# Patient Record
Sex: MALE | Race: WHITE | NOT HISPANIC OR LATINO | Employment: OTHER | URBAN - METROPOLITAN AREA
[De-identification: names, ages, dates, MRNs, and addresses within clinical notes are randomized per-mention and may not be internally consistent; named-entity substitution may affect disease eponyms.]

---

## 2017-05-15 ENCOUNTER — ALLSCRIPTS OFFICE VISIT (OUTPATIENT)
Dept: OTHER | Facility: OTHER | Age: 57
End: 2017-05-15

## 2017-05-15 PROCEDURE — 88305 TISSUE EXAM BY PATHOLOGIST: CPT | Performed by: SURGERY

## 2017-05-17 ENCOUNTER — LAB REQUISITION (OUTPATIENT)
Dept: LAB | Facility: HOSPITAL | Age: 57
End: 2017-05-17
Payer: COMMERCIAL

## 2017-05-17 DIAGNOSIS — L98.9 DISORDER OF SKIN OR SUBCUTANEOUS TISSUE: ICD-10-CM

## 2017-11-22 RX ORDER — RANITIDINE HCL 75 MG
150 TABLET ORAL 2 TIMES DAILY
COMMUNITY
End: 2019-10-12 | Stop reason: HOSPADM

## 2017-11-22 RX ORDER — SIMVASTATIN 40 MG
40 TABLET ORAL EVERY OTHER DAY
COMMUNITY

## 2017-11-24 ENCOUNTER — ANESTHESIA EVENT (OUTPATIENT)
Dept: GASTROENTEROLOGY | Facility: HOSPITAL | Age: 57
End: 2017-11-24
Payer: COMMERCIAL

## 2017-11-27 ENCOUNTER — HOSPITAL ENCOUNTER (OUTPATIENT)
Facility: HOSPITAL | Age: 57
Setting detail: OUTPATIENT SURGERY
Discharge: HOME | End: 2017-11-27
Attending: INTERNAL MEDICINE | Admitting: INTERNAL MEDICINE
Payer: COMMERCIAL

## 2017-11-27 ENCOUNTER — ANESTHESIA (OUTPATIENT)
Dept: GASTROENTEROLOGY | Facility: HOSPITAL | Age: 57
End: 2017-11-27
Payer: COMMERCIAL

## 2017-11-27 VITALS
RESPIRATION RATE: 20 BRPM | SYSTOLIC BLOOD PRESSURE: 133 MMHG | HEIGHT: 69 IN | BODY MASS INDEX: 46.65 KG/M2 | WEIGHT: 315 LBS | DIASTOLIC BLOOD PRESSURE: 62 MMHG | TEMPERATURE: 97.8 F | HEART RATE: 73 BPM | OXYGEN SATURATION: 95 %

## 2017-11-27 RX ORDER — ONDANSETRON 2 MG/ML
INJECTION INTRAMUSCULAR; INTRAVENOUS AS NEEDED
Status: DISCONTINUED | OUTPATIENT
Start: 2017-11-27 | End: 2017-11-27 | Stop reason: SURG

## 2017-11-27 RX ORDER — SODIUM CHLORIDE 9 MG/ML
125 INJECTION, SOLUTION INTRAVENOUS CONTINUOUS
Status: DISCONTINUED | OUTPATIENT
Start: 2017-11-27 | End: 2017-11-27 | Stop reason: HOSPADM

## 2017-11-27 RX ORDER — DEXAMETHASONE SODIUM PHOSPHATE 4 MG/ML
INJECTION, SOLUTION INTRA-ARTICULAR; INTRALESIONAL; INTRAMUSCULAR; INTRAVENOUS; SOFT TISSUE AS NEEDED
Status: DISCONTINUED | OUTPATIENT
Start: 2017-11-27 | End: 2017-11-27 | Stop reason: SURG

## 2017-11-27 RX ORDER — SUCCINYLCHOLINE CHLORIDE 20 MG/ML
INJECTION INTRAMUSCULAR; INTRAVENOUS AS NEEDED
Status: DISCONTINUED | OUTPATIENT
Start: 2017-11-27 | End: 2017-11-27 | Stop reason: SURG

## 2017-11-27 RX ORDER — EPHEDRINE SULFATE 50 MG/ML
INJECTION, SOLUTION INTRAVENOUS AS NEEDED
Status: DISCONTINUED | OUTPATIENT
Start: 2017-11-27 | End: 2017-11-27 | Stop reason: SURG

## 2017-11-27 RX ORDER — LIDOCAINE HYDROCHLORIDE 10 MG/ML
INJECTION, SOLUTION INFILTRATION; PERINEURAL AS NEEDED
Status: DISCONTINUED | OUTPATIENT
Start: 2017-11-27 | End: 2017-11-27 | Stop reason: SURG

## 2017-11-27 RX ORDER — PROPOFOL 10 MG/ML
INJECTION, EMULSION INTRAVENOUS AS NEEDED
Status: DISCONTINUED | OUTPATIENT
Start: 2017-11-27 | End: 2017-11-27 | Stop reason: SURG

## 2017-11-27 RX ORDER — ROCURONIUM BROMIDE 10 MG/ML
INJECTION, SOLUTION INTRAVENOUS AS NEEDED
Status: DISCONTINUED | OUTPATIENT
Start: 2017-11-27 | End: 2017-11-27 | Stop reason: SURG

## 2017-11-27 RX ADMIN — DEXAMETHASONE SODIUM PHOSPHATE 4 MG: 4 INJECTION, SOLUTION INTRAMUSCULAR; INTRAVENOUS at 08:34

## 2017-11-27 RX ADMIN — EPHEDRINE SULFATE 10 MG: 50 INJECTION, SOLUTION INTRAMUSCULAR; INTRAVENOUS; SUBCUTANEOUS at 08:45

## 2017-11-27 RX ADMIN — ROCURONIUM BROMIDE 5 MG: 10 INJECTION INTRAVENOUS at 08:30

## 2017-11-27 RX ADMIN — ONDANSETRON HYDROCHLORIDE 4 MG: 2 INJECTION, SOLUTION INTRAVENOUS at 08:36

## 2017-11-27 RX ADMIN — SUCCINYLCHOLINE CHLORIDE 100 MG: 20 INJECTION, SOLUTION INTRAMUSCULAR; INTRAVENOUS at 08:30

## 2017-11-27 RX ADMIN — PROPOFOL 300 MG: 10 INJECTION, EMULSION INTRAVENOUS at 08:30

## 2017-11-27 RX ADMIN — PROPOFOL 100 MG: 10 INJECTION, EMULSION INTRAVENOUS at 08:35

## 2017-11-27 RX ADMIN — SODIUM CHLORIDE: 0.9 INJECTION, SOLUTION INTRAVENOUS at 08:13

## 2017-11-27 RX ADMIN — LIDOCAINE HYDROCHLORIDE 50 MG: 10 INJECTION, SOLUTION INFILTRATION; PERINEURAL at 08:30

## 2017-11-27 RX ADMIN — SODIUM CHLORIDE 125 ML/HR: 0.9 INJECTION, SOLUTION INTRAVENOUS at 07:32

## 2017-11-27 NOTE — DISCHARGE INSTRUCTIONS
Discharge Summary - Rogelio Muhmamad 65 y o  male MRN: [id number]     [id number]    Date: 11/27/2017     Surgeon:  Fabiola Steven MD    Procedures Performed:  Colonoscopy    Discharge Diagnosis:  Sigmoid diverticulosis    Condition at Discharge: stable    Results and outcomes were reviewed with patient/family and questions answered  Discharge instructions were provided to patient on mobility limitations, signs and symptoms of potential complications, medication plan, treatment plan, and home safety  Patient discharged to: Home    Follow up instructions: See discharge instructions  Colonoscopy in 10 years as part of routine colon cancer screening    Diverticulosis   WHAT YOU NEED TO KNOW:   Diverticulosis is a condition that causes small pockets called diverticula to form in your intestine  These pockets make it difficult for bowel movements to pass through your digestive system  DISCHARGE INSTRUCTIONS:   Seek care immediately if:   · You have severe pain on the left side of your lower abdomen  · Your bowel movements are bright or dark red  Contact your healthcare provider if:   · You have a fever and chills  · You feel dizzy or lightheaded  · You have nausea, or you are vomiting  · You have a change in your bowel movements  · You have questions or concerns about your condition or care  Medicines:   · Medicines  to soften your bowel movements may be given  You may also need medicines to treat symptoms such as bloating and pain  · Take your medicine as directed  Contact your healthcare provider if you think your medicine is not helping or if you have side effects  Tell him or her if you are allergic to any medicine  Keep a list of the medicines, vitamins, and herbs you take  Include the amounts, and when and why you take them  Bring the list or the pill bottles to follow-up visits  Carry your medicine list with you in case of an emergency  Self-care:   The goal of treatment is to manage any symptoms you have and prevent other problems such as diverticulitis  Diverticulitis is swelling or infection of the diverticula  Your healthcare provider may recommend any of the following:  · Eat a variety of high-fiber foods  High-fiber foods help you have regular bowel movements  High-fiber foods include cooked beans, fruits, vegetables, and some cereals  Most adults need 25 to 35 grams of fiber each day  Your healthcare provider may recommend that you have more  Ask your healthcare provider how much fiber you need  Increase fiber slowly  You may have abdominal discomfort, bloating, and gas if you add fiber to your diet too quickly  You may need to take a fiber supplement if you are not getting enough fiber from food  · Drink liquids as directed  You may need to drink 2 to 3 liters (8 to 12 cups) of liquids every day  Ask your healthcare provider how much liquid to drink each day and which liquids are best for you  · Apply heat  on your abdomen for 20 to 30 minutes every 2 hours for as many days as directed  Heat helps decrease pain and muscle spasms  Help prevent diverticulitis or other symptoms: The following may help decrease your risk for diverticulitis or symptoms, such as bleeding  Talk to your provider about these or other things you can do to prevent problems that may occur with diverticulosis  · Exercise regularly  Ask your healthcare provider about the best exercise plan for you  Exercise can help you have regular bowel movements  Get 30 minutes of exercise on most days of the week  · Maintain a healthy weight  Ask your healthcare provider how much you should weigh  Ask him or her to help you create a weight loss plan if you are overweight  · Do not smoke  Nicotine and other chemicals in cigarettes increase your risk for diverticulitis  Ask your healthcare provider for information if you currently smoke and need help to quit   E-cigarettes or smokeless tobacco still contain nicotine  Talk to your healthcare provider before you use these products  · Ask your healthcare provider if it is safe to take NSAIDs  NSAIDs may increase your risk of diverticulitis  Follow up with your healthcare provider as directed:  Write down your questions so you remember to ask them during your visits  © 2017 [facility] Information is for End User's use only and may not be sold, redistributed or otherwise used for commercial purposes  All illustrations and images included in CareNotes® are the copyrighted property of [employer]  or Anibal Hunt  The above information is an  only  It is not intended as medical advice for individual conditions or treatments  Talk to your doctor, nurse or pharmacist before following any medical regimen to see if it is safe and effective for you

## 2017-11-27 NOTE — PRE-PROCEDURE INSTRUCTIONS
Patient very anxious because he was told that he would receive general anesthesia  Spoke with Dr Kel Woody and anxiety relieved  Instructions understood and call light within reach

## 2017-11-27 NOTE — OP NOTE
OPERATIVE REPORT  PATIENT NAME: Dustin Cazares    :  1960  MRN: [id number]  Pt Location: [facility] ROOM 01    SURGERY DATE: 2017    Surgeon(s) and Role:     * Kuldip Cobos MD - Primary    Preop Diagnosis:  Encounter for screening for malignant neoplasm of colon [Z12 11]    Post-Op Diagnosis Codes:     * Encounter for screening for malignant neoplasm of colon [Z12 11]     * Diverticulosis [K57 90]    Procedure(s) (LRB):  COLONOSCOPY (N/A)    Specimen(s):  * No specimens in log *    Estimated Blood Loss:   0 cc    Anesthesia Type:   IV Sedation with Anesthesia    Operative Indications:  Encounter for screening for malignant neoplasm of colon [Z12 11]    Operative Findings:  Diverticulosis    Complications:   None    Procedure and Technique: With the patient in the left lateral decubitus position, and under general endotracheal intubation, the colonoscope was introduced into the rectum following the rectal digital exam was normal  It is easily passed the entire colon to the cecum where the ileocecal valve and surface were identified  The preparation was excellent  The only finding on today's exam is that of moderate sigmoid diverticulosis  The instrument was withdrawn  Patient tolerated the procedure well  Recommendation:  Colonoscopy in 10 years as part of routine colon cancer screening     I was present for the entire procedure    Patient Disposition:  PACU     SIGNATURE: Kuldip Cobos MD  DATE: 2017  TIME: 8:50 AM

## 2017-11-27 NOTE — PROGRESS NOTES
D/C instructions given and Pt verbalizes understanding  Dr Austin Rush was here to talk with pt  OOB to ambulate, gait steady denies dizziness

## 2017-11-27 NOTE — ANESTHESIA PREPROCEDURE EVALUATION
Review of Systems/Medical History  Patient summary reviewed  Chart reviewed  History of anesthetic complications (sleep apnea requiring intubation)     Cardiovascular  Exercise tolerance: poor,  Hyperlipidemia,    Pulmonary  Negative pulmonary ROS Asthma: , Sleep apnea CPAP, ,        GI/Hepatic    GERD poorly controlled,        Negative  ROS        Endo/Other  Negative endo/other ROS      GYN  Negative gynecology ROS          Hematology  Negative hematology ROS      Musculoskeletal  Negative musculoskeletal ROS Obesity  morbid obesity,        Neurology  Negative neurology ROS      Psychology   Negative psychology ROS            Physical Exam    Airway    Mallampati score: II  TM Distance: <3 FB  Neck ROM: full     Dental   No notable dental hx     Cardiovascular  Rhythm: regular, Rate: normal,     Pulmonary  Breath sounds clear to auscultation,     Other Findings        Anesthesia Plan  ASA Score- 3       Anesthesia Type- IV sedation with anesthesia and general with ASA Monitors  Additional Monitors:   Airway Plan: ETT  Induction- intravenous  Informed Consent- Anesthetic plan and risks discussed with patient

## 2018-01-12 VITALS — WEIGHT: 315 LBS | BODY MASS INDEX: 46.65 KG/M2 | HEIGHT: 69 IN

## 2018-04-20 ENCOUNTER — ANESTHESIA EVENT (OUTPATIENT)
Dept: PERIOP | Facility: HOSPITAL | Age: 58
End: 2018-04-20
Payer: COMMERCIAL

## 2018-04-23 ENCOUNTER — ANESTHESIA (OUTPATIENT)
Dept: PERIOP | Facility: HOSPITAL | Age: 58
End: 2018-04-23
Payer: COMMERCIAL

## 2018-04-23 ENCOUNTER — HOSPITAL ENCOUNTER (OUTPATIENT)
Dept: RADIOLOGY | Facility: HOSPITAL | Age: 58
Setting detail: OUTPATIENT SURGERY
Discharge: HOME | End: 2018-04-23
Payer: COMMERCIAL

## 2018-04-23 ENCOUNTER — HOSPITAL ENCOUNTER (OUTPATIENT)
Facility: HOSPITAL | Age: 58
Setting detail: OUTPATIENT SURGERY
Discharge: HOME | End: 2018-04-23
Attending: PODIATRIST | Admitting: PODIATRIST
Payer: COMMERCIAL

## 2018-04-23 ENCOUNTER — APPOINTMENT (OUTPATIENT)
Dept: RADIOLOGY | Facility: HOSPITAL | Age: 58
End: 2018-04-23
Payer: COMMERCIAL

## 2018-04-23 VITALS
TEMPERATURE: 98.8 F | HEIGHT: 69 IN | BODY MASS INDEX: 46.65 KG/M2 | SYSTOLIC BLOOD PRESSURE: 138 MMHG | WEIGHT: 315 LBS | RESPIRATION RATE: 19 BRPM | OXYGEN SATURATION: 97 % | DIASTOLIC BLOOD PRESSURE: 76 MMHG | HEART RATE: 72 BPM

## 2018-04-23 DIAGNOSIS — M66.861 SPONTANEOUS RUPTURE OF OTHER TENDONS, RIGHT LOWER LEG: ICD-10-CM

## 2018-04-23 DIAGNOSIS — M79.671 RIGHT FOOT PAIN: Primary | ICD-10-CM

## 2018-04-23 PROCEDURE — C1713 ANCHOR/SCREW BN/BN,TIS/BN: HCPCS | Performed by: PODIATRIST

## 2018-04-23 PROCEDURE — 73650 X-RAY EXAM OF HEEL: CPT

## 2018-04-23 DEVICE — TENO SCRW,BIO-COMP
Type: IMPLANTABLE DEVICE | Site: ACHILLES TENDON | Status: FUNCTIONAL
Brand: ARTHREX®

## 2018-04-23 DEVICE — IMPL SYS,BIO-COMP ACHILLES MID-SUBSTANCE
Type: IMPLANTABLE DEVICE | Site: ACHILLES TENDON | Status: FUNCTIONAL
Brand: ARTHREX®

## 2018-04-23 RX ORDER — EPHEDRINE SULFATE 50 MG/ML
INJECTION, SOLUTION INTRAVENOUS AS NEEDED
Status: DISCONTINUED | OUTPATIENT
Start: 2018-04-23 | End: 2018-04-23 | Stop reason: SURG

## 2018-04-23 RX ORDER — OXYCODONE HYDROCHLORIDE 5 MG/1
5 TABLET ORAL EVERY 4 HOURS PRN
Status: DISCONTINUED | OUTPATIENT
Start: 2018-04-23 | End: 2018-04-23 | Stop reason: HOSPADM

## 2018-04-23 RX ORDER — ROPIVACAINE HYDROCHLORIDE 5 MG/ML
INJECTION, SOLUTION EPIDURAL; INFILTRATION; PERINEURAL AS NEEDED
Status: DISCONTINUED | OUTPATIENT
Start: 2018-04-23 | End: 2018-04-23 | Stop reason: SURG

## 2018-04-23 RX ORDER — OXYCODONE HYDROCHLORIDE AND ACETAMINOPHEN 5; 325 MG/1; MG/1
1 TABLET ORAL EVERY 4 HOURS PRN
Qty: 35 TABLET | Refills: 0 | Status: SHIPPED | OUTPATIENT
Start: 2018-04-23 | End: 2018-05-03

## 2018-04-23 RX ORDER — ACETAMINOPHEN 325 MG/1
650 TABLET ORAL EVERY 4 HOURS PRN
Status: DISCONTINUED | OUTPATIENT
Start: 2018-04-23 | End: 2018-04-23 | Stop reason: HOSPADM

## 2018-04-23 RX ORDER — OXYCODONE HYDROCHLORIDE 5 MG/1
10 TABLET ORAL EVERY 6 HOURS PRN
Status: DISCONTINUED | OUTPATIENT
Start: 2018-04-23 | End: 2018-04-23 | Stop reason: HOSPADM

## 2018-04-23 RX ORDER — SODIUM CHLORIDE 9 MG/ML
125 INJECTION, SOLUTION INTRAVENOUS CONTINUOUS
Status: DISCONTINUED | OUTPATIENT
Start: 2018-04-23 | End: 2018-04-23 | Stop reason: HOSPADM

## 2018-04-23 RX ORDER — LIDOCAINE HYDROCHLORIDE 20 MG/ML
INJECTION, SOLUTION EPIDURAL; INFILTRATION; INTRACAUDAL; PERINEURAL AS NEEDED
Status: DISCONTINUED | OUTPATIENT
Start: 2018-04-23 | End: 2018-04-23 | Stop reason: SURG

## 2018-04-23 RX ORDER — MAGNESIUM HYDROXIDE 1200 MG/15ML
LIQUID ORAL AS NEEDED
Status: DISCONTINUED | OUTPATIENT
Start: 2018-04-23 | End: 2018-04-23 | Stop reason: HOSPADM

## 2018-04-23 RX ORDER — ONDANSETRON 2 MG/ML
INJECTION INTRAMUSCULAR; INTRAVENOUS AS NEEDED
Status: DISCONTINUED | OUTPATIENT
Start: 2018-04-23 | End: 2018-04-23 | Stop reason: SURG

## 2018-04-23 RX ORDER — ONDANSETRON 2 MG/ML
4 INJECTION INTRAMUSCULAR; INTRAVENOUS ONCE AS NEEDED
Status: DISCONTINUED | OUTPATIENT
Start: 2018-04-23 | End: 2018-04-23 | Stop reason: HOSPADM

## 2018-04-23 RX ORDER — ONDANSETRON 4 MG/1
4 TABLET, FILM COATED ORAL EVERY 8 HOURS PRN
Qty: 25 TABLET | Refills: 0 | Status: ON HOLD | OUTPATIENT
Start: 2018-04-23 | End: 2018-07-23 | Stop reason: ALTCHOICE

## 2018-04-23 RX ORDER — OMEPRAZOLE 20 MG/1
20 CAPSULE, DELAYED RELEASE ORAL 2 TIMES DAILY
Qty: 70 CAPSULE | Refills: 0 | Status: ON HOLD | OUTPATIENT
Start: 2018-04-23 | End: 2018-07-23 | Stop reason: ALTCHOICE

## 2018-04-23 RX ORDER — ASPIRIN 81 MG/1
81 TABLET, CHEWABLE ORAL 2 TIMES DAILY
Qty: 70 TABLET | Refills: 0 | Status: ON HOLD | OUTPATIENT
Start: 2018-04-23 | End: 2018-07-23 | Stop reason: ALTCHOICE

## 2018-04-23 RX ORDER — ONDANSETRON 2 MG/ML
4 INJECTION INTRAMUSCULAR; INTRAVENOUS EVERY 6 HOURS PRN
Status: DISCONTINUED | OUTPATIENT
Start: 2018-04-23 | End: 2018-04-23 | Stop reason: HOSPADM

## 2018-04-23 RX ORDER — MIDAZOLAM HYDROCHLORIDE 1 MG/ML
INJECTION INTRAMUSCULAR; INTRAVENOUS AS NEEDED
Status: DISCONTINUED | OUTPATIENT
Start: 2018-04-23 | End: 2018-04-23 | Stop reason: SURG

## 2018-04-23 RX ORDER — PROPOFOL 10 MG/ML
INJECTION, EMULSION INTRAVENOUS AS NEEDED
Status: DISCONTINUED | OUTPATIENT
Start: 2018-04-23 | End: 2018-04-23 | Stop reason: SURG

## 2018-04-23 RX ORDER — SUCCINYLCHOLINE CHLORIDE 20 MG/ML
INJECTION INTRAMUSCULAR; INTRAVENOUS AS NEEDED
Status: DISCONTINUED | OUTPATIENT
Start: 2018-04-23 | End: 2018-04-23 | Stop reason: SURG

## 2018-04-23 RX ORDER — FENTANYL CITRATE 50 UG/ML
INJECTION, SOLUTION INTRAMUSCULAR; INTRAVENOUS AS NEEDED
Status: DISCONTINUED | OUTPATIENT
Start: 2018-04-23 | End: 2018-04-23 | Stop reason: SURG

## 2018-04-23 RX ORDER — METOCLOPRAMIDE HYDROCHLORIDE 5 MG/ML
10 INJECTION INTRAMUSCULAR; INTRAVENOUS ONCE AS NEEDED
Status: DISCONTINUED | OUTPATIENT
Start: 2018-04-23 | End: 2018-04-23 | Stop reason: HOSPADM

## 2018-04-23 RX ORDER — SCOLOPAMINE TRANSDERMAL SYSTEM 1 MG/1
1 PATCH, EXTENDED RELEASE TRANSDERMAL
Status: DISCONTINUED | OUTPATIENT
Start: 2018-04-23 | End: 2018-04-23 | Stop reason: HOSPADM

## 2018-04-23 RX ORDER — FENTANYL CITRATE/PF 50 MCG/ML
50 SYRINGE (ML) INJECTION
Status: DISCONTINUED | OUTPATIENT
Start: 2018-04-23 | End: 2018-04-23 | Stop reason: HOSPADM

## 2018-04-23 RX ADMIN — EPHEDRINE SULFATE 5 MG: 50 INJECTION, SOLUTION INTRAMUSCULAR; INTRAVENOUS; SUBCUTANEOUS at 13:36

## 2018-04-23 RX ADMIN — EPHEDRINE SULFATE 5 MG: 50 INJECTION, SOLUTION INTRAMUSCULAR; INTRAVENOUS; SUBCUTANEOUS at 13:27

## 2018-04-23 RX ADMIN — ROPIVACAINE HYDROCHLORIDE 30 ML: 5 INJECTION, SOLUTION EPIDURAL; INFILTRATION; PERINEURAL at 12:36

## 2018-04-23 RX ADMIN — PROPOFOL 200 MG: 10 INJECTION, EMULSION INTRAVENOUS at 12:55

## 2018-04-23 RX ADMIN — SODIUM CHLORIDE: 0.9 INJECTION, SOLUTION INTRAVENOUS at 13:15

## 2018-04-23 RX ADMIN — SUCCINYLCHOLINE CHLORIDE 100 MG: 20 INJECTION, SOLUTION INTRAMUSCULAR; INTRAVENOUS at 12:55

## 2018-04-23 RX ADMIN — ONDANSETRON HYDROCHLORIDE 4 MG: 2 INJECTION, SOLUTION INTRAVENOUS at 14:32

## 2018-04-23 RX ADMIN — EPHEDRINE SULFATE 5 MG: 50 INJECTION, SOLUTION INTRAMUSCULAR; INTRAVENOUS; SUBCUTANEOUS at 13:15

## 2018-04-23 RX ADMIN — DEXAMETHASONE SODIUM PHOSPHATE 8 MG: 10 INJECTION INTRAMUSCULAR; INTRAVENOUS at 13:15

## 2018-04-23 RX ADMIN — SCOPALAMINE 1 PATCH: 1 PATCH, EXTENDED RELEASE TRANSDERMAL at 12:31

## 2018-04-23 RX ADMIN — MIDAZOLAM 2 MG: 1 INJECTION INTRAMUSCULAR; INTRAVENOUS at 12:32

## 2018-04-23 RX ADMIN — Medication 3000 MG: at 12:50

## 2018-04-23 RX ADMIN — SODIUM CHLORIDE 125 ML/HR: 0.9 INJECTION, SOLUTION INTRAVENOUS at 11:11

## 2018-04-23 RX ADMIN — LIDOCAINE HYDROCHLORIDE 40 MG: 20 INJECTION, SOLUTION EPIDURAL; INFILTRATION; INTRACAUDAL; PERINEURAL at 12:55

## 2018-04-23 RX ADMIN — SODIUM CHLORIDE: 0.9 INJECTION, SOLUTION INTRAVENOUS at 14:42

## 2018-04-23 RX ADMIN — EPHEDRINE SULFATE 5 MG: 50 INJECTION, SOLUTION INTRAMUSCULAR; INTRAVENOUS; SUBCUTANEOUS at 13:13

## 2018-04-23 RX ADMIN — FENTANYL CITRATE 100 MCG: 50 INJECTION, SOLUTION INTRAMUSCULAR; INTRAVENOUS at 12:32

## 2018-04-23 RX ADMIN — EPHEDRINE SULFATE 5 MG: 50 INJECTION, SOLUTION INTRAMUSCULAR; INTRAVENOUS; SUBCUTANEOUS at 13:24

## 2018-04-23 RX ADMIN — FENTANYL CITRATE 100 MCG: 50 INJECTION, SOLUTION INTRAMUSCULAR; INTRAVENOUS at 12:55

## 2018-04-23 RX ADMIN — FENTANYL CITRATE 100 MCG: 50 INJECTION, SOLUTION INTRAMUSCULAR; INTRAVENOUS at 14:32

## 2018-04-23 NOTE — ANESTHESIA PROCEDURE NOTES
Peripheral Block    Patient location during procedure: holding area  Start time: 4/23/2018 12:32 PM  Removal time: 4/23/2018 12:36 PM  Reason for block: at surgeon's request and post-op pain management  Staffing  Anesthesiologist: Larry Calzada  Performed: anesthesiologist   Preanesthetic Checklist  Completed: patient identified, site marked, surgical consent, pre-op evaluation, timeout performed, IV checked, risks and benefits discussed and monitors and equipment checked  Peripheral Block  Patient position: prone  Prep: ChloraPrep  Patient monitoring: heart rate, continuous pulse ox and frequent blood pressure checks  Block type: popliteal  Laterality: right  Injection technique: single-shot  Procedures: ultrasound guided  Local infiltration: ropivacaine  Infiltration strength: 0 5 %  Dose: 30 mL  Needle  Needle type: short-bevel   Needle localization: ultrasound guidance and nerve stimulator  Assessment  Injection assessment: incremental injection, local visualized surrounding nerve on ultrasound, negative aspiration for heme and no paresthesia on injection  Post-procedure:  site cleaned  patient tolerated the procedure well with no immediate complications

## 2018-04-23 NOTE — DISCHARGE INSTRUCTIONS
Dr Nasra Snow  1)  Cold Pack: Apply a cold pack for 45 - Minutes intervals just above the surgical site for the initial 24-48 hours  Never place on the toes  If a cast has been applied  Apply the cold pack to the thigh or knee area  2)  Elevation and Rest: Keep the foot elevated at least as high as the hips for the first 24-48 hours  It would be beneficial for the foot to be elevated when you are sitting during the first 7-10 days  Elevating the foot above the heart level will help control post operative pain and swelling  3)  Medication: Take prescription as prescribed by your physician  If you have any difficulty or side effects with the medication, stop taking immediately and notify your physician at once  Medications given today:     Vicodin    3a  if applicable you may be given one of the following for prevention of blood clots  O Aspirin    Protection of Surgical Site/ Assistive Devices:  a) You will be given one of the following:  O posterior splint, nonweightbearing, walker and crutches  4) DO NOT GET THE BADAGE WET UNTIL THE DOCTOR GIVES PERMISSION  Keep the bandage clean, dry and intact until your follow-up appointment  General Information  1)  Bleeding: some bleeding through the bandage is normal  If bleeding persists, you may attempt to reinforce the existing bandage while following the above instruction  If bleeding persists, notify the physician  2)  Temperature: if your temperature rises [id number] 5 degree, call the office  3)  Problem: If you notice increasing swelling and/ or pain 2 to 3 days following surgery , please notify  4)  Redressing: call the office the day following surgery to schedule a redressing  Pain: Within the first 24 hours following surgery, if your pain is not controlled sufficiently with pain medication, please check that your bandage is not too tight  You may loosen the badge without removing it   Wait 30 minutes, if your pain is not relieved

## 2018-04-23 NOTE — ANESTHESIA PREPROCEDURE EVALUATION
Review of Systems/Medical History  Patient summary reviewed  Chart reviewed  History of anesthetic complications (sleep apnea requiring intubation)     Cardiovascular  Exercise tolerance: poor,  Hyperlipidemia,    Pulmonary  Negative pulmonary ROS Asthma: , Sleep apnea CPAP,        GI/Hepatic    GERD poorly controlled,        Negative  ROS        Endo/Other  Negative endo/other ROS   Obesity  morbid obesity   GYN  Negative gynecology ROS          Hematology  Negative hematology ROS      Musculoskeletal  Negative musculoskeletal ROS        Neurology  Negative neurology ROS      Psychology   Negative psychology ROS              Physical Exam    Airway    Mallampati score: II  TM Distance: <3 FB  Neck ROM: full     Dental   No notable dental hx     Cardiovascular  Rhythm: regular, Rate: normal,     Pulmonary  Breath sounds clear to auscultation,     Other Findings        Anesthesia Plan  ASA Score- 3     Anesthesia Type- general with ASA Monitors  Additional Monitors:   Airway Plan: ETT  Comment: Pop block preop  Plan Factors- Patient instructed to abstain from smoking on day of procedure  Patient smoked on day of surgery  Induction- intravenous  Postoperative Plan- Plan for postoperative opioid use  Informed Consent- Anesthetic plan and risks discussed with patient

## 2018-04-23 NOTE — ANESTHESIA POSTPROCEDURE EVALUATION
Post-Op Assessment Note      CV Status:  Stable    Mental Status:  Alert and awake    Hydration Status:  Euvolemic    PONV Controlled:  Controlled    Airway Patency:  Patent    Post Op Vitals Reviewed:  Yes              /61 (04/23/18 1605)    Temp 98 8 °F (37 1 °C) (04/23/18 1605)    Pulse 72 (04/23/18 1605)   Resp 19 (04/23/18 1605)    SpO2 95 % (04/23/18 1605)

## 2018-04-23 NOTE — OP NOTE
OPERATIVE REPORT  PATIENT NAME: Gigi Johnson    :  1960  MRN: [id number]  Pt Location: [facility] ROOM 05    SURGERY DATE: 2018    Surgeon(s) and Role:     * Pilar España DPM - Assisting     * Oddis Simmonds - Assisting     * Ella Yanes DPM - Primary    Preop Diagnosis:  Spontaneous rupture of other tendons, right lower leg [M66 861]    Post-Op Diagnosis Codes: * Spontaneous rupture of other tendons, right lower leg [M66 861]    Procedure(s) (LRB):  REPAIR TENDON ACHILLES  FHL TRANSFER (Right)    Specimen(s):  * No specimens in log *    Estimated Blood Loss:   Minimal    Materials:  Arthrex fiber loop, Arthrex FiberWire to O x2  Associated Arthrex interference screws measuring 6 25 mm and also Arthrex tenodesis screws  Anesthesia Type:   General w/ Popliteal Block    Operative Indications:  Spontaneous rupture of other tendons, right lower leg [M66 861]      Operative Findings:    Large amount of intratendinous calcification, hypertrophy and fusiform shape of the Achilles tendon  Mop ending of both surfaces of callus tendon    Complications:   None    Procedure and Technique:  Patient received a popliteal block preoperatively  Under mild sedation the patient was brought into the OR and Intubated on the litter  A pneumatic tourniquet was placed about the   Rightthigh over appropriate underpadding  Per anesthesia  Patient was then sedated and intubated for general anesthesia and then carefully placed in prone position on the procedure table  The extremity was then scrubbed, prepped, and draped in the usual aseptic manner  A time out was performed and satisfied   An eschmark bandage was then used to exsanguinate the  Right thigh and the tourniquet was inflated to 300 millimeters of pressure      The  Right Achilles tendon was examined and it was noticed that there was a palpable defect 2-3 cm proximally from its insertion with a positive Myers test      Attention was directed to the posterior achilles tendon where an approximate 12 cm longitudinal incision was made through the skin using a 15 blade from the heel extending proximally over the myotendinous junction  Blunt dissection through the subcutaneous tissue was performed, small vessels were cauterized for hemostasis  The sural nerve and vessels were identified and retracted laterally  With dissection  exposure of the deep fascia and peritenon was appreciated  A longitudinal incision was made through the peritenon and deep fascia along the entire length of the surgical site  This was reflected medially and laterally with care to protect the tissue thus exposing the tendon  The Achilles rupture was identified about 2 cm proximal to its insertion with a 3-4 cm gap  The tendon both proximally and distally to the rupture were grossly tendinotic,  Of note, there is large amount of  Intra-tendinous calcification taking up the majority of the Achilles tendon,  This was unable to be debrided due to the likelihood of weakening the tendon extensively      At this time it was decided that a deep tendon transfer was needed in order to augment the insertion site  Careful dissection was performed taking care to retract all vital neurovascular structures exposing the FHL tendon which was clamped using an Allis clamp and the FHL was transected distally  At this time utilizing a guidewire from the Arthrex tenodesis set, it was inserted via power into the calcaneus with exit plantar heel, next the wire was overdrilled with a 7mm drill  The FHL tendon was tagged with 2-0 FiberWire and was pulled through the drilled hole and under adequate tendon was stabilized with an Arthrex size 6 25 x 15mm anchor  The tendon was challenged and found to be stable       At this time the wound was flushed with copious amounts of sterile saline   The distal aspect of the achilles tendon was debrided to healthy tendon as noted previously, and using [place] stitch with size #2 FiberWire the tendon was placed under tension for lengthening with care to avoid tearing of the underlying muscle  A second #2 Fiberwire was then used to   Perform a 2nd Krackow anteriorly and posteriorly at the midline of the Achilles tendon  Next attention was directed to the distal calcaneus where two  Arthrex tenodesis screws were prepared for insertion  These holes were drilled with the provided drill bit and then tapped appropriately  2 strands of each FiberWire were inserted with each tenodesis screw in the proper hole  The ankle was plantar flexed to achieve maximal plantar flexion and the tenodesis screw was then inserted under the appropriate tension  The deep fascia and peritenon were then reapproximated using 2-0 Vicryl  The repair was noted to be stable  Closure of the remainder of the wound was performed using 3-0 and 4-0 Vicryl  Skin closure was obtained utilizing 3 0 nylon      The surgical site was dressed with Xeroform, dry sterile dressing, and a posterior splint maintained in slight plantarflexion  The tourniquet was deflated  A prompt hyperemic response was noted to the Left foot  All sharps and sponges were accounted for following the procedure  The patient tolerated the procedure and anesthesia well and was transferred to the PACU with vital signs stable       I was present for the entire procedure    Patient Disposition:  PACU  and extubated and stable    SIGNATURE: Javier Rodriguez  DATE: April 23, 2018  TIME: 3:34 PM

## 2018-04-23 NOTE — DISCHARGE SUMMARY
Discharge Summary Outpatient Procedure Podiatry -   Dutch Ortiz 65 y o  male MRN: [id number]  Unit/Bed#: OR POOL Encounter: [id number]    Admission Date: 4/23/2018     Admitting Diagnosis: Spontaneous rupture of other tendons, right lower leg [M66 861]    Discharge Diagnosis: same    Procedures Performed: [address] [facility] TRANSFER: [id number] (CPT®)    Complications: none    Condition at Discharge: stable    Discharge instructions/Information to patient and family:   See after visit summary for information provided to patient and family  Provisions for Follow-Up Care/Important appointments:  See after visit summary for information related to follow-up care and any pertinent home health orders  Discharge Medications:  See after visit summary for reconciled discharge medications provided to patient and family

## 2018-04-23 NOTE — PROGRESS NOTES
D/C instructions reviewed w/ pt I spouse, verbalized understanding  R LE gauze,ace wrap dressing remain CD&I  Posterior splint intact  Ice pack behind knee  Unable to move toes/ foot or ankle, very minimal sensation in 4-5th toes  +2 popliteal pulse  NWB w/ crutches, pt getting knee scooter for home   Printed crutch instructions given

## 2018-04-23 NOTE — PROGRESS NOTES
Ambulated w/ crutches NWB to bathroom, gait fairly steady  Rx's filled at [facility]  Scope patch remains behind L ear

## 2018-07-10 ENCOUNTER — TRANSCRIBE ORDERS (OUTPATIENT)
Dept: ADMINISTRATIVE | Facility: HOSPITAL | Age: 58
End: 2018-07-10

## 2018-07-10 DIAGNOSIS — M66.861 SPONTANEOUS RUPTURE OF OTHER TENDONS, RIGHT LOWER LEG: Primary | ICD-10-CM

## 2018-07-17 ENCOUNTER — ANESTHESIA EVENT (OUTPATIENT)
Dept: PERIOP | Facility: HOSPITAL | Age: 58
End: 2018-07-17
Payer: COMMERCIAL

## 2018-07-17 NOTE — ANESTHESIA PREPROCEDURE EVALUATION
Review of Systems/Medical History      No history of anesthetic complications     Cardiovascular  Exercise tolerance (METS): >4,  Hyperlipidemia,    Pulmonary  Asthma , seasonal/exercise induced Last rescue: > 1 year ago , Sleep apnea (cpap dependent) ,        GI/Hepatic    GERD well controlled,             Endo/Other    Obesity  morbid obesity   GYN       Hematology   Musculoskeletal       Neurology   Psychology           Physical Exam    Airway    Mallampati score: III  TM Distance: >3 FB  Neck ROM: full     Dental   Comment: Short neck  Large neck circumference  cpap dependent,     Cardiovascular  Cardiovascular exam normal    Pulmonary  Pulmonary exam normal     Other Findings        Anesthesia Plan  ASA Score- 3     Anesthesia Type- IV sedation with anesthesia and general with ASA Monitors  Additional Monitors:   Airway Plan:     Comment: May need LMA / ETT        Plan Factors-Patient not instructed to abstain from smoking on day of procedure  Patient did not smoke on day of surgery  Induction- intravenous  Postoperative Plan- Plan for postoperative opioid use  Informed Consent- Anesthetic plan and risks discussed with patient         ekg reviewed   Labs ok

## 2018-07-18 ENCOUNTER — HOSPITAL ENCOUNTER (OUTPATIENT)
Dept: MRI IMAGING | Facility: HOSPITAL | Age: 58
Discharge: HOME | End: 2018-07-18
Attending: PODIATRIST
Payer: COMMERCIAL

## 2018-07-18 ENCOUNTER — ANESTHESIA (OUTPATIENT)
Dept: PERIOP | Facility: HOSPITAL | Age: 58
End: 2018-07-18
Payer: COMMERCIAL

## 2018-07-18 ENCOUNTER — HOSPITAL ENCOUNTER (OUTPATIENT)
Facility: HOSPITAL | Age: 58
Setting detail: OUTPATIENT SURGERY
Discharge: HOME | End: 2018-07-18
Attending: PLASTIC SURGERY | Admitting: PLASTIC SURGERY
Payer: COMMERCIAL

## 2018-07-18 VITALS
DIASTOLIC BLOOD PRESSURE: 61 MMHG | TEMPERATURE: 97.1 F | HEART RATE: 81 BPM | OXYGEN SATURATION: 93 % | RESPIRATION RATE: 20 BRPM | SYSTOLIC BLOOD PRESSURE: 129 MMHG

## 2018-07-18 DIAGNOSIS — L98.9 DISORDER OF SKIN OR SUBCUTANEOUS TISSUE: ICD-10-CM

## 2018-07-18 DIAGNOSIS — M66.861 SPONTANEOUS RUPTURE OF OTHER TENDONS, RIGHT LOWER LEG: ICD-10-CM

## 2018-07-18 DIAGNOSIS — S91.001A OPEN WOUND OF RIGHT ANKLE: ICD-10-CM

## 2018-07-18 PROCEDURE — 87070 CULTURE OTHR SPECIMN AEROBIC: CPT | Performed by: PLASTIC SURGERY

## 2018-07-18 PROCEDURE — 87176 TISSUE HOMOGENIZATION CULTR: CPT | Performed by: PLASTIC SURGERY

## 2018-07-18 PROCEDURE — 94640 AIRWAY INHALATION TREATMENT: CPT

## 2018-07-18 PROCEDURE — 94760 N-INVAS EAR/PLS OXIMETRY 1: CPT

## 2018-07-18 PROCEDURE — 73721 MRI JNT OF LWR EXTRE W/O DYE: CPT

## 2018-07-18 PROCEDURE — 87077 CULTURE AEROBIC IDENTIFY: CPT | Performed by: PLASTIC SURGERY

## 2018-07-18 PROCEDURE — 88304 TISSUE EXAM BY PATHOLOGIST: CPT | Performed by: PATHOLOGY

## 2018-07-18 PROCEDURE — 87186 SC STD MICRODIL/AGAR DIL: CPT | Performed by: PLASTIC SURGERY

## 2018-07-18 PROCEDURE — 87205 SMEAR GRAM STAIN: CPT | Performed by: PLASTIC SURGERY

## 2018-07-18 RX ORDER — MIDAZOLAM HYDROCHLORIDE 1 MG/ML
INJECTION INTRAMUSCULAR; INTRAVENOUS AS NEEDED
Status: DISCONTINUED | OUTPATIENT
Start: 2018-07-18 | End: 2018-07-18 | Stop reason: SURG

## 2018-07-18 RX ORDER — VANCOMYCIN HYDROCHLORIDE 1 G/200ML
1000 INJECTION, SOLUTION INTRAVENOUS ONCE
Status: DISCONTINUED | OUTPATIENT
Start: 2018-07-18 | End: 2018-07-18 | Stop reason: HOSPADM

## 2018-07-18 RX ORDER — ROCURONIUM BROMIDE 10 MG/ML
INJECTION, SOLUTION INTRAVENOUS AS NEEDED
Status: DISCONTINUED | OUTPATIENT
Start: 2018-07-18 | End: 2018-07-18 | Stop reason: SURG

## 2018-07-18 RX ORDER — FENTANYL CITRATE 50 UG/ML
INJECTION, SOLUTION INTRAMUSCULAR; INTRAVENOUS AS NEEDED
Status: DISCONTINUED | OUTPATIENT
Start: 2018-07-18 | End: 2018-07-18 | Stop reason: SURG

## 2018-07-18 RX ORDER — LIDOCAINE HYDROCHLORIDE 10 MG/ML
INJECTION, SOLUTION INFILTRATION; PERINEURAL AS NEEDED
Status: DISCONTINUED | OUTPATIENT
Start: 2018-07-18 | End: 2018-07-18 | Stop reason: SURG

## 2018-07-18 RX ORDER — SODIUM CHLORIDE, SODIUM LACTATE, POTASSIUM CHLORIDE, CALCIUM CHLORIDE 600; 310; 30; 20 MG/100ML; MG/100ML; MG/100ML; MG/100ML
50 INJECTION, SOLUTION INTRAVENOUS CONTINUOUS
Status: DISCONTINUED | OUTPATIENT
Start: 2018-07-18 | End: 2018-07-18 | Stop reason: HOSPADM

## 2018-07-18 RX ORDER — ONDANSETRON 4 MG/1
4 TABLET, ORALLY DISINTEGRATING ORAL EVERY 6 HOURS PRN
Status: DISCONTINUED | OUTPATIENT
Start: 2018-07-18 | End: 2018-07-18 | Stop reason: HOSPADM

## 2018-07-18 RX ORDER — ONDANSETRON 2 MG/ML
INJECTION INTRAMUSCULAR; INTRAVENOUS AS NEEDED
Status: DISCONTINUED | OUTPATIENT
Start: 2018-07-18 | End: 2018-07-18 | Stop reason: SURG

## 2018-07-18 RX ORDER — PROPOFOL 10 MG/ML
INJECTION, EMULSION INTRAVENOUS AS NEEDED
Status: DISCONTINUED | OUTPATIENT
Start: 2018-07-18 | End: 2018-07-18 | Stop reason: SURG

## 2018-07-18 RX ORDER — ONDANSETRON 2 MG/ML
4 INJECTION INTRAMUSCULAR; INTRAVENOUS ONCE AS NEEDED
Status: DISCONTINUED | OUTPATIENT
Start: 2018-07-18 | End: 2018-07-18 | Stop reason: HOSPADM

## 2018-07-18 RX ORDER — IBUPROFEN 600 MG/1
600 TABLET ORAL EVERY 6 HOURS PRN
Status: DISCONTINUED | OUTPATIENT
Start: 2018-07-18 | End: 2018-07-18 | Stop reason: HOSPADM

## 2018-07-18 RX ORDER — PROMETHAZINE HYDROCHLORIDE 25 MG/ML
12.5 INJECTION, SOLUTION INTRAMUSCULAR; INTRAVENOUS ONCE AS NEEDED
Status: DISCONTINUED | OUTPATIENT
Start: 2018-07-18 | End: 2018-07-18 | Stop reason: HOSPADM

## 2018-07-18 RX ORDER — ALBUTEROL SULFATE 2.5 MG/3ML
SOLUTION RESPIRATORY (INHALATION) AS NEEDED
Status: DISCONTINUED | OUTPATIENT
Start: 2018-07-18 | End: 2018-07-18 | Stop reason: SURG

## 2018-07-18 RX ADMIN — LIDOCAINE HYDROCHLORIDE 30 MG: 10 INJECTION, SOLUTION INFILTRATION; PERINEURAL at 12:55

## 2018-07-18 RX ADMIN — MIDAZOLAM HYDROCHLORIDE 2 MG: 1 INJECTION, SOLUTION INTRAMUSCULAR; INTRAVENOUS at 12:55

## 2018-07-18 RX ADMIN — DEXAMETHASONE SODIUM PHOSPHATE 4 MG: 10 INJECTION INTRAMUSCULAR; INTRAVENOUS at 13:10

## 2018-07-18 RX ADMIN — SODIUM CHLORIDE, POTASSIUM CHLORIDE, SODIUM LACTATE AND CALCIUM CHLORIDE 50 ML/HR: 600; 310; 30; 20 INJECTION, SOLUTION INTRAVENOUS at 12:41

## 2018-07-18 RX ADMIN — ONDANSETRON HYDROCHLORIDE 4 MG: 2 INJECTION, SOLUTION INTRAMUSCULAR; INTRAVENOUS at 13:10

## 2018-07-18 RX ADMIN — ROCURONIUM BROMIDE 30 MG: 10 INJECTION, SOLUTION INTRAVENOUS at 13:05

## 2018-07-18 RX ADMIN — VANCOMYCIN 1 G: 1 INJECTION, SOLUTION INTRAVENOUS at 12:52

## 2018-07-18 RX ADMIN — SODIUM CHLORIDE, POTASSIUM CHLORIDE, SODIUM LACTATE AND CALCIUM CHLORIDE: 600; 310; 30; 20 INJECTION, SOLUTION INTRAVENOUS at 12:45

## 2018-07-18 RX ADMIN — FENTANYL CITRATE 100 MCG: 50 INJECTION INTRAMUSCULAR; INTRAVENOUS at 12:55

## 2018-07-18 RX ADMIN — PROPOFOL 200 MG: 10 INJECTION, EMULSION INTRAVENOUS at 13:05

## 2018-07-18 RX ADMIN — ALBUTEROL SULFATE 2.5 MG: 2.5 SOLUTION RESPIRATORY (INHALATION) at 14:21

## 2018-07-18 RX ADMIN — ALBUTEROL SULFATE 5 MG: 2.5 SOLUTION RESPIRATORY (INHALATION) at 13:10

## 2018-07-18 NOTE — ANESTHESIA POSTPROCEDURE EVALUATION
Post-Op Assessment Note      CV Status:  Stable    Mental Status:  Alert    Hydration Status:  Stable    PONV Controlled:  None    Airway Patency:  Patent    Post Op Vitals Reviewed: Yes          Staff: CRNA       Comments: going to get a breathing treatment          BP      Temp     Pulse     Resp      SpO2

## 2018-07-18 NOTE — OP NOTE
OPERATIVE REPORT  PATIENT NAME: Christine Nguyen    :  1960  MRN: [id number]  Pt Location:  OR ROOM 08    SURGERY DATE: 2018    Surgeon(s) and Role:     * Kim Darden MD - Primary    Preop and postop Diagnosis:  Infected right Achilles tendon repair    Operative  Procedure(s) (LRB):  DEBRIDEMENT ANKLE WOUND (Right)    Specimen(s):  ID Type Source Tests Collected by Time Destination   1 : right ankle Tissue Soft Tissue, Debridement TISSUE EXAM Kim Darden MD 2018 1328    A : right ankle Tissue Soft Tissue, Debridement CULTURE, TISSUE AND GRAM STAIN Kim Darden MD 2018 1330      Operative history:  Patient is now some 3 months after repair for right Achilles tendon rupture  He has  exposure of the Achilles tendon  Debridement of granulation tissue on top of the tendon as well as the portion of the repair was performed  A repair suture was found broken in the defect and removed as well as possible  Operative procedure:  Patient was taken to the operating room and placed in a lateral position with his left side down on the operating table  He was prepped and draped in the usual fashion  Anesthesia was general via endotracheal tube  The right lower extremity was exsanguinated with an Esmarch and a mid thighs to tourniquet inflated to 350 mm pressure  Some of the necrotic skin was excised above and below the defect as well as all residue over the Achilles tendon itself  There seemed to be some necrotic material around the tendon repair was removed as well as the suture  Some of the skin in size for exposure was sewn shut with 4-0 nylon simple sutures  The tourniquet tourniquet was deflated and hemostasis as necessary achieved with the Bovie  0 25% acetic acid dressings were placed on the wound  Upon extubation the patient had some stridor and hypercapnia  He was eventually stabilized anesthesia and taken to the recovery room awake and well oxygenated        SIGNATURE: Allison Isaac MD  DATE: July 18, 2018  TIME: 1:59 PM

## 2018-07-22 ENCOUNTER — ANESTHESIA EVENT (OUTPATIENT)
Dept: PERIOP | Facility: HOSPITAL | Age: 58
End: 2018-07-22
Payer: COMMERCIAL

## 2018-07-22 LAB
BACTERIA TISS AEROBE CULT: ABNORMAL
GRAM STN SPEC: ABNORMAL

## 2018-07-23 ENCOUNTER — HOSPITAL ENCOUNTER (OUTPATIENT)
Facility: HOSPITAL | Age: 58
Setting detail: OUTPATIENT SURGERY
Discharge: HOME | End: 2018-07-23
Attending: PLASTIC SURGERY | Admitting: PLASTIC SURGERY
Payer: COMMERCIAL

## 2018-07-23 ENCOUNTER — ANESTHESIA (OUTPATIENT)
Dept: PERIOP | Facility: HOSPITAL | Age: 58
End: 2018-07-23
Payer: COMMERCIAL

## 2018-07-23 VITALS
WEIGHT: 315 LBS | BODY MASS INDEX: 46.65 KG/M2 | HEIGHT: 69 IN | HEART RATE: 65 BPM | OXYGEN SATURATION: 95 % | TEMPERATURE: 97.6 F | RESPIRATION RATE: 18 BRPM | DIASTOLIC BLOOD PRESSURE: 79 MMHG | SYSTOLIC BLOOD PRESSURE: 133 MMHG

## 2018-07-23 RX ORDER — SODIUM CHLORIDE, SODIUM LACTATE, POTASSIUM CHLORIDE, CALCIUM CHLORIDE 600; 310; 30; 20 MG/100ML; MG/100ML; MG/100ML; MG/100ML
50 INJECTION, SOLUTION INTRAVENOUS CONTINUOUS
Status: DISCONTINUED | OUTPATIENT
Start: 2018-07-23 | End: 2018-07-23 | Stop reason: HOSPADM

## 2018-07-23 RX ORDER — ONDANSETRON 4 MG/1
4 TABLET, ORALLY DISINTEGRATING ORAL EVERY 6 HOURS PRN
Status: DISCONTINUED | OUTPATIENT
Start: 2018-07-23 | End: 2018-07-23 | Stop reason: HOSPADM

## 2018-07-23 RX ORDER — VANCOMYCIN HYDROCHLORIDE 1 G/200ML
1000 INJECTION, SOLUTION INTRAVENOUS EVERY 12 HOURS
Status: CANCELLED | OUTPATIENT
Start: 2018-07-23

## 2018-07-23 RX ORDER — MAGNESIUM HYDROXIDE 1200 MG/15ML
LIQUID ORAL AS NEEDED
Status: DISCONTINUED | OUTPATIENT
Start: 2018-07-23 | End: 2018-07-23 | Stop reason: HOSPADM

## 2018-07-23 RX ORDER — SULFAMETHOXAZOLE AND TRIMETHOPRIM 800; 160 MG/1; MG/1
1 TABLET ORAL 2 TIMES DAILY
Refills: 0 | Status: ON HOLD | COMMUNITY
Start: 2018-07-14 | End: 2018-07-30 | Stop reason: ALTCHOICE

## 2018-07-23 RX ORDER — PROMETHAZINE HYDROCHLORIDE 25 MG/ML
12.5 INJECTION, SOLUTION INTRAMUSCULAR; INTRAVENOUS ONCE AS NEEDED
Status: DISCONTINUED | OUTPATIENT
Start: 2018-07-23 | End: 2018-07-23 | Stop reason: HOSPADM

## 2018-07-23 RX ORDER — VANCOMYCIN HYDROCHLORIDE 1 G/200ML
1000 INJECTION, SOLUTION INTRAVENOUS EVERY 12 HOURS
Status: DISCONTINUED | OUTPATIENT
Start: 2018-07-23 | End: 2018-07-23 | Stop reason: DRUGHIGH

## 2018-07-23 RX ORDER — MIDAZOLAM HYDROCHLORIDE 1 MG/ML
INJECTION INTRAMUSCULAR; INTRAVENOUS AS NEEDED
Status: DISCONTINUED | OUTPATIENT
Start: 2018-07-23 | End: 2018-07-23 | Stop reason: SURG

## 2018-07-23 RX ORDER — ACETIC ACID 0.25 G/100ML
IRRIGANT IRRIGATION AS NEEDED
Status: DISCONTINUED | OUTPATIENT
Start: 2018-07-23 | End: 2018-07-23 | Stop reason: HOSPADM

## 2018-07-23 RX ORDER — ONDANSETRON 2 MG/ML
4 INJECTION INTRAMUSCULAR; INTRAVENOUS ONCE AS NEEDED
Status: DISCONTINUED | OUTPATIENT
Start: 2018-07-23 | End: 2018-07-23 | Stop reason: HOSPADM

## 2018-07-23 RX ORDER — VANCOMYCIN HYDROCHLORIDE 1 G/200ML
1000 INJECTION, SOLUTION INTRAVENOUS ONCE
Status: COMPLETED | OUTPATIENT
Start: 2018-07-23 | End: 2018-07-23

## 2018-07-23 RX ORDER — CIPROFLOXACIN 500 MG/1
TABLET, FILM COATED ORAL
Status: ON HOLD | COMMUNITY
End: 2018-07-30

## 2018-07-23 RX ADMIN — MIDAZOLAM HYDROCHLORIDE 1 MG: 1 INJECTION, SOLUTION INTRAMUSCULAR; INTRAVENOUS at 11:00

## 2018-07-23 RX ADMIN — SODIUM CHLORIDE, POTASSIUM CHLORIDE, SODIUM LACTATE AND CALCIUM CHLORIDE: 600; 310; 30; 20 INJECTION, SOLUTION INTRAVENOUS at 10:50

## 2018-07-23 RX ADMIN — LEVOFLOXACIN 750 MG: 750 INJECTION, SOLUTION INTRAVENOUS at 10:50

## 2018-07-23 RX ADMIN — VANCOMYCIN HYDROCHLORIDE 1000 MG: 1 INJECTION, SOLUTION INTRAVENOUS at 10:55

## 2018-07-23 RX ADMIN — MIDAZOLAM HYDROCHLORIDE 2 MG: 1 INJECTION, SOLUTION INTRAMUSCULAR; INTRAVENOUS at 10:53

## 2018-07-23 RX ADMIN — MIDAZOLAM HYDROCHLORIDE 1 MG: 1 INJECTION, SOLUTION INTRAMUSCULAR; INTRAVENOUS at 11:05

## 2018-07-23 RX ADMIN — SODIUM CHLORIDE, POTASSIUM CHLORIDE, SODIUM LACTATE AND CALCIUM CHLORIDE 50 ML/HR: 600; 310; 30; 20 INJECTION, SOLUTION INTRAVENOUS at 10:05

## 2018-07-23 NOTE — OP NOTE
OPERATIVE REPORT  PATIENT NAME: Maribel Sepulveda    :  1960  MRN: [id number]  Pt Location:  OR ROOM 12    SURGERY DATE: 2018    Surgeon(s) and Role:     * Leonora Evans MD - Primary     * Alvina Gastelum - Assisting    Preop and postoperative Diagnosis:  Exposed right Achilles tendon    Operative  Procedure(s) (LRB):  DEBRIDEMENT RIGHT ANKLE WOUND (Right)    Operative history:  The patient 3 months ago had a rupture of his Achilles tendon  There were wound healing issues after repair with the tendon becoming exposed  This was debrided last week, and he returns today for further debridement  The wound is amazingly better with minimal tendon exposure with most of it having granulated over  Definitive debridement and coverage will require free tissue transfer planned for next week  Operative procedure:  Patient was taken to the operating room placed supine on the operating table  He was prepped and draped in the usual fashion  Anesthesia was general via IV sedation  Findings were as described  Loose tissues were curretted from the wound of the right ankle  Acetic acid soaks were applied with a bulky dressing  He tolerated the procedure well and was taken to the recovery area in good condition        SIGNATURE: Leonora Evans MD  DATE: 2018  TIME: 11:31 AM

## 2018-07-23 NOTE — NURSING NOTE
Pt returned to [facility] awake alert IV infusing  Dressing to Right Ankle is clean, dry, and intact  Pt denies pain taking po IV infusing  Toes to right foot are pink and warm with positive neuromotor sensation

## 2018-07-23 NOTE — ANESTHESIA POSTPROCEDURE EVALUATION
Post-Op Assessment Note      CV Status:  Stable    Mental Status:  Alert and awake    Hydration Status:  Euvolemic    PONV Controlled:  Controlled    Airway Patency:  Patent    Post Op Vitals Reviewed: Yes          Staff: CRNA           BP      Temp (!) (P) 97 4 °F (36 3 °C) (07/23/18 1121)    Pulse     Resp (P) 17 (07/23/18 1121)    SpO2

## 2018-07-27 ENCOUNTER — ANESTHESIA EVENT (OUTPATIENT)
Dept: PERIOP | Facility: HOSPITAL | Age: 58
DRG: 904 | End: 2018-07-27
Payer: COMMERCIAL

## 2018-07-30 ENCOUNTER — HOSPITAL ENCOUNTER (INPATIENT)
Facility: HOSPITAL | Age: 58
LOS: 6 days | Discharge: HOME | DRG: 904 | End: 2018-08-05
Attending: PLASTIC SURGERY | Admitting: PLASTIC SURGERY
Payer: COMMERCIAL

## 2018-07-30 ENCOUNTER — ANESTHESIA (OUTPATIENT)
Dept: PERIOP | Facility: HOSPITAL | Age: 58
DRG: 904 | End: 2018-07-30
Payer: COMMERCIAL

## 2018-07-30 DIAGNOSIS — S91.001A OPEN WOUND OF RIGHT ANKLE: ICD-10-CM

## 2018-07-30 DIAGNOSIS — I10 HTN (HYPERTENSION): ICD-10-CM

## 2018-07-30 DIAGNOSIS — S91.301D OPEN WOUND OF RIGHT FOOT WITH TENDON INVOLVEMENT, SUBSEQUENT ENCOUNTER: Primary | Chronic | ICD-10-CM

## 2018-07-30 DIAGNOSIS — S96.901D OPEN WOUND OF RIGHT FOOT WITH TENDON INVOLVEMENT, SUBSEQUENT ENCOUNTER: Primary | Chronic | ICD-10-CM

## 2018-07-30 DIAGNOSIS — M66.271 SPONTANEOUS RUPTURE OF EXTENSOR TENDONS, RIGHT ANKLE AND FOOT: ICD-10-CM

## 2018-07-30 PROBLEM — S96.901A OPEN WOUND OF RIGHT FOOT WITH TENDON INVOLVEMENT: Chronic | Status: ACTIVE | Noted: 2018-07-30

## 2018-07-30 PROBLEM — S91.301A OPEN WOUND OF RIGHT FOOT WITH TENDON INVOLVEMENT: Chronic | Status: ACTIVE | Noted: 2018-07-30

## 2018-07-30 LAB
ANION GAP SERPL CALCULATED.3IONS-SCNC: 8 MMOL/L (ref 5–14)
ATRIAL RATE: 72 BPM
BACTERIA UR QL AUTO: ABNORMAL /HPF
BILIRUB UR QL STRIP: NEGATIVE
BUN SERPL-MCNC: 15 MG/DL (ref 5–25)
CALCIUM SERPL-MCNC: 8.6 MG/DL (ref 8.4–10.2)
CHLORIDE SERPL-SCNC: 102 MMOL/L (ref 97–108)
CLARITY UR: CLEAR
CO2 SERPL-SCNC: 27 MMOL/L (ref 22–30)
COLOR UR: ABNORMAL
CREAT SERPL-MCNC: 1.02 MG/DL (ref 0.7–1.5)
ERYTHROCYTE [DISTWIDTH] IN BLOOD BY AUTOMATED COUNT: 15.4 %
GFR SERPL CREATININE-BSD FRML MDRD: 81 ML/MIN/1.73SQ M
GLUCOSE SERPL-MCNC: 126 MG/DL (ref 70–99)
GLUCOSE UR STRIP-MCNC: NEGATIVE MG/DL
HCT VFR BLD AUTO: 45.3 % (ref 41–53)
HGB BLD-MCNC: 15.2 G/DL (ref 13.5–17.5)
HGB UR QL STRIP.AUTO: 10
KETONES UR STRIP-MCNC: NEGATIVE MG/DL
LEUKOCYTE ESTERASE UR QL STRIP: NEGATIVE
LYMPHOCYTES # BLD AUTO: 1.24 THOUSAND/UL (ref 0.5–4)
LYMPHOCYTES # BLD AUTO: 7 % (ref 20–50)
MCH RBC QN AUTO: 30.1 PG (ref 26–34)
MCHC RBC AUTO-ENTMCNC: 33.6 G/DL (ref 31–36)
MCV RBC AUTO: 90 FL (ref 80–100)
MONOCYTES # BLD AUTO: 1.24 THOUSAND/UL (ref 0.2–0.9)
MONOCYTES NFR BLD AUTO: 7 % (ref 1–10)
MUCOUS THREADS UR QL AUTO: ABNORMAL
NEUTS SEG # BLD: 15.22 THOUSAND/UL (ref 1.8–7.8)
NEUTS SEG NFR BLD AUTO: 86 %
NITRITE UR QL STRIP: NEGATIVE
NON-SQ EPI CELLS URNS QL MICRO: ABNORMAL /HPF
P AXIS: 52 DEGREES
PH UR STRIP.AUTO: 7 [PH] (ref 4.5–8)
PLATELET # BLD AUTO: 184 THOUSANDS/UL (ref 150–450)
PLATELET BLD QL SMEAR: ADEQUATE
PMV BLD AUTO: 8.3 FL (ref 8.9–12.7)
POTASSIUM SERPL-SCNC: 4.2 MMOL/L (ref 3.6–5)
PR INTERVAL: 202 MS
PROT UR STRIP-MCNC: NEGATIVE MG/DL
QRS AXIS: -17 DEGREES
QRSD INTERVAL: 114 MS
QT INTERVAL: 412 MS
QTC INTERVAL: 451 MS
RBC # BLD AUTO: 5.06 MILLION/UL (ref 4.5–5.9)
RBC #/AREA URNS AUTO: ABNORMAL /HPF
RBC MORPH BLD: NORMAL
SODIUM SERPL-SCNC: 137 MMOL/L (ref 137–147)
SP GR UR STRIP.AUTO: 1.01 (ref 1–1.04)
T WAVE AXIS: 33 DEGREES
TOTAL CELLS COUNTED SPEC: 100
UROBILINOGEN UA: NEGATIVE MG/DL
VENTRICULAR RATE: 72 BPM
WBC # BLD AUTO: 17.7 THOUSAND/UL (ref 4.5–11)
WBC #/AREA URNS AUTO: ABNORMAL /HPF

## 2018-07-30 PROCEDURE — 80048 BASIC METABOLIC PNL TOTAL CA: CPT | Performed by: PLASTIC SURGERY

## 2018-07-30 PROCEDURE — 99253 IP/OBS CNSLTJ NEW/EST LOW 45: CPT | Performed by: FAMILY MEDICINE

## 2018-07-30 PROCEDURE — 81001 URINALYSIS AUTO W/SCOPE: CPT | Performed by: PLASTIC SURGERY

## 2018-07-30 PROCEDURE — 0HRHX74 REPLACEMENT OF RIGHT UPPER LEG SKIN WITH AUTOLOGOUS TISSUE SUBSTITUTE, PARTIAL THICKNESS, EXTERNAL APPROACH: ICD-10-PCS | Performed by: PLASTIC SURGERY

## 2018-07-30 PROCEDURE — 93005 ELECTROCARDIOGRAM TRACING: CPT

## 2018-07-30 PROCEDURE — 85027 COMPLETE CBC AUTOMATED: CPT | Performed by: PLASTIC SURGERY

## 2018-07-30 PROCEDURE — 85007 BL SMEAR W/DIFF WBC COUNT: CPT | Performed by: PLASTIC SURGERY

## 2018-07-30 PROCEDURE — 0KUS07Z SUPPLEMENT RIGHT LOWER LEG MUSCLE WITH AUTOLOGOUS TISSUE SUBSTITUTE, OPEN APPROACH: ICD-10-PCS | Performed by: PLASTIC SURGERY

## 2018-07-30 PROCEDURE — 0LBN0ZZ EXCISION OF RIGHT LOWER LEG TENDON, OPEN APPROACH: ICD-10-PCS | Performed by: PLASTIC SURGERY

## 2018-07-30 PROCEDURE — 0HBHXZZ EXCISION OF RIGHT UPPER LEG SKIN, EXTERNAL APPROACH: ICD-10-PCS | Performed by: PLASTIC SURGERY

## 2018-07-30 PROCEDURE — 0KBQ0ZZ EXCISION OF RIGHT UPPER LEG MUSCLE, OPEN APPROACH: ICD-10-PCS | Performed by: PLASTIC SURGERY

## 2018-07-30 PROCEDURE — 0HRKX74 REPLACEMENT OF RIGHT LOWER LEG SKIN WITH AUTOLOGOUS TISSUE SUBSTITUTE, PARTIAL THICKNESS, EXTERNAL APPROACH: ICD-10-PCS | Performed by: PLASTIC SURGERY

## 2018-07-30 PROCEDURE — 88304 TISSUE EXAM BY PATHOLOGIST: CPT | Performed by: PATHOLOGY

## 2018-07-30 PROCEDURE — 93010 ELECTROCARDIOGRAM REPORT: CPT | Performed by: INTERNAL MEDICINE

## 2018-07-30 PROCEDURE — 94762 N-INVAS EAR/PLS OXIMTRY CONT: CPT

## 2018-07-30 DEVICE — THE GEM MICROVASCULAR ANASTOMOTIC COUPLER DEVICE RINGS ARE MADE OF POLYETHYLENE AND SURGICAL GRADE STAINLESS STEEL PINS. A PROTECTIVE COVER AND JAW ASSEMBLY PROTECT THE RINGS AND ALLOW FOR EASY LOADING ONTO THE ANASTOMOTIC INSTRUMENT. BOTH THE PROTECTIVE COVER AND JAW ASSEMBLY ARE DISPOSABLE. THE GEM MICROVASCULAR ANASTOMOTIC COUPLER DEVICE IS SINGLE-USE AND AVAILABLE IN VARIOUS SIZES
Type: IMPLANTABLE DEVICE | Site: ANKLE | Status: FUNCTIONAL
Brand: GEM MICROVASCULAR ANASTOMOTIC COUPLER

## 2018-07-30 RX ORDER — IPRATROPIUM BROMIDE AND ALBUTEROL SULFATE 2.5; .5 MG/3ML; MG/3ML
3 SOLUTION RESPIRATORY (INHALATION) ONCE
Status: DISCONTINUED | OUTPATIENT
Start: 2018-07-30 | End: 2018-07-30

## 2018-07-30 RX ORDER — LIDOCAINE HYDROCHLORIDE 10 MG/ML
INJECTION, SOLUTION INFILTRATION; PERINEURAL AS NEEDED
Status: DISCONTINUED | OUTPATIENT
Start: 2018-07-30 | End: 2018-07-30 | Stop reason: SURG

## 2018-07-30 RX ORDER — FENTANYL CITRATE 50 UG/ML
INJECTION, SOLUTION INTRAMUSCULAR; INTRAVENOUS AS NEEDED
Status: DISCONTINUED | OUTPATIENT
Start: 2018-07-30 | End: 2018-07-30 | Stop reason: SURG

## 2018-07-30 RX ORDER — MAGNESIUM HYDROXIDE 1200 MG/15ML
LIQUID ORAL AS NEEDED
Status: DISCONTINUED | OUTPATIENT
Start: 2018-07-30 | End: 2018-07-30 | Stop reason: HOSPADM

## 2018-07-30 RX ORDER — RANITIDINE HYDROCHLORIDE 15 MG/ML
75 SOLUTION ORAL DAILY
Status: DISCONTINUED | OUTPATIENT
Start: 2018-07-31 | End: 2018-07-30 | Stop reason: CLARIF

## 2018-07-30 RX ORDER — FAMOTIDINE 20 MG/1
20 TABLET, FILM COATED ORAL 2 TIMES DAILY
Status: DISCONTINUED | OUTPATIENT
Start: 2018-07-30 | End: 2018-08-05 | Stop reason: HOSPADM

## 2018-07-30 RX ORDER — LISINOPRIL 20 MG/1
20 TABLET ORAL DAILY
Status: DISCONTINUED | OUTPATIENT
Start: 2018-07-30 | End: 2018-07-31

## 2018-07-30 RX ORDER — SODIUM CHLORIDE, SODIUM LACTATE, POTASSIUM CHLORIDE, CALCIUM CHLORIDE 600; 310; 30; 20 MG/100ML; MG/100ML; MG/100ML; MG/100ML
50 INJECTION, SOLUTION INTRAVENOUS CONTINUOUS
Status: DISCONTINUED | OUTPATIENT
Start: 2018-07-30 | End: 2018-07-31

## 2018-07-30 RX ORDER — EPHEDRINE SULFATE 50 MG/ML
INJECTION, SOLUTION INTRAVENOUS AS NEEDED
Status: DISCONTINUED | OUTPATIENT
Start: 2018-07-30 | End: 2018-07-30 | Stop reason: SURG

## 2018-07-30 RX ORDER — ONDANSETRON 4 MG/1
4 TABLET, ORALLY DISINTEGRATING ORAL EVERY 6 HOURS PRN
Status: DISCONTINUED | OUTPATIENT
Start: 2018-07-30 | End: 2018-08-05 | Stop reason: HOSPADM

## 2018-07-30 RX ORDER — VECURONIUM BROMIDE 1 MG/ML
INJECTION, POWDER, LYOPHILIZED, FOR SOLUTION INTRAVENOUS AS NEEDED
Status: DISCONTINUED | OUTPATIENT
Start: 2018-07-30 | End: 2018-07-30 | Stop reason: SURG

## 2018-07-30 RX ORDER — MINERAL OIL
OIL (ML) MISCELLANEOUS AS NEEDED
Status: DISCONTINUED | OUTPATIENT
Start: 2018-07-30 | End: 2018-07-30 | Stop reason: HOSPADM

## 2018-07-30 RX ORDER — ONDANSETRON 2 MG/ML
INJECTION INTRAMUSCULAR; INTRAVENOUS AS NEEDED
Status: DISCONTINUED | OUTPATIENT
Start: 2018-07-30 | End: 2018-07-30 | Stop reason: SURG

## 2018-07-30 RX ORDER — MIDAZOLAM HYDROCHLORIDE 1 MG/ML
INJECTION INTRAMUSCULAR; INTRAVENOUS AS NEEDED
Status: DISCONTINUED | OUTPATIENT
Start: 2018-07-30 | End: 2018-07-30 | Stop reason: SURG

## 2018-07-30 RX ORDER — MORPHINE SULFATE 2 MG/ML
2 INJECTION, SOLUTION INTRAMUSCULAR; INTRAVENOUS
Status: DISCONTINUED | OUTPATIENT
Start: 2018-07-30 | End: 2018-07-31

## 2018-07-30 RX ORDER — HEPARIN SODIUM 5000 [USP'U]/ML
5000 INJECTION, SOLUTION INTRAVENOUS; SUBCUTANEOUS EVERY 8 HOURS SCHEDULED
Status: DISCONTINUED | OUTPATIENT
Start: 2018-07-30 | End: 2018-08-05 | Stop reason: HOSPADM

## 2018-07-30 RX ORDER — PRAVASTATIN SODIUM 40 MG
40 TABLET ORAL
Status: DISCONTINUED | OUTPATIENT
Start: 2018-07-30 | End: 2018-08-05 | Stop reason: HOSPADM

## 2018-07-30 RX ORDER — ROCURONIUM BROMIDE 10 MG/ML
INJECTION, SOLUTION INTRAVENOUS AS NEEDED
Status: DISCONTINUED | OUTPATIENT
Start: 2018-07-30 | End: 2018-07-30 | Stop reason: SURG

## 2018-07-30 RX ORDER — FAMOTIDINE 20 MG/1
20 TABLET, FILM COATED ORAL DAILY
Status: DISCONTINUED | OUTPATIENT
Start: 2018-07-31 | End: 2018-07-30

## 2018-07-30 RX ORDER — ALBUTEROL SULFATE 2.5 MG/3ML
SOLUTION RESPIRATORY (INHALATION) AS NEEDED
Status: DISCONTINUED | OUTPATIENT
Start: 2018-07-30 | End: 2018-07-30 | Stop reason: SURG

## 2018-07-30 RX ORDER — LEVOFLOXACIN 5 MG/ML
750 INJECTION, SOLUTION INTRAVENOUS ONCE
Status: COMPLETED | OUTPATIENT
Start: 2018-07-30 | End: 2018-07-30

## 2018-07-30 RX ORDER — EZETIMIBE 10 MG/1
10 TABLET ORAL DAILY
Status: DISCONTINUED | OUTPATIENT
Start: 2018-07-31 | End: 2018-08-05 | Stop reason: HOSPADM

## 2018-07-30 RX ORDER — PROPOFOL 10 MG/ML
INJECTION, EMULSION INTRAVENOUS AS NEEDED
Status: DISCONTINUED | OUTPATIENT
Start: 2018-07-30 | End: 2018-07-30 | Stop reason: SURG

## 2018-07-30 RX ORDER — HEPARIN SODIUM 5000 [USP'U]/ML
5000 INJECTION, SOLUTION INTRAVENOUS; SUBCUTANEOUS EVERY 8 HOURS SCHEDULED
Status: DISCONTINUED | OUTPATIENT
Start: 2018-07-30 | End: 2018-07-30 | Stop reason: SDUPTHER

## 2018-07-30 RX ORDER — HYDROMORPHONE HYDROCHLORIDE 2 MG/ML
INJECTION, SOLUTION INTRAMUSCULAR; INTRAVENOUS; SUBCUTANEOUS AS NEEDED
Status: DISCONTINUED | OUTPATIENT
Start: 2018-07-30 | End: 2018-07-30 | Stop reason: SURG

## 2018-07-30 RX ADMIN — FENTANYL CITRATE 100 MCG: 50 INJECTION INTRAMUSCULAR; INTRAVENOUS at 07:47

## 2018-07-30 RX ADMIN — SODIUM CHLORIDE, POTASSIUM CHLORIDE, SODIUM LACTATE AND CALCIUM CHLORIDE: 600; 310; 30; 20 INJECTION, SOLUTION INTRAVENOUS at 13:00

## 2018-07-30 RX ADMIN — HEPARIN SODIUM 5000 UNITS: 5000 INJECTION, SOLUTION INTRAVENOUS; SUBCUTANEOUS at 22:13

## 2018-07-30 RX ADMIN — ALBUTEROL SULFATE 0.02 MG: 2.5 SOLUTION RESPIRATORY (INHALATION) at 16:15

## 2018-07-30 RX ADMIN — ONDANSETRON HYDROCHLORIDE 4 MG: 2 INJECTION, SOLUTION INTRAMUSCULAR; INTRAVENOUS at 16:09

## 2018-07-30 RX ADMIN — ROCURONIUM BROMIDE 10 MG: 10 INJECTION INTRAVENOUS at 08:45

## 2018-07-30 RX ADMIN — MIDAZOLAM HYDROCHLORIDE 2 MG: 1 INJECTION, SOLUTION INTRAMUSCULAR; INTRAVENOUS at 07:47

## 2018-07-30 RX ADMIN — MORPHINE SULFATE 2 MG: 2 INJECTION, SOLUTION INTRAMUSCULAR; INTRAVENOUS at 22:25

## 2018-07-30 RX ADMIN — SODIUM CHLORIDE, POTASSIUM CHLORIDE, SODIUM LACTATE AND CALCIUM CHLORIDE: 600; 310; 30; 20 INJECTION, SOLUTION INTRAVENOUS at 07:40

## 2018-07-30 RX ADMIN — HYDROMORPHONE HYDROCHLORIDE 1 MG: 2 INJECTION, SOLUTION INTRAMUSCULAR; INTRAVENOUS; SUBCUTANEOUS at 10:15

## 2018-07-30 RX ADMIN — ONDANSETRON 4 MG: 4 TABLET, ORALLY DISINTEGRATING ORAL at 20:24

## 2018-07-30 RX ADMIN — LIDOCAINE HYDROCHLORIDE 50 MG: 10 INJECTION, SOLUTION INFILTRATION; PERINEURAL at 07:55

## 2018-07-30 RX ADMIN — VECURONIUM BROMIDE 3 MG: 1 INJECTION, POWDER, LYOPHILIZED, FOR SOLUTION INTRAVENOUS at 12:50

## 2018-07-30 RX ADMIN — FENTANYL CITRATE 150 MCG: 50 INJECTION INTRAMUSCULAR; INTRAVENOUS at 08:25

## 2018-07-30 RX ADMIN — ROCURONIUM BROMIDE 20 MG: 10 INJECTION INTRAVENOUS at 08:25

## 2018-07-30 RX ADMIN — ROCURONIUM BROMIDE 20 MG: 10 INJECTION INTRAVENOUS at 11:25

## 2018-07-30 RX ADMIN — EPHEDRINE SULFATE 10 MG: 50 INJECTION INTRAMUSCULAR; INTRAVENOUS; SUBCUTANEOUS at 08:15

## 2018-07-30 RX ADMIN — SODIUM CHLORIDE, POTASSIUM CHLORIDE, SODIUM LACTATE AND CALCIUM CHLORIDE 125 ML/HR: 600; 310; 30; 20 INJECTION, SOLUTION INTRAVENOUS at 20:26

## 2018-07-30 RX ADMIN — ALBUTEROL SULFATE 0.02 MG: 2.5 SOLUTION RESPIRATORY (INHALATION) at 17:25

## 2018-07-30 RX ADMIN — EPHEDRINE SULFATE 5 MG: 50 INJECTION INTRAMUSCULAR; INTRAVENOUS; SUBCUTANEOUS at 08:05

## 2018-07-30 RX ADMIN — ROCURONIUM BROMIDE 10 MG: 10 INJECTION INTRAVENOUS at 10:11

## 2018-07-30 RX ADMIN — FENTANYL CITRATE 50 MCG: 50 INJECTION INTRAMUSCULAR; INTRAVENOUS at 13:58

## 2018-07-30 RX ADMIN — FENTANYL CITRATE 100 MCG: 50 INJECTION INTRAMUSCULAR; INTRAVENOUS at 16:05

## 2018-07-30 RX ADMIN — DEXAMETHASONE SODIUM PHOSPHATE 4 MG: 10 INJECTION INTRAMUSCULAR; INTRAVENOUS at 08:00

## 2018-07-30 RX ADMIN — FAMOTIDINE 20 MG: 20 TABLET ORAL at 22:19

## 2018-07-30 RX ADMIN — LISINOPRIL 20 MG: 20 TABLET ORAL at 22:20

## 2018-07-30 RX ADMIN — VECURONIUM BROMIDE 1 MG: 1 INJECTION, POWDER, LYOPHILIZED, FOR SOLUTION INTRAVENOUS at 14:30

## 2018-07-30 RX ADMIN — ROCURONIUM BROMIDE 20 MG: 10 INJECTION INTRAVENOUS at 10:15

## 2018-07-30 RX ADMIN — VECURONIUM BROMIDE 1 MG: 1 INJECTION, POWDER, LYOPHILIZED, FOR SOLUTION INTRAVENOUS at 13:58

## 2018-07-30 RX ADMIN — HYDROMORPHONE HYDROCHLORIDE 1 MG: 2 INJECTION, SOLUTION INTRAMUSCULAR; INTRAVENOUS; SUBCUTANEOUS at 09:30

## 2018-07-30 RX ADMIN — VECURONIUM BROMIDE 2 MG: 1 INJECTION, POWDER, LYOPHILIZED, FOR SOLUTION INTRAVENOUS at 11:59

## 2018-07-30 RX ADMIN — VECURONIUM BROMIDE 1 MG: 1 INJECTION, POWDER, LYOPHILIZED, FOR SOLUTION INTRAVENOUS at 13:30

## 2018-07-30 RX ADMIN — PRAVASTATIN SODIUM 40 MG: 40 TABLET ORAL at 20:24

## 2018-07-30 RX ADMIN — ROCURONIUM BROMIDE 10 MG: 10 INJECTION INTRAVENOUS at 09:30

## 2018-07-30 RX ADMIN — ROCURONIUM BROMIDE 50 MG: 10 INJECTION INTRAVENOUS at 07:55

## 2018-07-30 RX ADMIN — EPHEDRINE SULFATE 5 MG: 50 INJECTION INTRAMUSCULAR; INTRAVENOUS; SUBCUTANEOUS at 08:10

## 2018-07-30 RX ADMIN — SODIUM CHLORIDE, POTASSIUM CHLORIDE, SODIUM LACTATE AND CALCIUM CHLORIDE: 600; 310; 30; 20 INJECTION, SOLUTION INTRAVENOUS at 10:30

## 2018-07-30 RX ADMIN — SODIUM CHLORIDE, POTASSIUM CHLORIDE, SODIUM LACTATE AND CALCIUM CHLORIDE: 600; 310; 30; 20 INJECTION, SOLUTION INTRAVENOUS at 08:40

## 2018-07-30 RX ADMIN — ROCURONIUM BROMIDE 10 MG: 10 INJECTION INTRAVENOUS at 10:59

## 2018-07-30 RX ADMIN — FENTANYL CITRATE 100 MCG: 50 INJECTION INTRAMUSCULAR; INTRAVENOUS at 12:50

## 2018-07-30 RX ADMIN — VECURONIUM BROMIDE 2 MG: 1 INJECTION, POWDER, LYOPHILIZED, FOR SOLUTION INTRAVENOUS at 15:30

## 2018-07-30 RX ADMIN — LEVOFLOXACIN: 750 INJECTION, SOLUTION INTRAVENOUS at 07:45

## 2018-07-30 RX ADMIN — PROPOFOL 200 MG: 10 INJECTION, EMULSION INTRAVENOUS at 07:55

## 2018-07-30 NOTE — NURSING NOTE
Pt received post op from the OR  R coverage heel  With gracilis muscle flap  - skin graft  Pt is sleepy but responsive  SR on monitor  Earl drain placed to 110 wall suction - small amount of bloody  R heel elevated off bed and leg turned outward- drg is dry and intact  On jones shield - respirations unlabored  Family has brought in pts CPAP machine  Muñoz drains clear yellow urine, pt is complaining that it is bothering him  Wife is here and has come into the room Denies surgical site pain at this time  Await Dr Zhang Grant orders   L Doppler functioning

## 2018-07-30 NOTE — OP NOTE
OPERATIVE REPORT  PATIENT NAME: Dustin Cazares    :  1960  MRN: [id number]  Pt Location:  OR ROOM 12    SURGERY DATE: 2018    Surgeon(s) and Role:     * Tamia Sprague MD - Primary     * OSIEL Farias S  - Assisting    Preop and postoperative Diagnosis:  Exposed right Achilles tendon    Operative  Procedure(s) (LRB):  COVERAGE HEEL WITH GRACILIS MUSCLE FLAP/SKINGRAFT  (Right)     Debridement of right Achilles tendon wound    Chimeric gracilis muscle and medial circumflex femoral artery  free flap for coverage of right Achilles tendon    Specimen(s):  ID Type Source Tests Collected by Time Destination   1 : debridement of right achilles tendon Tissue Tendon/Tendon Sheath TISSUE EXAM Tamia Sprague MD 2018 9908    A :  Urine Urine, Indwelling Muñoz Catheter URINALYSIS WITH REFLEX TO MICROSCOPIC Tamia Sprague MD 2018 0830      Operative history:  Patient of more than 3 months ago disrupted his right Achilles tendon with repair and wound breakdown with tendon exposure  he has undergone serial debridement  At this time all granulation tissue and exposed desiccated Achilles tendon was removed  Posterior tibial vessels were the recipient site with good arterial pulsation found  However the venae comitantes even after serial division were found to be full of fibrosis and longstanding thrombophlebitis So were inadequate to serve as venous outflow for the flap  Instead the greater saphenous vein was brought down as a loop into the medial ankle recipient area  The right gracilis muscle was the donor site  The flap artery was anastomosed in end-to-side fashion to the posterior tibial artery  The flap had only 1 vein outflow actually which is unusual   This was anastomosed in end-to-end fashion to the greater saphenous vein using a 4 0 mm anastomotic coupling device  Good flow was achieved immediately into and out from the flap    A skin graft was used to cover the muscle    Operative procedure:  Patient was taken to the operating room and placed supine on the operating table  He was prepped and draped in the usual fashion  Anesthesia was general by endotracheal tube  The right lower extremity was exsanguinated with an Esmarch and a mid thigh tourniquet inflated to 350 mm hg pressure  Radical debridement using the Bovie and for cutting and coagulation purposes was used to clean the area of the right Achilles  The tourniquet was deflated, with it then being very elevated and the tourniquet again reinflated as before  An incision was made just superior to the debrided the defect going proximally up to the level of the medial gastrocnemius muscle  This was carried to the deep fascia using the Bovie for cutting coagulation purposes  This exposed the soleus muscle which from distal to proximal was retracted posteriorly to expose the posterior tibial vessels  With great care they were dissected free in the artery from its veins  The tourniquet was deflated and good a good pulse was noted in the posterior tibial artery  Next an incision was made on the medial aspect of the distal posterior right thigh  This was carried through the deep fascia using the Bovie  Inferior to the sartorius muscle the tendon of the gracilis was circumscribed with a sterile glove  Traction on this allowed identification of the gracilis muscle in the proximal medial right thigh  A curvilinear incision at this level was carried through the deep fascia to expose the gracilis muscle  With medial traction on the muscle the medial circumflex femoral vessels were exposed  They were dissected toward the profunda for several cm as needed for the length of the pedicle  This required placement of hemoclips and/or the bipolar to controlled side branches as to the adductor longus muscle    The using the Bovie for dissection the origins of the muscle was freed up as well as the muscle toward its insertion with the help with digital manipulation  A 2-0 silk suture was placed in the gracilis tendon distally  A vascular clamp through the proximal incision grab that suture and pulled into the proximal wound  The Bovie was then used to divide the gracilis tendon  It was pulled into the proximal wound  The origin was taken down with the get the Bovie  This made this an island flap based on its vascular pedicle with good perfusion noted in the muscle as well as a skin flap taken on muscular cutaneous  of found during the dissection of the flap  The operating microscope was brought into the field at the ankle  Arterial pulse was excellent  Great difficulty in exposing the veins was encounter  The were cut in a sterile fashion with inadequate lumens observed  Therefore an incision was made anterior to the medial malleolus  The greater saphenous vein was found at this location  It was dissected of length enough to allow to be brought down to the arterial recipient site  The vein was divided distally and suture ligated with a 4-0 Vicryl suture  A tunnel was made connecting that incision with the medial ankle incision  The vein was then brought through that with gently for future use  Return to the proximal right thigh  The vascular pedicle was cross clamped with a right-angle  It was divided and oversewn with a 3-0 Vicryl suture ligature  The flap was taken to the right ankle  Using the operating microscope with an arteriotomy was made in the posterior tibial artery of size equal to the beveled end of the flap artery  End to side anastomosis was accomplished with a 9 0 nylon running whipstitch  The flap vein had been  from the artery and anastomosed in end to end  fashion to the greater saphenous vein using a 4 0 mm anastomotic   All clamps were removed with good flow into and out of the flap noted   The flap was cut to fit and inset into the defect of the right ankle using 3-0 nylon interrupted horizontal mattress sutures  The recipient site incisions as well as that with the greater saphenous vein was harvested were closed with 3-0 nylon interrupted vertical mattress sutures  The cutaneous flap for monitoring was sewn to the skin with 3-0 nylon simple sutures  Attention was directed to the thigh donor site  A Lawrence-Waggoner drain was brought out through a separate stab wound and sewn to the skin with an 0 silk whipstitch  Both incisions and the thyroid closed with 3-0 Vicryl simple subcutaneous sutures followed by 3-0 nylon interrupted vertical mattress skin sutures  A Kylah electric dermatome was used to harvest a 13 1000 of an inch split thickness skin graft from the medial right thigh  The graft was meshed 1/2 to 1 and put over the flap with staples for fixation  Some of the extra skin was returned to the thigh donor site where it too was stapled into position to hasten donor site healing  Bulky wraps were placed on the right lower leg and thigh to complete the procedure  The skin flap had good pink color  The laser Doppler used for to monitor had good flow values  The patient was therefore then taken to the intensive care unit for recovery, as he had done well with anesthesia      Estimated Blood Loss:   200 mL    Drains:  Closed/Suction Drain Right;Medial Thigh Bulb (Active)   Status To bulb suction 7/30/2018 12:06 PM   Number of days: 0       Urethral Catheter Non-latex 16 Fr   (Active)   Number of days: 0       Anesthesia Type:   General        SIGNATURE: Chuck Rojas MD  DATE: July 30, 2018  TIME: 6:10 PM

## 2018-07-30 NOTE — ANESTHESIA PREPROCEDURE EVALUATION
Review of Systems/Medical History          Cardiovascular  Hyperlipidemia,    Pulmonary  Asthma , PRN med  controlled , Sleep apnea CPAP,        GI/Hepatic    GERD well controlled,        Negative  ROS        Endo/Other  Negative endo/other ROS   Obesity  morbid obesity   GYN       Hematology  Negative hematology ROS      Musculoskeletal  Negative musculoskeletal ROS        Neurology  Negative neurology ROS      Psychology           Physical Exam    Airway    Mallampati score: III  TM Distance: >3 FB  Neck ROM: full     Dental       Cardiovascular  Cardiovascular exam normal    Pulmonary  Pulmonary exam normal Breath sounds clear to auscultation,     Other Findings        Anesthesia Plan  ASA Score- 3     Anesthesia Type- general with ASA Monitors  Additional Monitors:   Airway Plan: ETT  Comment: Use Glidoscope  Plan Factors-Patient not instructed to abstain from smoking on day of procedure  Patient did not smoke on day of surgery  Induction- intravenous  Postoperative Plan- Plan for postoperative opioid use  Informed Consent- Anesthetic plan and risks discussed with patient (possible post of mech  vent, ICU explained to the pt  understood and agrred to proceed  )  I personally reviewed this patient with the CRNA  Discussed and agreed on the Anesthesia Plan with the CRNA  Gayla Mendoza

## 2018-07-30 NOTE — ANESTHESIA POSTPROCEDURE EVALUATION
Post-Op Assessment Note      CV Status:  Stable    Mental Status:  Alert and awake    Hydration Status:  Stable    PONV Controlled:  None    Airway Patency:  Patent    Post Op Vitals Reviewed: Yes          Staff: CRNA           BP   165/85   Temp      Pulse  93   Resp   32   SpO2   95

## 2018-07-31 ENCOUNTER — APPOINTMENT (INPATIENT)
Dept: INTERVENTIONAL RADIOLOGY/VASCULAR | Facility: HOSPITAL | Age: 58
DRG: 904 | End: 2018-07-31
Attending: FAMILY MEDICINE
Payer: COMMERCIAL

## 2018-07-31 ENCOUNTER — ANESTHESIA (INPATIENT)
Dept: PERIOP | Facility: HOSPITAL | Age: 58
DRG: 857 | End: 2018-07-31
Payer: COMMERCIAL

## 2018-07-31 ENCOUNTER — APPOINTMENT (INPATIENT)
Dept: RADIOLOGY | Facility: HOSPITAL | Age: 58
DRG: 904 | End: 2018-07-31
Payer: COMMERCIAL

## 2018-07-31 ENCOUNTER — ANESTHESIA EVENT (INPATIENT)
Dept: PERIOP | Facility: HOSPITAL | Age: 58
DRG: 857 | End: 2018-07-31
Payer: COMMERCIAL

## 2018-07-31 PROBLEM — I49.3 PVC (PREMATURE VENTRICULAR CONTRACTION): Status: ACTIVE | Noted: 2018-07-31

## 2018-07-31 PROBLEM — I10 HTN (HYPERTENSION): Status: ACTIVE | Noted: 2018-07-31

## 2018-07-31 PROBLEM — K21.9 GERD (GASTROESOPHAGEAL REFLUX DISEASE): Status: ACTIVE | Noted: 2018-07-31

## 2018-07-31 PROBLEM — Z99.89 OSA ON CPAP: Status: ACTIVE | Noted: 2018-07-31

## 2018-07-31 PROBLEM — G47.33 OSA ON CPAP: Status: ACTIVE | Noted: 2018-07-31

## 2018-07-31 PROBLEM — E78.5 HLD (HYPERLIPIDEMIA): Status: ACTIVE | Noted: 2018-07-31

## 2018-07-31 LAB
ANION GAP SERPL CALCULATED.3IONS-SCNC: 5 MMOL/L (ref 5–14)
BASOPHILS # BLD AUTO: 0.1 THOUSANDS/ΜL (ref 0–0.1)
BASOPHILS NFR BLD AUTO: 1 % (ref 0–1)
BUN SERPL-MCNC: 13 MG/DL (ref 5–25)
CALCIUM SERPL-MCNC: 8.3 MG/DL (ref 8.4–10.2)
CHLORIDE SERPL-SCNC: 101 MMOL/L (ref 97–108)
CO2 SERPL-SCNC: 29 MMOL/L (ref 22–30)
CREAT SERPL-MCNC: 1.1 MG/DL (ref 0.7–1.5)
EOSINOPHIL # BLD AUTO: 0.3 THOUSAND/ΜL (ref 0–0.4)
EOSINOPHIL NFR BLD AUTO: 2 % (ref 0–6)
ERYTHROCYTE [DISTWIDTH] IN BLOOD BY AUTOMATED COUNT: 15.5 %
GFR SERPL CREATININE-BSD FRML MDRD: 74 ML/MIN/1.73SQ M
GLUCOSE SERPL-MCNC: 120 MG/DL (ref 70–99)
HCT VFR BLD AUTO: 41.9 % (ref 41–53)
HGB BLD-MCNC: 14.4 G/DL (ref 13.5–17.5)
LYMPHOCYTES # BLD AUTO: 1.8 THOUSANDS/ΜL (ref 0.5–4)
LYMPHOCYTES NFR BLD AUTO: 15 % (ref 20–50)
MAGNESIUM SERPL-MCNC: 1.8 MG/DL (ref 1.6–2.3)
MCH RBC QN AUTO: 31.2 PG (ref 26–34)
MCHC RBC AUTO-ENTMCNC: 34.5 G/DL (ref 31–36)
MCV RBC AUTO: 91 FL (ref 80–100)
MONOCYTES # BLD AUTO: 1 THOUSAND/ΜL (ref 0.2–0.9)
MONOCYTES NFR BLD AUTO: 8 % (ref 1–10)
NEUTROPHILS # BLD AUTO: 9.1 THOUSANDS/ΜL (ref 1.8–7.8)
NEUTS SEG NFR BLD AUTO: 74 % (ref 45–65)
PLATELET # BLD AUTO: 178 THOUSANDS/UL (ref 150–450)
PMV BLD AUTO: 8.6 FL (ref 8.9–12.7)
POTASSIUM SERPL-SCNC: 3.9 MMOL/L (ref 3.6–5)
RBC # BLD AUTO: 4.63 MILLION/UL (ref 4.5–5.9)
SODIUM SERPL-SCNC: 135 MMOL/L (ref 137–147)
WBC # BLD AUTO: 12.2 THOUSAND/UL (ref 4.5–11)

## 2018-07-31 PROCEDURE — 83735 ASSAY OF MAGNESIUM: CPT | Performed by: FAMILY MEDICINE

## 2018-07-31 PROCEDURE — 85025 COMPLETE CBC W/AUTO DIFF WBC: CPT | Performed by: FAMILY MEDICINE

## 2018-07-31 PROCEDURE — 99291 CRITICAL CARE FIRST HOUR: CPT | Performed by: FAMILY MEDICINE

## 2018-07-31 PROCEDURE — C1751 CATH, INF, PER/CENT/MIDLINE: HCPCS

## 2018-07-31 PROCEDURE — 99254 IP/OBS CNSLTJ NEW/EST MOD 60: CPT | Performed by: INTERNAL MEDICINE

## 2018-07-31 PROCEDURE — 71045 X-RAY EXAM CHEST 1 VIEW: CPT

## 2018-07-31 PROCEDURE — 80048 BASIC METABOLIC PNL TOTAL CA: CPT | Performed by: FAMILY MEDICINE

## 2018-07-31 RX ORDER — ASPIRIN 325 MG
325 TABLET ORAL 2 TIMES DAILY
Status: DISCONTINUED | OUTPATIENT
Start: 2018-07-31 | End: 2018-08-05 | Stop reason: HOSPADM

## 2018-07-31 RX ORDER — MORPHINE SULFATE 2 MG/ML
2 INJECTION, SOLUTION INTRAMUSCULAR; INTRAVENOUS
Status: DISCONTINUED | OUTPATIENT
Start: 2018-07-31 | End: 2018-08-05 | Stop reason: HOSPADM

## 2018-07-31 RX ORDER — LISINOPRIL 20 MG/1
20 TABLET ORAL 2 TIMES DAILY
Status: DISCONTINUED | OUTPATIENT
Start: 2018-07-31 | End: 2018-08-01

## 2018-07-31 RX ORDER — LEVOFLOXACIN 750 MG/1
750 TABLET ORAL EVERY 24 HOURS
Status: DISCONTINUED | OUTPATIENT
Start: 2018-07-31 | End: 2018-08-05 | Stop reason: HOSPADM

## 2018-07-31 RX ADMIN — LISINOPRIL 20 MG: 20 TABLET ORAL at 09:18

## 2018-07-31 RX ADMIN — EZETIMIBE 10 MG: 10 TABLET ORAL at 09:18

## 2018-07-31 RX ADMIN — LISINOPRIL 20 MG: 20 TABLET ORAL at 18:39

## 2018-07-31 RX ADMIN — HEPARIN SODIUM 5000 UNITS: 5000 INJECTION, SOLUTION INTRAVENOUS; SUBCUTANEOUS at 14:25

## 2018-07-31 RX ADMIN — PRAVASTATIN SODIUM 40 MG: 40 TABLET ORAL at 18:41

## 2018-07-31 RX ADMIN — HEPARIN SODIUM 5000 UNITS: 5000 INJECTION, SOLUTION INTRAVENOUS; SUBCUTANEOUS at 22:15

## 2018-07-31 RX ADMIN — MAGNESIUM OXIDE TAB 400 MG (241.3 MG ELEMENTAL MG) 400 MG: 400 (241.3 MG) TAB at 18:40

## 2018-07-31 RX ADMIN — AMPICILLIN 2000 MG: 2 INJECTION, POWDER, FOR SOLUTION INTRAMUSCULAR; INTRAVENOUS at 14:18

## 2018-07-31 RX ADMIN — LEVOFLOXACIN 750 MG: 750 TABLET, FILM COATED ORAL at 14:17

## 2018-07-31 RX ADMIN — HEPARIN SODIUM 5000 UNITS: 5000 INJECTION, SOLUTION INTRAVENOUS; SUBCUTANEOUS at 05:41

## 2018-07-31 RX ADMIN — MORPHINE SULFATE 2 MG: 2 INJECTION, SOLUTION INTRAMUSCULAR; INTRAVENOUS at 00:46

## 2018-07-31 RX ADMIN — SODIUM CHLORIDE, POTASSIUM CHLORIDE, SODIUM LACTATE AND CALCIUM CHLORIDE 125 ML/HR: 600; 310; 30; 20 INJECTION, SOLUTION INTRAVENOUS at 05:37

## 2018-07-31 RX ADMIN — MORPHINE SULFATE 2 MG: 2 INJECTION, SOLUTION INTRAMUSCULAR; INTRAVENOUS at 07:44

## 2018-07-31 RX ADMIN — MORPHINE SULFATE 2 MG: 2 INJECTION, SOLUTION INTRAMUSCULAR; INTRAVENOUS at 05:47

## 2018-07-31 RX ADMIN — FAMOTIDINE 20 MG: 20 TABLET ORAL at 18:38

## 2018-07-31 RX ADMIN — ASPIRIN 325 MG ORAL TABLET 325 MG: 325 PILL ORAL at 18:38

## 2018-07-31 RX ADMIN — FAMOTIDINE 20 MG: 20 TABLET ORAL at 09:18

## 2018-07-31 RX ADMIN — AMPICILLIN 2000 MG: 2 INJECTION, POWDER, FOR SOLUTION INTRAMUSCULAR; INTRAVENOUS at 21:12

## 2018-07-31 NOTE — PROCEDURES
PICC Line Insertion  Date/Time: 7/31/2018 11:40 AM  Performed by: Lorie Torrez by: Citlalli Downey     Patient location:  Bedside  Consent:     Consent obtained:  Written and verbal    Consent given by:  Patient    Risks discussed:  Arterial puncture, bleeding, infection, incorrect placement, nerve damage and pneumothorax    Alternatives discussed:  No treatment, delayed treatment and alternative treatment  Universal protocol:     Procedure explained and questions answered to patient or proxy's satisfaction: yes      Relevant documents present and verified: yes      Test results available and properly labeled: yes      Site/side marked: yes      Immediately prior to procedure, a time out was called: yes      Patient identity confirmed:  Verbally with patient, arm band, provided demographic data, hospital-assigned identification number, anonymous protocol, patient vented/unresponsive and legal responsible patient representative (family)  Pre-procedure details:     Hand hygiene: Hand hygiene performed prior to insertion      Sterile barrier technique: All elements of maximal sterile technique followed      Skin preparation:  ChloraPrep    Skin preparation agent: Skin preparation agent completely dried prior to procedure    Indications:     PICC line indications: long term antibiotics    Anesthesia (see MAR for exact dosages):      Anesthesia method:  None  Procedure details:     Location:  Basilic    Vessel type: vein      Laterality:  Left    Site selection rationale:  Pt request    Approach: percutaneous technique used      Patient position:  Flat    Procedural supplies:  Double lumen    Catheter size:  5 Fr    Landmarks identified: yes      Ultrasound guidance: yes      Number of attempts:  1    Successful placement: yes      Vessel of catheter tip end:  Chest Xray needed to confirm placement    Total catheter length (cm):  55    Catheter out on skin (cm):  5    Max flow rate:  5ml/sec, 300 psi Arm circumference:  41 5  Post-procedure details:     Post-procedure:  Dressing applied    Assessment:  Blood return through all ports and placement verification pending x-ray result    Patient tolerance of procedure: Tolerated well, no immediate complications  Comments:      Talha used  For PICC tip guidance during placement with appropriate indication of correct PICC tip direction

## 2018-07-31 NOTE — NURSING NOTE
Restless, pushing self down in bed, swinging left leg off the pillows and to the right side  Repositioned leg multiple times to keep off flap  Right thigh and ankle bleeding  Asked if he can sleep on his stomach as he has never slept on his back "ever in my life"  Says he will never be able to sleep if not on his back  Requested to have his CPAP from home put on at 0115, same done by Respiratory Therapist, settings not clear, RT unable to see settings but patient said it is preset and and he wants his CPAP on  RT puts same on but he keeps taking in off  Constantly on his phone  Few pieces of ice chips given for dry mouth

## 2018-07-31 NOTE — CASE MANAGEMENT
Initial Clinical Review    Age/Sex: 63 y o  male    Surgery Date:    7/30/2018    Procedure: Exposed right Achilles tendon     Operative  Procedure(s) (LRB):  COVERAGE HEEL WITH GRACILIS MUSCLE FLAP/SKINGRAFT  (Right)      Debridement of right Achilles tendon wound     Chimeric gracilis muscle and medial circumflex femoral artery  free flap for coverage of right Achilles tendon    Anesthesia:    general    Admission Orders: Date/Time/Statement: 7/30/18 @ 1909     Orders Placed This Encounter   Procedures    Inpatient Admission     Standing Status:   Standing     Number of Occurrences:   1     Order Specific Question:   Admitting Physician     Answer:   Dominique Grissom     Order Specific Question:   Level of Care     Answer:   Critical Care [15]     Order Specific Question:   Estimated length of stay     Answer:   More than 2 Midnights     Order Specific Question:   Certification     Answer:   I certify that inpatient services are medically necessary for this patient for a duration of greater than two midnights  See H&P and MD Progress Notes for additional information about the patient's course of treatment  Vital Signs: /89   Pulse 89   Temp 98 °F (36 7 °C) (Temporal)   Resp (!) 25   Ht 5' 8" (1 727 m)   Wt (!) 145 kg (320 lb)   SpO2 97%   BMI 48 66 kg/m²     Diet:        Diet Orders            Start     Ordered    07/31/18 0929  Diet Regular; Regular House  Diet effective now     Question Answer Comment   Diet Type Regular    Regular Regular House    RD to adjust diet per protocol? No        07/31/18 0930          Mobility:   As  daron    DVT Prophylaxis:   SCD'S    Pain Control:      Cons  Im  Cons  ID  Cons  IR  For  PICC   IV  MSO4  PRN  (  x1  7/30 and X 1 7/31 thus far)    Per  Im consult:  Rght Achilles tendon disruption, S/P debridement of right Achilles tendon wound, coverage right heel with gracilis flap/skin graft   Chimeric gracilis muscle and medial circumflex femoral artery  free flap for coverage of right Achilles tendon  Managed per Plastic surgery   2  Hyperlipidemia :  Continue simvastatin 40 mg orally once daily and Zetia 10 mg orally once a day  3   Gastroesophageal reflux disease:  No current symptoms, substitute Pepcid for Zantac  4   Hypertension:  The patient's current blood pressure is elevated, he denies being diagnosed with hypertension, however he reported elevated readings over the past 1 year  Will start lisinopril 20 mg orally once daily  Monitor vital signs  5   Obstructive sleep apnea:  Continue CPAP at nighttime  He brought his own CPAP from home  6   Hypotestosteronism:  He gets testosterone injection every 2 weeks  Due in 1 week  Per  ID  Consult:  Right heel wound infection  Patient is status post multiple I&D's  He is now status post free flap closure with STSG  Recent culture from I&D grew Enterococcus, Pseudomonas and stenotrophomonas  It is difficult to tell which should these bacteria is/are true pathogen/s  At this point, it would be best to treat him with a course of antibiotic to cover for all these pathogens perioperatively  I will start him on IV antibiotic  He can be transitioned to p  o  antibiotic at discharge  Patient has leukocytosis but no fever  He is clinically and systemically well  Start high-dose IV ampicillin and p o  Levaquin  Serial wound exam per Plastic surgery service  Transition to p o  amoxicillin/Levaquin at discharge  Treat x 14 days postop      2  Chronic right heel open wound  Patient is now status post free flap closure with STSG      3  Achilles tendon rupture, status post repair, with above complication      4  Morbid obesity  This most likely contributes to original injury  This probably also contributes to poor wound healing        Thank you,  Katia Aqq  [facility] Utilization Review Department  Phone: [phone];  Fax [phone]  ATTENTION: The Network Utilization Review Department is now centralized for our 9 Facilities  Make a note that we have a new phone and fax numbers for our Department  Please call with any questions or concerns to [phone] and carefully follow the prompts so that you are directed to the right person  All voicemails are confidential  Fax any determinations, approvals, denials, and requests for initial or continue stay review clinical to [phone]  Due to HIGH CALL volume, it would be easier if you could please send faxed requests to expedite your requests and in part, help us provide discharge notifications faster

## 2018-07-31 NOTE — NURSING NOTE
R surgical leg remains elevated on pillows, bloody drainage  LD remains out of range, flap continues to look purple  Patient still restless with the leg  Napped on and off

## 2018-07-31 NOTE — CONSULTS
Consult- Jez Garcia 1960, 63 y o  male MRN: [id number]    Unit/Bed#:  Encounter: [id number]    Primary Care Provider: Susie Darling MD   Date and time admitted to hospital: 7/30/2018  5:46 AM      Consults    KIRIT on CPAP   Assessment & Plan    ·  On CPAP at night and p r n  GERD (gastroesophageal reflux disease)   Assessment & Plan    ·  Pepcid        HTN (hypertension)   Assessment & Plan    ·   Uncontrolled  ·  will get a PCA pump for pain control also DC fluids  ·  increase lisinopril to 20 mg p o  B i d         PVC (premature ventricular contraction)   Assessment & Plan    ·  Could be secondary to hypertension or his chronic sleep apnea but I will check a BMP for potassium and magnesium  HLD (hyperlipidemia)   Assessment & Plan    ·  Continue Zetia and statin        Open wound of right foot with tendon involvement   Assessment & Plan    ·   Status post right Achilles tendon debridement and flap placement- pod #0  ·  DC fluids  ·  he is going to be placed on a PCA pump as his pain is uncontrolled  ·  antibiotics duration to and final choice  Fractures Disease evaluation  ·  repeating labs now                  VTE Pharmacologic Prophylaxis:   Pharmacologic: Heparin  Mechanical VTE Prophylaxis in Place: Yes    Patient Centered Rounds: I have performed bedside rounds with nursing staff today  Discussions with Specialists or Other Care Team Provider: yes    Education and Discussions with Family / Patient: yes    Time Spent for Care: 45 minutes  More than 50% of total time spent on counseling and coordination of care as described above  Current Length of Stay: 1 day(s)    Current Patient Status: Inpatient   Certification Statement: The patient will continue to require additional inpatient hospital stay due to  per Plastic surgery of postop care evaluation of flap as well      Discharge Plan:  When medically stable    Code Status: No Order      Subjective:    patient is seen and examined had a lot of pain today was short of breath today also and he coughed up a mucus plug  Not received the Dilaudid dose and pain is a little bit better denies any chest pain currently or shortness of breath no abdominal pain  Objective:     Vitals:   Temp (24hrs), Av °F (36 7 °C), Min:97 °F (36 1 °C), Max:98 5 °F (36 9 °C)    HR:  [74-94] 75  Resp:  [0-40] 0  BP: (131-189)/() 158/102  SpO2:  [92 %-99 %] 97 %  Body mass index is 48 66 kg/m²  Input and Output Summary (last 24 hours): Intake/Output Summary (Last 24 hours) at 18 0952  Last data filed at 18 0600   Gross per 24 hour   Intake             4070 ml   Output             3945 ml   Net              125 ml       Physical Exam:     Physical Exam   Constitutional: He is oriented to person, place, and time  He appears well-developed  Obese   HENT:   Head: Normocephalic and atraumatic  Eyes: EOM are normal  Pupils are equal, round, and reactive to light  Neck: Normal range of motion  Neck supple  Cardiovascular: Normal rate, regular rhythm and normal heart sounds  Pulmonary/Chest: Effort normal and breath sounds normal    Abdominal: Soft  Bowel sounds are normal    Obese   Musculoskeletal: Normal range of motion  He exhibits edema (Right lower extremity edema with dressings postop and the left 1 is trace pitting )  Neurological: He is alert and oriented to person, place, and time  He has normal reflexes  Skin: Skin is warm  Psychiatric: He has a normal mood and affect           Additional Data:     Labs:      Results from last 7 days  Lab Units 18   WBC Thousand/uL 17 70*   HEMOGLOBIN g/dL 15 2   HEMATOCRIT % 45 3   PLATELETS Thousands/uL 184   LYMPHO PCT % 7*   MONO PCT MAN % 7       Results from last 7 days  Lab Units 18   SODIUM mmol/L 137   POTASSIUM mmol/L 4 2   CHLORIDE mmol/L 102   CO2 mmol/L 27   BUN mg/dL 15   CREATININE mg/dL 1 02   CALCIUM mg/dL 8 6   GLUCOSE RANDOM mg/dL 126*                     * I Have Reviewed All Lab Data Listed Above  * Additional Pertinent Lab Tests Reviewed: All Labs Within Last 24 Hours Reviewed    Imaging:    Imaging Reports Reviewed Today Include: none  Imaging Personally Reviewed by Myself Includes:  none    Recent Cultures (last 7 days):           Last 24 Hours Medication List:     Current Facility-Administered Medications:  ampicillin 2,000 mg Intravenous Q6H Nancy Hollins MD   aspirin 325 mg Oral BID Sharron Diaz MD   ezetimibe 10 mg Oral Daily Sharron Diaz MD   famotidine 20 mg Oral BID Deric Pool MD   heparin (porcine) 5,000 Units Subcutaneous [facility] Sharron Diaz MD   HYDROmorphone (DILAUDID) 1 mg/mL 50 mL PCA  Intravenous Continuous Kimmie Kirk MD   levofloxacin 750 mg Oral Q24H Nancy Hollins MD   lisinopril 20 mg Oral BID Millicent Easley MD   morphine injection 2 mg Intravenous Q1H PRN Sharron Diaz MD   ondansetron 4 mg Oral Q6H PRN Sharron Diaz MD   pravastatin 40 mg Oral Daily With Olga Flores MD        Today, Patient Was Seen By: Millicent Easley MD    ** Please Note: Dictation voice to text software may have been used in the creation of this document   **

## 2018-07-31 NOTE — ASSESSMENT & PLAN NOTE
·   Status post right Achilles tendon debridement and flap placement- pod #0  ·  DC fluids  ·  he is going to be placed on a PCA pump as his pain is uncontrolled  ·  antibiotics duration to and final choice    Fractures Disease evaluation  ·  repeating labs now

## 2018-07-31 NOTE — ASSESSMENT & PLAN NOTE
·  Could be secondary to hypertension or his chronic sleep apnea but I will check a BMP for potassium and magnesium

## 2018-07-31 NOTE — NURSING NOTE
At 0636 LD reads "signal out of range", Shayne Grant called, no response  Dr Gerianne Goltz paged at 3632 and he responded to page at 0963, made aware of the LD status and darkened area to the flap, "I will send my assistant to look at it"

## 2018-07-31 NOTE — CONSULTS
Consultation - Harpreet Langford 63 y o  male MRN: [id number]    Unit/Bed#:  Encounter: [id number]      Assessment/Plan     1  Rght Achilles tendon disruption, S/P debridement of right Achilles tendon wound, coverage right heel with gracilis flap/skin graft  Chimeric gracilis muscle and medial circumflex femoral artery  free flap for coverage of right Achilles tendon  Managed per Plastic surgery   2  Hyperlipidemia :  Continue simvastatin 40 mg orally once daily and Zetia 10 mg orally once a day  3   Gastroesophageal reflux disease:  No current symptoms, substitute Pepcid for Zantac  4   Hypertension:  The patient's current blood pressure is elevated, he denies being diagnosed with hypertension, however he reported elevated readings over the past 1 year  Will start lisinopril 20 mg orally once daily  Monitor vital signs  5   Obstructive sleep apnea:  Continue CPAP at nighttime  He brought his own CPAP from home  6   Hypotestosteronism:  He gets testosterone injection every 2 weeks  Due in 1 week  History of Present Illness     HPI: Harpreet Langford is a 63y o  year old male who was admitted to Plastic surgery because of 3 months history of disrupted right Achilles tendon with repair and wound breakdown with tendon exposure  This morning he underwent debridement of right Achilles tendon wound, coverage right heel with gracilis flap/skin graft  Chimeric gracilis muscle and medial circumflex femoral artery  free flap for coverage of right Achilles tendon  The patient currently denies any pain, no chest   No shortness of breath  No fever  No cough  No other symptoms reported    Consults for medical management    Review of Systems   Constitutional: Negative for chills, diaphoresis, fatigue, fever and unexpected weight change  HENT: Negative for congestion, facial swelling, sinus pain, sinus pressure, sore throat, trouble swallowing and voice change      Eyes: Negative for photophobia, redness and visual disturbance  Respiratory: Negative for apnea, cough, choking, chest tightness, shortness of breath and stridor  Cardiovascular: Negative for chest pain, palpitations and leg swelling  Gastrointestinal: Negative for abdominal distention, abdominal pain, blood in stool, constipation, diarrhea, nausea and vomiting  Genitourinary: Negative for dysuria, flank pain, hematuria and urgency  Musculoskeletal: Negative for arthralgias, back pain, gait problem, joint swelling, myalgias, neck pain and neck stiffness  Skin:        Right Achilles tendon wound   Psychiatric/Behavioral: Negative for agitation, behavioral problems, confusion and hallucinations  The patient is not nervous/anxious  Historical Information   Past Medical History:   Diagnosis Date    Asthma     CPAP (continuous positive airway pressure) dependence     Diverticulosis     GERD (gastroesophageal reflux disease)     Hyperlipidemia     Morbid obesity ([facility] [address] )     Sleep apnea      Past Surgical History:   Procedure Laterality Date    CARPAL TUNNEL RELEASE      COLONOSCOPY  2007    HERNIA REPAIR      abdominal x 2    POLYPECTOMY      hyperplastic    OK COLONOSCOPY FLX DX W/COLLJ SPEC WHEN PFRMD N/A 11/27/2017    Procedure: COLONOSCOPY;  Surgeon: Beverly Wright MD;  [facility]; Service: Gastroenterology    OK DEBRIDEMENT, SKIN, SUB-Q TISSUE,=<20 SQ CM Right 7/18/2018    Procedure: DEBRIDEMENT ANKLE WOUND;  Surgeon: Fiordaliza James MD;  Location: [facility] MAIN OR;  Service: Plastics    OK DEBRIDEMENT, SKIN, SUB-Q TISSUE,=<20 SQ CM Right 7/23/2018    Procedure: DEBRIDEMENT RIGHT ANKLE WOUND;  Surgeon: Fiordaliza James MD;  Location: [facility] MAIN OR;  Service: Plastics    [address] [place] [address] Right 4/23/2018    Procedure: REPAIR TENDON ACHILLES   FHL TRANSFER;  Surgeon: Jair Jenkins DPM;  [facility];  Service: Podiatry    TRIGGER FINGER RELEASE      thumb     Social History   History   Alcohol Use    Yes     Comment: 7 per week     History   Drug Use No     History   Smoking Status    Never Smoker   Smokeless Tobacco    Never Used     Family History: non-contributory    Meds/Allergies   all current active meds have been reviewed  Allergies   Allergen Reactions    Latex Itching    Nsaids GI Intolerance     gastritis       Objective   Vitals: Blood pressure (!) 189/86, pulse 83, temperature (!) 97 °F (36 1 °C), temperature source Temporal, resp  rate 18, height 5' 8" (1 727 m), weight (!) 145 kg (320 lb), SpO2 99 %  Intake/Output Summary (Last 24 hours) at 07/30/18 2153  Last data filed at 07/30/18 1820   Gross per 24 hour   Intake             3600 ml   Output             1525 ml   Net             2075 ml     Invasive Devices     Peripheral Intravenous Line            Peripheral IV 07/30/18 Left Hand less than 1 day          Drain            Closed/Suction Drain Right;Medial Thigh Bulb less than 1 day    Urethral Catheter Non-latex 16 Fr  less than 1 day                Physical Exam   Constitutional: He is oriented to person, place, and time  Obese   Eyes: Conjunctivae and EOM are normal  Pupils are equal, round, and reactive to light  Right eye exhibits no discharge  Left eye exhibits no discharge  No scleral icterus  Neck: Normal range of motion  Neck supple  No JVD present  No tracheal deviation present  Cardiovascular: Normal rate, regular rhythm and intact distal pulses  Exam reveals no gallop and no friction rub  Pulmonary/Chest: Effort normal and breath sounds normal  No stridor  No respiratory distress  He has no wheezes  He has no rales  He exhibits no tenderness  Abdominal: Soft  Bowel sounds are normal  He exhibits no distension and no mass  There is no tenderness  There is no rebound  Musculoskeletal: Normal range of motion  He exhibits no tenderness or deformity  Neurological: He is alert and oriented to person, place, and time   He has normal reflexes  No cranial nerve deficit  Coordination normal    Skin: Skin is warm  Right foot in bandage, status post surgery  Wound VAC in place, functioning   Psychiatric: He has a normal mood and affect  His behavior is normal        Lab Results: I have personally reviewed pertinent reports        VTE Prophylaxis:  Heparin    Code Status:  Full code  *

## 2018-07-31 NOTE — SOCIAL WORK
Pt is POD#1 (R) ankle muscle flap/skin graft 2' exposed achilles tendon- currently with laser doppler in place with KEITH drain to LIS and on IV/PO antibiotic to be transitioned to total of 14 days oral Abx on discharge per ID   PT/OT evals requested when appropriate to determine d/c needs  Currently pt owns 3 houses all of which are handicap accessible due to his wife's handicap (albeit she is independent)- will stay post op if VNA is needed at his [address] [facility]  Pt with h/o KIRIT having a CPAP through [address]- verified settings as "auto with low of 8 and high of 18"- notified nursing of settings-follows with [facility] for same- pt to call to make appointment to be seen end of November- on AVS   Pt verified that he uses [facility] on [facility] for medications and his PCP is Dr Rossi Saleem to call on d/c for appointment (so to accommodate his work tk [facility]   Will follow throughout hospitalization for d/c needs

## 2018-07-31 NOTE — CONSULTS
Consultation - Infectious Disease   Cici Nix 63 y o  male MRN: [id number]  Unit/Bed#:  Encounter: [id number]      IMPRESSION & RECOMMENDATIONS:   Impression/Recommendations: This is a 63 y o  male, with closed right Achilles tendon tear, status post repair, complicated by wound infection and breakdown  Patient is status post multiple I&D's  He is now status post free flap closure with STSG  1   Right heel wound infection  Patient is status post multiple I&D's  He is now status post free flap closure with STSG  Recent culture from I&D grew Enterococcus, Pseudomonas and stenotrophomonas  It is difficult to tell which should these bacteria is/are true pathogen/s  At this point, it would be best to treat him with a course of antibiotic to cover for all these pathogens perioperatively  I will start him on IV antibiotic  He can be transitioned to p  o  antibiotic at discharge  Patient has leukocytosis but no fever  He is clinically and systemically well  Start high-dose IV ampicillin and p o  Levaquin  Serial wound exam per Plastic surgery service  Transition to p o  amoxicillin/Levaquin at discharge  Treat x 14 days postop  2   Chronic right heel open wound  Patient is now status post free flap closure with STSG  3  Achilles tendon rupture, status post repair, with above complication  4   Morbid obesity  This most likely contributes to original injury  This probably also contributes to poor wound healing  All previous hospitalization records reviewed in detail  Discussed with patient in detail regarding the above plan  Thank you for this consultation  We will follow along with you  HISTORY OF PRESENT ILLNESS:  Reason for Consult:  Right heel wound infection  HPI: Harpreet Langford is a 63 y o  male, with morbid obesity, who suffered a right Achilles tendon tear in mid April of this year  There was a closed injury    He underwent repair initially by his podiatrist   Gaetano Lennon, wound breakdown with tendon exposure  Patient had gone serial debridement  He was admitted yesterday to undergo further did remain, flap closure with STSG  This was done by Dr Michaela Castillo  We are asked to see evaluate patient postop  At present, patient does have pain in his ankle/heel, controlled  He denies any significant pain prior to surgery  No fever or chills at home  No purulent drainage  Patient had a recent culture earlier this month from 1 of the debridements growing Enterococcus, Pseudomonas and stenotrophomonas  REVIEW OF SYSTEMS:  A complete 12 point system-based review was done  Except for what is noted in HPI above, ROS of systems is otherwise negative  PAST MEDICAL HISTORY:  Past Medical History:   Diagnosis Date    Asthma     CPAP (continuous positive airway pressure) dependence     Diverticulosis     GERD (gastroesophageal reflux disease)     Hyperlipidemia     [facility]bid [facility] ([facility] [address] )     Sleep apnea      Past Surgical History:   Procedure Laterality Date    CARPAL TUNNEL RELEASE      COLONOSCOPY  2007    HERNIA REPAIR      abdominal x 2    POLYPECTOMY      hyperplastic    MI COLONOSCOPY FLX DX W/COLLJ SPEC WHEN PFRMD N/A 11/27/2017    Procedure: COLONOSCOPY;  Surgeon: Alexis Lr MD;  [facility]: [facility]; Service: Gastroenterology    MI DEBRIDEMENT, SKIN, SUB-Q TISSUE,=<20 SQ CM Right 7/18/2018    Procedure: DEBRIDEMENT ANKLE WOUND;  Surgeon: Saranya Saez MD;  Location: [address] [place] [address];  Service: Plastics    MI DEBRIDEMENT, SKIN, SUB-Q TISSUE,=<20 SQ CM Right 7/23/2018    Procedure: DEBRIDEMENT RIGHT ANKLE WOUND;  Surgeon: Saranya Saez MD;  Location: [address] [place] [address] OR;  Service: Plastics    [address] [place] [address] Right 4/23/2018    Procedure: REPAIR TENDON ACHILLES  FHL TRANSFER;  Surgeon: Dawit Acevedo DPM;  [facility];  Service: Podiatry    TRIGGER FINGER RELEASE      thumb     Problem list reviewed      FAMILY HISTORY:  Non-contributory    SOCIAL HISTORY:  History   Alcohol Use    Yes     Comment: 7 per week     History   Drug Use No     History   Smoking Status    Never Smoker   Smokeless Tobacco    Never Used       ALLERGIES:  Allergies   Allergen Reactions    Latex Itching    Nsaids GI Intolerance     gastritis       MEDICATIONS:  All current active medications have been reviewed  Patient is currently not on any antibiotic  PHYSICAL EXAM:  Vitals:  HR:  [74-94] 75  Resp:  [0-40] 0  BP: (131-189)/() 158/102  SpO2:  [92 %-99 %] 97 %  Temp (24hrs), Av °F (36 7 °C), Min:97 °F (36 1 °C), Max:98 5 °F (36 9 °C)  Current: Temperature: 98 °F (36 7 °C)     Physical Exam:  General:  Morbidly obese, comfortable, in no acute distress  Awake, alert and oriented x 3  Eyes:  Conjunctive clear with no hemorrhages or effusions  Oropharynx:  No ulcers, no lesions, pharynx benign, no tonsillitis  Neck:  Supple, no lymphadenopathy, no mass, nontender  Lungs:  Expansion symmetric, no rales, no wheezing, no accessory muscle use  Cardiac:  Regular rate and rhythm, normal S1, normal S2, no murmurs  Abdomen:  Soft, nondistended, non-tender, no HSM  Extremities:  1+ leg edema  Right foot/heel with surgical dressing in place  Dressing is dry  Mild tenderness  No erythema beyond dressing  Skin:  No rashes, no ulcers  Neurological:  Moves all four extremities spontaneously, sensation grossly intact    LABS, IMAGING, & OTHER STUDIES:  Lab Results:  I have personally reviewed pertinent labs      Results from last 7 days  Lab Units 18   SODIUM mmol/L 137   POTASSIUM mmol/L 4 2   CHLORIDE mmol/L 102   CO2 mmol/L 27   ANION GAP mmol/L 8   BUN mg/dL 15   CREATININE mg/dL 1 02   EGFR ml/min/1 73sq m 81   GLUCOSE RANDOM mg/dL 126*   CALCIUM mg/dL 8 6       Results from last 7 days  Lab Units 18   WBC Thousand/uL 17 70*   HEMOGLOBIN g/dL 15 2   PLATELETS Thousands/uL 184           Imaging Studies:   I have personally reviewed pertinent imaging study reports and images in PACS  EKG, Pathology, and Other Studies:   I have personally reviewed pertinent reports

## 2018-07-31 NOTE — RESPIRATORY THERAPY NOTE
PT is placed on CPAP that he has brought from home the setting are not stated on the machine , according to the PT it is pre-set by the company that he got the CPAP from  PT also stated that he doesn't no what the setting is also  PT HR 92 RR 22 SatO2 93% while on CPAP  [facility] RT

## 2018-07-31 NOTE — PROGRESS NOTES
Anesthesia Post Evaluation    Patient: Maribel Sepulveda    Procedures performed: Procedure(s):  COVERAGE HEEL WITH GRACILIS MUSCLE FLAP/SKINGRAFT     Anesthesia type: general    Post pain: pt had pain last night, pain improved with position, PCA usually ordered by surgeon, order was placed this morning      Last vitals: stable    Level of consciousness: awake, alert and oriented    Respiratory: unassisted, spontaneous ventilation, room air    Cardiovascular: stable and blood pressure at baseline

## 2018-08-01 ENCOUNTER — APPOINTMENT (INPATIENT)
Dept: RADIOLOGY | Facility: HOSPITAL | Age: 58
DRG: 904 | End: 2018-08-01
Payer: COMMERCIAL

## 2018-08-01 ENCOUNTER — APPOINTMENT (INPATIENT)
Dept: NON INVASIVE DIAGNOSTICS | Facility: HOSPITAL | Age: 58
DRG: 904 | End: 2018-08-01
Payer: COMMERCIAL

## 2018-08-01 PROBLEM — S91.001S ANKLE WOUND, RIGHT, SEQUELA: Status: ACTIVE | Noted: 2018-08-01

## 2018-08-01 PROBLEM — S91.301A OPEN WOUND OF RIGHT FOOT: Status: ACTIVE | Noted: 2018-08-01

## 2018-08-01 PROBLEM — E87.1 HYPONATREMIA: Status: ACTIVE | Noted: 2018-08-01

## 2018-08-01 LAB
ANION GAP SERPL CALCULATED.3IONS-SCNC: 3 MMOL/L (ref 5–14)
BASOPHILS # BLD AUTO: 0 THOUSANDS/ΜL (ref 0–0.1)
BASOPHILS NFR BLD AUTO: 0 % (ref 0–1)
BUN SERPL-MCNC: 15 MG/DL (ref 5–25)
CALCIUM SERPL-MCNC: 8.2 MG/DL (ref 8.4–10.2)
CHLORIDE SERPL-SCNC: 99 MMOL/L (ref 97–108)
CO2 SERPL-SCNC: 30 MMOL/L (ref 22–30)
CREAT SERPL-MCNC: 1.03 MG/DL (ref 0.7–1.5)
EOSINOPHIL # BLD AUTO: 0.6 THOUSAND/ΜL (ref 0–0.4)
EOSINOPHIL NFR BLD AUTO: 4 % (ref 0–6)
ERYTHROCYTE [DISTWIDTH] IN BLOOD BY AUTOMATED COUNT: 15.5 %
GFR SERPL CREATININE-BSD FRML MDRD: 80 ML/MIN/1.73SQ M
GLUCOSE SERPL-MCNC: 102 MG/DL (ref 70–99)
HCT VFR BLD AUTO: 41.1 % (ref 41–53)
HGB BLD-MCNC: 13.8 G/DL (ref 13.5–17.5)
LYMPHOCYTES # BLD AUTO: 2.1 THOUSANDS/ΜL (ref 0.5–4)
LYMPHOCYTES NFR BLD AUTO: 15 % (ref 20–50)
MCH RBC QN AUTO: 30.3 PG (ref 26–34)
MCHC RBC AUTO-ENTMCNC: 33.4 G/DL (ref 31–36)
MCV RBC AUTO: 91 FL (ref 80–100)
MONOCYTES # BLD AUTO: 1.7 THOUSAND/ΜL (ref 0.2–0.9)
MONOCYTES NFR BLD AUTO: 12 % (ref 1–10)
NEUTROPHILS # BLD AUTO: 9.7 THOUSANDS/ΜL (ref 1.8–7.8)
NEUTS SEG NFR BLD AUTO: 68 % (ref 45–65)
PLATELET # BLD AUTO: 173 THOUSANDS/UL (ref 150–450)
PMV BLD AUTO: 8.4 FL (ref 8.9–12.7)
POTASSIUM SERPL-SCNC: 4.1 MMOL/L (ref 3.6–5)
RBC # BLD AUTO: 4.53 MILLION/UL (ref 4.5–5.9)
SODIUM SERPL-SCNC: 132 MMOL/L (ref 137–147)
WBC # BLD AUTO: 14.2 THOUSAND/UL (ref 4.5–11)

## 2018-08-01 PROCEDURE — 85025 COMPLETE CBC W/AUTO DIFF WBC: CPT | Performed by: FAMILY MEDICINE

## 2018-08-01 PROCEDURE — 93306 TTE W/DOPPLER COMPLETE: CPT

## 2018-08-01 PROCEDURE — 99232 SBSQ HOSP IP/OBS MODERATE 35: CPT | Performed by: INTERNAL MEDICINE

## 2018-08-01 PROCEDURE — 71045 X-RAY EXAM CHEST 1 VIEW: CPT

## 2018-08-01 PROCEDURE — 80048 BASIC METABOLIC PNL TOTAL CA: CPT | Performed by: FAMILY MEDICINE

## 2018-08-01 PROCEDURE — 99291 CRITICAL CARE FIRST HOUR: CPT | Performed by: FAMILY MEDICINE

## 2018-08-01 PROCEDURE — 93306 TTE W/DOPPLER COMPLETE: CPT | Performed by: INTERNAL MEDICINE

## 2018-08-01 RX ORDER — LISINOPRIL 20 MG/1
20 TABLET ORAL DAILY
Status: DISCONTINUED | OUTPATIENT
Start: 2018-08-02 | End: 2018-08-04

## 2018-08-01 RX ADMIN — ASPIRIN 325 MG ORAL TABLET 325 MG: 325 PILL ORAL at 09:27

## 2018-08-01 RX ADMIN — AMPICILLIN 2000 MG: 2 INJECTION, POWDER, FOR SOLUTION INTRAMUSCULAR; INTRAVENOUS at 22:17

## 2018-08-01 RX ADMIN — PERFLUTREN 2 ML/MIN: 6.52 INJECTION, SUSPENSION INTRAVENOUS at 14:34

## 2018-08-01 RX ADMIN — ASPIRIN 325 MG ORAL TABLET 325 MG: 325 PILL ORAL at 17:02

## 2018-08-01 RX ADMIN — AMPICILLIN 2000 MG: 2 INJECTION, POWDER, FOR SOLUTION INTRAMUSCULAR; INTRAVENOUS at 03:12

## 2018-08-01 RX ADMIN — HEPARIN SODIUM 5000 UNITS: 5000 INJECTION, SOLUTION INTRAVENOUS; SUBCUTANEOUS at 14:23

## 2018-08-01 RX ADMIN — FAMOTIDINE 20 MG: 20 TABLET ORAL at 09:27

## 2018-08-01 RX ADMIN — HEPARIN SODIUM 5000 UNITS: 5000 INJECTION, SOLUTION INTRAVENOUS; SUBCUTANEOUS at 06:54

## 2018-08-01 RX ADMIN — AMPICILLIN 2000 MG: 2 INJECTION, POWDER, FOR SOLUTION INTRAMUSCULAR; INTRAVENOUS at 15:21

## 2018-08-01 RX ADMIN — EZETIMIBE 10 MG: 10 TABLET ORAL at 09:27

## 2018-08-01 RX ADMIN — AMPICILLIN 2000 MG: 2 INJECTION, POWDER, FOR SOLUTION INTRAMUSCULAR; INTRAVENOUS at 09:27

## 2018-08-01 RX ADMIN — PRAVASTATIN SODIUM 40 MG: 40 TABLET ORAL at 17:02

## 2018-08-01 RX ADMIN — HEPARIN SODIUM 5000 UNITS: 5000 INJECTION, SOLUTION INTRAVENOUS; SUBCUTANEOUS at 22:16

## 2018-08-01 RX ADMIN — FAMOTIDINE 20 MG: 20 TABLET ORAL at 17:02

## 2018-08-01 RX ADMIN — LEVOFLOXACIN 750 MG: 750 TABLET, FILM COATED ORAL at 09:27

## 2018-08-01 NOTE — PROGRESS NOTES
Progress Note - Ian Garza 1960, 63 y o  male MRN: [id number]    Unit/Bed#:  Encounter: [id number]    Primary Care Provider: Issa Montes MD   Date and time admitted to hospital: 7/30/2018  5:46 AM        Hyponatremia   Assessment & Plan    · One hundred thirty-two asymptomatic suspect secondary to postoperative pain he is currently on Dilaudid pump also I will remove the Muñoz as he is increasing his pain  · Placed on 1500 mL fluid restriction  Fluids were discontinued yesterday, repeat sodium tomorrow  KIRIT on CPAP   Assessment & Plan    ·  On CPAP at night and p r n  GERD (gastroesophageal reflux disease)   Assessment & Plan    ·  Pepcid        HTN (hypertension)   Assessment & Plan    ·   Uncontrolled  · Since his pain is controlled with a PCA pump yesterday lisinopril was increased to 20 b i d  will back down to 20 as his blood pressure secondary to the pain meds  The fluid has decreased and he had marginal SBP in the 90s during the night  PVC (premature ventricular contraction)   Assessment & Plan    ·  Could be secondary to hypertension or his chronic sleep apnea , his potassium and normal yesterday magnesium was supplemented with 400 mg of p o  Mag oxide also he had a 5 beat run of V-tach today 5 in the morning I suspect is secondary to the PICC line asked the nurse to pull back the PICC line 4 cm repeated chest x-ray to assess the placement  Monitor on telemetry monitor also I will get an echo on him to review his valves and ejection fraction  He is completely asymptomatic  · Repeat BMP and magnesium tomorrow          HLD (hyperlipidemia)   Assessment & Plan    ·  Continue Zetia and statin        Open wound of right foot with tendon involvement   Assessment & Plan    · Status post right Achilles tendon debridement and flap placement- pod #1  · He is not the Dilaudid PCA but his foot pain is controlled actually Dove Muñoz that is very uncomfortable and he is losing the Dilaudid pump secondary to the Muñoz retention and pain  DC Muñoz to make the patient more comfortable  ·  Woun infection has been evaluated by Infectious Disease he is currently on ampicillin and Levaquin he is going to be having a 14 day course from the day of surgery of total antibiotics on discharge can be changed to p  o  marks still in  Today is day 2   · Labs reviewed WBC decreased            VTE Pharmacologic Prophylaxis:   Pharmacologic: Heparin  Mechanical VTE Prophylaxis in Place: Yes    Patient Centered Rounds: I have evaluated patient without nursing staff present due to Flap care  Discussions with Specialists or Other Care Team Provider: yes    Education and Discussions with Family / Patient:   Patient    Time Spent for Care: 45 minutes  More than 50% of total time spent on counseling and coordination of care as described above  Current Length of Stay: 2 day(s)    Current Patient Status: Inpatient   Certification Statement: The patient will continue to require additional inpatient hospital stay due to Flap assessment    Discharge Plan:   When medically cleared    Code Status: No Order      Subjective:   Patient is seen and examined complaining of bilateral pain in his groin area from the Muñoz  Otherwise the leg pain is controlled he is actually utilizing Dilaudid PCA for the groin pain area and denies any chest pain or shortness of breath no abdominal pain  Objective:     Vitals:   Temp (24hrs), Av 4 °F (36 9 °C), Min:97 1 °F (36 2 °C), Max:99 3 °F (37 4 °C)    HR:  [68-94] 74  Resp:  [0-34] 22  BP: ()/() 119/67  SpO2:  [89 %-96 %] 96 %  Body mass index is 48 66 kg/m²  Input and Output Summary (last 24 hours):        Intake/Output Summary (Last 24 hours) at 18 0913  Last data filed at 18 0600   Gross per 24 hour   Intake           3081 2 ml   Output             3400 ml   Net           -318 8 ml       Physical Exam:     Physical Exam Constitutional: He is oriented to person, place, and time  He appears well-developed  Obese   HENT:   Head: Normocephalic and atraumatic  Eyes: EOM are normal  Pupils are equal, round, and reactive to light  Neck: Normal range of motion  Neck supple  Cardiovascular: Normal rate, regular rhythm and normal heart sounds  Pulmonary/Chest: Effort normal and breath sounds normal    Abdominal: Soft  Bowel sounds are normal    Musculoskeletal: Normal range of motion  He exhibits edema (Trace in the left positive on the right postop )  Neurological: He is alert and oriented to person, place, and time  He has normal reflexes  Skin: Skin is warm  Psychiatric: He has a normal mood and affect  Additional Data:     Labs:      Results from last 7 days  Lab Units 08/01/18  0522   WBC Thousand/uL 14 20*   HEMOGLOBIN g/dL 13 8   HEMATOCRIT % 41 1   PLATELETS Thousands/uL 173   NEUTROS PCT % 68*   LYMPHS PCT % 15*   MONOS PCT % 12*   EOS PCT % 4       Results from last 7 days  Lab Units 08/01/18  0522   SODIUM mmol/L 132*   POTASSIUM mmol/L 4 1   CHLORIDE mmol/L 99   CO2 mmol/L 30   BUN mg/dL 15   CREATININE mg/dL 1 03   CALCIUM mg/dL 8 2*   GLUCOSE RANDOM mg/dL 102*                     * I Have Reviewed All Lab Data Listed Above  * Additional Pertinent Lab Tests Reviewed:  All Labs Within Last 24 Hours Reviewed    Imaging:    Imaging Reports Reviewed Today Include: yes  Imaging Personally Reviewed by Myself Includes:  none    Recent Cultures (last 7 days):           Last 24 Hours Medication List:     Current Facility-Administered Medications:  ampicillin 2,000 mg Intravenous Q6H Brandin Woods MD Last Rate: 2,000 mg (08/01/18 0222)   aspirin 325 mg Oral BID Gómez Weaver MD    ezetimibe 10 mg Oral Daily Gómez Weaver MD    famotidine 20 mg Oral BID Fran Velazquez MD    heparin (porcine) 5,000 Units Subcutaneous [facility] Gómez Weaver MD    HYDROmorphone (DILAUDID) 1 mg/mL 50 mL PCA  Intravenous Continuous Sudhakar Sotelo MD Last Rate: 0  (07/31/18 1039)   levofloxacin 750 mg Oral Q24H Daisy June MD    [START ON 8/2/2018] lisinopril 20 mg Oral Daily Candy Arredondo MD    morphine injection 2 mg Intravenous Q1H PRN Geneva Arreola MD    ondansetron 4 mg Oral Q6H PRN Geneva Arreola MD    pravastatin 40 mg Oral Daily With Ira Holcomb MD         Today, Patient Was Seen By: Candy Arredondo MD    ** Please Note: Dictation voice to text software may have been used in the creation of this document   **

## 2018-08-01 NOTE — ASSESSMENT & PLAN NOTE
·  Could be secondary to hypertension or his chronic sleep apnea , his potassium and normal yesterday magnesium was supplemented with 400 mg of p o  Mag oxide also he had a 5 beat run of V-tach today 5 in the morning I suspect is secondary to the PICC line asked the nurse to pull back the PICC line 4 cm repeated chest x-ray to assess the placement  Monitor on telemetry monitor also I will get an echo on him to review his valves and ejection fraction  He is completely asymptomatic  · Repeat BMP and magnesium tomorrow

## 2018-08-01 NOTE — ASSESSMENT & PLAN NOTE
·   Uncontrolled  · Since his pain is controlled with a PCA pump yesterday lisinopril was increased to 20 b i d  will back down to 20 as his blood pressure secondary to the pain meds  The fluid has decreased and he had marginal SBP in the 90s during the night

## 2018-08-01 NOTE — NURSING NOTE
No change in assessment from earlier today, will continue to monitor pt, laser doppler still out of range

## 2018-08-01 NOTE — ASSESSMENT & PLAN NOTE
· Status post right Achilles tendon debridement and flap placement- pod #1  · He is not the Dilaudid PCA but his foot pain is controlled actually Dove Muñoz that is very uncomfortable and he is losing the Dilaudid pump secondary to the Muñoz retention and pain  DC Muñoz to make the patient more comfortable  ·  Woun infection has been evaluated by Infectious Disease he is currently on ampicillin and Levaquin he is going to be having a 14 day course from the day of surgery of total antibiotics on discharge can be changed to p  o  marks still in    Today is day 2   · Labs reviewed WBC decreased

## 2018-08-01 NOTE — NURSING NOTE
Patient laying in bed and appears comfortable, CPAP on, "am trying to sleep"  Rates right foot pain as 4/10, PCA Dilauded in place and is being used appropriately  Large amounts of bloody drainage from R surgical site, leg repositioned correctly and off flap  Dressing to right leg and right thigh in place  Laser doppler not working, reading, 'out of range'  Previous shift says the Doctor is aware that it hasn't worked all day

## 2018-08-01 NOTE — NURSING NOTE
No change in assessment from earlier today, pillow under rle with heel off bed, Dr Pedersen Cap in and aware of laser out of range, will contue to monitor pt, pt able to wiggle right toes with +sensaiton in all toes

## 2018-08-01 NOTE — ASSESSMENT & PLAN NOTE
· One hundred thirty-two asymptomatic suspect secondary to postoperative pain he is currently on Dilaudid pump also I will remove the Muñoz as he is increasing his pain  · Placed on 1500 mL fluid restriction  Fluids were discontinued yesterday, repeat sodium tomorrow

## 2018-08-01 NOTE — NURSING NOTE
Tolerating diet well  Dressings saturated with bloody drainage  KEITH drain intact  Thigh dressing dry and intact  Pt  Now using dilaudid PCA for pain

## 2018-08-01 NOTE — PROGRESS NOTES
Patient has had a lot of pain postoperatively  A lot resolved with removal of the Muñoz catheter  Laser Doppler is not reading, but we know yesterday when dressings were changed for the muscle flap was pink with good bleeding on needle prick  Actually he has lot of bleeding in the dressings even now implying blood flow to this area  We will probably change the dressings in 48 hours to reassess the muscle flap  He must not dangle to impede venous outflow so bed rest is imperative    Pain is now being controlled

## 2018-08-01 NOTE — NURSING NOTE
Dr Ailyn Arora here and aware that pt requesting that thomas be removed and discontinued by Leidy SANTIAGO, then pt was able to void 50 ml of yellow urine without problem, right upper thigh dressing intact and dry, rle dressing intact with bloody drainage noted, anibal to low wall 100 mm Hg suction for serous to serosangenous drainage, laser doppler not reading states out of range or zero, left picc pulled out 4 cm and redressed, ivf and dilaudid pca infusing, pt helps turn side to side to reposition, will continue to monitor pt

## 2018-08-01 NOTE — NURSING NOTE
Refused to turn side to side, stayed in prone position  CPAP on all night and has been sleeping well

## 2018-08-01 NOTE — PROGRESS NOTES
Progress Note - Infectious Disease   Tiffanie Carrizales 63 y o  male MRN: [id number]  Unit/Bed#:  Encounter: [id number]      Impression/Recommendations: This is a 63 y o  male, with closed right Achilles tendon tear, status post repair, complicated by wound infection and breakdown  Patient is status post multiple I&D's  He is now status post free flap closure with STSG      1  Right heel wound infection  Patient is status post multiple I&D's  He is now status post free flap closure with STSG  Recent culture from I&D grew Enterococcus, Pseudomonas and stenotrophomonas  It is difficult to tell which should these bacteria is/are true pathogen/s  At this point, it would be best to treat him with a course of antibiotic to cover for all these pathogens perioperatively  Patient has leukocytosis but no fever  He is clinically and systemically well  Patient is tolerating IV ampicillin and Levaquin without difficulty  Continue high-dose IV ampicillin and p o  Levaquin  Serial wound exam per Plastic surgery service  Transition to p o  amoxicillin/Levaquin at discharge  Treat x 14 days, through       2  Chronic right heel open wound  Patient is now status post free flap closure with STSG      3  Achilles tendon rupture, status post repair, with above complication      4  Morbid obesity  This most likely contributes to original injury  This probably also contributes to poor wound healing      Discussed above plan with patient and his wife at bedside  Discussed above plan with Dr Bernice Carballo  Subjective:  Patient seen on AM rounds  Denies fevers, chills, or sweats  Denies nausea, vomiting, or diarrhea        Objective:  Vitals:  HR:  [72-94] 80  Resp:  [11-34] 18  BP: ()/(49-75) 119/67  SpO2:  [91 %-98 %] 92 %  Temp (24hrs), Av 2 °F (36 8 °C), Min:97 1 °F (36 2 °C), Max:99 3 °F (37 4 °C)  Current: Temperature: 97 7 °F (36 5 °C)    Physical Exam:   General:  No acute distress  Psychiatric:  Awake and alert  Pulmonary:  Normal respiratory excursion without accessory muscle use  Abdomen:  Soft, nontender  Extremities:   Extensive right foot dressing intact  Skin:  No rashes    Left upper extremity PICC line in place    Lab Results:  I have personally reviewed pertinent labs  Results from last 7 days  Lab Units 08/01/18  0522 07/31/18  1402 07/30/18  2031   SODIUM mmol/L 132* 135* 137   POTASSIUM mmol/L 4 1 3 9 4 2   CHLORIDE mmol/L 99 101 102   CO2 mmol/L 30 29 27   ANION GAP mmol/L 3* 5 8   BUN mg/dL 15 13 15   CREATININE mg/dL 1 03 1 10 1 02   EGFR ml/min/1 73sq m 80 74 81   GLUCOSE RANDOM mg/dL 102* 120* 126*   CALCIUM mg/dL 8 2* 8 3* 8 6       Results from last 7 days  Lab Units 08/01/18  0522 07/31/18  1402 07/30/18  2031   WBC Thousand/uL 14 20* 12 20* 17 70*   HEMOGLOBIN g/dL 13 8 14 4 15 2   PLATELETS Thousands/uL 173 178 184           Imaging Studies:   I have personally reviewed pertinent imaging study reports and images in PACS  EKG, Pathology, and Other Studies:   I have personally reviewed pertinent reports

## 2018-08-01 NOTE — NURSING NOTE
Awake, took CPAP off  Says he is hungry and asking for something to eat, toast offered and he requested 4 slices with butter and jelly  Same given with cranberry juice  Ate well  Good pain control, 4/10  Continues with PCA dilaudid  R leg continues to have bloody drainage, elevated on pillow, repositioned for comfort and to be off the flap  LD continues to be out of range

## 2018-08-01 NOTE — NURSING NOTE
Pt ringing call bell and c/o of penile pain with thomas and asking if can call physician to have it removed, pt already has his own cpap machine removed and on RA, using dilaudid pca pump, states pain is 6/10 more penile pain with catheter and some right ankle pain, repositioned for comfort

## 2018-08-01 NOTE — NURSING NOTE
Pt states he is feeling better since catheter was removed, pt has replaced his cpap mask and is resting on and off, call bell within reach

## 2018-08-02 LAB
ANION GAP SERPL CALCULATED.3IONS-SCNC: 5 MMOL/L (ref 5–14)
BASOPHILS # BLD AUTO: 0.1 THOUSANDS/ΜL (ref 0–0.1)
BASOPHILS NFR BLD AUTO: 1 % (ref 0–1)
BUN SERPL-MCNC: 15 MG/DL (ref 5–25)
CALCIUM SERPL-MCNC: 8.1 MG/DL (ref 8.4–10.2)
CHLORIDE SERPL-SCNC: 102 MMOL/L (ref 97–108)
CO2 SERPL-SCNC: 28 MMOL/L (ref 22–30)
CREAT SERPL-MCNC: 0.92 MG/DL (ref 0.7–1.5)
EOSINOPHIL # BLD AUTO: 0.9 THOUSAND/ΜL (ref 0–0.4)
EOSINOPHIL NFR BLD AUTO: 7 % (ref 0–6)
ERYTHROCYTE [DISTWIDTH] IN BLOOD BY AUTOMATED COUNT: 15.7 %
GFR SERPL CREATININE-BSD FRML MDRD: 92 ML/MIN/1.73SQ M
GLUCOSE SERPL-MCNC: 99 MG/DL (ref 70–99)
HCT VFR BLD AUTO: 38.2 % (ref 41–53)
HGB BLD-MCNC: 12.8 G/DL (ref 13.5–17.5)
LYMPHOCYTES # BLD AUTO: 2.7 THOUSANDS/ΜL (ref 0.5–4)
LYMPHOCYTES NFR BLD AUTO: 20 % (ref 20–50)
MAGNESIUM SERPL-MCNC: 2 MG/DL (ref 1.6–2.3)
MCH RBC QN AUTO: 30.1 PG (ref 26–34)
MCHC RBC AUTO-ENTMCNC: 33.5 G/DL (ref 31–36)
MCV RBC AUTO: 90 FL (ref 80–100)
MONOCYTES # BLD AUTO: 1.5 THOUSAND/ΜL (ref 0.2–0.9)
MONOCYTES NFR BLD AUTO: 11 % (ref 1–10)
NEUTROPHILS # BLD AUTO: 8.2 THOUSANDS/ΜL (ref 1.8–7.8)
NEUTS SEG NFR BLD AUTO: 61 % (ref 45–65)
PLATELET # BLD AUTO: 160 THOUSANDS/UL (ref 150–450)
PMV BLD AUTO: 8.7 FL (ref 8.9–12.7)
POTASSIUM SERPL-SCNC: 4 MMOL/L (ref 3.6–5)
RBC # BLD AUTO: 4.26 MILLION/UL (ref 4.5–5.9)
SODIUM SERPL-SCNC: 135 MMOL/L (ref 137–147)
WBC # BLD AUTO: 13.4 THOUSAND/UL (ref 4.5–11)

## 2018-08-02 PROCEDURE — 94760 N-INVAS EAR/PLS OXIMETRY 1: CPT

## 2018-08-02 PROCEDURE — 83735 ASSAY OF MAGNESIUM: CPT | Performed by: FAMILY MEDICINE

## 2018-08-02 PROCEDURE — 99232 SBSQ HOSP IP/OBS MODERATE 35: CPT | Performed by: FAMILY MEDICINE

## 2018-08-02 PROCEDURE — 94664 DEMO&/EVAL PT USE INHALER: CPT

## 2018-08-02 PROCEDURE — 99232 SBSQ HOSP IP/OBS MODERATE 35: CPT | Performed by: INTERNAL MEDICINE

## 2018-08-02 PROCEDURE — 85025 COMPLETE CBC W/AUTO DIFF WBC: CPT | Performed by: FAMILY MEDICINE

## 2018-08-02 PROCEDURE — 94640 AIRWAY INHALATION TREATMENT: CPT

## 2018-08-02 PROCEDURE — 80048 BASIC METABOLIC PNL TOTAL CA: CPT | Performed by: FAMILY MEDICINE

## 2018-08-02 RX ORDER — DOCUSATE SODIUM 100 MG/1
100 CAPSULE, LIQUID FILLED ORAL 2 TIMES DAILY
Status: DISCONTINUED | OUTPATIENT
Start: 2018-08-02 | End: 2018-08-03

## 2018-08-02 RX ORDER — SENNOSIDES 8.6 MG
1 TABLET ORAL
Status: DISCONTINUED | OUTPATIENT
Start: 2018-08-02 | End: 2018-08-03

## 2018-08-02 RX ORDER — SODIUM PHOSPHATE, DIBASIC AND SODIUM PHOSPHATE, MONOBASIC 7; 19 G/133ML; G/133ML
1 ENEMA RECTAL ONCE
Status: COMPLETED | OUTPATIENT
Start: 2018-08-02 | End: 2018-08-02

## 2018-08-02 RX ADMIN — AMPICILLIN 2000 MG: 2 INJECTION, POWDER, FOR SOLUTION INTRAMUSCULAR; INTRAVENOUS at 15:59

## 2018-08-02 RX ADMIN — FAMOTIDINE 20 MG: 20 TABLET ORAL at 08:28

## 2018-08-02 RX ADMIN — LISINOPRIL 20 MG: 20 TABLET ORAL at 08:27

## 2018-08-02 RX ADMIN — AMPICILLIN 2000 MG: 2 INJECTION, POWDER, FOR SOLUTION INTRAMUSCULAR; INTRAVENOUS at 03:52

## 2018-08-02 RX ADMIN — ASPIRIN 325 MG ORAL TABLET 325 MG: 325 PILL ORAL at 17:01

## 2018-08-02 RX ADMIN — AMPICILLIN 2000 MG: 2 INJECTION, POWDER, FOR SOLUTION INTRAMUSCULAR; INTRAVENOUS at 21:49

## 2018-08-02 RX ADMIN — PRAVASTATIN SODIUM 40 MG: 40 TABLET ORAL at 17:01

## 2018-08-02 RX ADMIN — DOCUSATE SODIUM 100 MG: 100 CAPSULE, LIQUID FILLED ORAL at 08:27

## 2018-08-02 RX ADMIN — LEVOFLOXACIN 750 MG: 750 TABLET, FILM COATED ORAL at 08:27

## 2018-08-02 RX ADMIN — HEPARIN SODIUM 5000 UNITS: 5000 INJECTION, SOLUTION INTRAVENOUS; SUBCUTANEOUS at 06:40

## 2018-08-02 RX ADMIN — HEPARIN SODIUM 5000 UNITS: 5000 INJECTION, SOLUTION INTRAVENOUS; SUBCUTANEOUS at 14:06

## 2018-08-02 RX ADMIN — AMPICILLIN 2000 MG: 2 INJECTION, POWDER, FOR SOLUTION INTRAMUSCULAR; INTRAVENOUS at 08:28

## 2018-08-02 RX ADMIN — EZETIMIBE 10 MG: 10 TABLET ORAL at 08:27

## 2018-08-02 RX ADMIN — FAMOTIDINE 20 MG: 20 TABLET ORAL at 17:01

## 2018-08-02 RX ADMIN — SODIUM PHOSPHATE, DIBASIC AND SODIUM PHOSPHATE, MONOBASIC 1 ENEMA: 7; 19 ENEMA RECTAL at 09:00

## 2018-08-02 RX ADMIN — HEPARIN SODIUM 5000 UNITS: 5000 INJECTION, SOLUTION INTRAVENOUS; SUBCUTANEOUS at 21:49

## 2018-08-02 RX ADMIN — ASPIRIN 325 MG ORAL TABLET 325 MG: 325 PILL ORAL at 08:27

## 2018-08-02 RX ADMIN — ALBUTEROL SULFATE 2.5 MG: 2.5 SOLUTION RESPIRATORY (INHALATION) at 22:07

## 2018-08-02 NOTE — NURSING NOTE
No c/o offered, SR on monitor with occasional pvs noted, denies cp or sob, watching tv and on cell phone, rle elevated up on 2 pillows for heel off bed, will continue to monitor pt

## 2018-08-02 NOTE — NURSING NOTE
patient turned and repositioned  Pain in RLE with movement  Use of PCA  RLE dressing bloody drainage  Extremity elevated  Patient asking what time it is   Ya Monroe   States" oh good 4:00 I got some sleep" No other needs present at this time   Call bell in reach

## 2018-08-02 NOTE — NURSING NOTE
No change in assessment from earlier today, no c/o offered, pt likes ice bag behind neck for comfort and to help cool him down

## 2018-08-02 NOTE — SOCIAL WORK
Notified that Surgeon to unvail wound Friday with possible removal of LD at that time  KEITH drain to LIS continues  On D3/14 IV/PO Abx to be changed to oral on d/c  Will need PT/OT evals ordered to ascertain d/c needs  Pt again verbalized a possibility of not needing VNA as colleagues/friends have volunteered aide with dressing changes- will await recommendation of therapy however and agreeable to the d/c plan as it will be presented once better established  Wife Sherry Bridges present during conversation reiterating pt will be staying at [facility] address on d/c and so if VNA is warranted, it should be to that address (pt owns 3 different homes)  No further questions asked at this time  Will continue to follow for d/c needs

## 2018-08-02 NOTE — NURSING NOTE
Patient bathed with minimal assistance linens changed  Turned and repositioned  RLE elevated, dressing remains " bloody" per patient " I think the bleeding has slowed down a little bit" Using PCA  Laser doppler remains at 0  No other concerns at this time   Call bell in reach

## 2018-08-02 NOTE — CONSULTS
Consult- Michael Krabbe 1960, 63 y o  male MRN: [id number]    Unit/Bed#:  Encounter: [id number]    Primary Care Provider: Ang Braun MD   Date and time admitted to hospital: 7/30/2018  5:46 AM      Consults    Hyponatremia   Assessment & Plan    · Improved status post resolution of pain and 1500 mL fluid restriction           KIRIT on CPAP   Assessment & Plan    ·  On CPAP at night and p r n  GERD (gastroesophageal reflux disease)   Assessment & Plan    ·  Pepcid        HTN (hypertension)   Assessment & Plan    ·  Controlled hypertensionresolved since the titration of the allotted and also lisinopril back down to 20 mg p o  daily        PVC (premature ventricular contraction)   Assessment & Plan    · Intermittent PVCs in a bigeminy trigeminy seen asymptomatic no more V-tach since the PICC line was pulled back 4 cm  Echo reviewed EF 60% essentially normal no indication for IV treatment discussed with the patient her just to have follow-up with Dr Luis Daniel Shelby an outpatient  · Repeat BMP and magnesium tomorrow  HLD (hyperlipidemia)   Assessment & Plan    ·  Continue Zetia and statin        Open wound of right foot with tendon involvement   Assessment & Plan    · Status post right Achilles tendon debridement and flap placement- pod #2  · Pain is controlled minimal use of the PCA pump he will himself type 2 titrated down constipated will put on Colace 100 mg p o  b i d  standing and Senokot 1 tab p o  daily standing   ·  Woun infection has been evaluated by Infectious Disease he is currently on ampicillin and Levaquin he is going to be having a 14 day course from the day of surgery of total antibiotics on discharge can be changed to p  o  marks still in    Today is day 3   · Labs reviewed WBC decreased                      VTE Pharmacologic Prophylaxis:   Pharmacologic: Heparin  Mechanical VTE Prophylaxis in Place: Yes    Patient Centered Rounds: I have performed bedside rounds with nursing staff today  Discussions with Specialists or Other Care Team Provider: yes    Education and Discussions with Family / Patient: patient    Time Spent for Care: 45 minutes  More than 50% of total time spent on counseling and coordination of care as described above  Current Length of Stay: 3 day(s)    Current Patient Status: Inpatient   Certification Statement: staying for post op care from plastic   Discharge Plan: when cleared by plastic surgery     Code Status: No Order      Subjective:  Patient is seen and examined, pain in the leg is okay he has titrating on his Dilaudid down no abdominal pain no chest pain or dizziness he had had a bowel movement  He has no more groin pain since the Muñoz is removed slept well all night  Objective:     Vitals:   Temp (24hrs), Av °F (36 7 °C), Min:97 5 °F (36 4 °C), Max:98 3 °F (36 8 °C)    HR:  [77-90] 77  Resp:  [6-35] 16  BP: ()/() 124/72  SpO2:  [91 %-98 %] 92 %  Body mass index is 48 66 kg/m²  Input and Output Summary (last 24 hours): Intake/Output Summary (Last 24 hours) at 18 1339  Last data filed at 18 0422   Gross per 24 hour   Intake           1023 1 ml   Output             2550 ml   Net          -1526 9 ml       Physical Exam:     Physical Exam   Constitutional: He is oriented to person, place, and time  He appears well-developed  Obese   HENT:   Head: Normocephalic and atraumatic  Eyes: EOM are normal  Pupils are equal, round, and reactive to light  Neck: Normal range of motion  Cardiovascular: Normal rate, regular rhythm and normal heart sounds  Pulmonary/Chest: Effort normal and breath sounds normal    Abdominal: Soft  Bowel sounds are normal    Obese   Musculoskeletal: Normal range of motion  He exhibits edema (Right lower extremity mildly swollen with dressing)  Neurological: He is alert and oriented to person, place, and time  He has normal reflexes  Skin: Skin is warm     Psychiatric: He has a normal mood and affect  Additional Data:     Labs:      Results from last 7 days  Lab Units 08/02/18  0457   WBC Thousand/uL 13 40*   HEMOGLOBIN g/dL 12 8*   HEMATOCRIT % 38 2*   PLATELETS Thousands/uL 160   NEUTROS PCT % 61   LYMPHS PCT % 20   MONOS PCT % 11*   EOS PCT % 7*       Results from last 7 days  Lab Units 08/02/18  0457   SODIUM mmol/L 135*   POTASSIUM mmol/L 4 0   CHLORIDE mmol/L 102   CO2 mmol/L 28   BUN mg/dL 15   CREATININE mg/dL 0 92   CALCIUM mg/dL 8 1*   GLUCOSE RANDOM mg/dL 99                     * I Have Reviewed All Lab Data Listed Above  * Additional Pertinent Lab Tests Reviewed: All Labs Within Last 24 Hours Reviewed    Imaging:    Imaging Reports Reviewed Today Include: echo  Imaging Personally Reviewed by Myself Includes:  none    Recent Cultures (last 7 days):           Last 24 Hours Medication List:     Current Facility-Administered Medications:  ampicillin 2,000 mg Intravenous Q6H Andrew Joyce MD Last Rate: Stopped (08/02/18 0422)   aspirin 325 mg Oral BID Ozzy Pradhan MD    docusate sodium 100 mg Oral BID Willie Arthur MD    ezetimibe 10 mg Oral Daily Ozzy Pradhan MD    famotidine 20 mg Oral BID Stella Hathaway MD    heparin (porcine) 5,000 Units Subcutaneous [facility] Ozzy Pradhan MD    HYDROmorphone (DILAUDID) 1 mg/mL 50 mL PCA  Intravenous Continuous Chino Palacios MD Last Rate: 0  (08/01/18 1627)   levofloxacin 750 mg Oral Q24H Andrew Joyce MD    lisinopril 20 mg Oral Daily Willie Arthur MD    morphine injection 2 mg Intravenous Q1H PRN Ozzy Pradhan MD    ondansetron 4 mg Oral Q6H PRN Ozzy Pradhan MD    pravastatin 40 mg Oral Daily With Blanquita Dye MD    senna 1 tablet Oral HS Willie Arthur MD         Today, Patient Was Seen By: Willie Arthur MD    ** Please Note: Dictation voice to text software may have been used in the creation of this document   **

## 2018-08-02 NOTE — PROGRESS NOTES
Progress Note - Infectious Disease   Margaret Nash 63 y o  male MRN: [id number]  Unit/Bed#:  Encounter: [id number]      Impression/Recommendations: This is a 63 y  o  male, with closed right Achilles tendon tear, status post repair, complicated by wound infection and breakdown   Patient is status post multiple I&D's  Thania Room is now status post free flap closure with STSG      1   Right heel wound infection   Patient is status post multiple I&D's  Thania Room is now status post free flap closure with STSG   Recent culture from I&D grew Enterococcus, Pseudomonas and stenotrophomonas   It is difficult to tell which should these bacteria is/are true pathogen/s   At this point, it would be best to treat him with a course of antibiotic to cover for all these pathogens perioperatively  Patient has leukocytosis but no fever   He is clinically and systemically well  Patient is tolerating IV ampicillin and Levaquin without difficulty  Continue high-dose IV ampicillin and p o  Levaquin     Serial wound exams per Plastic surgery service  Next dressing change within the next 48 hr   Transition to p o  amoxicillin/Levaquin at discharge  Treat x 14 days, through       2   Chronic right heel open wound   Patient is now status post free flap closure with STSG      3  Achilles tendon rupture, status post repair, with above complication      4   Morbid obesity   This most likely contributes to original injury   This probably also contributes to poor wound healing  Antibiotic D3    Subjective:  Pain is relatively controlled  Muñoz removed  Denies fevers, chills, or sweats  Denies nausea, vomiting, or diarrhea        Objective:  Vitals:  HR:  [77-90] 82  Resp:  [6-35] 21  BP: ()/() 114/40  SpO2:  [91 %-98 %] 97 %  Temp (24hrs), Av °F (36 7 °C), Min:97 5 °F (36 4 °C), Max:98 3 °F (36 8 °C)  Current: Temperature: 97 8 °F (36 6 °C)    Physical Exam:   General:  No acute distress  Psychiatric:  Awake and alert  Pulmonary:  Normal respiratory excursion without accessory muscle use  Abdomen:  Soft, nontender  Extremities:  No edema  Right foot dressing intact  Skin:  No rashes  Left upper extremity PICC line in place    Lab Results:  I have personally reviewed pertinent labs  Results from last 7 days  Lab Units 08/02/18  0457 08/01/18  0522 07/31/18  1402   SODIUM mmol/L 135* 132* 135*   POTASSIUM mmol/L 4 0 4 1 3 9   CHLORIDE mmol/L 102 99 101   CO2 mmol/L 28 30 29   ANION GAP mmol/L 5 3* 5   BUN mg/dL 15 15 13   CREATININE mg/dL 0 92 1 03 1 10   EGFR ml/min/1 73sq m 92 80 74   GLUCOSE RANDOM mg/dL 99 102* 120*   CALCIUM mg/dL 8 1* 8 2* 8 3*       Results from last 7 days  Lab Units 08/02/18  0457 08/01/18  0522 07/31/18  1402   WBC Thousand/uL 13 40* 14 20* 12 20*   HEMOGLOBIN g/dL 12 8* 13 8 14 4   PLATELETS Thousands/uL 160 173 178           Imaging Studies:   I have personally reviewed pertinent imaging study reports and images in PACS  EKG, Pathology, and Other Studies:   I have personally reviewed pertinent reports

## 2018-08-02 NOTE — NURSING NOTE
Pt attempting bedpan but unsuccessful, pt requesting gloves and wipes, "I think I  Might need a fleets enema, I am very sensitive to the opiod medicaitons", Dr Rey Garcia paged with same

## 2018-08-02 NOTE — ASSESSMENT & PLAN NOTE
· Status post right Achilles tendon debridement and flap placement- pod #2  · Pain is controlled minimal use of the PCA pump he will himself type 2 titrated down constipated will put on Colace 100 mg p o  b i d  standing and Senokot 1 tab p o  daily standing   ·  Woun infection has been evaluated by Infectious Disease he is currently on ampicillin and Levaquin he is going to be having a 14 day course from the day of surgery of total antibiotics on discharge can be changed to p  o  marks still in    Today is day 3   · Labs reviewed WBC decreased

## 2018-08-02 NOTE — ASSESSMENT & PLAN NOTE
·  Controlled hypertensionresolved since the titration of the allotted and also lisinopril back down to 20 mg p o  daily

## 2018-08-02 NOTE — NURSING NOTE
Pt c/o of feeling warm in room, personal fan and fan on in room, facilities called to try to adjust temp in room, pt requesting washcloths to wash up and provided, wife at bedside

## 2018-08-02 NOTE — NURSING NOTE
No change in assessment from earlier today, will continue to monitor pt, rle elevated up on 2 pillows for heel off bed

## 2018-08-02 NOTE — NURSING NOTE
Patient awake intermittently  CPAP on, rates pain 4/10  Encouraged to use PCA  Repositioned  RLE elevated   No change from previous assessment Call bell in reach

## 2018-08-02 NOTE — PHYSICAL THERAPY NOTE
PT CANCELLATION    Received PT Evaluation and treat order  Chart reviewed  PT Evaluation/Treatment deferred at this time due to: patient with laser doppler in place  Plan is to resume PT after removal of laser doppler and when patient available, willing to participate and medically cleared      Dante Vela, PT, DPT

## 2018-08-02 NOTE — PROGRESS NOTES
No more drainage since yesterday  To begin isometrics  Flap not with pressure  Tomorrow to begin PT with no stepdown rt foot  Dressings will be changed and laser doppler stopped

## 2018-08-02 NOTE — ASSESSMENT & PLAN NOTE
· Intermittent PVCs not in  bigeminy trigeminy seen asymptomatic no more V-tach since the PICC line was pulled back 4 cm  Echo reviewed EF 60% essentially normal no indication for IV treatment discussed with the patient her just to have follow-up with Dr Gabino Keller an outpatient  · Repeat BMP and magnesium tomorrow

## 2018-08-03 PROCEDURE — G8982 BODY POS GOAL STATUS: HCPCS

## 2018-08-03 PROCEDURE — 97163 PT EVAL HIGH COMPLEX 45 MIN: CPT

## 2018-08-03 PROCEDURE — 94664 DEMO&/EVAL PT USE INHALER: CPT

## 2018-08-03 PROCEDURE — G8988 SELF CARE GOAL STATUS: HCPCS

## 2018-08-03 PROCEDURE — 97530 THERAPEUTIC ACTIVITIES: CPT

## 2018-08-03 PROCEDURE — 97167 OT EVAL HIGH COMPLEX 60 MIN: CPT

## 2018-08-03 PROCEDURE — G8981 BODY POS CURRENT STATUS: HCPCS

## 2018-08-03 PROCEDURE — G8987 SELF CARE CURRENT STATUS: HCPCS

## 2018-08-03 PROCEDURE — 94760 N-INVAS EAR/PLS OXIMETRY 1: CPT

## 2018-08-03 PROCEDURE — 99232 SBSQ HOSP IP/OBS MODERATE 35: CPT | Performed by: FAMILY MEDICINE

## 2018-08-03 PROCEDURE — 94640 AIRWAY INHALATION TREATMENT: CPT

## 2018-08-03 RX ORDER — SENNOSIDES 8.6 MG
1 TABLET ORAL
Status: DISCONTINUED | OUTPATIENT
Start: 2018-08-03 | End: 2018-08-05 | Stop reason: HOSPADM

## 2018-08-03 RX ORDER — DOCUSATE SODIUM 100 MG/1
100 CAPSULE, LIQUID FILLED ORAL 2 TIMES DAILY PRN
Status: DISCONTINUED | OUTPATIENT
Start: 2018-08-03 | End: 2018-08-05 | Stop reason: HOSPADM

## 2018-08-03 RX ORDER — OXYCODONE HYDROCHLORIDE AND ACETAMINOPHEN 5; 325 MG/1; MG/1
2 TABLET ORAL EVERY 4 HOURS PRN
Status: DISCONTINUED | OUTPATIENT
Start: 2018-08-03 | End: 2018-08-05 | Stop reason: HOSPADM

## 2018-08-03 RX ADMIN — AMPICILLIN 2000 MG: 2 INJECTION, POWDER, FOR SOLUTION INTRAMUSCULAR; INTRAVENOUS at 08:20

## 2018-08-03 RX ADMIN — ASPIRIN 325 MG ORAL TABLET 325 MG: 325 PILL ORAL at 08:35

## 2018-08-03 RX ADMIN — AMPICILLIN 2000 MG: 2 INJECTION, POWDER, FOR SOLUTION INTRAMUSCULAR; INTRAVENOUS at 15:06

## 2018-08-03 RX ADMIN — HEPARIN SODIUM 5000 UNITS: 5000 INJECTION, SOLUTION INTRAVENOUS; SUBCUTANEOUS at 14:01

## 2018-08-03 RX ADMIN — ALBUTEROL SULFATE 2.5 MG: 2.5 SOLUTION RESPIRATORY (INHALATION) at 20:19

## 2018-08-03 RX ADMIN — PRAVASTATIN SODIUM 40 MG: 40 TABLET ORAL at 17:16

## 2018-08-03 RX ADMIN — OXYCODONE HYDROCHLORIDE AND ACETAMINOPHEN 2 TABLET: 5; 325 TABLET ORAL at 14:33

## 2018-08-03 RX ADMIN — OXYCODONE HYDROCHLORIDE AND ACETAMINOPHEN 2 TABLET: 5; 325 TABLET ORAL at 22:50

## 2018-08-03 RX ADMIN — ASPIRIN 325 MG ORAL TABLET 325 MG: 325 PILL ORAL at 17:16

## 2018-08-03 RX ADMIN — FAMOTIDINE 20 MG: 20 TABLET ORAL at 08:20

## 2018-08-03 RX ADMIN — OXYCODONE HYDROCHLORIDE AND ACETAMINOPHEN 2 TABLET: 5; 325 TABLET ORAL at 18:50

## 2018-08-03 RX ADMIN — EZETIMIBE 10 MG: 10 TABLET ORAL at 08:35

## 2018-08-03 RX ADMIN — HEPARIN SODIUM 5000 UNITS: 5000 INJECTION, SOLUTION INTRAVENOUS; SUBCUTANEOUS at 22:11

## 2018-08-03 RX ADMIN — DOCUSATE SODIUM 100 MG: 100 CAPSULE, LIQUID FILLED ORAL at 08:20

## 2018-08-03 RX ADMIN — LEVOFLOXACIN 750 MG: 750 TABLET, FILM COATED ORAL at 08:20

## 2018-08-03 RX ADMIN — FAMOTIDINE 20 MG: 20 TABLET ORAL at 17:16

## 2018-08-03 RX ADMIN — HEPARIN SODIUM 5000 UNITS: 5000 INJECTION, SOLUTION INTRAVENOUS; SUBCUTANEOUS at 06:25

## 2018-08-03 RX ADMIN — AMPICILLIN 2000 MG: 2 INJECTION, POWDER, FOR SOLUTION INTRAMUSCULAR; INTRAVENOUS at 21:13

## 2018-08-03 RX ADMIN — AMPICILLIN 2000 MG: 2 INJECTION, POWDER, FOR SOLUTION INTRAMUSCULAR; INTRAVENOUS at 03:15

## 2018-08-03 NOTE — PLAN OF CARE
Problem: OCCUPATIONAL THERAPY ADULT  Goal: Performs self-care activities at highest level of function for planned discharge setting  See evaluation for individualized goals  Treatment Interventions: ADL retraining, Functional transfer training, Endurance training, Activityengagement  Equipment Recommended:  (May need tub bench when allowed to shower; Bariatric RW)       See flowsheet documentation for full assessment, interventions and recommendations  Limitation: Decreased ADL status, Decreased endurance, Decreased self-care trans, Decreased high-level ADLs  Prognosis: Good  Assessment: Pt admit for R achilles tendon debridement and STSG  OT completed extensive review of pt's medical and social history  Pt with h/o KIRIT, HTN, PVC, and obesity  Prior to admit was living with wife and able to complete all tasks (I)'ly  Pt works full time as an oral surgeon  Supportive wife, whom walks with a cane due to foot drop  Pt presents to OT below baseline due to the following performance deficits:  pain; balance; stand tolerance; functional mobility; awareness; community integration; and self care  Therefore, pt would benefit from OT services to achieve optimal level of performance  Occupational performance areas to be addressed include: grooming, bathing, toileting, dressing, activity tolerance, functional mobility, and clothing management  Based on findings, pt is of high complexity due to limited physical assist from wife, KEITH drain remaining, NWB on R foot, and decreased mobility using AD  Recommend home with support once medically cleared        OT Discharge Recommendation: Home with family support  OT - OK to Discharge: Yes (When medically stable)

## 2018-08-03 NOTE — NURSING NOTE
pt comfortable in recliner chair with legs elevated up, visitors at bedside, rle dressing remains dry and intact with minimal serous drainage in anibal drain, right anterior thigh site dry

## 2018-08-03 NOTE — NURSING NOTE
Patient awake in bed, talking with visitors  Independently using urinal states " Im definetly getting rid of a lot of fluid " Rates pain 3/10 throbbing, per patient " Im not really using the PCA maybe ill hit it tonight before I go to sleep " Currently on RA, no complaints of SOB  Laser doppler still remains at 0  RLE with minimal bloody drainage  RLE elevated  Pt turned and repositioned call bell in reach and instructed to use

## 2018-08-03 NOTE — NURSING NOTE
Patient sleeping, has personal CPAP in place  PCA within reach  RLE elevated  Pt repositioning independently  No change from previous assessment   Call bell in reach

## 2018-08-03 NOTE — OCCUPATIONAL THERAPY NOTE
[address]  Evaluation     Patient Name: Quyen Awad  LNATK'I Date: 8/3/2018  Problem List  Patient Active Problem List   Diagnosis    Open wound of right foot with tendon involvement    HLD (hyperlipidemia)    PVC (premature ventricular contraction)    HTN (hypertension)    GERD (gastroesophageal reflux disease)    KIRIT on CPAP    Ankle wound, right, sequela    Hyponatremia    Open wound of right foot     Past Medical History  Past Medical History:   Diagnosis Date    Asthma     CPAP (continuous positive airway pressure) dependence     Diverticulosis     GERD (gastroesophageal reflux disease)     Hyperlipidemia     Morbid obesity ([address] )     Sleep apnea      Past Surgical History  Past Surgical History:   Procedure Laterality Date    CARPAL TUNNEL RELEASE      COLONOSCOPY  2007    HERNIA REPAIR      abdominal x 2    POLYPECTOMY      hyperplastic    AR COLONOSCOPY FLX DX W/COLLJ SPEC WHEN PFRMD N/A 11/27/2017    Procedure: COLONOSCOPY;  Surgeon: Mendez Moraes MD;  Location: [facility]; Service: Gastroenterology    AR DEBRIDEMENT, SKIN, SUB-Q TISSUE,=<20 SQ CM Right 7/18/2018    Procedure: DEBRIDEMENT ANKLE WOUND;  Surgeon: Chelsea Hernandez MD;  Location: [address] [place] [address];  Service: Plastics    AR DEBRIDEMENT, SKIN, SUB-Q TISSUE,=<20 SQ CM Right 7/23/2018    Procedure: DEBRIDEMENT RIGHT ANKLE WOUND;  Surgeon: Chelsea Hernandez MD;  Location: [address] [place] [address];  Service: Plastics    AR FREE MUSC-SKIN FLAP W/MICROVASC ANAST Right 7/30/2018    Procedure: COVERAGE HEEL WITH GRACILIS MUSCLE FLAP/SKINGRAFT ;  Surgeon: Chelsea Hernandez MD;  Location: [address] [place] [address];  Service: Plastics    [address] [place] [address] Right 4/23/2018    Procedure: REPAIR TENDON ACHILLES   FHL TRANSFER;  Surgeon: Amber Romero DPM;  Location: [facility];  Service: Podiatry    TRIGGER FINGER RELEASE      thumb     RN cleared pt for OT eval   Remains seated in recliner at end of session with all needs 08/03/18 1411   Note Type   Note type Eval/Treat   Restrictions/Precautions   Weight Bearing Precautions Per Order Yes   RLE Weight Bearing Per Order NWB   Other Precautions Impulsive; Fall Risk;Pain  (KEITH drain )   Pain Assessment   Pain Assessment 0-10   Pain Score 7   Pain Type Acute pain   Pain Location Leg   Pain Orientation Right   Pain Descriptors [facility]   Hospital Pain Intervention(s) Medication (See MAR); Repositioned; Ambulation/increased activity; Emotional support   Home Living   Type of [address] One level;Ramped entrance  (R HR on ramp)   Bathroom Shower/Tub Tub/shower unit   Bathroom Toilet Standard   Bathroom Equipment Hand-held shower   [address] [place];Sock aid;Long-handled shoehorn  (CPAP; Rollator; Knee scooter )   Prior Function   Level of [place] Independent with ADLs and functional mobility  (Ambulates without AD- prior used knee scooter)   Lives With Spouse   ADL Assistance Independent   IADLs Independent   Falls in the last 6 months 0   Vocational Full time employment  (Oral surgeon )   Comments Wife has foot drop and walks with cane    Lifestyle   Autonomy Pt is (I) PLOF; + drives; R handed    Reciprocal Relationships Wife   Service to Others Oral surgeon    Intrinsic Gratification Working on cars; Art; Rugby referee   Subjective   Subjective "I've been dealing with this for 3 months"   ADL   UB Dressing Assistance 4  Minimal Assistance   UB Dressing Deficit ([place] clean gown )   LB Dressing Assistance 1  Total Assistance   LB Dressing Deficit (Don L shoe)   Bed Mobility   Supine to Sit 5  Supervision   Additional items (Increased time and effort ; cues not to hold breath)   Additional Comments (Remains OOB in chair )   Transfers   Sit to Stand 4  Minimal assistance   Additional items (For safety and cues for leg placement)   Stand to Sit 4  Minimal assistance   Additional items (Uncontrolled descent )   Stand pivot 5  Supervision Additional items (Close using RW; impulsive at times )   Functional Mobility   Functional Mobility 5  Supervision   Additional Comments (Close using knee scooter; impulsive)   Additional items (94% RA HR 88  BP seated 138/78 after SPT)   Balance   Static Sitting Fair   Dynamic Sitting Fair -   Static Standing Fair   Dynamic Standing Fair   Ambulatory Fair   Activity Tolerance   Activity Tolerance Patient limited by fatigue;Patient limited by pain   Nurse Made Aware RN- pain once sitting on EOB  RUE Assessment   RUE Assessment (4+/5 t/o)   LUE Assessment   LUE Assessment (4+/5 t/o )   Hand Function   Gross Motor Coordination Functional   Vision-Basic Assessment   Current Vision Wears glasses all the time   Cognition   Arousal/Participation Cooperative; Alert   Attention Within functional limits   Orientation Level Oriented X4   Following Commands Follows all commands and directions without difficulty   Assessment   Limitation Decreased ADL status; Decreased endurance;Decreased self-care trans;Decreased high-level ADLs   Prognosis Good   Assessment Pt admit for R achilles tendon debridement and STSG  OT completed extensive review of pt's medical and social history  Pt with h/o KIRIT, HTN, PVC, and obesity  Prior to admit was living with wife and able to complete all tasks (I)'ly  Pt works full time as an oral surgeon  Supportive wife, whom walks with a cane due to foot drop  Pt presents to OT below baseline due to the following performance deficits:  pain; balance; stand tolerance; functional mobility; awareness; community integration; and self care  Therefore, pt would benefit from OT services to achieve optimal level of performance  Occupational performance areas to be addressed include: grooming, bathing, toileting, dressing, activity tolerance, functional mobility, and clothing management   Based on findings, pt is of high complexity due to limited physical assist from wife, KEITH drain remaining, NWB on R foot, and decreased mobility using AD  Recommend home with support once medically cleared  Goals   Patient Goals "Not to jeopardize the graft and do what it takes"    Short Term Goal #1 1  LB ADL- (S) while maintaining NWB  ;  2  Toileting- (S) for clothing management while maintaining NWB  ; 3  Bed Mobility- MI with bed flat and no SR to prep for purposeful tasks  ;  4  ADL Txfs- Distant S using AD  ;  5  Stand Balance- F+ 1 UE support for clothing management   ;  6  Activity Tolerance- Fair+ to resume prior roles and routines     Plan   Treatment Interventions ADL retraining;Functional transfer training; Endurance training; Activityengagement   Goal Expiration Date 08/10/18   Treatment Day 1   OT Frequency 3-5x/wk   Additional Treatment Session   Start Time 1436   End Time 1451   Treatment Assessment Treatment focused on self care, ADL txfs, and activity tolerance  Pt sitting on EOB with F balance and posterior lean; appeared fatigue  (D) to don L shoe; F- dynamic sit balance  Requested another gown for back; Min to don  Stand from EOB with Min and cues to keep leg inside walker  SPT to recliner with Min using RW  Stand to sit Min with uncontrolled descent  Educated pt on need to control descent  Vitals stable  OT inquired about LB AE for home and pt states he has everything from prior sx  Discussed assist at home due to NWB and the need for good balance in order to complete hygiene needs  Reports wife can help but feels he will be fine  OT recommends tub bench for when he's able to shower; agreed  Remains in recliner with all needs and R LE elevated  RN aware      Recommendation   OT Discharge Recommendation Home with family support   Equipment Recommended (May need tub bench when allowed to shower; Bariatric RW)   OT - OK to Discharge Yes  (When medically stable)   Barthel Index   Feeding 10   Bathing 0   Grooming Score 0   Dressing Score 5   Bladder Score 10   Bowels Score 10   Toilet Use Score 5   Transfers (Bed/Chair) Score 5   Mobility (Level Surface) Score 10   Stairs Score 0   Barthel Index Score 55     Lorelei Rodrigez

## 2018-08-03 NOTE — NURSING NOTE
PT/OT here to see pt, assisted oob to recliner chair and use of knee scooter, while in recliner chair, rle elevated up on 1 pillow with legs elevated up, pt tolerated well

## 2018-08-03 NOTE — CONSULTS
Consult- Kendrick Davey 1960, 63 y o  male MRN: [id number]    Unit/Bed#:  Encounter: [id number]    Primary Care Provider: Rodrigo Chopra MD   Date and time admitted to hospital: 7/30/2018  5:46 AM      Consults    Hyponatremia   Assessment & Plan    · Improved status post resolution of pain and 1500 mL fluid restriction           KIRIT on CPAP   Assessment & Plan    ·  On CPAP at night and p r n  GERD (gastroesophageal reflux disease)   Assessment & Plan    ·  Pepcid        HTN (hypertension)   Assessment & Plan    · Controlled on lisinopril 20 mg p o  daily with holding parameters        PVC (premature ventricular contraction)   Assessment & Plan    · Intermittent PVCs not in  bigeminy trigeminy seen asymptomatic no more V-tach since the PICC line was pulled back 4 cm  Echo reviewed reviewed-also no indication for treatment he should follow up with Dr Aurelia Grey as an outpatient  He is asymptomatic  HLD (hyperlipidemia)   Assessment & Plan    ·  Continue Zetia and statin        Open wound of right foot with tendon involvement   Assessment & Plan    · Status post right Achilles tendon debridement and flap placement- pod #3  · Pain controlled plastic surgery supposed to evaluate the graft today and see if it is good in possibly start working with physical therapy  He had a Fleet enema yesterday his constipation has resolved will switch his cholecystitis and for p r n  ·  Woun infection has been evaluated by Infectious Disease he is currently on ampicillin and Levaquin he is going to be having a 14 day course from the day of surgery of total antibiotics on discharge can be changed to p  o  marks still in  Today is day 4   · Labs reviewed WBC decreased            VTE Pharmacologic Prophylaxis:   Pharmacologic: Heparin  Mechanical VTE Prophylaxis in Place: Yes    Patient Centered Rounds: I have performed bedside rounds with nursing staff today      Discussions with Specialists or Other Care Team Provider: yes    Education and Discussions with Family / Patient: patient    Time Spent for Care: 30 minutes  More than 50% of total time spent on counseling and coordination of care as described above  Current Length of Stay: 4 day(s)    Current Patient Status: Inpatient   Certification Statement: The patient will continue to require additional inpatient hospital stay due to Plastic surgery    Discharge Plan:  When cleared    Code Status: No Order      Subjective:   Patient seen examined the right foot pain just a little sore today but denies chest pain shortness of breath left well had a bowel movement yesterday after the Fleet  Hoping to get out of bed  Objective:     Vitals:   Temp (24hrs), Av 4 °F (36 9 °C), Min:97 8 °F (36 6 °C), Max:99 1 °F (37 3 °C)    HR:  [80-92] 80  Resp:  [12-26] 14  BP: ()/(40-78) 94/41  SpO2:  [91 %-97 %] 97 %  Body mass index is 48 66 kg/m²  Input and Output Summary (last 24 hours): Intake/Output Summary (Last 24 hours) at 18 0849  Last data filed at 18 0820   Gross per 24 hour   Intake          1681 87 ml   Output             3235 ml   Net         -1553 13 ml       Physical Exam:     Physical Exam   Constitutional: He is oriented to person, place, and time  He appears well-developed  Obese   HENT:   Head: Normocephalic and atraumatic  Eyes: EOM are normal  Pupils are equal, round, and reactive to light  Neck: Normal range of motion  Cardiovascular: Normal rate, regular rhythm and normal heart sounds  Pulmonary/Chest: Effort normal and breath sounds normal    Abdominal: Soft  Bowel sounds are normal    Obese   Musculoskeletal: Normal range of motion  He exhibits edema (Mild right lower extremity with dressing)  Neurological: He is alert and oriented to person, place, and time  He has normal reflexes  Skin: Skin is warm  Psychiatric: He has a normal mood and affect         Additional Data:     Labs:      Results from last 7 days  Lab Units 08/02/18  0457   WBC Thousand/uL 13 40*   HEMOGLOBIN g/dL 12 8*   HEMATOCRIT % 38 2*   PLATELETS Thousands/uL 160   NEUTROS PCT % 61   LYMPHS PCT % 20   MONOS PCT % 11*   EOS PCT % 7*       Results from last 7 days  Lab Units 08/02/18  0457   SODIUM mmol/L 135*   POTASSIUM mmol/L 4 0   CHLORIDE mmol/L 102   CO2 mmol/L 28   BUN mg/dL 15   CREATININE mg/dL 0 92   CALCIUM mg/dL 8 1*   GLUCOSE RANDOM mg/dL 99                     * I Have Reviewed All Lab Data Listed Above  * Additional Pertinent Lab Tests Reviewed: All Labs Within Last 24 Hours Reviewed    Imaging:    Imaging Reports Reviewed Today Include: none  Imaging Personally Reviewed by Myself Includes:  none    Recent Cultures (last 7 days):           Last 24 Hours Medication List:     Current Facility-Administered Medications:  albuterol        albuterol 2 5 mg Nebulization Q6H PRN Zara Artis MD    ampicillin 2,000 mg Intravenous Q6H Francine Chan MD Last Rate: 2,000 mg (08/03/18 0820)   aspirin 325 mg Oral BID Carmen Shah MD    docusate sodium 100 mg Oral BID PRN Miky Castro MD    ezetimibe 10 mg Oral Daily Carmen Shah MD    famotidine 20 mg Oral BID Pancho Pool MD    heparin (porcine) 5,000 Units Subcutaneous [facility] Carmen Shah MD    HYDROmorphone (DILAUDID) 1 mg/mL 50 mL PCA  Intravenous Continuous Stephanie Curry MD Last Rate: 0  (08/02/18 1702)   levofloxacin 750 mg Oral Q24H Francine Chan MD    lisinopril 20 mg Oral Daily Miky Castro MD    morphine injection 2 mg Intravenous Q1H PRN Carmen Shah MD    ondansetron 4 mg Oral Q6H PRN Carmen Shah MD    pravastatin 40 mg Oral Daily With Chino Villeda MD    senna 1 tablet Oral HS PRN Miky Castro MD         Today, Patient Was Seen By: Miky Castro MD    ** Please Note: Dictation voice to text software may have been used in the creation of this document   **

## 2018-08-03 NOTE — PROGRESS NOTES
Progress Note - General Surgery   Glorious Joaquin 63 y o  male MRN: [id number]  Unit/Bed#:  Encounter: [id number]    Assessment:  Dressings changed, flap appears to be good, STSG good, sutures intact, KEITH to bulb, redressed with RN assist   STSG site drying to air  PT today  Plan:  PT today    Subjective/Objective   Chief Complaint: post-surgical free flap    Subjective: patient doing well    Objective: out of bed with ambulation assist and possible discharge this weekend  Blood pressure (!) 121/48, pulse 84, temperature 98 °F (36 7 °C), temperature source Temporal, resp  rate 12, height 5' 8" (1 727 m), weight (!) 145 kg (320 lb), SpO2 96 %  ,Body mass index is 48 66 kg/m²  Intake/Output Summary (Last 24 hours) at 08/03/18 1114  Last data filed at 08/03/18 1001   Gross per 24 hour   Intake          1801 87 ml   Output             3035 ml   Net         -1233 13 ml       Invasive Devices     Peripherally Inserted Central Catheter Line            PICC Line 46/45/18 Left Basilic 2 days          Drain            Closed/Suction Drain Right;Medial Thigh Bulb 3 days                Physical Exam: No exam performed today, examined at dressing change  Lab, Imaging and other studies:E Pharmacologic Prophylaxis: Reason for no pharmacologic prophylaxis Patient doing well  VTE Mechanical Prophylaxis: reason for no mechanical VTE prophylaxis patient will PT today   Dressings changed  Flap appears fine, STSG good, sutures intact, KEITH to bulb, STSG donor site drying to air    Redressed with RN assist

## 2018-08-03 NOTE — NURSING NOTE
C/o of 4/10 right leg pain, requesting pain medication, percocet 2 tabs given for same, family at bedside

## 2018-08-03 NOTE — NURSING NOTE
Patient has frequent dry irritating cough and states " I feel like I have something going on a little weird with my breathing  I take albuterol at home only when I need it" Per patient " I take so many asthma medications but I didn't list them on my home meds because I haven't taken them since winter time" Respiratory paged new orders obtained

## 2018-08-03 NOTE — ASSESSMENT & PLAN NOTE
· Status post right Achilles tendon debridement and flap placement- pod #3  · Pain controlled plastic surgery supposed to evaluate the graft today and see if it is good in possibly start working with physical therapy  He had a Fleet enema yesterday his constipation has resolved will switch his cholecystitis and for p r n  ·  Woun infection has been evaluated by Infectious Disease he is currently on ampicillin and Levaquin he is going to be having a 14 day course from the day of surgery of total antibiotics on discharge can be changed to p  o  marks still in    Today is day 4   · Labs reviewed WBC decreased

## 2018-08-03 NOTE — NURSING NOTE
Patient morning ADLS completed by patient with minimal assistance  New linens, Turned and repositioned  RLE draining moderate amount of dark bloody drainage at top of calf  Ariel Reyes notified

## 2018-08-03 NOTE — NURSING NOTE
[place] here and redressed right ankle skin flap site, hematoma  Was removed from outside of skin flap and redressed with zeroform, 4x4, abd and kerlix and ace wrap, pt tolerated well, right thigh dressing removed and skin donor site with zeroform stapled in place and blown dry with cool air for 15 minutes until dry, pt tolerated well, rle elevatd up on pillow for heel off bed, dilaudid pca stopped and 49 ml discarded and witness by Felipe No RN

## 2018-08-03 NOTE — SOCIAL WORK
Surgeon cleared pt for PT-to keep NWB to (R) foot- to be evaluated to ascertain d/c needs- KEITH now to bulb suction  Pending therapy recommendations would be agreeable to Home PT if needed however continues to state has colleagues/friends willing to aide with wound care- antibiotics to be changed to PO on d/c  Per Surgeon could be cleared this weekend for d/c- will follow up this afternoon for therapy recommendations

## 2018-08-03 NOTE — PLAN OF CARE
Problem: PHYSICAL THERAPY ADULT  Goal: Performs mobility at highest level of function for planned discharge setting  See evaluation for individualized goals  Treatment/Interventions: Functional transfer training, ADL retraining, Elevations, Therapeutic exercise, Endurance training, Patient/family training, Equipment eval/education, Bed mobility, Gait training, OT, Spoke to nursing, Family  Equipment Recommended: Other (Comment) (Bariatric RW)       See flowsheet documentation for full assessment, interventions and recommendations  Outcome: Progressing  Prognosis: Fair  Problem List: Impaired balance, Decreased mobility, Decreased safety awareness, Impaired vision, Pain, Orthopedic restrictions  Assessment: In summary, guiding factors including patient history, examination of body system(s), clinical presentation and clinical decision making were considered  Patient presents with comorbid conditions that impact function, context of current functional limitations as compared to the prior level of function, limited physical support, recent hospital admission and with living environment deficits  Patient also presents with impaired safety awareness, bed mobility, transfers, endurance for activity, standing balance and gait abilities  Clinical presentation is unstable/unpredictable at this time  The assigned level of complexity is: high  Patient would benefit from continued PT treatment to address deficits as defined above and restore or maximize level of functional mobility with consistency  From PT/mobility standpoint, preliminary discharge recommendation would be: home with family support, in order to facilitate return home with family support available and modified independence in home environment  Barriers to Discharge:  Other (Comment) (Limited support available; limited to NWB on right foot)     Recommendation: Home with family support     PT - OK to Discharge:  (When medically cleared)    See flowsheet documentation for full assessment

## 2018-08-03 NOTE — PHYSICAL THERAPY NOTE
PHYSICAL THERAPY EVALUATION    Time In: 14:10  Time Out: 14:30  Total Time: 20 min  MRN: [id number]    Patient is a 63 y o  male evaluated by Physical Therapy s/p admit to [facility]  on 7/30/2018 with admitting diagnosis(es) of: Open wound of right ankle [S91 001A]  Spontaneous rupture of extensor tendons, right ankle and foot [M66 271] and with principal problem(s) of: <principal problem not specified>  Patient Active Problem List   Diagnosis    Open wound of right foot with tendon involvement    HLD (hyperlipidemia)    PVC (premature ventricular contraction)    HTN (hypertension)    GERD (gastroesophageal reflux disease)    KIRIT on CPAP    Ankle wound, right, sequela    Hyponatremia    Open wound of right foot     Please refer to H & P for further details  Past Medical History:   Diagnosis Date    Asthma     CPAP (continuous positive airway pressure) dependence     Diverticulosis     GERD (gastroesophageal reflux disease)     Hyperlipidemia     Morbid obesity ([address] )     Sleep apnea        Past Surgical History:   Procedure Laterality Date    CARPAL TUNNEL RELEASE      COLONOSCOPY  2007    HERNIA REPAIR      abdominal x 2    POLYPECTOMY      hyperplastic    OH COLONOSCOPY FLX DX W/COLLJ SPEC WHEN PFRMD N/A 11/27/2017    Procedure: COLONOSCOPY;  Surgeon: Santosh Regalado MD;  Location: [facility];   Service: Gastroenterology    OH DEBRIDEMENT, SKIN, SUB-Q TISSUE,=<20 SQ CM Right 7/18/2018    Procedure: DEBRIDEMENT ANKLE WOUND;  Surgeon: Brandi Green MD;  Location: [address] [place] [address];  Service: Plastics    OH DEBRIDEMENT, SKIN, SUB-Q TISSUE,=<20 SQ CM Right 7/23/2018    Procedure: DEBRIDEMENT RIGHT ANKLE WOUND;  Surgeon: Brandi Green MD;  Location: [address] [place] [address];  Service: Plastics    OH FREE MUSC-SKIN FLAP W/MICROVASC ANAST Right 7/30/2018    Procedure: COVERAGE HEEL WITH GRACILIS MUSCLE FLAP/SKINGRAFT ;  Surgeon: Brandi Green MD;  Location: [address] [place] [address] OR; Service: Plastics    TX REPAIR ACHILLES TENDON,SECONDARY Right 4/23/2018    Procedure: REPAIR TENDON ACHILLES  FHL TRANSFER;  Surgeon: Ella Yanes DPM;  Location: [facility];  Service: Podiatry    TRIGGER FINGER RELEASE      thumb       Current Length Of Hospital Stay: 4 day(s)    PT was consulted to assess patient's functional mobility and discharge needs  Ordered are PT Evaluation and treatment  Chart reviewed  RN cleared patient for PT  Comorbidities affecting patient's physical performance at time of assessment include: HTN  Personal factors affecting the patient at time of Initial Evaluation include: ambulating with assistive device, limited home support, impaired safety awareness, visual impairments and impulsivity  Please locate the subjective and objective findings outlined below:     08/03/18 1410   Note Type   Note type Eval/Treat   Pain Assessment   Pain Assessment No/denies pain   Pain Score No Pain   Home Living   Type of [address] [place] [address] entrance; Other (Comment); One level  (Rail on right side of ramp)   Bathroom Shower/Tub Tub/shower unit   Bathroom Toilet Standard   Bathroom Equipment Shower chair;Hand-held shower   [address] Other (Comment)  (Rollator walker, knee walker)   Additional Comments Patient states he was independent with ambulation without an AD   Prior Function   Level of [place] Independent with ADLs and functional mobility   Lives With Spouse   ADL Assistance Independent   IADLs Independent   Falls in the last 6 months 0   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   Restrictions/Precautions   [employer] Bearing Precautions Per Order Yes   RLE Weight Bearing Per Order NWB   Other Precautions Fall Risk;Visual impairment   General   Family/Caregiver Present No   Cognition   Overall Cognitive Status WFL   Arousal/Participation Alert   Orientation Level Oriented X4   Following Commands Follows all commands and directions without difficulty   RLE Assessment   RLE Assessment X  (NT; able to wiggle toes  Lower leg dressing intact )   LLE Assessment   LLE Assessment WFL  (Hip flex 4/5, knee ext & ankle DF 5/5)   Coordination   Movements are Fluid and Coordinated 1   Sensation (Denies numbness/tingling bilateral feet)   Bed Mobility   Supine to Sit 5  Supervision   Additional items Increased time required;Comment;Verbal cues  (VCs to avoid valsalva maneuver)   Transfers   Sit to Stand 4  Minimal assistance  (Secondary to weakness)   Additional items Assist x 1;Other;Verbal cues  (VCs to maintain NWB on right foot & for hand placement)   Stand to Sit 4  Minimal assistance  (For eccentric control)   Additional items Other;Verbal cues; Assist x 1; Impulsive  (VCs to maintain NWB on right foot & for hand placement)   Ambulation/Elevation   Gait pattern Short stride   Gait Assistance 5  Supervision  (Close, for safety)   Assistive Device Bariatric Rolling walker   Distance 5 ft   Ramp Technique Not tested   Balance   Static Standing Fair -   Dynamic Standing Fair -   Endurance Deficit   Endurance Deficit Yes   Endurance Deficit Description Limited endurance due to [address] limitation   Activity Tolerance   Activity Tolerance Patient limited by fatigue;Patient limited by pain   Assessment   Prognosis Fair   Problem List Impaired balance;Decreased mobility; Decreased safety awareness; Impaired vision;Pain;Orthopedic restrictions   Assessment In summary, guiding factors including patient history, examination of body system(s), clinical presentation and clinical decision making were considered  Patient presents with comorbid conditions that impact function, context of current functional limitations as compared to the prior level of function, limited physical support, recent hospital admission and with living environment deficits   Patient also presents with impaired safety awareness, bed mobility, transfers, endurance for activity, standing balance and gait abilities  Clinical presentation is unstable/unpredictable at this time  The assigned level of complexity is: high  Patient would benefit from continued PT treatment to address deficits as defined above and restore or maximize level of functional mobility with consistency  From PT/mobility standpoint, preliminary discharge recommendation would be: home with family support, in order to facilitate return home with family support available and modified independence in home environment  Barriers to Discharge Other (Comment)  (Limited support available; limited to NWB on right foot)   Goals   Patient Goals "To not jeopardize the graft"  LTG Expiration Date 08/13/18   Long Term Goal #1 1  Patient will perform all bed mobility with modified independence in order to get in/out of bed  2  Patient will perform all functional transfers with modified independence in order to facilitate transfer from one surface to another  3  Patient will ambulate with modified independence x at least 100 ft with right knee walker vs bariatric RW in order to safely access all necessary areas of home  4  Patient will ascend/descend ramp with right handrail available with right knee walker with supervision to enter/exit home  Treatment Day 1   Plan   Treatment/Interventions Functional transfer training;ADL retraining;Elevations; Therapeutic exercise; Endurance training;Patient/family training;Equipment eval/education; Bed mobility;Gait training;OT;Spoke to nursing;Family   PT Frequency Other (Comment)  (At least 3x/wk in 4 PT treatment sessions)   Recommendation   Recommendation Home with family support   Equipment Recommended Other (Comment)  (Bariatric RW)   PT - OK to Discharge (When medically cleared)   Barthel Index   Feeding 10   Bathing 0   Grooming Score 0   Dressing Score 5   Bladder Score 10   Bowels Score 10   Toilet Use Score 5   Transfers (Bed/Chair) Score 5   Mobility (Level Surface) Score 10   Stairs Score 0   Barthel Index Score 55     PHYSICAL THERAPY TREATMENT NOTE    Time In: 14:30    Time Out: 14:50  Total Time: 20 min  MRN: [id number]    S: "Now that my foot is down, I have pain", patient stated upon supine > sit  Rated right foot pain 7/10; Phuong Lord RN made aware  O: Therapeutic Activities:   Sit < > stand with min A of 1 secondary to weakness and for decreased eccentric control; VCs for proper hand placement  Patient impulsive with movement  Ambulation with right knee walker (patient's own) with close supervision for safety x ~ 125 ft; ambulates with fast pace, slight flexed trunk posture and impulsivity  Patient declined readjustment of walker, as it seemed too low  Patient accustomed to using this device in the recent past     Vitals: Seated post-ambulation and right forearm BP = 138/78, Heart Rate = 88 bpm, SpO2 = 94%  on RA     A: Limited endurance for ambulation using bariatric RW, but patient stable and able to maintain NWB on right foot  Patient impulsive with movement  P: Plan PT session 8/4/18 for safety instructions in mobility  Patient transferred to recliner chair at bedside with LEs elevated; patient positioned with all needs, including call bell, within reach      Yosef Vela, PT, DPT

## 2018-08-04 LAB — TROPONIN I SERPL-MCNC: <0.01 NG/ML (ref 0–0.03)

## 2018-08-04 PROCEDURE — 99232 SBSQ HOSP IP/OBS MODERATE 35: CPT | Performed by: FAMILY MEDICINE

## 2018-08-04 PROCEDURE — 94760 N-INVAS EAR/PLS OXIMETRY 1: CPT

## 2018-08-04 PROCEDURE — 84484 ASSAY OF TROPONIN QUANT: CPT | Performed by: HOSPITALIST

## 2018-08-04 PROCEDURE — 97116 GAIT TRAINING THERAPY: CPT

## 2018-08-04 PROCEDURE — 93005 ELECTROCARDIOGRAM TRACING: CPT

## 2018-08-04 RX ORDER — LISINOPRIL 10 MG/1
10 TABLET ORAL DAILY
Status: DISCONTINUED | OUTPATIENT
Start: 2018-08-05 | End: 2018-08-05 | Stop reason: HOSPADM

## 2018-08-04 RX ORDER — METHOCARBAMOL 500 MG/1
500 TABLET, FILM COATED ORAL ONCE
Status: COMPLETED | OUTPATIENT
Start: 2018-08-04 | End: 2018-08-04

## 2018-08-04 RX ORDER — METHOCARBAMOL 500 MG/1
500 TABLET, FILM COATED ORAL EVERY 6 HOURS PRN
Status: DISCONTINUED | OUTPATIENT
Start: 2018-08-04 | End: 2018-08-05 | Stop reason: HOSPADM

## 2018-08-04 RX ADMIN — ASPIRIN 325 MG ORAL TABLET 325 MG: 325 PILL ORAL at 08:07

## 2018-08-04 RX ADMIN — OXYCODONE HYDROCHLORIDE AND ACETAMINOPHEN 2 TABLET: 5; 325 TABLET ORAL at 18:21

## 2018-08-04 RX ADMIN — HEPARIN SODIUM 5000 UNITS: 5000 INJECTION, SOLUTION INTRAVENOUS; SUBCUTANEOUS at 06:52

## 2018-08-04 RX ADMIN — OXYCODONE HYDROCHLORIDE AND ACETAMINOPHEN 2 TABLET: 5; 325 TABLET ORAL at 14:03

## 2018-08-04 RX ADMIN — FAMOTIDINE 20 MG: 20 TABLET ORAL at 08:08

## 2018-08-04 RX ADMIN — AMPICILLIN 2000 MG: 2 INJECTION, POWDER, FOR SOLUTION INTRAMUSCULAR; INTRAVENOUS at 21:36

## 2018-08-04 RX ADMIN — ASPIRIN 325 MG ORAL TABLET 325 MG: 325 PILL ORAL at 17:20

## 2018-08-04 RX ADMIN — OXYCODONE HYDROCHLORIDE AND ACETAMINOPHEN 2 TABLET: 5; 325 TABLET ORAL at 23:15

## 2018-08-04 RX ADMIN — AMPICILLIN 2000 MG: 2 INJECTION, POWDER, FOR SOLUTION INTRAMUSCULAR; INTRAVENOUS at 15:00

## 2018-08-04 RX ADMIN — HEPARIN SODIUM 5000 UNITS: 5000 INJECTION, SOLUTION INTRAVENOUS; SUBCUTANEOUS at 13:50

## 2018-08-04 RX ADMIN — FAMOTIDINE 20 MG: 20 TABLET ORAL at 17:16

## 2018-08-04 RX ADMIN — METHOCARBAMOL 500 MG: 500 TABLET ORAL at 20:44

## 2018-08-04 RX ADMIN — HEPARIN SODIUM 5000 UNITS: 5000 INJECTION, SOLUTION INTRAVENOUS; SUBCUTANEOUS at 21:36

## 2018-08-04 RX ADMIN — PRAVASTATIN SODIUM 40 MG: 40 TABLET ORAL at 17:16

## 2018-08-04 RX ADMIN — EZETIMIBE 10 MG: 10 TABLET ORAL at 08:07

## 2018-08-04 RX ADMIN — LEVOFLOXACIN 750 MG: 750 TABLET, FILM COATED ORAL at 08:15

## 2018-08-04 RX ADMIN — DOCUSATE SODIUM 100 MG: 100 CAPSULE, LIQUID FILLED ORAL at 08:15

## 2018-08-04 RX ADMIN — AMPICILLIN 2000 MG: 2 INJECTION, POWDER, FOR SOLUTION INTRAMUSCULAR; INTRAVENOUS at 03:24

## 2018-08-04 RX ADMIN — AMPICILLIN 2000 MG: 2 INJECTION, POWDER, FOR SOLUTION INTRAMUSCULAR; INTRAVENOUS at 08:16

## 2018-08-04 RX ADMIN — OXYCODONE HYDROCHLORIDE AND ACETAMINOPHEN 2 TABLET: 5; 325 TABLET ORAL at 07:57

## 2018-08-04 RX ADMIN — METHOCARBAMOL 500 MG: 500 TABLET ORAL at 15:00

## 2018-08-04 NOTE — PHYSICAL THERAPY NOTE
08/04/18 1352   Pain Assessment   Pain Assessment No/denies pain   Pain Score No Pain   Restrictions/Precautions   Weight Bearing Precautions Per Order Yes   RLE Weight Bearing Per Order NWB   General   Chart Reviewed Yes   Family/Caregiver Present Yes   Cognition   Overall Cognitive Status WFL   Subjective   Subjective ("I am definitely deconditioned")   Transfers   Stand to Sit 5  Supervision   Additional items (uncontrolled descent)   Stand pivot (S from [employer])   Additional Comments 142/76  O2 99   Ambulation/Elevation   Gait Assistance 5  Supervision   Assistive Device [employer])   Distance 80'   Activity Tolerance   Activity Tolerance Patient tolerated treatment well   Exercises   Hip Flexion 20 reps  (20 L)   Knee AROM Long Arc Quad (20 L)   Ankle Pumps (20 L)   Assessment   Prognosis Good   Assessment Vandana amb well with kneewalker  requested weights "to work on upper body" issued blue band     Uncontrolled descent  stand to sit    Goals   Patient Goals (return home)   LTG Expiration Date 08/13/18   Treatment Day 2   Plan   Treatment/Interventions Therapeutic exercise;Gait training   Progress Progressing toward goals   PT Frequency (3xweek in 4 PT Rxs)   Recommendation   Equipment Recommended (has kneewalker)                                                                                     PHYSICAL THERAPY NOTE          Patient Name: Ru Sanchez  WWNAA'P Date: 8/4/2018   Rx time 19 min  Ulysses Hawks, PTA

## 2018-08-04 NOTE — NURSING NOTE
Patient awaken at 1230 and had been sleeping since earlier after medicated with percocet  Patient had placed his CPA on by himself  Patient now wishes to get oob to the bathroom to attempt to have a bm  "I should have gotten oob earlier instead of sleeping, now I am going to have pain" I offered pain medication but refusing  Assisted with shoe wear and patient insisting in getting out of bed and having his knee walker in front of him and not wanting to use the walker to get support before using the knee walker  Patient wheeled himself to the toilet, not using weight bearing to right leg  Call bell with in reach

## 2018-08-04 NOTE — NURSING NOTE
Patient awake alert  Offered pain medication  Percocet given for pain level 3  Offered colace and willing to take it  Patient reports he would like to rest and will get oob later to use the bathroom and attempt to have a bm  All needs met  Call bell within reach  Stable

## 2018-08-04 NOTE — PLAN OF CARE
Problem: PHYSICAL THERAPY ADULT  Goal: Performs mobility at highest level of function for planned discharge setting  See evaluation for individualized goals  Treatment/Interventions: Functional transfer training, ADL retraining, Elevations, Therapeutic exercise, Endurance training, Patient/family training, Equipment eval/education, Bed mobility, Gait training, OT, Spoke to nursing, Family  Equipment Recommended: Other (Comment) (Bariatric RW)       See flowsheet documentation for full assessment, interventions and recommendations  Prognosis: Good  Problem List: Impaired balance, Decreased mobility, Decreased safety awareness, Impaired vision, Pain, Orthopedic restrictions  Assessment: Vandana amb well with kneewalker  requested weights "to work on upper body" issued blue band   Uncontrolled descent  stand to sit   Barriers to Discharge: Other (Comment) (Limited support available; limited to NWB on right foot)     Recommendation: Home with family support     PT - OK to Discharge:  (When medically cleared)    See flowsheet documentation for full assessment

## 2018-08-04 NOTE — NURSING NOTE
Physical Therapist worked with patient earlier  Patient reports feeling a burning sensation right pectoralis muscle and also spasm left hip area " I think I over did it and streched my muscle too much" patient was in the chair and asked to get oob and repositioned himself  Dr Pascual Bailey notified of the patients reporting findings  Md to order medication Robaxin  Patient informed of the plan and agrees with it  Will administer the medication when Md orders it

## 2018-08-04 NOTE — PROGRESS NOTES
Progress Note - Natalie Barakat 1960, 63 y o  male MRN: [id number]    Unit/Bed#:  Encounter: [id number]    Primary Care Provider: Matilde Shone, MD   Date and time admitted to hospital: 7/30/2018  5:46 AM        Hyponatremia   Assessment & Plan    · Improved status post resolution of pain pain has resolved removed 1500 mL restriction          KIRIT on CPAP   Assessment & Plan    ·  On CPAP at night and p r n  GERD (gastroesophageal reflux disease)   Assessment & Plan    ·  Pepcid        HTN (hypertension)   Assessment & Plan    · Controlled on his blood pressures outside of the parameters he has been utilizing lisinopril 20 mg in the past 2 days decreased to 10 mg p o  daily and observe        PVC (premature ventricular contraction)   Assessment & Plan    · Intermittent PVCs not in  bigeminy trigeminy seen asymptomatic no more V-tach since the PICC line was pulled back 4 cm  Echo reviewed reviewed-also no indication for treatment he should follow up with Dr Olayinka Fitzpatrick as an outpatient  He is asymptomatic  HLD (hyperlipidemia)   Assessment & Plan    ·  Continue Zetia and statin        Open wound of right foot with tendon involvement   Assessment & Plan    · Status post right Achilles tendon debridement and flap placement- pod #4  ·  Woun infection has been evaluated by Infectious Disease he is currently on ampicillin and Levaquin he is going to be having a 14 day course from the day of surgery of total antibiotics on discharge can be changed to p  o  marks still in  Today is day 5   · Labs reviewed WBC decreased  · Flap is good evaluated by Plastic surgery started physical therapy as per patient he states he was told he is going to go home tomorrow  VTE Pharmacologic Prophylaxis:   Pharmacologic: Heparin  Mechanical VTE Prophylaxis in Place: Yes    Patient Centered Rounds: I have performed bedside rounds with nursing staff today      Discussions with Specialists or Other Care Team Provider: yes    Education and Discussions with Family / Patient: patient    Time Spent for Care: 30 minutes  More than 50% of total time spent on counseling and coordination of care as described above  Current Length of Stay: 5 day(s)    Current Patient Status: Inpatient   Certification Statement: The patient will continue to require additional inpatient hospital stay due to Plastic surgery    Discharge Plan: home    Code Status: No Order      Subjective:   Patient seen and examined he only utilized 1 tablet for pain and no chest pain or shortness of breath no abdominal pain set in the chair yesterday to work with physical therapy  Objective:     Vitals:   Temp (24hrs), Av 8 °F (36 6 °C), Min:97 2 °F (36 2 °C), Max:98 2 °F (36 8 °C)    HR:  [73-94] 73  Resp:  [0-32] 0  BP: ()/(40-78) 103/57  SpO2:  [82 %-97 %] 97 %  Body mass index is 48 66 kg/m²  Input and Output Summary (last 24 hours): Intake/Output Summary (Last 24 hours) at 18 0820  Last data filed at 18 0600   Gross per 24 hour   Intake            882 2 ml   Output             1170 ml   Net           -287 8 ml       Physical Exam:     Physical Exam   Constitutional: He is oriented to person, place, and time  He appears well-developed  Obese   HENT:   Head: Normocephalic and atraumatic  Eyes: EOM are normal  Pupils are equal, round, and reactive to light  Neck: Normal range of motion  Neck supple  Cardiovascular: Normal rate, regular rhythm and normal heart sounds  Pulmonary/Chest: Effort normal and breath sounds normal    Abdominal: Soft  Bowel sounds are normal    Obese   Musculoskeletal: Normal range of motion  He exhibits edema (Will a large dressing on the right lower extremity)  Neurological: He is alert and oriented to person, place, and time  He has normal reflexes  Skin: Skin is warm  Psychiatric: He has a normal mood and affect           Additional Data:     Labs:      Results from last 7 days  Lab Units 08/02/18  0457   WBC Thousand/uL 13 40*   HEMOGLOBIN g/dL 12 8*   HEMATOCRIT % 38 2*   PLATELETS Thousands/uL 160   NEUTROS PCT % 61   LYMPHS PCT % 20   MONOS PCT % 11*   EOS PCT % 7*       Results from last 7 days  Lab Units 08/02/18  0457   SODIUM mmol/L 135*   POTASSIUM mmol/L 4 0   CHLORIDE mmol/L 102   CO2 mmol/L 28   BUN mg/dL 15   CREATININE mg/dL 0 92   CALCIUM mg/dL 8 1*   GLUCOSE RANDOM mg/dL 99                     * I Have Reviewed All Lab Data Listed Above  * Additional Pertinent Lab Tests Reviewed: All Labs Within Last 24 Hours Reviewed    Imaging:    Imaging Reports Reviewed Today Include: none  Imaging Personally Reviewed by Myself Includes:  none    Recent Cultures (last 7 days):           Last 24 Hours Medication List:     Current Facility-Administered Medications:  albuterol 2 5 mg Nebulization Q6H PRN Javan Morejon MD    ampicillin 2,000 mg Intravenous Q6H Shauna Hilliard MD Last Rate: 2,000 mg (08/04/18 0816)   aspirin 325 mg Oral BID David Momin MD    docusate sodium 100 mg Oral BID PRN Rebel Holt MD    ezetimibe 10 mg Oral Daily David Momin MD    famotidine 20 mg Oral BID Yun Jamison MD    heparin (porcine) 5,000 Units Subcutaneous [facility] David Momin MD    levofloxacin 750 mg Oral Q24H Shauna Hilliard MD    [START ON 8/5/2018] lisinopril 10 mg Oral Daily Rebel Holt MD    morphine injection 2 mg Intravenous Q1H PRN David Momin MD    ondansetron 4 mg Oral Q6H PRN David Momin MD    oxyCODONE-acetaminophen 2 tablet Oral Q4H PRN David Momin MD    pravastatin 40 mg Oral Daily With Zahra Wright MD    senna 1 tablet Oral HS PRN Rebel Holt MD         Today, Patient Was Seen By: Rebel Holt MD    ** Please Note: Dictation voice to text software may have been used in the creation of this document   **

## 2018-08-04 NOTE — ASSESSMENT & PLAN NOTE
· Controlled on his blood pressures outside of the parameters he has been utilizing lisinopril 20 mg in the past 2 days decreased to 10 mg p o  daily and observe

## 2018-08-04 NOTE — ASSESSMENT & PLAN NOTE
· Intermittent PVCs not in  bigeminy trigeminy seen asymptomatic no more V-tach since the PICC line was pulled back 4 cm  Echo reviewed reviewed-also no indication for treatment he should follow up with Dr Zhen Harden as an outpatient  He is asymptomatic

## 2018-08-04 NOTE — NURSING NOTE
Patient back to bed with supervision, non weigh bearing on RLE  Rates pain 3/10 given PRN pain medication  Placed self on cpap  No complaints   Call bell in reach and instructed to use

## 2018-08-04 NOTE — ASSESSMENT & PLAN NOTE
· Status post right Achilles tendon debridement and flap placement- pod #4  ·  Woun infection has been evaluated by Infectious Disease he is currently on ampicillin and Levaquin he is going to be having a 14 day course from the day of surgery of total antibiotics on discharge can be changed to p  o  marks still in  Today is day 5   · Labs reviewed WBC decreased  · Flap is good evaluated by Plastic surgery started physical therapy as per patient he states he was told he is going to go home tomorrow

## 2018-08-04 NOTE — NURSING NOTE
Pt OOB in chair, legs elevated  Rates pain 2/10  RLE dressing dry and intact, right upper thigh dressing dry  Aware of pain medication schedule pt verbalized understanding  Receiving treatment from respiratory at current time, dry cough noted  No other concerns at this time   Call bell in reach and instructed to use

## 2018-08-05 VITALS
HEART RATE: 80 BPM | TEMPERATURE: 97.8 F | SYSTOLIC BLOOD PRESSURE: 124 MMHG | OXYGEN SATURATION: 95 % | DIASTOLIC BLOOD PRESSURE: 78 MMHG | HEIGHT: 68 IN | BODY MASS INDEX: 47.74 KG/M2 | RESPIRATION RATE: 22 BRPM | WEIGHT: 315 LBS

## 2018-08-05 PROBLEM — R07.9 CHEST PAIN: Status: RESOLVED | Noted: 2018-08-05 | Resolved: 2018-08-05

## 2018-08-05 PROBLEM — R07.9 CHEST PAIN: Status: ACTIVE | Noted: 2018-08-05

## 2018-08-05 LAB
ATRIAL RATE: 84 BPM
P AXIS: 40 DEGREES
PR INTERVAL: 186 MS
QRS AXIS: -12 DEGREES
QRSD INTERVAL: 106 MS
QT INTERVAL: 368 MS
QTC INTERVAL: 434 MS
T WAVE AXIS: 48 DEGREES
VENTRICULAR RATE: 84 BPM

## 2018-08-05 PROCEDURE — 94760 N-INVAS EAR/PLS OXIMETRY 1: CPT

## 2018-08-05 PROCEDURE — 93010 ELECTROCARDIOGRAM REPORT: CPT | Performed by: INTERNAL MEDICINE

## 2018-08-05 PROCEDURE — 99232 SBSQ HOSP IP/OBS MODERATE 35: CPT | Performed by: FAMILY MEDICINE

## 2018-08-05 RX ORDER — LEVOFLOXACIN 750 MG/1
750 TABLET ORAL EVERY 24 HOURS
Qty: 9 TABLET | Refills: 0 | Status: SHIPPED | OUTPATIENT
Start: 2018-08-05 | End: 2018-08-29 | Stop reason: HOSPADM

## 2018-08-05 RX ORDER — LISINOPRIL 10 MG/1
10 TABLET ORAL DAILY
Qty: 30 TABLET | Refills: 0 | Status: SHIPPED | OUTPATIENT
Start: 2018-08-05 | End: 2018-08-29 | Stop reason: HOSPADM

## 2018-08-05 RX ORDER — AMOXICILLIN 500 MG/1
500 CAPSULE ORAL EVERY 8 HOURS SCHEDULED
Qty: 27 CAPSULE | Refills: 0 | Status: SHIPPED | OUTPATIENT
Start: 2018-08-05 | End: 2018-08-29 | Stop reason: HOSPADM

## 2018-08-05 RX ORDER — ASPIRIN 325 MG
325 TABLET ORAL 2 TIMES DAILY
Qty: 60 TABLET | Refills: 0 | Status: SHIPPED | OUTPATIENT
Start: 2018-08-05 | End: 2019-10-12 | Stop reason: HOSPADM

## 2018-08-05 RX ADMIN — FAMOTIDINE 20 MG: 20 TABLET ORAL at 08:16

## 2018-08-05 RX ADMIN — EZETIMIBE 10 MG: 10 TABLET ORAL at 08:16

## 2018-08-05 RX ADMIN — LISINOPRIL 10 MG: 10 TABLET ORAL at 08:16

## 2018-08-05 RX ADMIN — METHOCARBAMOL 500 MG: 500 TABLET ORAL at 04:26

## 2018-08-05 RX ADMIN — DOCUSATE SODIUM 100 MG: 100 CAPSULE, LIQUID FILLED ORAL at 08:16

## 2018-08-05 RX ADMIN — ASPIRIN 325 MG ORAL TABLET 325 MG: 325 PILL ORAL at 08:16

## 2018-08-05 RX ADMIN — HEPARIN SODIUM 5000 UNITS: 5000 INJECTION, SOLUTION INTRAVENOUS; SUBCUTANEOUS at 06:42

## 2018-08-05 RX ADMIN — OXYCODONE HYDROCHLORIDE AND ACETAMINOPHEN 2 TABLET: 5; 325 TABLET ORAL at 09:46

## 2018-08-05 RX ADMIN — LEVOFLOXACIN 750 MG: 750 TABLET, FILM COATED ORAL at 08:16

## 2018-08-05 RX ADMIN — AMPICILLIN 2000 MG: 2 INJECTION, POWDER, FOR SOLUTION INTRAMUSCULAR; INTRAVENOUS at 08:15

## 2018-08-05 RX ADMIN — AMPICILLIN 2000 MG: 2 INJECTION, POWDER, FOR SOLUTION INTRAMUSCULAR; INTRAVENOUS at 04:04

## 2018-08-05 NOTE — ASSESSMENT & PLAN NOTE
· Status post right Achilles tendon debridement and flap placement- pod #5  ·  Woun infection has been evaluated by Infectious Disease he is currently on ampicillin and Levaquin he is going to be having a 14 day course from the day of surgery of total antibiotics on discharge can be changed to p  o  marks still in    Dc on levaquin and amoxicillin x9 more days  · Labs reviewed WBC decreased  · Flap is good evaluated by Plastic surgery started physical therapy as per patient he states he was told he is going to go home today  · Asa bid duration per plastic surgery

## 2018-08-05 NOTE — DISCHARGE INSTR - OTHER ORDERS
Percocet script included  See instructions for administration   LAST DOSE OF ADMINISTRATION 8/5/18 2863

## 2018-08-05 NOTE — NURSING NOTE
Patient doing well, c/o mild spasm left hip are medicated with Robaxin 500 mg po right thigh and Right ankle dressing intact , no other c/o call bell in reach will continue to monitor

## 2018-08-05 NOTE — NURSING NOTE
Patient discharge via knee walker with wife and Annetta Bower (Technical partner)  Assisted to the car with no difficultly and patient given all his personal items  All questions about the discharge plan answered to patient and wife

## 2018-08-05 NOTE — PROGRESS NOTES
Dressing changed right leg  Skin graft not adherent and flap looks dark blue  Since last seen this is a definite change that the flap may not be viable  Patient is out of bed satisfactory and we will send him home and bring him back to the office next week to decide whether not he needs another free flap to try to save his Achilles tendon  Lawrence-Waggoner drain was removed  home today

## 2018-08-05 NOTE — CONSULTS
Consult- William Garnett 1960, 63 y o  male MRN: [id number]    Unit/Bed#:  Encounter: [id number]    Primary Care Provider: Amelia Owen MD   Date and time admitted to hospital: 7/30/2018  5:46 AM      Consults    Chest pain   Assessment & Plan    · Last night had right side knot sharp pain - resolved - troponin and ekg normal- atypical        KIRIT on CPAP   Assessment & Plan    ·  On CPAP at night and p r n  GERD (gastroesophageal reflux disease)   Assessment & Plan    ·  Zantac at home        HTN (hypertension)   Assessment & Plan    · Controled continue lisinopril 10 mg po daily        PVC (premature ventricular contraction)   Assessment & Plan    · Intermittent PVCs not in  bigeminy trigeminy seen asymptomatic no more V-tach since the PICC line was pulled back 4 cm  Echo reviewed reviewed-also no indication for treatment he should follow up with Dr rEika Durham as an outpatient  He is asymptomatic  HLD (hyperlipidemia)   Assessment & Plan    ·  Continue Zetia and statin        Open wound of right foot with tendon involvement   Assessment & Plan    · Status post right Achilles tendon debridement and flap placement- pod #5  ·  Woun infection has been evaluated by Infectious Disease he is currently on ampicillin and Levaquin he is going to be having a 14 day course from the day of surgery of total antibiotics on discharge can be changed to p  o  marks still in  Dc on levaquin and amoxicillin x9 more days  · Labs reviewed WBC decreased  · Flap is good evaluated by Plastic surgery started physical therapy as per patient he states he was told he is going to go home today  · Asa bid duration per plastic surgery                    VTE Pharmacologic Prophylaxis:   Pharmacologic: Heparin  Mechanical VTE Prophylaxis in Place: Yes    Patient Centered Rounds: I have performed bedside rounds with nursing staff today      Discussions with Specialists or Other Care Team Provider: yes    Education and Discussions with Family / Patient: patient     Time Spent for Care: 45 minutes  More than 50% of total time spent on counseling and coordination of care as described above  Current Length of Stay: 6 day(s)    Current Patient Status: Inpatient   Certification Statement: The patient will continue to require additional inpatient hospital stay due to is being discharged home today    Discharge Plan: home    Code Status: No Order      Subjective:   Patient seen and examined, denies any chest pain, or sob  No abdominal pain  Leg pain is controlled  Objective:     Vitals:   Temp (24hrs), Av °F (36 7 °C), Min:97 7 °F (36 5 °C), Max:98 8 °F (37 1 °C)    HR:  [72-95] 72  Resp:  [16-23] 16  BP: (106-142)/(50-80) 129/68  SpO2:  [94 %-100 %] 100 %  Body mass index is 48 66 kg/m²  Input and Output Summary (last 24 hours): Intake/Output Summary (Last 24 hours) at 18 0737  Last data filed at 18 0501   Gross per 24 hour   Intake          1496 67 ml   Output             1340 ml   Net           156 67 ml       Physical Exam:     Physical Exam   Constitutional: He is oriented to person, place, and time  He appears well-developed and well-nourished  Obese    HENT:   Head: Normocephalic and atraumatic  Eyes: EOM are normal  Pupils are equal, round, and reactive to light  Neck: Normal range of motion  Cardiovascular: Normal rate, regular rhythm and normal heart sounds  Pulmonary/Chest: Effort normal and breath sounds normal    Abdominal: Soft  Bowel sounds are normal    obese   Musculoskeletal: Normal range of motion  He exhibits edema (right mild and with dressing )  Neurological: He is alert and oriented to person, place, and time  He has normal reflexes  Skin: Skin is warm  Psychiatric: He has a normal mood and affect           Additional Data:     Labs:      Results from last 7 days  Lab Units 18  0457   WBC Thousand/uL 13 40*   HEMOGLOBIN g/dL 12 8* HEMATOCRIT % 38 2*   PLATELETS Thousands/uL 160   NEUTROS PCT % 61   LYMPHS PCT % 20   MONOS PCT % 11*   EOS PCT % 7*       Results from last 7 days  Lab Units 08/02/18  0457   SODIUM mmol/L 135*   POTASSIUM mmol/L 4 0   CHLORIDE mmol/L 102   CO2 mmol/L 28   BUN mg/dL 15   CREATININE mg/dL 0 92   CALCIUM mg/dL 8 1*   GLUCOSE RANDOM mg/dL 99                     * I Have Reviewed All Lab Data Listed Above  * Additional Pertinent Lab Tests Reviewed: All Labs Within Last 24 Hours Reviewed    Imaging:    Imaging Reports Reviewed Today Include: none  Imaging Personally Reviewed by Myself Includes:  none    Recent Cultures (last 7 days):           Last 24 Hours Medication List:     Current Facility-Administered Medications:  albuterol 2 5 mg Nebulization Q6H PRN Tyrell Gaspar MD    ampicillin 2,000 mg Intravenous Q6H Shaylee Funes MD Last Rate: 2,000 mg (08/05/18 0404)   aspirin 325 mg Oral BID Damaris Joseph MD    docusate sodium 100 mg Oral BID PRN Tamara Panda MD    ezetimibe 10 mg Oral Daily Damaris Joseph MD    famotidine 20 mg Oral BID Terrell Jiménez MD    heparin (porcine) 5,000 Units Subcutaneous [facility] Damaris Joseph MD    levofloxacin 750 mg Oral Q24H Shaylee Funes MD    lisinopril 10 mg Oral Daily Tamara Panda MD    methocarbamol 500 mg Oral Q6H PRN Tyrell Gaspar MD    morphine injection 2 mg Intravenous Q1H PRN Damaris Jospeh MD    ondansetron 4 mg Oral Q6H PRN Damaris Joseph MD    oxyCODONE-acetaminophen 2 tablet Oral Q4H PRN Damaris Joseph MD    pravastatin 40 mg Oral Daily With Andrew Guillen MD    senna 1 tablet Oral HS PRN Tamara Panda MD         Today, Patient Was Seen By: Tamara Panda MD    ** Please Note: Dictation voice to text software may have been used in the creation of this document   **

## 2018-08-05 NOTE — NURSING NOTE
Patient c/o right upper chest pain , SR on monitor with occasional PVS ; Doctor [facility]  Notified new order received STAT EKG, STAT Troponin , patient appear stable /79 resting in bed will continue to monitor

## 2018-08-05 NOTE — DISCHARGE INSTRUCTIONS
Dressings will remain on until seen in the office in 2 days            Acute Wound Care   AMBULATORY CARE:   An acute wound  is an injury that causes a break in the skin  An acute wound can happen suddenly, last a short time, and may heal on its own  Common signs and symptoms of an acute wound:   · A cut, tear, or gash in your skin    · Bleeding, swelling, pain, or trouble moving the affected area    · Dirt or foreign objects inside the wound     · Milky, yellow, green, or brown pus in the wound     · Red, tender, or warm area around the pus    · Fever  Seek care immediately if:   · You have pus or a foul odor coming from the wound  · You have sudden trouble breathing or chest pain  · Blood soaks through your bandage  Contact your healthcare provider if:   · You have muscle, joint, or body aches, sweating, or a fever  · You have more swelling, redness, or bleeding in your wound  · Your skin is itchy, swollen, or you have a rash  · You have questions or concerns about your condition or care  Treatment for an acute wound  may include any of the following:  · Cleansing  is done with soap and water to wash away germs and decrease the risk of infection  Sterile water further cleans the wound  The cleaning is done under high pressure with a catheter tip and large syringe  A solution that kills germs may also be used  · Debridement  is done to clean and remove objects, dirt, or dead tissues from the open wound  Healthcare providers may also drain the wound to clean out pus  · Closure of the wound  is done with stitches, staples, skin adhesive, or other treatments  This may be done if the wound is wide or deep  Stitches may be needed if the wound is in an area that moves a lot, such as the hands, feet, and joints  Stitches may help to keep the wound from getting infected  They may also decrease the amount of scarring you have  Some wounds may heal better without stitches    Wound care:   · If your wound was closed with thin strips of medical tape, keep them clean and dry  The strips of medical tape will fall off on their own  Do not pull them off  · Keep the bandage clean and dry  Do not remove the bandage over your wound unless your healthcare provider says it is okay  · Wash your hands before and after you take care of your wound to prevent infection  · Clean the wound as directed  If you cannot reach the wound, have someone help you  · If you have packing, make sure all the gauze used to pack the wound is taken out and replaced as directed  Keep track of how many gauze dressings are placed inside the wound  Follow up with your healthcare provider as directed:  Write down your questions so you remember to ask them during your visits  © 2016 [address] is for End User's use only and may not be sold, redistributed or otherwise used for commercial purposes  All illustrations and images included in CareNotes® are the copyrighted property of A D [employer] M [employer]  or Anibal Hunt  The above information is an  only  It is not intended as medical advice for individual conditions or treatments  Talk to your doctor, nurse or pharmacist before following any medical regimen to see if it is safe and effective for you  Care For Your Stitches   AMBULATORY CARE:   Stitches , or sutures, are used to close cuts and wounds on the skin  Stitches need to be removed after your wound has healed  Seek care immediately if:   · Your stitches come apart  · Blood soaks through your bandages  · You suddenly cannot move your injured joint  · You have sudden numbness around your wound  · You see red streaks coming from your wound  Contact your healthcare provider if:   · You have a fever and chills  · Your wound is red, warm, swollen, or leaking pus  · There is a bad smell coming from your wound      · You have increased pain in the wound area     · You have questions or concerns about your condition or care  Care for your stitches:  Keep your stitches clean and dry  You may need to cover your stitches with a bandage for 24 to 48 hours, or as directed  Do not bump or hit the suture area, as this could open the wound  Do not trim or shorten the ends of your stitches  If they rub on your clothing, put a gauze bandage between the stitches and your clothes  Clean your wound:  Carefully wash your wound with soap and water  For mouth and lip wounds, rinse your mouth after meals and at bedtime  Ask your healthcare provider what to use to rinse your mouth  If you have a scalp wound, you may gently wash your hair every 2 days with mild shampoo  Do not use hair products, such as hair spray  Help your wound heal:   · Elevate  your wound above the level of your heart as often as you can  This will help decrease swelling and pain  Prop your wound on pillows or blankets to keep it elevated comfortably  · Limit activity  Do not stretch the skin around your wound  This will help prevent bleeding and swelling of the wound area  Follow up with your healthcare provider as directed: You may need to return to have your stitches removed  Write down your questions so you remember to ask them during your visits  © 2017 [address] [facility] Information is for End User's use only and may not be sold, redistributed or otherwise used for commercial purposes  All illustrations and images included in CareNotes® are the copyrighted property of A D A M [employer] Inc  or Anibal Hunt  The above information is an  only  It is not intended as medical advice for individual conditions or treatments  Talk to your doctor, nurse or pharmacist before following any medical regimen to see if it is safe and effective for you  Chronic Wound Care   WHAT YOU NEED TO KNOW:   A wound is an injury that causes a break in the skin   There may also be damage to nearby tissues  Chronic wounds are wounds that do not heal completely in 6 weeks  Examples of wounds that can become chronic are deep ulcers (open sores), large burns, and infected cuts  DISCHARGE INSTRUCTIONS:   Medicines:   · NSAIDs  help decrease swelling, pain, and fever  This medicine is available with or without a doctor's order  NSAIDs can cause stomach bleeding or kidney problems in certain people  If you take blood thinner medicine, always ask your healthcare provider if NSAIDs are safe for you  Always read the medicine label and follow directions  · Acetaminophen  decreases pain and fever  It is available without a doctor's order  Ask how much to take and how often to take it  Follow directions  Acetaminophen can cause liver damage if not taken correctly  · Antibiotics  may be given to prevent or treat an infection caused by bacteria  · Take your medicine as directed  Contact your healthcare provider if you think your medicine is not helping or if you have side effects  Tell him if you are allergic to any medicine  Keep a list of the medicines, vitamins, and herbs you take  Include the amounts, and when and why you take them  Bring the list or the pill bottles to follow-up visits  Carry your medicine list with you in case of an emergency  Follow up with your healthcare provider as directed: You need to return to have your wound checked  If you have packing in your wound, you need to return to have the packing replaced and the bandage changed  Write down your questions so you remember to ask them during your visits  Wound care:   · If your wound was closed with thin strips of medical tape, keep them clean and dry  The strips of medical tape will fall off on their own  Do not pull them off  · Keep the bandage clean and dry  Do not remove the bandage over your wound unless your healthcare provider says it is okay      · Wash your hands before and after you take care of your wound to prevent infection  · Clean the wound as directed  If you cannot reach the wound, have someone help you  · If you have pain when you change your bandages, take pain medicine before you start  · If you have packing, make sure all the gauze used to pack the wound is taken out and replaced as directed  Keep track of how many gauze dressings are placed inside the wound  Negative pressure wound therapy:  Negative pressure wound therapy (NPWT) is also called wound vacuum, or wound vac therapy  A vacuum device uses suction to remove fluid and waste from your wound and pull the edges closer together  It may also increase blood flow and new tissue growth in the wound  Your healthcare providers will decide if you need NPWT at home and how long you need it  Tell your healthcare providers if you do not feel able to use NPWT at home  · If you will use NPWT at home, get trained on how to use the equipment correctly  Ask your healthcare providers to watch you use the vacuum device to make sure you are using it correctly  Learn when and how to change the drainage container  Keep the directions in a place where you can find them easily  · Ask about the risks of NPWT, including bleeding and infection  Tell your healthcare providers about the medicines you use  Certain medicines, such as aspirin and blood thinners, increase your risk for bleeding  If you see blood in the tubing or container, or on your bandages, stop the device immediately  Apply direct pressure  Call 911   Eat healthy foods and drink liquids as directed:  Healthy foods give your body the nutrients it needs to heal your wound  Liquids prevent dehydration that can decrease the blood supply to your wound  Healthy foods include fruits, vegetables, grains (breads and cereals), dairy, and protein foods  Protein foods include meat, fish, nuts, and soy products   Protein, calories, vitamin C, and zinc help wounds heal  Ask for more information about the foods you should eat to improve healing  Do not smoke: If you smoke, it is never too late to quit  Smoking delays wound healing  Smoking also increases your risk for infection after surgery  Ask your healthcare provider for information if you need help quitting  Prevent pressure damage:  If you have a chronic wound, you may be at risk for pressure damage to your wound and other places on your body  Pressure sores can develop when blood flow to an area is blocked  For example, you sit or lie in the same position without moving and put pressure on your legs  · Prevent pressure sores by changing your position every 15 minutes while you are sitting  Prop your legs on pillows to lift your heels while you are lying down  · Check your skin daily for signs of pressure sores  Common signs are swelling, open sores, blisters, a rash, or changes in color or temperature  Tell your healthcare provider if you have any of these signs  Contact your healthcare provider if:   · You have a fever  · You have increased or new pain, swelling, redness, or bleeding in or around your wound  · You have pus or a foul odor coming from your wound  · Your skin itches or has a rash  · You have questions or concerns about your condition or care  Seek care immediately or call 911 if:   · You have muscle or joint pain, body aches, or sweating, with a fever  · You have a headache with diarrhea, nausea or vomiting, or a sore throat  · You are confused, or feel dizzy or faint when you stand up  · You have trouble breathing or sudden chest pain  · You see blood in the NPWT tubing or container, or on your bandages  © 2016 [address] is for End User's use only and may not be sold, redistributed or otherwise used for commercial purposes  All illustrations and images included in CareNotes® are the copyrighted property of [employer]  or Anibal Hunt    The above information is an  only  It is not intended as medical advice for individual conditions or treatments  Talk to your doctor, nurse or pharmacist before following any medical regimen to see if it is safe and effective for you

## 2018-08-05 NOTE — ASSESSMENT & PLAN NOTE
· Intermittent PVCs not in  bigeminy trigeminy seen asymptomatic no more V-tach since the PICC line was pulled back 4 cm  Echo reviewed reviewed-also no indication for treatment he should follow up with Dr Claribel Sy as an outpatient  He is asymptomatic

## 2018-08-05 NOTE — DISCHARGE SUMMARY
Discharge summary:    Date of admission: July 30, 2018  Date of discharge:  August 5, 2018    Diagnoses:  1  Exposed rupture right Achilles tendon    Secondary diagnoses:  1  Hypertension  2  Morbid obesity  3  Anxiety    Consultations  1  Infectious disease  2  Internal medicine    Operations performed:  1  Debridement right Achilles tendon July 30, 2018  2  Coverage right Achilles tendon with right gracilis free muscle flap July 30, 2018  3   Split-thickness skin graft muscle flap July 30, 2018    Pertinent history and physical:   In April of this year he was referring a rugby match and ruptured his right Achilles tendon  It was repaired with wound breakdown and exposed  An attempt at this time would require a free flap for coverage for which he was admitted  Pertinent physical examination revealed reasonable dorsalis pedis and posterior tibialis pulses at the right ankle on Doppler examination  He had a 5 x 10 cm open granulating wound with exposed right Achilles tendon  Hospital course: On the day of admission he was taken to the operating room with debridement of the right Achilles tendon being performed  With great difficulty it was found that the posterior tibial artery was of reasonable quality  The venae comitantes were of poor quality  Therefore the greater saphenous vein had to be brought into the recipient area to serve as venous outflow  A monitoring skin paddle was taken with the flap but that proved inadequate due to the small size of the perforators to it  On dressing changed 2 days ago the flap that bled on pin prick  However on dressing change today there was no bleeding and the flap had a dark color with no skin graft adherence  At least the external surface of the flap is not alive  Disposition:  Home    Condition on discharge:  Stable    Discharge instructions:  1  The patient will return to my office in 2 days for the next dressing change    2   Medications on discharge are Zestril 10 mg daily, aspirin 325 mg, amoxicillin 500 mg every 8 hours, Levaquin 750 mg daily, Percocet 5/325 mg q 4 hours p r n  number 30   3   Thigh donor site should be blow dried PRN  4  He should not walk on the right foot

## 2018-08-05 NOTE — PLAN OF CARE
DISCHARGE PLANNING     Discharge to home or other facility with appropriate resources Completed        INFECTION - ADULT     Absence or prevention of progression during hospitalization Completed     Absence of fever/infection during neutropenic period Completed        Knowledge Deficit     Patient/family/caregiver demonstrates understanding of disease process, treatment plan, medications, and discharge instructions Completed        PAIN - ADULT     Verbalizes/displays adequate comfort level or baseline comfort level Completed        Potential for Falls     Patient will remain free of falls Completed        Prexisting or High Potential for Compromised Skin Integrity     Skin integrity is maintained or improved Completed        SAFETY ADULT     Maintain or return to baseline ADL function Completed     Maintain or return mobility status to optimal level Completed     Patient will remain free of falls Completed

## 2018-08-06 NOTE — CASE MANAGEMENT
Notification of Discharge  This is a Notification of Discharge from our facility [address]  Please be advised that this patient has been discharge from our facility  Below you will find the admission and discharge date and time including the patients disposition  PRESENTATION DATE: 7/30/2018  5:46 AM  IP ADMISSION DATE: 7/30/18 1909  DISCHARGE DATE: 8/5/2018 12:48 PM  DISPOSITION: Home/Self Care    [address] [facility] in the [facility] Util[facility] Review Department  Phone: [phone]; Fax [phone]  ATTENTION: The Network Utilization Review Department is now centralized for our 9 Facilities  Make a note that we have a new phone and fax numbers for our Department  Please call with any questions or concerns to [phone] and carefully follow the prompts so that you are directed to the right person  All voicemails are confidential  Fax any determinations, approvals, denials, and requests for initial or continue stay review clinical to [phone]  Due to HIGH CALL volume, it would be easier if you could please send faxed requests to expedite your requests and in part, help us provide discharge notifications faster

## 2018-08-10 ENCOUNTER — ANESTHESIA EVENT (OUTPATIENT)
Dept: PERIOP | Facility: HOSPITAL | Age: 58
DRG: 857 | End: 2018-08-10
Payer: COMMERCIAL

## 2018-08-10 NOTE — ANESTHESIA PREPROCEDURE EVALUATION
Review of Systems/Medical History  Patient summary reviewed  Chart reviewed  No history of anesthetic complications     Cardiovascular  EKG reviewed, Exercise tolerance (METS): <4,  Hyperlipidemia, Hypertension controlled,   Comment: ECHO done 7/18 is fine  No valve no pulm hypertension,  Pulmonary  Asthma , well controlled/ stable Last rescue: < 1 week ago Asthma type of rescue: PRN inhaler, Sleep apnea CPAP,        GI/Hepatic    GERD well controlled,        Negative  ROS        Endo/Other    Obesity  super morbid obesity   GYN       Hematology  Negative hematology ROS      Musculoskeletal  Negative musculoskeletal ROS Back pain (improved with decrease statins) ,        Neurology  Negative neurology ROS      Psychology   Negative psychology ROS Anxiety (extreme anxiety  ),              Physical Exam    Airway    Mallampati score: III  TM Distance: >3 FB  Neck ROM: limited     Dental       Cardiovascular  Rhythm: regular, Cardiovascular exam normal    Pulmonary  Pulmonary exam normal     Other Findings  Fixed and  Upper and lower      Anesthesia Plan  ASA Score- 3     Anesthesia Type- general with ASA Monitors  Additional Monitors:   Airway Plan: ETT  Comment: Glide scope  No toradol  Plan Factors-Patient not instructed to abstain from smoking on day of procedure  Patient did not smoke on day of surgery  Induction- intravenous  Postoperative Plan- Plan for postoperative opioid use  Informed Consent- Anesthetic plan and risks discussed with patient  I personally reviewed this patient with the CRNA  Discussed and agreed on the Anesthesia Plan with the CRNA  Kiet Gutierrez

## 2018-08-13 ENCOUNTER — ANESTHESIA (OUTPATIENT)
Dept: PERIOP | Facility: HOSPITAL | Age: 58
DRG: 857 | End: 2018-08-13
Payer: COMMERCIAL

## 2018-08-13 ENCOUNTER — HOSPITAL ENCOUNTER (INPATIENT)
Facility: HOSPITAL | Age: 58
LOS: 16 days | Discharge: HOME HEALTH | DRG: 857 | End: 2018-08-29
Attending: PLASTIC SURGERY | Admitting: PLASTIC SURGERY
Payer: COMMERCIAL

## 2018-08-13 DIAGNOSIS — S91.001A OPEN WOUND OF RIGHT ANKLE: ICD-10-CM

## 2018-08-13 DIAGNOSIS — S91.011A: ICD-10-CM

## 2018-08-13 DIAGNOSIS — K21.9 GASTROESOPHAGEAL REFLUX DISEASE WITHOUT ESOPHAGITIS: Primary | Chronic | ICD-10-CM

## 2018-08-13 DIAGNOSIS — K59.01 SLOW TRANSIT CONSTIPATION: ICD-10-CM

## 2018-08-13 DIAGNOSIS — S91.001S ANKLE WOUND, RIGHT, SEQUELA: ICD-10-CM

## 2018-08-13 DIAGNOSIS — M66.271 SPONTANEOUS RUPTURE OF EXTENSOR TENDONS, RIGHT ANKLE AND FOOT: ICD-10-CM

## 2018-08-13 PROBLEM — Z99.89 OSA ON CPAP: Chronic | Status: ACTIVE | Noted: 2018-07-31

## 2018-08-13 PROBLEM — E66.01 MORBID OBESITY WITH BMI OF 45.0-49.9, ADULT (HCC): Chronic | Status: ACTIVE | Noted: 2018-08-13

## 2018-08-13 PROBLEM — E78.5 HLD (HYPERLIPIDEMIA): Chronic | Status: ACTIVE | Noted: 2018-07-31

## 2018-08-13 PROBLEM — I10 ESSENTIAL HYPERTENSION: Chronic | Status: ACTIVE | Noted: 2018-07-31

## 2018-08-13 PROBLEM — G47.33 OSA ON CPAP: Chronic | Status: ACTIVE | Noted: 2018-07-31

## 2018-08-13 PROBLEM — E66.01 MORBID OBESITY WITH BMI OF 45.0-49.9, ADULT (HCC): Status: ACTIVE | Noted: 2018-08-13

## 2018-08-13 LAB
ANION GAP SERPL CALCULATED.3IONS-SCNC: 10 MMOL/L (ref 5–14)
BACTERIA UR QL AUTO: ABNORMAL /HPF
BILIRUB UR QL STRIP: NEGATIVE
BUN SERPL-MCNC: 17 MG/DL (ref 5–25)
CALCIUM SERPL-MCNC: 8.7 MG/DL (ref 8.4–10.2)
CHLORIDE SERPL-SCNC: 99 MMOL/L (ref 97–108)
CLARITY UR: CLEAR
CO2 SERPL-SCNC: 24 MMOL/L (ref 22–30)
COLOR UR: ABNORMAL
CREAT SERPL-MCNC: 1.07 MG/DL (ref 0.7–1.5)
ERYTHROCYTE [DISTWIDTH] IN BLOOD BY AUTOMATED COUNT: 15.8 %
GFR SERPL CREATININE-BSD FRML MDRD: 77 ML/MIN/1.73SQ M
GLUCOSE SERPL-MCNC: 175 MG/DL (ref 70–99)
GLUCOSE UR STRIP-MCNC: NEGATIVE MG/DL
HCT VFR BLD AUTO: 37 % (ref 41–53)
HGB BLD-MCNC: 12.5 G/DL (ref 13.5–17.5)
HGB UR QL STRIP.AUTO: 10
KETONES UR STRIP-MCNC: NEGATIVE MG/DL
LEUKOCYTE ESTERASE UR QL STRIP: NEGATIVE
MCH RBC QN AUTO: 30.4 PG (ref 26–34)
MCHC RBC AUTO-ENTMCNC: 33.7 G/DL (ref 31–36)
MCV RBC AUTO: 90 FL (ref 80–100)
NITRITE UR QL STRIP: NEGATIVE
NON-SQ EPI CELLS URNS QL MICRO: ABNORMAL /HPF
PH UR STRIP.AUTO: 5 [PH] (ref 4.5–8)
PLATELET # BLD AUTO: 407 THOUSANDS/UL (ref 150–450)
PMV BLD AUTO: 7.3 FL (ref 8.9–12.7)
POTASSIUM SERPL-SCNC: 4.9 MMOL/L (ref 3.6–5)
PROT UR STRIP-MCNC: NEGATIVE MG/DL
RBC # BLD AUTO: 4.1 MILLION/UL (ref 4.5–5.9)
RBC #/AREA URNS AUTO: ABNORMAL /HPF
SODIUM SERPL-SCNC: 133 MMOL/L (ref 137–147)
SP GR UR STRIP.AUTO: 1.02 (ref 1–1.04)
UROBILINOGEN UA: NEGATIVE MG/DL
WBC # BLD AUTO: 15.8 THOUSAND/UL (ref 4.5–11)
WBC #/AREA URNS AUTO: ABNORMAL /HPF

## 2018-08-13 PROCEDURE — 87176 TISSUE HOMOGENIZATION CULTR: CPT | Performed by: PLASTIC SURGERY

## 2018-08-13 PROCEDURE — 87186 SC STD MICRODIL/AGAR DIL: CPT | Performed by: PLASTIC SURGERY

## 2018-08-13 PROCEDURE — 85027 COMPLETE CBC AUTOMATED: CPT | Performed by: PLASTIC SURGERY

## 2018-08-13 PROCEDURE — 94760 N-INVAS EAR/PLS OXIMETRY 1: CPT

## 2018-08-13 PROCEDURE — 88304 TISSUE EXAM BY PATHOLOGIST: CPT | Performed by: PATHOLOGY

## 2018-08-13 PROCEDURE — 94660 CPAP INITIATION&MGMT: CPT

## 2018-08-13 PROCEDURE — 87070 CULTURE OTHR SPECIMN AEROBIC: CPT | Performed by: PLASTIC SURGERY

## 2018-08-13 PROCEDURE — 81001 URINALYSIS AUTO W/SCOPE: CPT | Performed by: PLASTIC SURGERY

## 2018-08-13 PROCEDURE — 87147 CULTURE TYPE IMMUNOLOGIC: CPT | Performed by: PLASTIC SURGERY

## 2018-08-13 PROCEDURE — 80048 BASIC METABOLIC PNL TOTAL CA: CPT | Performed by: PLASTIC SURGERY

## 2018-08-13 PROCEDURE — 87205 SMEAR GRAM STAIN: CPT | Performed by: PLASTIC SURGERY

## 2018-08-13 PROCEDURE — 0KRV07Z REPLACEMENT OF RIGHT FOOT MUSCLE WITH AUTOLOGOUS TISSUE SUBSTITUTE, OPEN APPROACH: ICD-10-PCS | Performed by: PLASTIC SURGERY

## 2018-08-13 PROCEDURE — 87077 CULTURE AEROBIC IDENTIFY: CPT | Performed by: PLASTIC SURGERY

## 2018-08-13 PROCEDURE — 0KBQ0ZZ EXCISION OF RIGHT UPPER LEG MUSCLE, OPEN APPROACH: ICD-10-PCS | Performed by: PLASTIC SURGERY

## 2018-08-13 PROCEDURE — 99253 IP/OBS CNSLTJ NEW/EST LOW 45: CPT | Performed by: HOSPITALIST

## 2018-08-13 DEVICE — THE GEM MICROVASCULAR ANASTOMOTIC COUPLER DEVICE RINGS ARE MADE OF POLYETHYLENE AND SURGICAL GRADE STAINLESS STEEL PINS. A PROTECTIVE COVER AND JAW ASSEMBLY PROTECT THE RINGS AND ALLOW FOR EASY LOADING ONTO THE ANASTOMOTIC INSTRUMENT. BOTH THE PROTECTIVE COVER AND JAW ASSEMBLY ARE DISPOSABLE. THE GEM MICROVASCULAR ANASTOMOTIC COUPLER DEVICE IS SINGLE-USE AND AVAILABLE IN VARIOUS SIZES
Type: IMPLANTABLE DEVICE | Site: LEG | Status: FUNCTIONAL
Brand: GEM MICROVASCULAR ANASTOMOTIC COUPLER

## 2018-08-13 RX ORDER — AMOXICILLIN 500 MG/1
500 CAPSULE ORAL EVERY 8 HOURS SCHEDULED
Status: COMPLETED | OUTPATIENT
Start: 2018-08-13 | End: 2018-08-14

## 2018-08-13 RX ORDER — MAGNESIUM HYDROXIDE 1200 MG/15ML
LIQUID ORAL AS NEEDED
Status: DISCONTINUED | OUTPATIENT
Start: 2018-08-13 | End: 2018-08-13 | Stop reason: HOSPADM

## 2018-08-13 RX ORDER — GLYCOPYRROLATE 0.2 MG/ML
INJECTION INTRAMUSCULAR; INTRAVENOUS AS NEEDED
Status: DISCONTINUED | OUTPATIENT
Start: 2018-08-13 | End: 2018-08-13 | Stop reason: SURG

## 2018-08-13 RX ORDER — SODIUM CHLORIDE, SODIUM LACTATE, POTASSIUM CHLORIDE, CALCIUM CHLORIDE 600; 310; 30; 20 MG/100ML; MG/100ML; MG/100ML; MG/100ML
125 INJECTION, SOLUTION INTRAVENOUS CONTINUOUS
Status: DISCONTINUED | OUTPATIENT
Start: 2018-08-13 | End: 2018-08-14

## 2018-08-13 RX ORDER — PRAVASTATIN SODIUM 20 MG
80 TABLET ORAL
Status: DISCONTINUED | OUTPATIENT
Start: 2018-08-14 | End: 2018-08-13

## 2018-08-13 RX ORDER — SODIUM CHLORIDE, SODIUM LACTATE, POTASSIUM CHLORIDE, CALCIUM CHLORIDE 600; 310; 30; 20 MG/100ML; MG/100ML; MG/100ML; MG/100ML
75 INJECTION, SOLUTION INTRAVENOUS CONTINUOUS
Status: DISCONTINUED | OUTPATIENT
Start: 2018-08-13 | End: 2018-08-13

## 2018-08-13 RX ORDER — LISINOPRIL 10 MG/1
10 TABLET ORAL DAILY
Status: DISCONTINUED | OUTPATIENT
Start: 2018-08-14 | End: 2018-08-15

## 2018-08-13 RX ORDER — SODIUM CHLORIDE, SODIUM LACTATE, POTASSIUM CHLORIDE, CALCIUM CHLORIDE 600; 310; 30; 20 MG/100ML; MG/100ML; MG/100ML; MG/100ML
75 INJECTION, SOLUTION INTRAVENOUS CONTINUOUS
Status: DISCONTINUED | OUTPATIENT
Start: 2018-08-13 | End: 2018-08-17

## 2018-08-13 RX ORDER — LIDOCAINE HYDROCHLORIDE 10 MG/ML
INJECTION, SOLUTION INFILTRATION; PERINEURAL AS NEEDED
Status: DISCONTINUED | OUTPATIENT
Start: 2018-08-13 | End: 2018-08-13 | Stop reason: SURG

## 2018-08-13 RX ORDER — ASPIRIN 325 MG
325 TABLET ORAL 2 TIMES DAILY
Status: DISCONTINUED | OUTPATIENT
Start: 2018-08-13 | End: 2018-08-29 | Stop reason: HOSPADM

## 2018-08-13 RX ORDER — MIDAZOLAM HYDROCHLORIDE 1 MG/ML
INJECTION INTRAMUSCULAR; INTRAVENOUS AS NEEDED
Status: DISCONTINUED | OUTPATIENT
Start: 2018-08-13 | End: 2018-08-13 | Stop reason: SURG

## 2018-08-13 RX ORDER — DOCUSATE SODIUM 100 MG/1
100 CAPSULE, LIQUID FILLED ORAL 2 TIMES DAILY
Status: DISCONTINUED | OUTPATIENT
Start: 2018-08-13 | End: 2018-08-29 | Stop reason: HOSPADM

## 2018-08-13 RX ORDER — HEPARIN SODIUM 5000 [USP'U]/ML
5000 INJECTION, SOLUTION INTRAVENOUS; SUBCUTANEOUS EVERY 12 HOURS SCHEDULED
Status: DISCONTINUED | OUTPATIENT
Start: 2018-08-13 | End: 2018-08-29 | Stop reason: HOSPADM

## 2018-08-13 RX ORDER — ONDANSETRON 2 MG/ML
4 INJECTION INTRAMUSCULAR; INTRAVENOUS ONCE AS NEEDED
Status: DISCONTINUED | OUTPATIENT
Start: 2018-08-13 | End: 2018-08-13

## 2018-08-13 RX ORDER — PRAVASTATIN SODIUM 20 MG
80 TABLET ORAL EVERY OTHER DAY
Status: DISCONTINUED | OUTPATIENT
Start: 2018-08-14 | End: 2018-08-29 | Stop reason: HOSPADM

## 2018-08-13 RX ORDER — LIDOCAINE HYDROCHLORIDE 20 MG/ML
INJECTION, SOLUTION INFILTRATION; PERINEURAL AS NEEDED
Status: DISCONTINUED | OUTPATIENT
Start: 2018-08-13 | End: 2018-08-13 | Stop reason: HOSPADM

## 2018-08-13 RX ORDER — OXYCODONE HYDROCHLORIDE AND ACETAMINOPHEN 5; 325 MG/1; MG/1
1 TABLET ORAL EVERY 4 HOURS PRN
Status: DISCONTINUED | OUTPATIENT
Start: 2018-08-13 | End: 2018-08-29 | Stop reason: HOSPADM

## 2018-08-13 RX ORDER — LEVOFLOXACIN 750 MG/1
750 TABLET ORAL EVERY 24 HOURS
Status: ACTIVE | OUTPATIENT
Start: 2018-08-13 | End: 2018-08-14

## 2018-08-13 RX ORDER — MORPHINE SULFATE 2 MG/ML
2 INJECTION, SOLUTION INTRAMUSCULAR; INTRAVENOUS
Status: DISCONTINUED | OUTPATIENT
Start: 2018-08-13 | End: 2018-08-29 | Stop reason: HOSPADM

## 2018-08-13 RX ORDER — EZETIMIBE 10 MG/1
10 TABLET ORAL DAILY
Status: DISCONTINUED | OUTPATIENT
Start: 2018-08-14 | End: 2018-08-26

## 2018-08-13 RX ORDER — ONDANSETRON 2 MG/ML
INJECTION INTRAMUSCULAR; INTRAVENOUS AS NEEDED
Status: DISCONTINUED | OUTPATIENT
Start: 2018-08-13 | End: 2018-08-13 | Stop reason: SURG

## 2018-08-13 RX ORDER — SUCCINYLCHOLINE CHLORIDE 20 MG/ML
INJECTION INTRAMUSCULAR; INTRAVENOUS AS NEEDED
Status: DISCONTINUED | OUTPATIENT
Start: 2018-08-13 | End: 2018-08-13 | Stop reason: SURG

## 2018-08-13 RX ORDER — PROPOFOL 10 MG/ML
INJECTION, EMULSION INTRAVENOUS AS NEEDED
Status: DISCONTINUED | OUTPATIENT
Start: 2018-08-13 | End: 2018-08-13 | Stop reason: SURG

## 2018-08-13 RX ORDER — ASPIRIN 325 MG
325 TABLET ORAL 2 TIMES DAILY
Status: DISCONTINUED | OUTPATIENT
Start: 2018-08-14 | End: 2018-08-14 | Stop reason: SDUPTHER

## 2018-08-13 RX ORDER — EPHEDRINE SULFATE 50 MG/ML
INJECTION, SOLUTION INTRAVENOUS AS NEEDED
Status: DISCONTINUED | OUTPATIENT
Start: 2018-08-13 | End: 2018-08-13 | Stop reason: SURG

## 2018-08-13 RX ORDER — ONDANSETRON 2 MG/ML
4 INJECTION INTRAMUSCULAR; INTRAVENOUS EVERY 6 HOURS PRN
Status: DISCONTINUED | OUTPATIENT
Start: 2018-08-13 | End: 2018-08-25

## 2018-08-13 RX ORDER — DEXAMETHASONE SODIUM PHOSPHATE 4 MG/ML
INJECTION, SOLUTION INTRA-ARTICULAR; INTRALESIONAL; INTRAMUSCULAR; INTRAVENOUS; SOFT TISSUE AS NEEDED
Status: DISCONTINUED | OUTPATIENT
Start: 2018-08-13 | End: 2018-08-13 | Stop reason: SURG

## 2018-08-13 RX ORDER — VECURONIUM BROMIDE 1 MG/ML
INJECTION, POWDER, LYOPHILIZED, FOR SOLUTION INTRAVENOUS AS NEEDED
Status: DISCONTINUED | OUTPATIENT
Start: 2018-08-13 | End: 2018-08-13 | Stop reason: SURG

## 2018-08-13 RX ORDER — RANITIDINE HYDROCHLORIDE 15 MG/ML
75 SOLUTION ORAL DAILY
Status: DISCONTINUED | OUTPATIENT
Start: 2018-08-14 | End: 2018-08-14 | Stop reason: CLARIF

## 2018-08-13 RX ORDER — LABETALOL HYDROCHLORIDE 5 MG/ML
10 INJECTION, SOLUTION INTRAVENOUS EVERY 4 HOURS PRN
Status: DISCONTINUED | OUTPATIENT
Start: 2018-08-13 | End: 2018-08-16

## 2018-08-13 RX ORDER — FENTANYL CITRATE 50 UG/ML
INJECTION, SOLUTION INTRAMUSCULAR; INTRAVENOUS AS NEEDED
Status: DISCONTINUED | OUTPATIENT
Start: 2018-08-13 | End: 2018-08-13 | Stop reason: SURG

## 2018-08-13 RX ADMIN — FENTANYL CITRATE 50 MCG: 50 INJECTION INTRAMUSCULAR; INTRAVENOUS at 17:55

## 2018-08-13 RX ADMIN — FENTANYL CITRATE 50 MCG: 50 INJECTION INTRAMUSCULAR; INTRAVENOUS at 15:22

## 2018-08-13 RX ADMIN — SODIUM CHLORIDE, POTASSIUM CHLORIDE, SODIUM LACTATE AND CALCIUM CHLORIDE: 600; 310; 30; 20 INJECTION, SOLUTION INTRAVENOUS at 10:23

## 2018-08-13 RX ADMIN — VECURONIUM BROMIDE 1 MG: 1 INJECTION, POWDER, LYOPHILIZED, FOR SOLUTION INTRAVENOUS at 15:10

## 2018-08-13 RX ADMIN — VECURONIUM BROMIDE 10 MG: 1 INJECTION, POWDER, LYOPHILIZED, FOR SOLUTION INTRAVENOUS at 08:04

## 2018-08-13 RX ADMIN — HYDROMORPHONE HYDROCHLORIDE 1 MG: 1 INJECTION, SOLUTION INTRAMUSCULAR; INTRAVENOUS; SUBCUTANEOUS at 22:09

## 2018-08-13 RX ADMIN — PROPOFOL 200 MG: 10 INJECTION, EMULSION INTRAVENOUS at 07:57

## 2018-08-13 RX ADMIN — Medication: at 22:41

## 2018-08-13 RX ADMIN — VECURONIUM BROMIDE 2 MG: 1 INJECTION, POWDER, LYOPHILIZED, FOR SOLUTION INTRAVENOUS at 09:20

## 2018-08-13 RX ADMIN — VECURONIUM BROMIDE 1 MG: 1 INJECTION, POWDER, LYOPHILIZED, FOR SOLUTION INTRAVENOUS at 14:20

## 2018-08-13 RX ADMIN — VECURONIUM BROMIDE 1 MG: 1 INJECTION, POWDER, LYOPHILIZED, FOR SOLUTION INTRAVENOUS at 16:15

## 2018-08-13 RX ADMIN — LIDOCAINE HYDROCHLORIDE 30 MG: 10 INJECTION, SOLUTION INFILTRATION; PERINEURAL at 07:57

## 2018-08-13 RX ADMIN — SODIUM CHLORIDE, POTASSIUM CHLORIDE, SODIUM LACTATE AND CALCIUM CHLORIDE: 600; 310; 30; 20 INJECTION, SOLUTION INTRAVENOUS at 07:57

## 2018-08-13 RX ADMIN — FENTANYL CITRATE 50 MCG: 50 INJECTION INTRAMUSCULAR; INTRAVENOUS at 17:30

## 2018-08-13 RX ADMIN — FENTANYL CITRATE 100 MCG: 50 INJECTION INTRAMUSCULAR; INTRAVENOUS at 07:56

## 2018-08-13 RX ADMIN — EPHEDRINE SULFATE 5 MG: 50 INJECTION INTRAMUSCULAR; INTRAVENOUS; SUBCUTANEOUS at 10:35

## 2018-08-13 RX ADMIN — ASPIRIN 325 MG ORAL TABLET 325 MG: 325 PILL ORAL at 22:11

## 2018-08-13 RX ADMIN — FENTANYL CITRATE 50 MCG: 50 INJECTION INTRAMUSCULAR; INTRAVENOUS at 18:20

## 2018-08-13 RX ADMIN — MIDAZOLAM HYDROCHLORIDE 2 MG: 1 INJECTION, SOLUTION INTRAMUSCULAR; INTRAVENOUS at 07:56

## 2018-08-13 RX ADMIN — GLYCOPYRROLATE 0.4 MG: 0.2 INJECTION, SOLUTION INTRAMUSCULAR; INTRAVENOUS at 18:10

## 2018-08-13 RX ADMIN — MORPHINE SULFATE 2 MG: 2 INJECTION, SOLUTION INTRAMUSCULAR; INTRAVENOUS at 19:42

## 2018-08-13 RX ADMIN — SODIUM CHLORIDE, POTASSIUM CHLORIDE, SODIUM LACTATE AND CALCIUM CHLORIDE 75 ML/HR: 600; 310; 30; 20 INJECTION, SOLUTION INTRAVENOUS at 19:18

## 2018-08-13 RX ADMIN — SODIUM CHLORIDE, POTASSIUM CHLORIDE, SODIUM LACTATE AND CALCIUM CHLORIDE 125 ML/HR: 600; 310; 30; 20 INJECTION, SOLUTION INTRAVENOUS at 21:14

## 2018-08-13 RX ADMIN — HYDROMORPHONE HYDROCHLORIDE 0.5 MG: 1 INJECTION, SOLUTION INTRAMUSCULAR; INTRAVENOUS; SUBCUTANEOUS at 19:10

## 2018-08-13 RX ADMIN — HEPARIN SODIUM 5000 UNITS: 5000 INJECTION, SOLUTION INTRAVENOUS; SUBCUTANEOUS at 22:10

## 2018-08-13 RX ADMIN — AMOXICILLIN 500 MG: 500 CAPSULE ORAL at 23:22

## 2018-08-13 RX ADMIN — SODIUM CHLORIDE, POTASSIUM CHLORIDE, SODIUM LACTATE AND CALCIUM CHLORIDE 75 ML/HR: 600; 310; 30; 20 INJECTION, SOLUTION INTRAVENOUS at 07:22

## 2018-08-13 RX ADMIN — VECURONIUM BROMIDE 2 MG: 1 INJECTION, POWDER, LYOPHILIZED, FOR SOLUTION INTRAVENOUS at 09:56

## 2018-08-13 RX ADMIN — VECURONIUM BROMIDE 2 MG: 1 INJECTION, POWDER, LYOPHILIZED, FOR SOLUTION INTRAVENOUS at 11:51

## 2018-08-13 RX ADMIN — EPHEDRINE SULFATE 5 MG: 50 INJECTION INTRAMUSCULAR; INTRAVENOUS; SUBCUTANEOUS at 08:15

## 2018-08-13 RX ADMIN — FENTANYL CITRATE 100 MCG: 50 INJECTION INTRAMUSCULAR; INTRAVENOUS at 13:45

## 2018-08-13 RX ADMIN — HYDROMORPHONE HYDROCHLORIDE 1 MG: 1 INJECTION, SOLUTION INTRAMUSCULAR; INTRAVENOUS; SUBCUTANEOUS at 21:08

## 2018-08-13 RX ADMIN — SODIUM CHLORIDE, POTASSIUM CHLORIDE, SODIUM LACTATE AND CALCIUM CHLORIDE: 600; 310; 30; 20 INJECTION, SOLUTION INTRAVENOUS at 14:45

## 2018-08-13 RX ADMIN — VECURONIUM BROMIDE 4 MG: 1 INJECTION, POWDER, LYOPHILIZED, FOR SOLUTION INTRAVENOUS at 10:23

## 2018-08-13 RX ADMIN — MORPHINE SULFATE 2 MG: 2 INJECTION, SOLUTION INTRAMUSCULAR; INTRAVENOUS at 21:33

## 2018-08-13 RX ADMIN — SUCCINYLCHOLINE CHLORIDE 130 MG: 20 INJECTION, SOLUTION INTRAMUSCULAR; INTRAVENOUS at 07:57

## 2018-08-13 RX ADMIN — FENTANYL CITRATE 50 MCG: 50 INJECTION INTRAMUSCULAR; INTRAVENOUS at 16:05

## 2018-08-13 RX ADMIN — ONDANSETRON HYDROCHLORIDE 4 MG: 2 INJECTION, SOLUTION INTRAMUSCULAR; INTRAVENOUS at 17:30

## 2018-08-13 RX ADMIN — DEXAMETHASONE SODIUM PHOSPHATE 8 MG: 4 INJECTION, SOLUTION INTRA-ARTICULAR; INTRALESIONAL; INTRAMUSCULAR; INTRAVENOUS; SOFT TISSUE at 08:26

## 2018-08-13 RX ADMIN — FENTANYL CITRATE 50 MCG: 50 INJECTION INTRAMUSCULAR; INTRAVENOUS at 10:16

## 2018-08-13 RX ADMIN — VECURONIUM BROMIDE 4 MG: 1 INJECTION, POWDER, LYOPHILIZED, FOR SOLUTION INTRAVENOUS at 12:59

## 2018-08-13 RX ADMIN — EPHEDRINE SULFATE 5 MG: 50 INJECTION INTRAMUSCULAR; INTRAVENOUS; SUBCUTANEOUS at 10:11

## 2018-08-13 RX ADMIN — VECURONIUM BROMIDE 2 MG: 1 INJECTION, POWDER, LYOPHILIZED, FOR SOLUTION INTRAVENOUS at 10:44

## 2018-08-13 RX ADMIN — SODIUM CHLORIDE, POTASSIUM CHLORIDE, SODIUM LACTATE AND CALCIUM CHLORIDE: 600; 310; 30; 20 INJECTION, SOLUTION INTRAVENOUS at 18:05

## 2018-08-13 RX ADMIN — LEVOFLOXACIN 750 MG: 750 INJECTION, SOLUTION INTRAVENOUS at 08:14

## 2018-08-13 RX ADMIN — FENTANYL CITRATE 50 MCG: 50 INJECTION INTRAMUSCULAR; INTRAVENOUS at 18:10

## 2018-08-13 RX ADMIN — NEOSTIGMINE METHYLSULFATE 4 MG: 1 INJECTION, SOLUTION INTRAMUSCULAR; INTRAVENOUS; SUBCUTANEOUS at 18:15

## 2018-08-13 NOTE — NURSING NOTE
Patient received from OR via bed and oxygen to room 412  Patient awake alert and following commands  Attached to monitor and sinus  Hr control NRM with sats 100%/ Right leg slightly turned to side and dressing intact and exposed Flap pink with capillary refill and adequate doppler  Alex Osman at bedside and attached the laser doppler  Numbers adequate with reading 12  Received report at bedside from anesthesia Dr Robert Bhat  Medicated with a dose of dilaudid for a reported pain of 7  Awaiting orders from dr Cecille Robbins  Instructions written by Alex Osman for positioning of right leg  Patient updated by Alex Osman  IVf started  Per Dr Jones Lines to place patients on his own CAP  CAP in the room and applied  Patient follow commands and wiggle toes and squeezes hand bilaterally   Report given to [facility]

## 2018-08-13 NOTE — OP NOTE
OPERATIVE REPORT  PATIENT NAME: Jez Garcia    :  1960  MRN: [id number]  Pt Location:  OR ROOM 12    SURGERY DATE: 2018    Surgeon(s) and Role:     * Burgess Marcin MD - Primary     * Yadira Cowan - Assisting    Preop and postoperative Diagnosis:  Open wound of right ankle [S91 001A], Achilles tendon exposure      Procedure(s) (LRB):  TRANSFER RIGHT THIGH FREE FLAP TO RIGHT HEEL (Right)    Specimen(s):  ID Type Source Tests Collected by Time Destination   1 : Right Achilles Wound Tissue Foot, Right TISSUE EXAM Burgess Marcin MD 2018 1250    2 : Right Posterior Tibial Vein Tissue Foot, Right TISSUE EXAM Burgess Marcin MD 2018 1307    A :  Urine Urine, Indwelling Muñoz Catheter URINALYSIS WITH REFLEX TO MICROSCOPIC Burgess Marcin MD 2018 0845    B : Right Achilles Wound Tissue Leg, Right CULTURE, TISSUE AND GRAM STAIN Burgess Marcin MD 2018 9701        Estimated Blood Loss:   100 mL    Drains:  Open Drain Right Leg (Active)   Number of days: 0       Urethral Catheter Non-latex 16 Fr  (Active)   Site Assessment Clean;Skin intact 2018  8:46 AM   Collection Container Standard drainage bag 2018  8:46 AM   Securement Method Securing device (Describe) 2018  8:46 AM   Number of days: 0       Anesthesia Type:   General    Operative history:  Patient has exposed right Achilles tendon following repair of a disruption  Attempted coverage with a right gracilis muscle free flap 2 weeks ago was unsuccessful  Another attempt was done today using a right anterior medial thigh free muscle  flap  There was 1  for this 7 x 23 cm large flap  The flap artery was anastomosed in in to side fashion to the right posterior tibial artery  Once again the posterior tibial veins showed signs of fibrosis in occlusion and were unusable    Therefore at the greater saphenous vein had to be brought down to the recipient site for an end-to-end anastomosis with a 3 0 mm anastomotic coupling device to the flap vein  Good flow was achieved into and out of the flap  It had good capillary refill at the conclusion of the procedure  Direct closure of the donor site of the right thigh was possible  Operative procedure: The patient was taken to the operating room and placed supine on the operating table  He was prepped and draped in the usual fashion  Anesthesia was general following endotracheal tube  Findings over the right Achilles showed a necrotic gracilis muscle flap which was removed with copious irrigation  The right leg was elevated for exsanguination and mid thigh sterile tourniquet inflated to 350 mm pressure  The previous incision along the medial right leg was continued proximally  The greater saphenous vein was identified here and set aside  The gastrocnemius and soleus muscles could then be retracted posteriorly to expose the septum between the superficial and deep posterior compartments  Here the posterior tibial artery was dissected free for several cm  The tourniquet was deflated in good pulsations noted in the artery  A template was made of the defect  Next the audible Doppler was used to identify perforators in the a mid right thigh  The template was placed about this and the flap designed  The medial border the flap was incised and taken straight through the subcutaneous tissues down to and through the deep fascia  A subfascial dissection was then done as this would provide a gliding surface over the tendon ultimately  A large  only was found superiorly  The lateral border of the flap was similarly excised through the deep fascia  The flap was raised from caudal to cephalad about the single   The  was found to arise through the vastus medialis muscle  This required intramuscular dissection until low enough pedicle length from the source vessel was achieved    This dissection required hemoclips and/or the bipolar for to coagulate side branches thereby freeing up the entire pedicle to now an island flap  The operating microscope was brought into the right lower leg  The the posterior tibial veins were then exposed  Hemoclips were placed distally  They were cut with still diminutive lumens that were unacceptable noted  Therefore hemoclips were placed proximal as these veins would be unusual for outflow  Instead the greater saphenous vein was dissected through the subcutaneous tissues distally the until the recipient site area was reached  Hemoclips were placed distally and the vein cut free and brought into the recipient area next to the posterior tibial artery  We return to the right thigh where the pedicle of the flap was crossclamped and divided  It was sutured with a 4-0 Vicryl ligature  The now free flap was taken to the right Achilles area where temporary sewn in place with 3-0 silk simple sutures  Under the operating microscope the vein and artery of the flap were   Vascular clamps were placed on the posterior tibial artery  An 8 0 nylon stay suture was used to allow creation of an arteriotomy of size equal to the beveled end of the posterior tibial artery  An end-to-side anastomosis was accomplished with 9 0 nylon continuous sutures  The greater saphenous vein and the flap vein were anastomosed in end-to-end fashion with the  as already described  All vascular clamps were then removed and excellent flow achieved into and out of the flap  The recipient area and flap were then cut to fit and inset over a non latex drain using 3-0 nylon interrupted simple or vertical mattress sutures  Next attention was directed to the donor site of the right thigh  Subcutaneous tissues were closed with 2-0 Vicryl interrupted simple sutures  Skin was closed with 3-0 nylon interrupted vertical mattress sutures  Bulky wraps were placed to pad the flap and thigh    The flap had excellent capillary refill  He was then taken to the intensive care unit for recovery in good condition      SIGNATURE: Kaor Martins MD  DATE: August 13, 2018  TIME: 7:18 PM

## 2018-08-14 ENCOUNTER — ANESTHESIA (OUTPATIENT)
Dept: ICU | Facility: HOSPITAL | Age: 58
End: 2018-08-14

## 2018-08-14 ENCOUNTER — APPOINTMENT (INPATIENT)
Dept: RADIOLOGY | Facility: HOSPITAL | Age: 58
DRG: 857 | End: 2018-08-14
Payer: COMMERCIAL

## 2018-08-14 ENCOUNTER — ANESTHESIA (INPATIENT)
Dept: PERIOP | Facility: HOSPITAL | Age: 58
DRG: 857 | End: 2018-08-14
Payer: COMMERCIAL

## 2018-08-14 ENCOUNTER — ANESTHESIA EVENT (INPATIENT)
Dept: PERIOP | Facility: HOSPITAL | Age: 58
DRG: 857 | End: 2018-08-14
Payer: COMMERCIAL

## 2018-08-14 ENCOUNTER — APPOINTMENT (INPATIENT)
Dept: INTERVENTIONAL RADIOLOGY/VASCULAR | Facility: HOSPITAL | Age: 58
DRG: 857 | End: 2018-08-14
Attending: FAMILY MEDICINE
Payer: COMMERCIAL

## 2018-08-14 ENCOUNTER — ANESTHESIA EVENT (OUTPATIENT)
Dept: ICU | Facility: HOSPITAL | Age: 58
End: 2018-08-14

## 2018-08-14 PROBLEM — S91.001A OPEN WOUND OF RIGHT ANKLE: Status: ACTIVE | Noted: 2018-08-07

## 2018-08-14 LAB
ATRIAL RATE: 96 BPM
P AXIS: 11 DEGREES
PR INTERVAL: 188 MS
QRS AXIS: -7 DEGREES
QRSD INTERVAL: 98 MS
QT INTERVAL: 360 MS
QTC INTERVAL: 454 MS
T WAVE AXIS: 36 DEGREES
VENTRICULAR RATE: 96 BPM

## 2018-08-14 PROCEDURE — 02HV33Z INSERTION OF INFUSION DEVICE INTO SUPERIOR VENA CAVA, PERCUTANEOUS APPROACH: ICD-10-PCS | Performed by: PLASTIC SURGERY

## 2018-08-14 PROCEDURE — 71045 X-RAY EXAM CHEST 1 VIEW: CPT

## 2018-08-14 PROCEDURE — 99232 SBSQ HOSP IP/OBS MODERATE 35: CPT | Performed by: FAMILY MEDICINE

## 2018-08-14 PROCEDURE — 99254 IP/OBS CNSLTJ NEW/EST MOD 60: CPT | Performed by: INTERNAL MEDICINE

## 2018-08-14 PROCEDURE — 93005 ELECTROCARDIOGRAM TRACING: CPT

## 2018-08-14 PROCEDURE — C1751 CATH, INF, PER/CENT/MIDLINE: HCPCS

## 2018-08-14 PROCEDURE — 04CR0ZZ EXTIRPATION OF MATTER FROM RIGHT POSTERIOR TIBIAL ARTERY, OPEN APPROACH: ICD-10-PCS | Performed by: PLASTIC SURGERY

## 2018-08-14 PROCEDURE — 88304 TISSUE EXAM BY PATHOLOGIST: CPT | Performed by: PATHOLOGY

## 2018-08-14 PROCEDURE — 94660 CPAP INITIATION&MGMT: CPT

## 2018-08-14 PROCEDURE — 94760 N-INVAS EAR/PLS OXIMETRY 1: CPT

## 2018-08-14 PROCEDURE — 93010 ELECTROCARDIOGRAM REPORT: CPT | Performed by: INTERNAL MEDICINE

## 2018-08-14 RX ORDER — FAMOTIDINE 20 MG/1
20 TABLET, FILM COATED ORAL DAILY
Status: DISCONTINUED | OUTPATIENT
Start: 2018-08-14 | End: 2018-08-18

## 2018-08-14 RX ORDER — MIDAZOLAM HYDROCHLORIDE 1 MG/ML
INJECTION INTRAMUSCULAR; INTRAVENOUS AS NEEDED
Status: DISCONTINUED | OUTPATIENT
Start: 2018-08-14 | End: 2018-08-14 | Stop reason: SURG

## 2018-08-14 RX ORDER — FENTANYL CITRATE/PF 50 MCG/ML
25 SYRINGE (ML) INJECTION
Status: DISCONTINUED | OUTPATIENT
Start: 2018-08-14 | End: 2018-08-25

## 2018-08-14 RX ORDER — HEPARIN SODIUM 5000 [USP'U]/ML
10000 INJECTION, SOLUTION INTRAVENOUS; SUBCUTANEOUS ONCE
Status: DISCONTINUED | OUTPATIENT
Start: 2018-08-14 | End: 2018-08-24

## 2018-08-14 RX ORDER — VECURONIUM BROMIDE 1 MG/ML
INJECTION, POWDER, LYOPHILIZED, FOR SOLUTION INTRAVENOUS AS NEEDED
Status: DISCONTINUED | OUTPATIENT
Start: 2018-08-14 | End: 2018-08-14 | Stop reason: SURG

## 2018-08-14 RX ORDER — HEPARIN SODIUM 5000 [USP'U]/ML
5000 INJECTION, SOLUTION INTRAVENOUS; SUBCUTANEOUS EVERY 8 HOURS SCHEDULED
Status: DISCONTINUED | OUTPATIENT
Start: 2018-08-14 | End: 2018-08-14

## 2018-08-14 RX ORDER — DEXAMETHASONE SODIUM PHOSPHATE 4 MG/ML
4 INJECTION, SOLUTION INTRA-ARTICULAR; INTRALESIONAL; INTRAMUSCULAR; INTRAVENOUS; SOFT TISSUE ONCE AS NEEDED
Status: DISCONTINUED | OUTPATIENT
Start: 2018-08-14 | End: 2018-08-20

## 2018-08-14 RX ORDER — MEPERIDINE HYDROCHLORIDE 25 MG/ML
12.5 INJECTION INTRAMUSCULAR; INTRAVENOUS; SUBCUTANEOUS
Status: DISCONTINUED | OUTPATIENT
Start: 2018-08-14 | End: 2018-08-25

## 2018-08-14 RX ORDER — LIDOCAINE HYDROCHLORIDE 20 MG/ML
INJECTION, SOLUTION INFILTRATION; PERINEURAL AS NEEDED
Status: DISCONTINUED | OUTPATIENT
Start: 2018-08-14 | End: 2018-08-14 | Stop reason: HOSPADM

## 2018-08-14 RX ORDER — DEXAMETHASONE SODIUM PHOSPHATE 4 MG/ML
8 INJECTION, SOLUTION INTRA-ARTICULAR; INTRALESIONAL; INTRAMUSCULAR; INTRAVENOUS; SOFT TISSUE ONCE AS NEEDED
Status: DISCONTINUED | OUTPATIENT
Start: 2018-08-14 | End: 2018-08-20

## 2018-08-14 RX ORDER — PROPOFOL 10 MG/ML
INJECTION, EMULSION INTRAVENOUS AS NEEDED
Status: DISCONTINUED | OUTPATIENT
Start: 2018-08-14 | End: 2018-08-14 | Stop reason: SURG

## 2018-08-14 RX ORDER — HEPARIN SODIUM 5000 [USP'U]/ML
INJECTION, SOLUTION INTRAVENOUS; SUBCUTANEOUS
Status: COMPLETED
Start: 2018-08-14 | End: 2018-08-14

## 2018-08-14 RX ORDER — MAGNESIUM HYDROXIDE 1200 MG/15ML
LIQUID ORAL AS NEEDED
Status: DISCONTINUED | OUTPATIENT
Start: 2018-08-14 | End: 2018-08-14 | Stop reason: HOSPADM

## 2018-08-14 RX ORDER — SUCCINYLCHOLINE CHLORIDE 20 MG/ML
INJECTION INTRAMUSCULAR; INTRAVENOUS AS NEEDED
Status: DISCONTINUED | OUTPATIENT
Start: 2018-08-14 | End: 2018-08-14 | Stop reason: SURG

## 2018-08-14 RX ORDER — GLYCOPYRROLATE 0.2 MG/ML
INJECTION INTRAMUSCULAR; INTRAVENOUS AS NEEDED
Status: DISCONTINUED | OUTPATIENT
Start: 2018-08-14 | End: 2018-08-14 | Stop reason: SURG

## 2018-08-14 RX ORDER — SODIUM CHLORIDE, SODIUM LACTATE, POTASSIUM CHLORIDE, CALCIUM CHLORIDE 600; 310; 30; 20 MG/100ML; MG/100ML; MG/100ML; MG/100ML
75 INJECTION, SOLUTION INTRAVENOUS CONTINUOUS
Status: DISCONTINUED | OUTPATIENT
Start: 2018-08-14 | End: 2018-08-17

## 2018-08-14 RX ORDER — HEPARIN SODIUM 5000 [USP'U]/ML
15000 INJECTION, SOLUTION INTRAVENOUS; SUBCUTANEOUS
Status: DISCONTINUED | OUTPATIENT
Start: 2018-08-14 | End: 2018-08-25

## 2018-08-14 RX ORDER — HEPARIN SODIUM 5000 [USP'U]/ML
INJECTION, SOLUTION INTRAVENOUS; SUBCUTANEOUS AS NEEDED
Status: DISCONTINUED | OUTPATIENT
Start: 2018-08-14 | End: 2018-08-14 | Stop reason: HOSPADM

## 2018-08-14 RX ORDER — FENTANYL CITRATE 50 UG/ML
INJECTION, SOLUTION INTRAMUSCULAR; INTRAVENOUS AS NEEDED
Status: DISCONTINUED | OUTPATIENT
Start: 2018-08-14 | End: 2018-08-14 | Stop reason: SURG

## 2018-08-14 RX ORDER — SENNOSIDES 8.6 MG
2 TABLET ORAL 2 TIMES DAILY
Status: DISCONTINUED | OUTPATIENT
Start: 2018-08-14 | End: 2018-08-29 | Stop reason: HOSPADM

## 2018-08-14 RX ADMIN — HYDROMORPHONE HYDROCHLORIDE 1 MG: 1 INJECTION, SOLUTION INTRAMUSCULAR; INTRAVENOUS; SUBCUTANEOUS at 07:54

## 2018-08-14 RX ADMIN — MIDAZOLAM HYDROCHLORIDE 2 MG: 1 INJECTION, SOLUTION INTRAMUSCULAR; INTRAVENOUS at 17:05

## 2018-08-14 RX ADMIN — HEPARIN SODIUM 15000 UNITS: 5000 INJECTION, SOLUTION INTRAVENOUS; SUBCUTANEOUS at 21:41

## 2018-08-14 RX ADMIN — FENTANYL CITRATE 25 MCG: 50 INJECTION INTRAMUSCULAR; INTRAVENOUS at 17:10

## 2018-08-14 RX ADMIN — PROPOFOL 50 MG: 10 INJECTION, EMULSION INTRAVENOUS at 18:32

## 2018-08-14 RX ADMIN — HYDROMORPHONE HYDROCHLORIDE 1 MG: 1 INJECTION, SOLUTION INTRAMUSCULAR; INTRAVENOUS; SUBCUTANEOUS at 02:37

## 2018-08-14 RX ADMIN — Medication: at 20:29

## 2018-08-14 RX ADMIN — VECURONIUM BROMIDE 4 MG: 1 INJECTION, POWDER, LYOPHILIZED, FOR SOLUTION INTRAVENOUS at 17:23

## 2018-08-14 RX ADMIN — SENNOSIDES 17.2 MG: 8.6 TABLET, FILM COATED ORAL at 23:00

## 2018-08-14 RX ADMIN — LISINOPRIL 10 MG: 10 TABLET ORAL at 09:14

## 2018-08-14 RX ADMIN — GLYCOPYRROLATE 0.4 MG: 0.2 INJECTION, SOLUTION INTRAMUSCULAR; INTRAVENOUS at 19:13

## 2018-08-14 RX ADMIN — DOCUSATE SODIUM 100 MG: 100 CAPSULE, LIQUID FILLED ORAL at 09:14

## 2018-08-14 RX ADMIN — HEPARIN SODIUM 5000 UNITS: 5000 INJECTION, SOLUTION INTRAVENOUS; SUBCUTANEOUS at 22:29

## 2018-08-14 RX ADMIN — HEPARIN SODIUM 5000 UNITS: 5000 INJECTION, SOLUTION INTRAVENOUS; SUBCUTANEOUS at 21:30

## 2018-08-14 RX ADMIN — PROPOFOL 200 MG: 10 INJECTION, EMULSION INTRAVENOUS at 17:10

## 2018-08-14 RX ADMIN — AMOXICILLIN 500 MG: 500 CAPSULE ORAL at 06:39

## 2018-08-14 RX ADMIN — SUCCINYLCHOLINE CHLORIDE 140 MG: 20 INJECTION, SOLUTION INTRAMUSCULAR; INTRAVENOUS at 17:14

## 2018-08-14 RX ADMIN — FENTANYL CITRATE 50 MCG: 50 INJECTION INTRAMUSCULAR; INTRAVENOUS at 17:13

## 2018-08-14 RX ADMIN — HYDROMORPHONE HYDROCHLORIDE 1 MG: 1 INJECTION, SOLUTION INTRAMUSCULAR; INTRAVENOUS; SUBCUTANEOUS at 20:08

## 2018-08-14 RX ADMIN — AMOXICILLIN 500 MG: 500 CAPSULE ORAL at 14:59

## 2018-08-14 RX ADMIN — ASPIRIN 325 MG ORAL TABLET 325 MG: 325 PILL ORAL at 21:47

## 2018-08-14 RX ADMIN — SODIUM CHLORIDE, POTASSIUM CHLORIDE, SODIUM LACTATE AND CALCIUM CHLORIDE 125 ML/HR: 600; 310; 30; 20 INJECTION, SOLUTION INTRAVENOUS at 06:40

## 2018-08-14 RX ADMIN — FENTANYL CITRATE 100 MCG: 50 INJECTION INTRAMUSCULAR; INTRAVENOUS at 17:26

## 2018-08-14 RX ADMIN — HEPARIN SODIUM 10000 UNITS: 5000 INJECTION, SOLUTION INTRAVENOUS; SUBCUTANEOUS at 15:55

## 2018-08-14 RX ADMIN — HYDROMORPHONE HYDROCHLORIDE 1 MG: 1 INJECTION, SOLUTION INTRAMUSCULAR; INTRAVENOUS; SUBCUTANEOUS at 13:15

## 2018-08-14 RX ADMIN — VECURONIUM BROMIDE 4 MG: 1 INJECTION, POWDER, LYOPHILIZED, FOR SOLUTION INTRAVENOUS at 17:32

## 2018-08-14 RX ADMIN — HYDROMORPHONE HYDROCHLORIDE 1 MG: 1 INJECTION, SOLUTION INTRAMUSCULAR; INTRAVENOUS; SUBCUTANEOUS at 05:01

## 2018-08-14 RX ADMIN — DEXAMETHASONE SODIUM PHOSPHATE 8 MG: 10 INJECTION INTRAMUSCULAR; INTRAVENOUS at 18:38

## 2018-08-14 RX ADMIN — FAMOTIDINE 20 MG: 20 TABLET ORAL at 09:14

## 2018-08-14 RX ADMIN — EZETIMIBE 10 MG: 10 TABLET ORAL at 09:14

## 2018-08-14 RX ADMIN — NEOSTIGMINE METHYLSULFATE 2.5 MG: 1 INJECTION, SOLUTION INTRAMUSCULAR; INTRAVENOUS; SUBCUTANEOUS at 19:09

## 2018-08-14 RX ADMIN — FENTANYL CITRATE 50 MCG: 50 INJECTION INTRAMUSCULAR; INTRAVENOUS at 18:02

## 2018-08-14 RX ADMIN — Medication 1 SPRAY: at 20:31

## 2018-08-14 RX ADMIN — HEPARIN SODIUM 5000 UNITS: 5000 INJECTION, SOLUTION INTRAVENOUS; SUBCUTANEOUS at 09:16

## 2018-08-14 RX ADMIN — SODIUM CHLORIDE, POTASSIUM CHLORIDE, SODIUM LACTATE AND CALCIUM CHLORIDE 75 ML/HR: 600; 310; 30; 20 INJECTION, SOLUTION INTRAVENOUS at 14:17

## 2018-08-14 RX ADMIN — HEPARIN SODIUM 5000 UNITS: 5000 INJECTION, SOLUTION INTRAVENOUS; SUBCUTANEOUS at 23:49

## 2018-08-14 RX ADMIN — HYDROMORPHONE HYDROCHLORIDE 1 MG: 1 INJECTION, SOLUTION INTRAMUSCULAR; INTRAVENOUS; SUBCUTANEOUS at 00:49

## 2018-08-14 RX ADMIN — FENTANYL CITRATE 50 MCG: 50 INJECTION INTRAMUSCULAR; INTRAVENOUS at 17:30

## 2018-08-14 RX ADMIN — ASPIRIN 325 MG ORAL TABLET 325 MG: 325 PILL ORAL at 09:14

## 2018-08-14 RX ADMIN — MIDAZOLAM HYDROCHLORIDE 2 MG: 1 INJECTION, SOLUTION INTRAMUSCULAR; INTRAVENOUS at 17:30

## 2018-08-14 RX ADMIN — PROPOFOL 100 MG: 10 INJECTION, EMULSION INTRAVENOUS at 19:08

## 2018-08-14 RX ADMIN — HYDROMORPHONE HYDROCHLORIDE 1 MG: 1 INJECTION, SOLUTION INTRAMUSCULAR; INTRAVENOUS; SUBCUTANEOUS at 16:23

## 2018-08-14 RX ADMIN — FENTANYL CITRATE 25 MCG: 50 INJECTION INTRAMUSCULAR; INTRAVENOUS at 18:01

## 2018-08-14 NOTE — PERIOPERATIVE NURSING NOTE
Attempted obtain  Blood work, CBC, BMP patient is hard stick , patient refused of any further attempts,  request Pick line  Patient doing well  Afebrile appear stable , will continue to monitor

## 2018-08-14 NOTE — PROCEDURES
PICC Line Insertion  Date/Time: 8/14/2018 12:03 PM  Performed by: Irma Turner by: Noam Astorga     Patient location:  Bedside  Other Assisting Provider: Yes (comment) RT Bipin)    Consent:     Consent obtained:  Written    Consent given by:  Patient    Risks discussed:  Arterial puncture, infection, incorrect placement, bleeding, pneumothorax and nerve damage    Alternatives discussed:  No treatment, delayed treatment, alternative treatment, observation and referral  Universal protocol:     Procedure explained and questions answered to patient or proxy's satisfaction: yes      Relevant documents present and verified: yes      Test results available and properly labeled: yes      Radiology Images displayed and confirmed  If images not available, report reviewed: yes      Required blood products, implants, devices, and special equipment available: yes      Site/side marked: yes      Immediately prior to procedure, a time out was called: yes      Patient identity confirmed:  Verbally with patient and arm band  Pre-procedure details:     Hand hygiene: Hand hygiene performed prior to insertion      Sterile barrier technique: All elements of maximal sterile technique followed      Skin preparation:  ChloraPrep    Skin preparation agent: Skin preparation agent completely dried prior to procedure    Indications:     PICC line indications: no peripheral vascular access    Anesthesia (see MAR for exact dosages):      Anesthesia method:  None  Procedure details:     Location:  Basilic    Vessel type: vein      Laterality:  Left    Site selection rationale:  Patient preference    Approach: percutaneous technique used      Patient position:  Flat    Procedural supplies:  Triple lumen    Catheter size:  5 Fr    Landmarks identified: yes      Ultrasound guidance: yes      Sterile ultrasound techniques: Sterile gel and sterile probe covers were used      Number of attempts:  1    Successful placement: yes      Vessel of catheter tip end:  Chest Xray needed to confirm placement    Total catheter length (cm):  55cm    Catheter out on skin (cm):  5cm    Max flow rate:  5cc/sec    Arm circumference:  33cm  Post-procedure details:     Post-procedure:  Dressing applied and securement device placed    Assessment:  Blood return through all ports and placement verification pending x-ray result    Post-procedure complications: none      Patient tolerance of procedure:   Tolerated well, no immediate complications  Comments:      Catheter AXZ#[id number]  Catheter P#[id number]

## 2018-08-14 NOTE — CASE MANAGEMENT
Initial Clinical Review    Age/Sex: 63 y o  male    Surgery Date: 8/13/18     Procedure: TRANSFER RIGHT THIGH FREE FLAP TO RIGHT HEEL (Right)  CPT 46836, 37642  Patient has exposed right Achilles tendon following repair of a disruption  Attempted coverage with a right gracilis muscle free flap 2 weeks ago was unsuccessful  Another attempt was done today using a right anterior medial thigh free muscle  flap  There was 1  for this 7 x 23 cm large flap  The flap artery was anastomosed in in to side fashion to the right posterior tibial artery  Once again the posterior tibial veins showed signs of fibrosis in occlusion and were unusable  Therefore at the greater saphenous vein had to be brought down to the recipient site for an end-to-end anastomosis with a 3 0 mm anastomotic coupling device to the flap vein  Good flow was achieved into and out of the flap  It had good capillary refill at the conclusion of the procedure  Direct closure of the donor site of the right thigh was possible      Operative procedure: The patient was taken to the operating room and placed supine on the operating table  He was prepped and draped in the usual fashion  Anesthesia was general following endotracheal tube  Findings over the right Achilles showed a necrotic gracilis muscle flap which was removed with copious irrigation  The right leg was elevated for exsanguination and mid thigh sterile tourniquet inflated to 350 mm pressure  The previous incision along the medial right leg was continued proximally  The greater saphenous vein was identified here and set aside  The gastrocnemius and soleus muscles could then be retracted posteriorly to expose the septum between the superficial and deep posterior compartments  Here the posterior tibial artery was dissected free for several cm  The tourniquet was deflated in good pulsations noted in the artery    A template was made of the defect      Next the audible Doppler was used to identify perforators in the a mid right thigh  The template was placed about this and the flap designed  The medial border the flap was incised and taken straight through the subcutaneous tissues down to and through the deep fascia  A subfascial dissection was then done as this would provide a gliding surface over the tendon ultimately  A large  only was found superiorly  The lateral border of the flap was similarly excised through the deep fascia  The flap was raised from caudal to cephalad about the single   The  was found to arise through the vastus medialis muscle  This required intramuscular dissection until low enough pedicle length from the source vessel was achieved  This dissection required hemoclips and/or the bipolar for to coagulate side branches thereby freeing up the entire pedicle to now an island flap      The operating microscope was brought into the right lower leg  The the posterior tibial veins were then exposed  Hemoclips were placed distally  They were cut with still diminutive lumens that were unacceptable noted  Therefore hemoclips were placed proximal as these veins would be unusual for outflow  Instead the greater saphenous vein was dissected through the subcutaneous tissues distally the until the recipient site area was reached  Hemoclips were placed distally and the vein cut free and brought into the recipient area next to the posterior tibial artery       We return to the right thigh where the pedicle of the flap was crossclamped and divided  It was sutured with a 4-0 Vicryl ligature  The now free flap was taken to the right Achilles area where temporary sewn in place with 3-0 silk simple sutures  Under the operating microscope the vein and artery of the flap were   Vascular clamps were placed on the posterior tibial artery    An 8 0 nylon stay suture was used to allow creation of an arteriotomy of size equal to the beveled end of the posterior tibial artery  An end-to-side anastomosis was accomplished with 9 0 nylon continuous sutures  The greater saphenous vein and the flap vein were anastomosed in end-to-end fashion with the  as already described  All vascular clamps were then removed and excellent flow achieved into and out of the flap  The recipient area and flap were then cut to fit and inset over a non latex drain using 3-0 nylon interrupted simple or vertical mattress sutures      Next attention was directed to the donor site of the right thigh  Subcutaneous tissues were closed with 2-0 Vicryl interrupted simple sutures  Skin was closed with 3-0 nylon interrupted vertical mattress sutures  Bulky wraps were placed to pad the flap and thigh  The flap had excellent capillary refill  He was then taken to the intensive care unit for recovery in good condition  Surgery time 12 hrs     Anesthesia: General     Admission Orders: Date/Time/Statement: 8/13/18 @ 1849     Orders Placed This Encounter   Procedures    Inpatient Admission     Standing Status:   Standing     Number of Occurrences:   1     Order Specific Question:   Admitting Physician     Answer:   Tyron Heart     Order Specific Question:   Level of Care     Answer:   Critical Care [15]     Order Specific Question:   Bed Type     Answer:   Trauma [7]     Order Specific Question:   Estimated length of stay     Answer:   More than 2 Midnights     Order Specific Question:   Certification     Answer:   I certify that inpatient services are medically necessary for this patient for a duration of greater than two midnights  See H&P and MD Progress Notes for additional information about the patient's course of treatment         Vital Signs: /84   Pulse 96   Temp (!) 97 2 °F (36 2 °C) (Temporal)   Resp 16   Ht 5' 8" (1 727 m)   Wt (!) 145 kg (320 lb)   SpO2 98%   BMI 48 66 kg/m²       Diet Orders            Start     Ordered    08/14/18 0500  Diet Cardiac; Cardiac TLC 2 3 GM NA; Consistent Carbohydrate Diet Level 1 (4 carb servings/60 grams CHO/meal), Lo Cholesterol  Effective tomorrow     Question Answer Comment   Diet Type Cardiac    Cardiac Cardiac TLC 2 3 GM NA    Other Restriction(s): Consistent Carbohydrate Diet Level 1 (4 carb servings/60 grams CHO/meal)    Other Restriction(s): Lo Cholesterol    RD to adjust diet per protocol? No        08/13/18 1941      Labs 8/13   WBC 15 8  H&H 12 5/37 0    8/14 labs pending     Penrose drain   Muñoz catheter     Mobility: bedrest     DVT Prophylaxis: aspirin 325 mg po BID     Pain Control: PCA fentanyl demand dosing only 10 mcg q 5 mins, one hour limit 120 mcg      8/14/18 Remains in ICU with hourly laser doppler monitoring  Laser doppler 5 6 this am, No audible doppler pulses per nursing notes  Awaiting visit from surgeon this am  Dispo tbd  Thank you,  Katia Grayson  [address] Utilization Review Department  Phone: [phone]; Fax [phone]  ATTENTION: The Network Utilization Review Department is now centralized for our 9 Facilities  Make a note that we have a new phone and fax numbers for our Department  Please call with any questions or concerns to [phone] and carefully follow the prompts so that you are directed to the right person  All voicemails are confidential  Fax any determinations, approvals, denials, and requests for initial or continue stay review clinical to [phone]  Due to HIGH CALL volume, it would be easier if you could please send faxed requests to expedite your requests and in part, help us provide discharge notifications faster

## 2018-08-14 NOTE — RESPIRATORY THERAPY NOTE
RT Protocol Note  Harpreet Langford 63 y o  male MRN: [id number]  Unit/Bed#:  Encounter: [id number]    Assessment    Principal Problem:    Ankle wound, right, sequela  Active Problems:    HLD (hyperlipidemia)    Essential hypertension    GERD (gastroesophageal reflux disease)    KIRIT on CPAP    Morbid obesity with BMI of 45 0-49 9, adult (HCC)      Past Medical History:   Diagnosis Date    Asthma     CPAP (continuous positive airway pressure) dependence     Diverticulosis     GERD (gastroesophageal reflux disease)     Hyperlipidemia     Morbid obesity ([address] )     Sleep apnea     Ventricular tachycardia (HCC)     1 episode     Social History     Social History    Marital status: /Civil Union     Spouse name: N/A    Number of children: N/A    Years of education: N/A     Social History Main Topics    Smoking status: Never Smoker    Smokeless tobacco: Never Used    Alcohol use Yes      Comment: 7 per week    Drug use: No    Sexual activity: No     Other Topics Concern    None     Social History Narrative    None       Vitals:  Blood pressure 142/85, pulse 91, temperature (!) 97 2 °F (36 2 °C), temperature source Temporal, resp  rate (!) 44, height 5' 8" (1 727 m), weight (!) 145 kg (320 lb), SpO2 96 %  Plan    Patient's CPAP machine checked  Patient currently using CPAP of 8 for HS

## 2018-08-14 NOTE — PROGRESS NOTES
I was informed by the patient nurse that she attempted obtain labs patient is hard stick he refused of any further attempts   despite his significantly elevated white blood cell count of 29319 he remains afebrile hemodynamically stable

## 2018-08-14 NOTE — ANESTHESIA POST-OP FOLLOW-UP NOTE
Patient: Agata Remy reviewed  Pt spirits satisfactory  PO tolerated  IV in place  No new cardiac or resp changes    Pt states analgesia is  Adequate without any complications Pt comfortable with positioning

## 2018-08-14 NOTE — NURSING NOTE
Pt  Seen by JASMYN MATHEW  At 10:00 and has had dressings changed  There is now a weakly audible doppler pulse on flap however color appears to be becoming minimally bluish/pink since this AM  JASMYN Best made aware of same

## 2018-08-14 NOTE — NURSING NOTE
Pt as per shift assessment  Upon initial examination with off-going RN we are unable to locate a doppler audible pulse  Dr Fatimah Spence notified LD #s are adequate as recorded  Flap  with brisk capillary refill and adequate pink coloring  Reading vary greatly with minimal changes in position of his foot

## 2018-08-14 NOTE — NURSING NOTE
Pt's wife at bedside  Both she and pt  Spoken to by JASMYN Ramos also in to see   Pt  To OR in NAD via bed

## 2018-08-14 NOTE — ASSESSMENT & PLAN NOTE
Patient underwent right-sided free flap to right heel surgery by Dr Alcantara on 8/13/18  Continue current pain management  Bowel movement regimen  PT evaluate, DVT prophylaxis as per orthopedic surgeon  Continue current antibiotic until ID evaluation  Will repeat CBC and BMP in a m    Patient afebrile, hemodynamically stable

## 2018-08-14 NOTE — ANESTHESIA PREPROCEDURE EVALUATION
Review of Systems/Medical History  Patient summary reviewed  Chart reviewed  History of anesthetic complications difficult airway    Cardiovascular  Hyperlipidemia, Hypertension controlled,    Pulmonary  Asthma , well controlled/ stable Last rescue: < 6 months ago Asthma type of rescue: PRN medication usage, Sleep apnea Sleep Study completed,        GI/Hepatic    GERD well controlled,        Negative  ROS        Endo/Other  Negative endo/other ROS      GYN       Hematology  Anemia ,     Musculoskeletal  Negative musculoskeletal ROS   Comment: CC only      Neurology  Negative neurology ROS      Psychology   Negative psychology ROS              Physical Exam    Airway    Mallampati score: III         Dental   No notable dental hx     Cardiovascular  Cardiovascular exam normal    Pulmonary  Pulmonary exam normal     Other Findings  Glide scope in past      Anesthesia Plan  ASA Score- 3 Emergent    Anesthesia Type- general with ASA Monitors  Additional Monitors:   Airway Plan:         Plan Factors-    Induction- intravenous  Postoperative Plan- Plan for postoperative opioid use  Informed Consent- Anesthetic plan and risks discussed with patient

## 2018-08-14 NOTE — ED PROCEDURE NOTE
Laser Doppler 5 6, no audible Doppler pulses , Flap site small amount bleeding noted, Doctor Stacia Hassan notifed  Stated that "CHEPE Best will be there to see the patient"

## 2018-08-14 NOTE — CONSULTS
Consult- Markus Seaman 1960, 63 y o  male MRN: [id number]    Unit/Bed#:  Encounter: [id number]    Primary Care Provider: Willa Wallace MD   Date and time admitted to hospital: 8/13/2018  5:55 AM      Consults    * Ankle wound, right, sequela   Assessment & Plan    Patient underwent right-sided free flap to right heel surgery by Kory Farnsworth on 8/13/18  Continue current pain management  Bowel movement regimen  PT evaluate, DVT prophylaxis as per orthopedic surgeon  Continue current antibiotic until ID evaluation  Will repeat CBC and BMP in a m  Patient afebrile, hemodynamically stable             Essential hypertension   Assessment & Plan    Monitor blood pressure  Continue lisinopril  Labetalol IV p r n  for systolic blood pressure above 180        GERD (gastroesophageal reflux disease)   Assessment & Plan    Continue Zantac        HLD (hyperlipidemia)   Assessment & Plan    Low-cholesterol diet  Continue pravastatin every other day as recommended by patient's family doctor and Marjan        KIRIT on CPAP   Assessment & Plan    Continue CPAP        Morbid obesity with BMI of 45 0-49 9, adult (HCC)   Assessment & Plan    Low-calorie diet,  PT evaluation for early mobilization if recommended by orthopedic surgeon            VTE Prophylaxis: Heparin  / sequential compression device     Recommendations for Discharge:  · Follow up with orthopedic surgeon ,PCP    Counseling / Coordination of Care Time: 45 minutes  Greater than 50% of total time spent on patient counseling and coordination of care  Collaboration of Care: Were Recommendations Directly Discussed with Primary Treatment Team? - Yes     History of Present Illness:    Markus Seaman is a 63 y o  male who is originally admitted to the orthopedic service due to right-sided free flap to right ankle  surgery  on 8/13/18  We are consulted for medical management of his other comorbidities    Patient reports a history of hyperlipidemia ,obstructive apnea for which he using CPAP, GERD, hypertension  Upon my assessment patient denies any medical related complaints, he was very concerned about management of hyperlipidemia with statin and he wants to continue his simvastatin every other day to prevent a muscle cramp which was recommended by his family doctor Amelia Owen  Labs reviewed patient remained hemodynamically stable EKG stat ordered    Review of Systems:    Review of Systems   Musculoskeletal:        Right ankle pain       Past Medical and Surgical History:     Past Medical History:   Diagnosis Date    Asthma     CPAP (continuous positive airway pressure) dependence     Diverticulosis     GERD (gastroesophageal reflux disease)     Hyperlipidemia     Morbid obesity ([address] )     Sleep apnea     Ventricular tachycardia (HCC)     1 episode       Past Surgical History:   Procedure Laterality Date    CARPAL TUNNEL RELEASE      COLONOSCOPY  2007    HERNIA REPAIR      abdominal x 2    POLYPECTOMY      hyperplastic    [place] COLONOSCOPY FLX DX W/COLLJ SPEC WHEN PFRMD N/A 11/27/2017    Procedure: COLONOSCOPY;  Surgeon: Elizabeth Tomlinson MD;  Location: [facility]; Service: Gastroenterology    NJ DEBRIDEMENT, SKIN, SUB-Q TISSUE,=<20 SQ CM Right 7/18/2018    Procedure: DEBRIDEMENT ANKLE WOUND;  Surgeon: Slick Garcia MD;  Location: [address] [place] [address] OR;  Service: Plastics    NJ DEBRIDEMENT, SKIN, SUB-Q TISSUE,=<20 SQ CM Right 7/23/2018    Procedure: DEBRIDEMENT RIGHT ANKLE WOUND;  Surgeon: Slick Garcia MD;  Location: [address] [place] [address];  Service: Plastics    [place] FREE MUSC-SKIN FLAP W/MICROVASC ANAST Right 7/30/2018    Procedure: COVERAGE HEEL WITH GRACILIS MUSCLE FLAP/SKINGRAFT ;  Surgeon: Slick Garcia MD;  Location: [address] [place] [address];  Service: Plastics    [address] [place] [address] Right 4/23/2018    Procedure: REPAIR TENDON ACHILLES   FHL TRANSFER;  Surgeon: Collins Charles DPM;  Location: [facility];  Service: Podiatry    TRIGGER FINGER RELEASE      thumb       Meds/Allergies:    all medications and allergies reviewed    Allergies: Allergies   Allergen Reactions    Latex Itching    Nsaids GI Intolerance     gastritis       Social History:     Marital Status: /Civil Union    Substance Use History:   History   Alcohol Use    Yes     Comment: 7 per week     History   Smoking Status    Never Smoker   Smokeless Tobacco    Never Used     History   Drug Use No       Family History:    non-contributory    Physical Exam:     Vitals:   Blood Pressure: 142/85 (08/13/18 1900)  Pulse: 91 (08/13/18 1900)  Temperature: (!) 97 2 °F (36 2 °C) (08/13/18 1846)  Temp Source: Temporal (08/13/18 1846)  Respirations: (!) 44 (08/13/18 1900)  Height: 5' 8" (172 7 cm) (08/13/18 0733)  Weight - Scale: (!) 145 kg (320 lb) (08/13/18 0733)  SpO2: 100 % (08/13/18 1900)    Physical Exam   Constitutional: No distress  HENT:   Head: Normocephalic  Eyes: Pupils are equal, round, and reactive to light  Neck: Normal range of motion  Pulmonary/Chest:   Decreased breath sounds bilaterally due to body habitus   Abdominal:   Obese soft nontender not distended   Musculoskeletal: He exhibits tenderness  A dressing/cast applied over the right ankle , visible  skin graft   Neurological: No cranial nerve deficit  Psychiatric: He has a normal mood and affect  Additional Data:     Lab Results: I have personally reviewed pertinent reports  Results from last 7 days  Lab Units 08/13/18 1915   WBC Thousand/uL 15 80*   HEMOGLOBIN g/dL 12 5*   HEMATOCRIT % 37 0*   PLATELETS Thousands/uL 407       Results from last 7 days  Lab Units 08/13/18 1915   SODIUM mmol/L 133*   POTASSIUM mmol/L 4 9   CHLORIDE mmol/L 99   CO2 mmol/L 24   BUN mg/dL 17   CREATININE mg/dL 1 07   CALCIUM mg/dL 8 7   GLUCOSE RANDOM mg/dL 175*             No results found for: HGBA1C        Imaging: I have personally reviewed pertinent reports        No orders to display       EKG, Pathology, and Other Studies Reviewed on Admission:   · EKG:  Pending    ** Please Note: This note has been constructed using a voice recognition system   **

## 2018-08-14 NOTE — PROGRESS NOTES
Patient received S/p right sided free flap to right heel surgery, awake alert oriented x 3   Right leg elevated  On two pillow per doctor Dylan Sharma pink with capillary refill positive doppler, Lase Doppler reading 16 at present time  Patient c/o right foot pain , Doctor Nam Paredes  made aware new order received  Dilaudid 1 mg IVP every 1 hr, Awaiting orders for PCA , Doctor WON [facility] made aware about the situation

## 2018-08-14 NOTE — ASSESSMENT & PLAN NOTE
Low-cholesterol diet  Continue pravastatin every other day as recommended by patient's family doctor and Zetia

## 2018-08-14 NOTE — PROGRESS NOTES
After flap concerns, dressings changed with NA tech assist   Flap appears pink, cooler than surrounding tissue, good capillary refill and LD 6 8   19 ga  Needle stick yields normal arterial blodd and small amount of venous blood  Bleeding not continuous  All sutures intact  Small amounts of drainage from  distal aspect  Redressed loosely  LD=7 5  Leg repositioned to optimum LD flow  RN and patient made aware of positioning importance

## 2018-08-14 NOTE — ASSESSMENT & PLAN NOTE
Patient underwent right-sided free flap to right heel surgery by Azalea Rangel on 8/13/18-  Previous free flap failure  Continue current pain management  Bowel movement regimen  PT evaluate, DVT prophylaxis as per orthopedic surgeon  Continue current antibiotic until ID evaluation he was supposed to have last dose on 08/14 which is today I would leave the decision to continue or stop after the last dose today up to Infectious Disease  Will repeat CBC and BMP in a m   Blood work was unable to be obtained   PICC line will be placed on lab work will be obtained  Patient afebrile, hemodynamically stable

## 2018-08-14 NOTE — PROGRESS NOTES
Vitals reviewed  Pt spirits satisfactory  PO tolerated  IV in place  No new cardiac or resp changes  Pt states analgesia is  Adequate without any opiate complications  Pt satisfied with care  Patient: Renan Mondragon  Procedure(s) with comments:Anesthesia Post EvaluationVitals reviewed  Pt spirits satisfactory  PO tolerated  IV in place  No new cardiac or resp changes  Pt self ambulates without any issues  Pt states analgesia is  Adequate without any opiate complications  Pt satisfied with care      Physical Exam  NA

## 2018-08-14 NOTE — ANESTHESIA POST-OP FOLLOW-UP NOTE
Patient: Ru Sanchez  Vitals:    08/14/18 1100   BP: 123/84   Pulse: 94   Resp: (!) 26   Temp:    SpO2: 95%       Vitals reviewed  Pt spirits satisfactory  PO tolerated  IV in place  No new cardiac or resp changes  Pt self ambulates without any issues  Pt states analgesia is  Adequate without any opiate complications    Pt satisfied with care

## 2018-08-14 NOTE — ASSESSMENT & PLAN NOTE
Monitor blood pressure  Continue lisinopril  Labetalol IV p r n  for systolic blood pressure above 180

## 2018-08-14 NOTE — CONSULTS
Consultation - Infectious Disease   Edilberto Nix 63 y o  male MRN: [id number]  Unit/Bed#:  Encounter: [id number]      IMPRESSION & RECOMMENDATIONS:   Impression/Recommendations:  1  Right heel wound infection  Post multiple previous I and D's  Most recently status post free flap closure with STSG  As patient completed a postoperative course of oral amoxicillin and Levaquin for 14 days based on prior culture data which revealed Enterococcus, Pseudomonas, stenotrophomonas  Patient is now most recently status post repeat free flap coverage with STSG  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  Operative culture was sent from tissue which is now growing gram-negative madonna and Staph aureus  Fortunately, patient is without signs of sepsis  He is afebrile, hemodynamically stable     -discontinue amoxicillin and Levaquin  -start cefepime 2 g IV q 12 hours  -follow up OR culture data  May need to tailor antibiotics based on final OR culture data  -serial exams of wound per Plastic surgery    2  Chronic right heel open wound  Status post free flap closure with STSG x2     3   Achilles tendon repair, status post repair with above complication  4   Morbid obesity  May have contributed to original injury and poor wound healing  5   Leukocytosis  Likely postoperative elevation  No associated fevers  Remains systemically well, nontoxic  Check CBC in a m  Antibiotics:  Amoxicillin/Levaquin     Discussed above with vascular surgery team, and with patient  Thank you for this consultation  We will follow along with you  HISTORY OF PRESENT ILLNESS:  Reason for Consult:  Right heel wound infectious    HPI: Michael Krabbe is a 63 y o  male with morbid obesity, right acute least tendon repair in mid April of this year  This was a closed injury  Postoperatively, wound was nonhealing with ultimate exposure of tendon  Patient has undergone multiple serial debridements    He was recently admitted in July 2018 and underwent flap closure with STSG by Dr Saravanan Morejon  Operative culture from recent debridement had grown Enterococcus, Pseudomonas, stenotrophomonas  Patient was given a 14 day postoperative course of oral amoxicillin and Levaquin based on prior culture data with completion date on 08/13  Patient without fevers  Patient complaining right lower extremity pain  He did say that over the past week, there has been worsening swelling of the right lower extremity than normal     REVIEW OF SYSTEMS:  A complete system-based review of systems is otherwise negative  PAST MEDICAL HISTORY:  Past Medical History:   Diagnosis Date    Asthma     CPAP (continuous positive airway pressure) dependence     Diverticulosis     GERD (gastroesophageal reflux disease)     Hyperlipidemia     Morbid [address] [address]     Sleep apnea     Ventricular tachycardia (HCC)     1 episode     Past Surgical History:   Procedure Laterality Date    CARPAL TUNNEL RELEASE      COLONOSCOPY  2007    FREE FLAP GRAFT Right 8/13/2018    Procedure: TRANSFER RIGHT THIGH FREE FLAP TO RIGHT HEEL;  Surgeon: Florence Esteves MD;  Location: [address] [place] [address];  Service: Plastics    HERNIA REPAIR      abdominal x 2    POLYPECTOMY      hyperplastic    WY COLONOSCOPY FLX DX W/COLLJ SPEC WHEN PFRMD N/A 11/27/2017    Procedure: COLONOSCOPY;  Surgeon: Savannah Messina MD;  Location: [facility];   Service: Gastroenterology    WY DEBRIDEMENT, SKIN, SUB-Q TISSUE,=<20 SQ CM Right 7/18/2018    Procedure: DEBRIDEMENT ANKLE WOUND;  Surgeon: Florence Esteves MD;  Location: [address] [place] [address];  Service: Plastics    WY DEBRIDEMENT, SKIN, SUB-Q TISSUE,=<20 SQ CM Right 7/23/2018    Procedure: DEBRIDEMENT RIGHT ANKLE WOUND;  Surgeon: Florence Esteves MD;  Location: [address] [place] [address];  Service: Plastics    [address] ANAST Right 7/30/2018    Procedure: COVERAGE HEEL WITH GRACILIS MUSCLE FLAP/SKINGRAFT ;  Surgeon: Florence Esteves MD;  Location: [facility];  Service: Plastics    WY REPAIR ACHILLES TENDON,SECONDARY Right 2018    Procedure: REPAIR TENDON ACHILLES  FHL TRANSFER;  Surgeon: Ebony Maldonado DPM;  [facility];  Service: Podiatry    TRIGGER FINGER RELEASE      thumb       FAMILY HISTORY:  Non-contributory    SOCIAL HISTORY:  History   Alcohol Use    Yes     Comment: 7 per week     History   Drug Use No     History   Smoking Status    Never Smoker   Smokeless Tobacco    Never Used       ALLERGIES:  Allergies   Allergen Reactions    Latex Itching    Nsaids GI Intolerance     gastritis       MEDICATIONS:  All current active medications have been reviewed  PHYSICAL EXAM:  Vitals:  HR:  [] 93  Resp:  [14-44] 21  BP: (114-157)/() 129/78  SpO2:  [91 %-100 %] 93 %  Temp (24hrs), Av 3 °F (36 8 °C), Min:97 2 °F (36 2 °C), Max:99 3 °F (37 4 °C)  Current: Temperature: 99 3 °F (37 4 °C)     Physical Exam:  General:  No acute distress  Eyes:  Conjunctive clear with no hemorrhages or effusions  Oropharynx:  No ulcers, no lesions  Neck:  Supple, no lymphadenopathy  Lungs:  Clear to auscultation bilaterally, no accessory muscle use  Cardiac:  Regular rate and rhythm, no murmurs  Abdomen:  Soft, non-tender, non-distended  Extremities:  No peripheral cyanosis, clubbing, or edema  Skin:  No rashes, no ulcers  Neurological:  Moves all four extremities spontaneously, sensation grossly intact  Right lower extremity dressing  A PICC line in place    LABS, IMAGING, & OTHER STUDIES:  Lab Results:  I have personally reviewed pertinent labs      Results from last 7 days  Lab Units 18  1915   SODIUM mmol/L 133*   POTASSIUM mmol/L 4 9   CHLORIDE mmol/L 99   CO2 mmol/L 24   ANION GAP mmol/L 10   BUN mg/dL 17   CREATININE mg/dL 1 07   EGFR ml/min/1 73sq m 77   GLUCOSE RANDOM mg/dL 175*   CALCIUM mg/dL 8 7       Results from last 7 days  Lab Units 18  1915   WBC Thousand/uL 15 80*   HEMOGLOBIN g/dL 12 5*   PLATELETS Thousands/uL 407       Results from last 7 days  Lab Units 08/13/18  0850   GRAM STAIN RESULT  No polys seen  4+ Gram positive cocci in pairs  4+ Gram Positive Rods Resembling Diphtheroids         Imaging Studies:   I have personally reviewed pertinent imaging study reports and images in PACS  EKG, Pathology, and Other Studies:   I have personally reviewed pertinent reports  Ordered report reviewed

## 2018-08-14 NOTE — SOCIAL WORK
Pt s/p re-do flap of (R) heel due to prior flap failure-completing oral Abx from prior d/c  Currently has LD in place with abnormal readings- to return to OR this day  Seen by ID with OR cx's showing GNR and Staph aureus with pt placed Cefepime Q12H- type/duration pending final cx results    Will continue to follow for d/c needs throughout hospitalization,

## 2018-08-14 NOTE — CONSULTS
Consult- Maggysoniabasilio WhiteNisha 1960, 63 y o  male MRN: [id number]    Unit/Bed#:  Encounter: [id number]    Primary Care Provider: Christiano Washington MD   Date and time admitted to hospital: 8/13/2018  5:55 AM      Consults    Morbid obesity with BMI of 45 0-49 9, adult ([address] )   Assessment & Plan    Low-calorie diet,  PT evaluation for early mobilization if recommended by orthopedic surgeon        KIRIT on CPAP   Assessment & Plan    Continue CPAP        GERD (gastroesophageal reflux disease)   Assessment & Plan    Continue  Pepcid        Essential hypertension   Assessment & Plan    Monitor blood pressure  Continue lisinopril  Labetalol IV p r n  for systolic blood pressure above 180        HLD (hyperlipidemia)   Assessment & Plan    Low-cholesterol diet  Continue pravastatin every other day as recommended by patient's family doctor and Zetia- every other day  As he was having muscle spasm secondary to being on statin daily and his muscle  Spasms have resolved since going this way        * Ankle wound, right, sequela   Assessment & Plan    Patient underwent right-sided free flap to right heel surgery by Cleve Whaley on 8/13/18-  Previous free flap failure  Continue current pain management  Bowel movement regimen  PT evaluate, DVT prophylaxis as per orthopedic surgeon  Continue current antibiotic until ID evaluation he was supposed to have last dose on 08/14 which is today I would leave the decision to continue or stop after the last dose today up to Infectious Disease  Will repeat CBC and BMP in a m  Blood work was unable to be obtained   PICC line will be placed on lab work will be obtained  Patient afebrile, hemodynamically stable                       VTE Pharmacologic Prophylaxis:   Pharmacologic: Heparin  Mechanical VTE Prophylaxis in Place: Yes    Patient Centered Rounds: I have performed bedside rounds with nursing staff today      Discussions with Specialists or Other Care Team Provider: yes    Education and Discussions with Family / Patient: patient    Time Spent for Care: 45 minutes  More than 50% of total time spent on counseling and coordination of care as described above  Current Length of Stay: 1 day(s)    Current Patient Status: Inpatient   Certification Statement: The patient will continue to require additional inpatient hospital stay due to Flap management    Discharge Plan:  When medically cleared    Code Status: No Order      Subjective:    patient is seen and examined having his pulses evaluated on the flap leg pain is controlled  Otherwise denies any chest pain or shortness of breath no muscle spasm no abdominal pain  Objective:     Vitals:   Temp (24hrs), Av 2 °F (36 2 °C), Min:97 2 °F (36 2 °C), Max:97 2 °F (36 2 °C)    HR:  [] 96  Resp:  [14-44] 16  BP: (122-157)/() 143/84  SpO2:  [91 %-100 %] 98 %  Body mass index is 48 66 kg/m²  Input and Output Summary (last 24 hours): Intake/Output Summary (Last 24 hours) at 18 1019  Last data filed at 18 9231   Gross per 24 hour   Intake             2853 ml   Output             2595 ml   Net              258 ml       Physical Exam:     Physical Exam   Constitutional: He is oriented to person, place, and time  He appears well-developed  Morbid obesity   HENT:   Head: Normocephalic and atraumatic  Eyes: EOM are normal  Pupils are equal, round, and reactive to light  Neck: Normal range of motion  Neck supple  Cardiovascular: Normal heart sounds  Tachycardia present  Pulmonary/Chest: Effort normal and breath sounds normal    Abdominal: Soft  Bowel sounds are normal    Obese   Musculoskeletal: Normal range of motion  He exhibits edema (Right lower extremity with dressing postop)  Neurological: He is alert and oriented to person, place, and time  He has normal reflexes  Skin: Skin is warm  Psychiatric: He has a normal mood and affect           Additional Data:     Labs:      Results from last 7 days  Lab Units 08/13/18 1915   WBC Thousand/uL 15 80*   HEMOGLOBIN g/dL 12 5*   HEMATOCRIT % 37 0*   PLATELETS Thousands/uL 407       Results from last 7 days  Lab Units 08/13/18  1915   SODIUM mmol/L 133*   POTASSIUM mmol/L 4 9   CHLORIDE mmol/L 99   CO2 mmol/L 24   BUN mg/dL 17   CREATININE mg/dL 1 07   CALCIUM mg/dL 8 7   GLUCOSE RANDOM mg/dL 175*                     * I Have Reviewed All Lab Data Listed Above  * Additional Pertinent Lab Tests Reviewed:  [address] Admission Reviewed    Imaging:    Imaging Reports Reviewed Today Include: none ordered  Imaging Personally Reviewed by Myself Includes:  none    Recent Cultures (last 7 days):       Results from last 7 days  Lab Units 08/13/18  0850   GRAM STAIN RESULT  No polys seen  4+ Gram positive cocci in pairs  4+ Gram Positive Rods Resembling Diphtheroids       Last 24 Hours Medication List:     Current Facility-Administered Medications:  amoxicillin 500 mg Oral Q8H [address] Lesia Trujillo MD    aspirin 325 mg Oral BID Lesia Trujillo MD    docusate sodium 100 mg Oral BID Magan Acosta MD    ezetimibe 10 mg Oral Daily Lesia Trujillo MD    famotidine 20 mg Oral Daily Lesia Trujillo MD    fentaNYL  Intravenous Continuous Lesia Trujillo MD    heparin (porcine) 5,000 Units Subcutaneous Q12H [address] Lesia Trujillo MD    HYDROmorphone 1 mg Intravenous Q1H PRN Torri Martins MD    HYDROmorphone 1 mg Intravenous Q3H PRN Magan Acosta MD    labetalol 10 mg Intravenous Q4H PRN Magan Acosta MD    lactated ringers 75 mL/hr Intravenous Continuous Marlon Licea DO Last Rate: Stopped (08/13/18 2114)   levofloxacin 750 mg Oral Q24H Lesia Trujillo MD    lisinopril 10 mg Oral Daily Lesia Trujillo MD    morphine injection 2 mg Intravenous Q3H PRN Lesia Trujillo MD    ondansetron 4 mg Intravenous Q6H PRN Lesia Trujillo MD    oxyCODONE-acetaminophen 1 tablet Oral Q4H PRN Lesia Trujillo MD    pravastatin 80 mg Oral Every Other Day Camille Gu MD         Today, Patient Was Seen By: Duong Caballero MD    ** Please Note: Dictation voice to text software may have been used in the creation of this document   **

## 2018-08-14 NOTE — PLAN OF CARE
CARDIOVASCULAR - ADULT     Maintains optimal cardiac output and hemodynamic stability Progressing     Absence of cardiac dysrhythmias or at baseline rhythm Progressing          MUSCULOSKELETAL - ADULT     Maintain or return mobility to safest level of function Progressing     Maintain proper alignment of affected body part Progressing          Potential for Falls     Patient will remain free of falls Progressing          SKIN/TISSUE INTEGRITY - ADULT     Skin integrity remains intact Progressing     Incision(s), wounds(s) or drain site(s) healing without S/S of infection Progressing     Oral mucous membranes remain intact Progressing

## 2018-08-14 NOTE — SEDATION DOCUMENTATION
Procedure was discussed with the patient  Patient verbalized understanding of the process  Consent was reviewed

## 2018-08-14 NOTE — PROGRESS NOTES
Fentanyl PCA 10 mcg/ml started at 22:41 pm, pain well controled , lloyd doctor Doug Buck , patient can have Dilaudid 1 mg IVP for breakthrough pain  Flap site small amount blood noted , good capillary refill, adequate  Doppler, LASER Doppler(LD)  14's   Wife at bed side at present time , will continue to monitor

## 2018-08-14 NOTE — ASSESSMENT & PLAN NOTE
Low-cholesterol diet  Continue pravastatin every other day as recommended by patient's family doctor and Zetia- every other day  As he was having muscle spasm secondary to being on statin daily and his muscle  Spasms have resolved since going this way

## 2018-08-14 NOTE — NURSING NOTE
Flap leached by maryjane MATHEW   And is now with active bloody oozing and improved LD #s at present

## 2018-08-14 NOTE — PLAN OF CARE
CARDIOVASCULAR - ADULT     Maintains optimal cardiac output and hemodynamic stability Progressing     Absence of cardiac dysrhythmias or at baseline rhythm Progressing          MUSCULOSKELETAL - ADULT     Maintain or return mobility to safest level of function Progressing     Maintain proper alignment of affected body part Progressing          CARDIOVASCULAR - ADULT     Maintains optimal cardiac output and hemodynamic stability Progressing     Absence of cardiac dysrhythmias or at baseline rhythm Progressing        MUSCULOSKELETAL - ADULT     Maintain or return mobility to safest level of function Progressing     Maintain proper alignment of affected body part Progressing        Potential for Falls     Patient will remain free of falls Progressing        SKIN/TISSUE INTEGRITY - ADULT     Skin integrity remains intact Progressing     Incision(s), wounds(s) or drain site(s) healing without S/S of infection Progressing     Oral mucous membranes remain intact Progressing

## 2018-08-14 NOTE — NURSING NOTE
In addition, flap appears more cyanotic with now sluggish capillary refill   Same relayed to Dr Scott Sung

## 2018-08-15 PROBLEM — K59.00 CONSTIPATION: Status: ACTIVE | Noted: 2018-08-15

## 2018-08-15 LAB
ANION GAP SERPL CALCULATED.3IONS-SCNC: 3 MMOL/L (ref 5–14)
BUN SERPL-MCNC: 13 MG/DL (ref 5–25)
CALCIUM SERPL-MCNC: 8.7 MG/DL (ref 8.4–10.2)
CHLORIDE SERPL-SCNC: 101 MMOL/L (ref 97–108)
CO2 SERPL-SCNC: 29 MMOL/L (ref 22–30)
CREAT SERPL-MCNC: 0.79 MG/DL (ref 0.7–1.5)
ERYTHROCYTE [DISTWIDTH] IN BLOOD BY AUTOMATED COUNT: 15.7 %
GFR SERPL CREATININE-BSD FRML MDRD: 100 ML/MIN/1.73SQ M
GLUCOSE SERPL-MCNC: 122 MG/DL (ref 70–99)
HCT VFR BLD AUTO: 33.8 % (ref 41–53)
HGB BLD-MCNC: 11.4 G/DL (ref 13.5–17.5)
LYMPHOCYTES # BLD AUTO: 1.09 THOUSAND/UL (ref 0.5–4)
LYMPHOCYTES # BLD AUTO: 7 % (ref 20–50)
MAGNESIUM SERPL-MCNC: 2 MG/DL (ref 1.6–2.3)
MCH RBC QN AUTO: 30.3 PG (ref 26–34)
MCHC RBC AUTO-ENTMCNC: 33.6 G/DL (ref 31–36)
MCV RBC AUTO: 90 FL (ref 80–100)
MONOCYTES # BLD AUTO: 1.09 THOUSAND/UL (ref 0.2–0.9)
MONOCYTES NFR BLD AUTO: 7 % (ref 1–10)
NEUTS BAND NFR BLD MANUAL: 3 % (ref 0–8)
NEUTS SEG # BLD: 13.42 THOUSAND/UL (ref 1.8–7.8)
NEUTS SEG NFR BLD AUTO: 83 %
PLATELET # BLD AUTO: 329 THOUSANDS/UL (ref 150–450)
PLATELET BLD QL SMEAR: ADEQUATE
PMV BLD AUTO: 8.3 FL (ref 8.9–12.7)
POTASSIUM SERPL-SCNC: 4.3 MMOL/L (ref 3.6–5)
RBC # BLD AUTO: 3.74 MILLION/UL (ref 4.5–5.9)
RBC MORPH BLD: NORMAL
SODIUM SERPL-SCNC: 133 MMOL/L (ref 137–147)
TOTAL CELLS COUNTED SPEC: 100
TOXIC GRANULES BLD QL SMEAR: PRESENT
WBC # BLD AUTO: 15.6 THOUSAND/UL (ref 4.5–11)

## 2018-08-15 PROCEDURE — 85007 BL SMEAR W/DIFF WBC COUNT: CPT | Performed by: FAMILY MEDICINE

## 2018-08-15 PROCEDURE — 99232 SBSQ HOSP IP/OBS MODERATE 35: CPT | Performed by: INTERNAL MEDICINE

## 2018-08-15 PROCEDURE — 83735 ASSAY OF MAGNESIUM: CPT | Performed by: FAMILY MEDICINE

## 2018-08-15 PROCEDURE — 80048 BASIC METABOLIC PNL TOTAL CA: CPT | Performed by: FAMILY MEDICINE

## 2018-08-15 PROCEDURE — 85027 COMPLETE CBC AUTOMATED: CPT | Performed by: FAMILY MEDICINE

## 2018-08-15 PROCEDURE — 99232 SBSQ HOSP IP/OBS MODERATE 35: CPT | Performed by: FAMILY MEDICINE

## 2018-08-15 RX ORDER — LISINOPRIL 10 MG/1
10 TABLET ORAL 2 TIMES DAILY
Status: DISCONTINUED | OUTPATIENT
Start: 2018-08-15 | End: 2018-08-20

## 2018-08-15 RX ADMIN — HYDROMORPHONE HYDROCHLORIDE 1 MG: 1 INJECTION, SOLUTION INTRAMUSCULAR; INTRAVENOUS; SUBCUTANEOUS at 20:00

## 2018-08-15 RX ADMIN — HEPARIN SODIUM 15000 UNITS: 5000 INJECTION, SOLUTION INTRAVENOUS; SUBCUTANEOUS at 11:00

## 2018-08-15 RX ADMIN — HEPARIN SODIUM 15000 UNITS: 5000 INJECTION, SOLUTION INTRAVENOUS; SUBCUTANEOUS at 12:00

## 2018-08-15 RX ADMIN — LISINOPRIL 10 MG: 10 TABLET ORAL at 08:25

## 2018-08-15 RX ADMIN — DOCUSATE SODIUM 100 MG: 100 CAPSULE, LIQUID FILLED ORAL at 08:25

## 2018-08-15 RX ADMIN — LISINOPRIL 10 MG: 10 TABLET ORAL at 18:09

## 2018-08-15 RX ADMIN — HEPARIN SODIUM 15000 UNITS: 5000 INJECTION, SOLUTION INTRAVENOUS; SUBCUTANEOUS at 06:36

## 2018-08-15 RX ADMIN — HEPARIN SODIUM 15000 UNITS: 5000 INJECTION, SOLUTION INTRAVENOUS; SUBCUTANEOUS at 22:01

## 2018-08-15 RX ADMIN — HEPARIN SODIUM 15000 UNITS: 5000 INJECTION, SOLUTION INTRAVENOUS; SUBCUTANEOUS at 08:00

## 2018-08-15 RX ADMIN — FAMOTIDINE 20 MG: 20 TABLET ORAL at 08:25

## 2018-08-15 RX ADMIN — Medication 2000 MG: at 03:40

## 2018-08-15 RX ADMIN — HEPARIN SODIUM 15000 UNITS: 5000 INJECTION, SOLUTION INTRAVENOUS; SUBCUTANEOUS at 21:02

## 2018-08-15 RX ADMIN — HEPARIN SODIUM 15000 UNITS: 5000 INJECTION, SOLUTION INTRAVENOUS; SUBCUTANEOUS at 17:03

## 2018-08-15 RX ADMIN — HEPARIN SODIUM 15000 UNITS: 5000 INJECTION, SOLUTION INTRAVENOUS; SUBCUTANEOUS at 09:05

## 2018-08-15 RX ADMIN — Medication 2000 MG: at 16:01

## 2018-08-15 RX ADMIN — HEPARIN SODIUM 15000 UNITS: 5000 INJECTION, SOLUTION INTRAVENOUS; SUBCUTANEOUS at 13:10

## 2018-08-15 RX ADMIN — HEPARIN SODIUM 15000 UNITS: 5000 INJECTION, SOLUTION INTRAVENOUS; SUBCUTANEOUS at 01:26

## 2018-08-15 RX ADMIN — HEPARIN SODIUM 15000 UNITS: 5000 INJECTION, SOLUTION INTRAVENOUS; SUBCUTANEOUS at 14:02

## 2018-08-15 RX ADMIN — EZETIMIBE 10 MG: 10 TABLET ORAL at 08:25

## 2018-08-15 RX ADMIN — HEPARIN SODIUM 15000 UNITS: 5000 INJECTION, SOLUTION INTRAVENOUS; SUBCUTANEOUS at 16:01

## 2018-08-15 RX ADMIN — HEPARIN SODIUM 15000 UNITS: 5000 INJECTION, SOLUTION INTRAVENOUS; SUBCUTANEOUS at 03:40

## 2018-08-15 RX ADMIN — DOCUSATE SODIUM 100 MG: 100 CAPSULE, LIQUID FILLED ORAL at 18:09

## 2018-08-15 RX ADMIN — HEPARIN SODIUM 15000 UNITS: 5000 INJECTION, SOLUTION INTRAVENOUS; SUBCUTANEOUS at 15:01

## 2018-08-15 RX ADMIN — Medication 1 SPRAY: at 06:54

## 2018-08-15 RX ADMIN — HEPARIN SODIUM 15000 UNITS: 5000 INJECTION, SOLUTION INTRAVENOUS; SUBCUTANEOUS at 10:02

## 2018-08-15 RX ADMIN — HEPARIN SODIUM 15000 UNITS: 5000 INJECTION, SOLUTION INTRAVENOUS; SUBCUTANEOUS at 23:01

## 2018-08-15 RX ADMIN — ASPIRIN 325 MG ORAL TABLET 325 MG: 325 PILL ORAL at 18:09

## 2018-08-15 RX ADMIN — SODIUM CHLORIDE, POTASSIUM CHLORIDE, SODIUM LACTATE AND CALCIUM CHLORIDE 75 ML/HR: 600; 310; 30; 20 INJECTION, SOLUTION INTRAVENOUS at 15:10

## 2018-08-15 RX ADMIN — HEPARIN SODIUM 15000 UNITS: 5000 INJECTION, SOLUTION INTRAVENOUS; SUBCUTANEOUS at 04:30

## 2018-08-15 RX ADMIN — HEPARIN SODIUM 15000 UNITS: 5000 INJECTION, SOLUTION INTRAVENOUS; SUBCUTANEOUS at 20:01

## 2018-08-15 RX ADMIN — HEPARIN SODIUM 5000 UNITS: 5000 INJECTION, SOLUTION INTRAVENOUS; SUBCUTANEOUS at 08:26

## 2018-08-15 RX ADMIN — HEPARIN SODIUM 5000 UNITS: 5000 INJECTION, SOLUTION INTRAVENOUS; SUBCUTANEOUS at 21:08

## 2018-08-15 RX ADMIN — ASPIRIN 325 MG ORAL TABLET 325 MG: 325 PILL ORAL at 08:25

## 2018-08-15 RX ADMIN — HEPARIN SODIUM 15000 UNITS: 5000 INJECTION, SOLUTION INTRAVENOUS; SUBCUTANEOUS at 02:30

## 2018-08-15 RX ADMIN — HEPARIN SODIUM 15000 UNITS: 5000 INJECTION, SOLUTION INTRAVENOUS; SUBCUTANEOUS at 18:58

## 2018-08-15 RX ADMIN — SODIUM CHLORIDE, POTASSIUM CHLORIDE, SODIUM LACTATE AND CALCIUM CHLORIDE 75 ML/HR: 600; 310; 30; 20 INJECTION, SOLUTION INTRAVENOUS at 01:18

## 2018-08-15 RX ADMIN — MAGNESIUM HYDROXIDE 30 ML: 400 SUSPENSION ORAL at 08:27

## 2018-08-15 RX ADMIN — VANCOMYCIN HYDROCHLORIDE 1750 MG: 1 INJECTION, POWDER, LYOPHILIZED, FOR SOLUTION INTRAVENOUS at 12:11

## 2018-08-15 RX ADMIN — HEPARIN SODIUM 15000 UNITS: 5000 INJECTION, SOLUTION INTRAVENOUS; SUBCUTANEOUS at 05:38

## 2018-08-15 RX ADMIN — HEPARIN SODIUM 15000 UNITS: 5000 INJECTION, SOLUTION INTRAVENOUS; SUBCUTANEOUS at 18:00

## 2018-08-15 RX ADMIN — VANCOMYCIN HYDROCHLORIDE 1750 MG: 1 INJECTION, POWDER, LYOPHILIZED, FOR SOLUTION INTRAVENOUS at 23:02

## 2018-08-15 RX ADMIN — HEPARIN SODIUM 15000 UNITS: 5000 INJECTION, SOLUTION INTRAVENOUS; SUBCUTANEOUS at 00:32

## 2018-08-15 NOTE — OP NOTE
OPERATIVE REPORT  PATIENT NAME: Juno Hahn    :  1960  MRN: [id number]  Pt Location:  OR ROOM 12    SURGERY DATE: 2018    Surgeon(s) and Role:     * Flaco Duarte MD - Primary     * Deyanira Tipton - Assisting    Preop and postoperative Diagnosis:  Venous congestion free flap right leg    Operative Procedure(s) (LRB):  ASPIRATION FAILING FREE FLAP RIGHT LEG (Right)  Exploration right leg free flap micro anastomoses    Specimen(s):  ID Type Source Tests Collected by Time Destination   1 : thrombus, right lower extremity Tissue Leg, Right TISSUE EXAM Flaco Duarte MD 2018 1801      Operative history:  Patient yesterday had a free flap to his cover his right Achilles tendon  Routine monitoring showed venous congestion due to a purple flap  Exploration revealed the total thrombosis of the venous side of the flap  This was irreversible  The artery had a clot in it that was removed with good flow reinstituted into the flap  We will do medicinal leaching to try the save the flap  Operative procedure: The patient was taken to the operating room placed supine on the operating table  He was prepped and draped in the usual fashion  Anesthesia was general by endotracheal tube  The suture lines were taken down showing good venous clot as predicted  Attempts to irrigate the veins and removed the clot were unsuccessful  The artery was followed from its anastomosis to the posterior tibial artery to the distal end of the source artery  A clot was milked out  Excellent perfusion occurred requiring control with hemoclips  The flap bled on pinprick  Heparin soaks were applied  Suture lines were reclosed with 3-0 nylon interrupted simple and vertical mattress sutures  A glove finger served as a drain  He tolerated the procedure well was taken back to the intensive care unit for recovery after application of a bulky dressings      Estimated Blood Loss:   100 mL    Drains:  Open Drain Right Leg (Active)   Number of days: 1       Urethral Catheter Non-latex 16 Fr   (Active)   Site Assessment Clean;Skin intact 8/13/2018  8:46 AM   Collection Container Standard drainage bag 8/13/2018  8:46 AM   Securement Method Securing device (Describe) 8/13/2018  8:46 AM   Output (mL) 925 mL 8/14/2018  6:00 AM   Number of days: 1         SIGNATURE: Lesia Trujillo MD  DATE: August 14, 2018  TIME: 8:09 PM

## 2018-08-15 NOTE — ASSESSMENT & PLAN NOTE
· Can be a combination secondary to hypovolemic 0 per up postoperative pain the patient has been NPO with 2 surgeries 1 day apart  He is on lactated Ringer's  Will re-evaluate tomorrow post pain control he has an PCA pump and also if he starts taking p o  Ann Schasuzette

## 2018-08-15 NOTE — H&P
Vitals satisfactory  Spirits good  Effective post op analgesia without any complications    No N/V  PO tolerated  Self ambulates without any issues  Pt satisfied with care  IV site OK

## 2018-08-15 NOTE — NURSING NOTE
No change in general assessment  VS and LDs as recorded  No change in appearance of flap   Muñoz DCd per patient request

## 2018-08-15 NOTE — NURSING NOTE
Pt found to have large amount of sanguineous drainage with large clots to right lower leg flap site  Unable to determine origin of drainage  No longer active  PICC dressing changed per protocol

## 2018-08-15 NOTE — SOCIAL WORK
Pt POD#1 debridement of necrotic flap with medical leeching being performed w/LD in place- cx's (+) for MRSA and GNR on D1 Vanco Q12H and D2 Cefepime Q12H with final cx's pending- Type/ duration of Abx TBD  Pt remains on bedrest and to have no pressure to (R) foot    Will continue to follow throughout hospitalization for d/c needs (pending IV Abx and therapy needs when ordered)

## 2018-08-15 NOTE — ASSESSMENT & PLAN NOTE
Uncontrolled but it secondary to the pain formed mechanical leaching as well but I will increase him to lisinopril 10 mg b i d

## 2018-08-15 NOTE — PROGRESS NOTES
Progress Note - Infectious Disease   Sam Gallagher 63 y o  male MRN: [id number]  Unit/Bed#:  Encounter: [id number]      Impression/Recommendations:  1  Right heel wound infection  Post multiple previous I and D's  Most recently status post free flap closure with STSG  As patient completed a postoperative course of oral amoxicillin and Levaquin for 14 days based on prior culture data which revealed Enterococcus, Pseudomonas, stenotrophomonas  Patient is now most recently status post repeat free flap coverage with STSG  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  Operative culture was sent from tissue which is now growing gram-negative madonna and MRSA  Fortunately, patient is without signs of sepsis  He is afebrile, hemodynamically stable      -start IV vancomycin  Check trough prior to 4th dose   -continue cefepime 2 g IV q 12 hours for now  -follow up OR culture data  May need to tailor antibiotics based on final OR culture data  -serial exams of wound per Plastic surgery     2  Chronic right heel open wound  Status post free flap closure with STSG x2      3  Achilles tendon repair, status post repair with above complication      4  Morbid obesity  May have contributed to original injury and poor wound healing      5   Leukocytosis  Likely postoperative elevation  No associated fevers  Remains systemically well, nontoxic  Check CBC in a m      Antibiotics:  Cefepime D2      Subjective:  Patient went back to OR yesterday and a total thrombosis of the venous side flap was noted  Clot was removed  Denies fevers, chills, or sweats  Denies nausea, vomiting, or diarrhea  Did complain of leg pain overnight      Objective:  Vitals:  HR:  [67-98] 82  Resp:  [6-37] 22  BP: (120-169)/(62-90) 153/86  SpO2:  [90 %-100 %] 95 %  Temp (24hrs), Av 8 °F (36 6 °C), Min:96 9 °F (36 1 °C), Max:99 °F (37 2 °C)  Current: Temperature: (!) 97 2 °F (36 2 °C)    Physical Exam:   General: No acute distress  Psychiatric:  Awake and alert  Pulmonary:  Normal respiratory excursion without accessory muscle use  Abdomen:  Soft, nontender  Extremities:  No edema  Right lower extremity dressing intact  Skin:  No rashes    Lab Results:  I have personally reviewed pertinent labs  Results from last 7 days  Lab Units 08/15/18  0454 08/13/18  1915   SODIUM mmol/L 133* 133*   POTASSIUM mmol/L 4 3 4 9   CHLORIDE mmol/L 101 99   CO2 mmol/L 29 24   ANION GAP mmol/L 3* 10   BUN mg/dL 13 17   CREATININE mg/dL 0 79 1 07   EGFR ml/min/1 73sq m 100 77   GLUCOSE RANDOM mg/dL 122* 175*   CALCIUM mg/dL 8 7 8 7       Results from last 7 days  Lab Units 08/15/18  0453 08/13/18  1915   WBC Thousand/uL 15 60* 15 80*   HEMOGLOBIN g/dL 11 4* 12 5*   PLATELETS Thousands/uL 329 407       Results from last 7 days  Lab Units 08/13/18  0850   GRAM STAIN RESULT  No polys seen  4+ Gram positive cocci in pairs  4+ Gram Positive Rods Resembling Diphtheroids       Imaging Studies:   I have personally reviewed pertinent imaging study reports and images in PACS  EKG, Pathology, and Other Studies:   I have personally reviewed pertinent reports  Operative report reviewed

## 2018-08-15 NOTE — NURSING NOTE
Pt has been sleeping most of the morning, wakes intermittently  Dr Pena Slim in to see  Visible flap remains mostly lightly cyanotic with interspersed areas of pink  LD #s as recorded

## 2018-08-15 NOTE — ASSESSMENT & PLAN NOTE
Patient underwent right-sided free flap to right heel surgery by Rinku Bocanegra on 8/13/18-  Previous free flap failure-and a flap failure with venous congestion and arterial clot was that he had to be taken to the OR again for the clock to be evacuated now he is on mechanical leaching by Plastic surgery  Continue current pain management  Bowel movement regimen  PT evaluate, DVT prophylaxis as per orthopedic surgeon   Evaluate by ID tissue culture of Staph aureus g negative rods antibiotics changed to cefepime 2 g IV q 12 duration final antibiotics per ID d  PICC line obtained  Labs reviewed  Leukocytosis improved from 08/13 secondary to postop reactive

## 2018-08-15 NOTE — ANESTHESIA PREPROCEDURE EVALUATION
Review of Systems/Medical History  Patient summary reviewed  Chart reviewed      Cardiovascular  Hyperlipidemia, Hypertension ,    Pulmonary  Asthma , Sleep apnea ,        GI/Hepatic    GERD ,             Endo/Other    Obesity  super morbid obesity   GYN       Hematology   Musculoskeletal       Neurology   Psychology   Negative psychology ROS              Physical Exam    Airway      TM Distance: >3 FB  Neck ROM: limited     Dental       Cardiovascular      Pulmonary      Other Findings        Anesthesia Plan  ASA Score- 3 Emergent    Anesthesia Type- general with ASA Monitors  Additional Monitors:   Airway Plan: ETT  Plan Factors-  Patient did not smoke on day of surgery  Induction- intravenous and rapid sequence induction  Postoperative Plan-     Informed Consent- Anesthetic plan and risks discussed with patient

## 2018-08-15 NOTE — NURSING NOTE
Pt  As per shift assessment  Sleeping mostly this AM but easy to awaken  Visible flap with cyanotic edges and lightly cyanotic throughout with areas of pink noted  2 areas of darker purplish coloring noted  Essentially no bleeding in response to mechanical leeching however some serous fluid oozing noted

## 2018-08-15 NOTE — NURSING NOTE
Arrived via bed to ICU with anesthesia on O2  Drowsy but oriented and able to follow commands  Denied pain initially but c/o severe pain shortly after arrival   PCA restarted and post op dose of pain control administered with moderate effect  Demonstrates appropriate use of PCA  Moves all extremities  Maintained on bedrest   Laser doppler in place  Heart tones muffled  Pulses palpable to upper ext and LLE  Doppler to right popliteal  Lungs decreased but clear  No shortness of breath  Using CPAP from home intermittently  On continuous pulse ox  Abdomen is obese and soft with normoactive bowel sounds  No BM  Denies nausea  C/o dry mouth  Mouth care provided  Urinary catheter is patent for clear becker yellow urine  Latex free  IV site to left wrist intact  PICC line to left upper arm intact with good blood return to all ports  Dressing to right thigh dry and intact  Right lower leg dressing is moist with active serosanguineous drainage  Flap site is dressed with heparin gauze  Site appears dusky with areas of cyanosis  Leeching per order with scant bleeding  Edges of wound are draining dark and somewhat purulent drainage  Will monitor  Dressing to foot is intact

## 2018-08-15 NOTE — PROGRESS NOTES
Patient is resting comfortably in bed  He continues medical leeching of the flap  Is mottled  Laser Doppler reads 5-6 implying for there is still blood flow into the flap  We will see if we can salvage this or not

## 2018-08-15 NOTE — CONSULTS
Consult- Jaredgrazyna Yury 1960, 63 y o  male MRN: [id number]    Unit/Bed#:  Encounter: [id number]    Primary Care Provider: Beau Ya MD   Date and time admitted to hospital: 8/13/2018  5:55 AM      Consults    Constipation   Assessment & Plan    In addition to senokot and colace will add milk of mag prn - patient requesting as well        Morbid obesity with BMI of 45 0-49 9, adult (HCC)   Assessment & Plan    Low-calorie diet,  PT evaluation for early mobilization if recommended by orthopedic surgeon        Acute hyponatremia   Assessment & Plan    · Can be a combination secondary to hypovolemic 0 per up postoperative pain the patient has been NPO with 2 surgeries 1 day apart  He is on lactated Ringer's  Will re-evaluate tomorrow post pain control he has an PCA pump and also if he starts taking p o  KIRIT on CPAP   Assessment & Plan    Continue CPAP        GERD (gastroesophageal reflux disease)   Assessment & Plan    Continue  Pepcid        Essential hypertension   Assessment & Plan    Uncontrolled but it secondary to the pain formed mechanical leaching as well but I will increase him to lisinopril 10 mg b i d  HLD (hyperlipidemia)   Assessment & Plan    Low-cholesterol diet  Continue pravastatin every other day as recommended by patient's family doctor and Zetia- every other day  As he was having muscle spasm secondary to being on statin daily and his muscle  Spasms have resolved since going this way        * Ankle wound, right, sequela   Assessment & Plan    Patient underwent right-sided free flap to right heel surgery by Rupa Greer on 8/13/18-  Previous free flap failure-and a flap failure with venous congestion and arterial clot was that he had to be taken to the OR again for the clock to be evacuated now he is on mechanical leaching by Plastic surgery    Continue current pain management  Bowel movement regimen  PT evaluate, DVT prophylaxis as per orthopedic surgeon Evaluate by ID tissue culture of Staph aureus g negative rods antibiotics changed to cefepime 2 g IV q 12 duration final antibiotics per ID d  PICC line obtained  Labs reviewed  Leukocytosis improved from  secondary to postop reactive  VTE Pharmacologic Prophylaxis:   Pharmacologic: Heparin  Mechanical VTE Prophylaxis in Place: Yes    Patient Centered Rounds: I have performed bedside rounds with nursing staff today  Discussions with Specialists or Other Care Team Provider: yes    Education and Discussions with Family / Patient: patient    Time Spent for Care: 45 minutes  More than 50% of total time spent on counseling and coordination of care as described above  Current Length of Stay: 2 day(s)    Current Patient Status: Inpatient   Certification Statement: The patient will continue to require additional inpatient hospital stay due to Status post another flap monitoring    Discharge Plan:  When medically cleared    Code Status: No Order      Subjective:   Patient seen and examined he feels beat up also is constipated had had a BM in a while asking for milk of magnesia  Otherwise denies any chest pain or shortness of breath they were doing mechanically leaching night a lot of pain he was using his PCA pump a lot during that time  Objective:     Vitals:   Temp (24hrs), Av 8 °F (36 6 °C), Min:96 9 °F (36 1 °C), Max:99 °F (37 2 °C)    HR:  [] 82  Resp:  [6-37] 22  BP: (120-169)/(62-90) 165/81  SpO2:  [90 %-100 %] 95 %  Body mass index is 48 66 kg/m²  Input and Output Summary (last 24 hours): Intake/Output Summary (Last 24 hours) at 08/15/18 0815  Last data filed at 08/15/18 0700   Gross per 24 hour   Intake             5053 ml   Output             7280 ml   Net            -2227 ml       Physical Exam:     Physical Exam   Constitutional: He is oriented to person, place, and time  He appears well-developed  Morbid obesity   HENT:   Head: Normocephalic and atraumatic  Eyes: EOM are normal  Pupils are equal, round, and reactive to light  Neck: Normal range of motion  Neck supple  Cardiovascular: Normal rate, regular rhythm and normal heart sounds  Pulmonary/Chest: Effort normal and breath sounds normal    Abdominal: Soft  Bowel sounds are normal    Obese   Musculoskeletal: Normal range of motion  He exhibits edema (Right lower extremity and a large thick dressing)  Neurological: He is alert and oriented to person, place, and time  He has normal reflexes  Skin: Skin is warm  Psychiatric: He has a normal mood and affect  Additional Data:     Labs:      Results from last 7 days  Lab Units 08/15/18  0453   WBC Thousand/uL 15 60*   HEMOGLOBIN g/dL 11 4*   HEMATOCRIT % 33 8*   PLATELETS Thousands/uL 329   LYMPHO PCT % 7*   MONO PCT MAN % 7       Results from last 7 days  Lab Units 08/15/18  0454   SODIUM mmol/L 133*   POTASSIUM mmol/L 4 3   CHLORIDE mmol/L 101   CO2 mmol/L 29   BUN mg/dL 13   CREATININE mg/dL 0 79   CALCIUM mg/dL 8 7   GLUCOSE RANDOM mg/dL 122*                     * I Have Reviewed All Lab Data Listed Above  * Additional Pertinent Lab Tests Reviewed:  All Labs Within Last 24 Hours Reviewed    Imaging:    Imaging Reports Reviewed Today Include: none ordered  Imaging Personally Reviewed by Myself Includes:  none    Recent Cultures (last 7 days):       Results from last 7 days  Lab Units 08/13/18  0850   GRAM STAIN RESULT  No polys seen  4+ Gram positive cocci in pairs  4+ Gram Positive Rods Resembling Diphtheroids       Last 24 Hours Medication List:     Current Facility-Administered Medications:  aspirin 325 mg Oral BID Keara Muñoz MD    cefepime 2,000 mg Intravenous Q12H Savanna Aldea, DO Last Rate: 2,000 mg (08/15/18 0340)   dexamethasone 4 mg Intravenous Once PRN Lesvia Ouch, DO    dexamethasone 8 mg Intravenous Once PRN Lesvia Ouch, DO    docusate sodium 100 mg Oral BID Dawson Manley MD    ezetimibe 10 mg Oral Daily Keara Muñoz MD famotidine 20 mg Oral Daily Ruba Bravo MD    fentaNYL 25 mcg Intravenous Q5 Min PRN Meet Livings, DO    fentaNYL  Intravenous Continuous Ruba Bravo MD    fentaNYL  Intravenous Continuous Meet Livings, DO    heparin (porcine) 10,000 Units Subcutaneous Once Ruba Bravo MD    heparin (porcine) 15,000 Units Subcutaneous Q30 Min PRN Ruba Bravo MD    heparin (porcine) 5,000 Units Subcutaneous Q12H [facility] Ruba Bravo MD    HYDROmorphone 0 2 mg Intravenous Q5 Min PRN Meet Livings, DO    HYDROmorphone 1 mg Intravenous Q1H PRN Santino Grant MD    HYDROmorphone 1 mg Intravenous Q3H PRN Mela Herbert MD    labetalol 10 mg Intravenous Q4H PRN Mela Herbert MD    lactated ringers 75 mL/hr Intravenous Continuous Meet Livings, DO Last Rate: 75 mL/hr (08/14/18 1417)   lactated ringers 75 mL/hr Intravenous Continuous Meet Livings, DO Last Rate: 75 mL/hr (08/15/18 0118)   lisinopril 10 mg Oral BID Marcin Gasca MD    magnesium hydroxide 30 mL Oral Daily PRN Marcin Gasca MD    meperidine 12 5 mg Intravenous Q10 Min PRN Meet Livings, DO    morphine injection 2 mg Intravenous Q3H PRN Ruba Bravo MD    ondansetron 4 mg Intravenous Q6H PRN Ruba Bravo MD    oxyCODONE-acetaminophen 1 tablet Oral Q4H PRN Ruba Bravo MD    phenol 1 spray Mouth/Throat Q2H PRN Meet Livings, DO    pravastatin 80 mg Oral Every Other Day Mela Herbert MD    senna 2 tablet Oral BID Odalys Abarca MD         Today, Patient Was Seen By: Marcin Gasca MD    ** Please Note: Dictation voice to text software may have been used in the creation of this document   **

## 2018-08-16 LAB
ANION GAP SERPL CALCULATED.3IONS-SCNC: 4 MMOL/L (ref 5–14)
BACTERIA TISS AEROBE CULT: ABNORMAL
BASOPHILS # BLD AUTO: 0.1 THOUSANDS/ΜL (ref 0–0.1)
BASOPHILS NFR BLD AUTO: 1 % (ref 0–1)
BUN SERPL-MCNC: 13 MG/DL (ref 5–25)
CALCIUM SERPL-MCNC: 8 MG/DL (ref 8.4–10.2)
CHLORIDE SERPL-SCNC: 100 MMOL/L (ref 97–108)
CO2 SERPL-SCNC: 29 MMOL/L (ref 22–30)
CREAT SERPL-MCNC: 0.79 MG/DL (ref 0.7–1.5)
EOSINOPHIL # BLD AUTO: 0.6 THOUSAND/ΜL (ref 0–0.4)
EOSINOPHIL NFR BLD AUTO: 4 % (ref 0–6)
ERYTHROCYTE [DISTWIDTH] IN BLOOD BY AUTOMATED COUNT: 15.2 %
GFR SERPL CREATININE-BSD FRML MDRD: 100 ML/MIN/1.73SQ M
GLUCOSE SERPL-MCNC: 104 MG/DL (ref 70–99)
GRAM STN SPEC: ABNORMAL
HCT VFR BLD AUTO: 29.9 % (ref 41–53)
HGB BLD-MCNC: 10.2 G/DL (ref 13.5–17.5)
LYMPHOCYTES # BLD AUTO: 1.9 THOUSANDS/ΜL (ref 0.5–4)
LYMPHOCYTES NFR BLD AUTO: 14 % (ref 20–50)
MAGNESIUM SERPL-MCNC: 1.9 MG/DL (ref 1.6–2.3)
MCH RBC QN AUTO: 30.8 PG (ref 26–34)
MCHC RBC AUTO-ENTMCNC: 34.3 G/DL (ref 31–36)
MCV RBC AUTO: 90 FL (ref 80–100)
MONOCYTES # BLD AUTO: 1.3 THOUSAND/ΜL (ref 0.2–0.9)
MONOCYTES NFR BLD AUTO: 10 % (ref 1–10)
NEUTROPHILS # BLD AUTO: 10.1 THOUSANDS/ΜL (ref 1.8–7.8)
NEUTS SEG NFR BLD AUTO: 72 % (ref 45–65)
PLATELET # BLD AUTO: 308 THOUSANDS/UL (ref 150–450)
PMV BLD AUTO: 7.8 FL (ref 8.9–12.7)
POTASSIUM SERPL-SCNC: 3.9 MMOL/L (ref 3.6–5)
RBC # BLD AUTO: 3.33 MILLION/UL (ref 4.5–5.9)
SODIUM SERPL-SCNC: 133 MMOL/L (ref 137–147)
WBC # BLD AUTO: 14 THOUSAND/UL (ref 4.5–11)

## 2018-08-16 PROCEDURE — 83735 ASSAY OF MAGNESIUM: CPT | Performed by: FAMILY MEDICINE

## 2018-08-16 PROCEDURE — 99232 SBSQ HOSP IP/OBS MODERATE 35: CPT | Performed by: FAMILY MEDICINE

## 2018-08-16 PROCEDURE — 80048 BASIC METABOLIC PNL TOTAL CA: CPT | Performed by: FAMILY MEDICINE

## 2018-08-16 PROCEDURE — 99233 SBSQ HOSP IP/OBS HIGH 50: CPT | Performed by: INTERNAL MEDICINE

## 2018-08-16 PROCEDURE — 85025 COMPLETE CBC W/AUTO DIFF WBC: CPT | Performed by: FAMILY MEDICINE

## 2018-08-16 RX ORDER — SULFAMETHOXAZOLE AND TRIMETHOPRIM 400; 80 MG/1; MG/1
1 TABLET ORAL EVERY 12 HOURS SCHEDULED
Status: DISCONTINUED | OUTPATIENT
Start: 2018-08-16 | End: 2018-08-16

## 2018-08-16 RX ORDER — SULFAMETHOXAZOLE AND TRIMETHOPRIM 800; 160 MG/1; MG/1
1 TABLET ORAL EVERY 12 HOURS SCHEDULED
Status: DISCONTINUED | OUTPATIENT
Start: 2018-08-16 | End: 2018-08-27

## 2018-08-16 RX ORDER — SULFAMETHOXAZOLE AND TRIMETHOPRIM 800; 160 MG/1; MG/1
0.5 TABLET ORAL EVERY 12 HOURS SCHEDULED
Status: DISCONTINUED | OUTPATIENT
Start: 2018-08-16 | End: 2018-08-16

## 2018-08-16 RX ADMIN — SULFAMETHOXAZOLE AND TRIMETHOPRIM 1 TABLET: 800; 160 TABLET ORAL at 22:10

## 2018-08-16 RX ADMIN — HEPARIN SODIUM 15000 UNITS: 5000 INJECTION, SOLUTION INTRAVENOUS; SUBCUTANEOUS at 14:07

## 2018-08-16 RX ADMIN — LISINOPRIL 10 MG: 10 TABLET ORAL at 18:03

## 2018-08-16 RX ADMIN — HEPARIN SODIUM 15000 UNITS: 5000 INJECTION, SOLUTION INTRAVENOUS; SUBCUTANEOUS at 03:08

## 2018-08-16 RX ADMIN — HEPARIN SODIUM 15000 UNITS: 5000 INJECTION, SOLUTION INTRAVENOUS; SUBCUTANEOUS at 20:06

## 2018-08-16 RX ADMIN — HEPARIN SODIUM 15000 UNITS: 5000 INJECTION, SOLUTION INTRAVENOUS; SUBCUTANEOUS at 01:07

## 2018-08-16 RX ADMIN — SENNOSIDES 17.2 MG: 8.6 TABLET, FILM COATED ORAL at 18:03

## 2018-08-16 RX ADMIN — HEPARIN SODIUM 5000 UNITS: 5000 INJECTION, SOLUTION INTRAVENOUS; SUBCUTANEOUS at 22:12

## 2018-08-16 RX ADMIN — EZETIMIBE 10 MG: 10 TABLET ORAL at 08:50

## 2018-08-16 RX ADMIN — MAGNESIUM HYDROXIDE 30 ML: 400 SUSPENSION ORAL at 08:51

## 2018-08-16 RX ADMIN — HEPARIN SODIUM 15000 UNITS: 5000 INJECTION, SOLUTION INTRAVENOUS; SUBCUTANEOUS at 17:11

## 2018-08-16 RX ADMIN — HYDROMORPHONE HYDROCHLORIDE 1 MG: 1 INJECTION, SOLUTION INTRAMUSCULAR; INTRAVENOUS; SUBCUTANEOUS at 22:12

## 2018-08-16 RX ADMIN — HEPARIN SODIUM 15000 UNITS: 5000 INJECTION, SOLUTION INTRAVENOUS; SUBCUTANEOUS at 23:02

## 2018-08-16 RX ADMIN — HEPARIN SODIUM 15000 UNITS: 5000 INJECTION, SOLUTION INTRAVENOUS; SUBCUTANEOUS at 06:02

## 2018-08-16 RX ADMIN — MAGNESIUM HYDROXIDE 30 ML: 400 SUSPENSION ORAL at 18:03

## 2018-08-16 RX ADMIN — HEPARIN SODIUM 15000 UNITS: 5000 INJECTION, SOLUTION INTRAVENOUS; SUBCUTANEOUS at 18:07

## 2018-08-16 RX ADMIN — HEPARIN SODIUM 15000 UNITS: 5000 INJECTION, SOLUTION INTRAVENOUS; SUBCUTANEOUS at 13:09

## 2018-08-16 RX ADMIN — HEPARIN SODIUM 15000 UNITS: 5000 INJECTION, SOLUTION INTRAVENOUS; SUBCUTANEOUS at 08:30

## 2018-08-16 RX ADMIN — HYDROMORPHONE HYDROCHLORIDE 1 MG: 1 INJECTION, SOLUTION INTRAMUSCULAR; INTRAVENOUS; SUBCUTANEOUS at 12:02

## 2018-08-16 RX ADMIN — HEPARIN SODIUM 15000 UNITS: 5000 INJECTION, SOLUTION INTRAVENOUS; SUBCUTANEOUS at 16:15

## 2018-08-16 RX ADMIN — Medication 2000 MG: at 03:50

## 2018-08-16 RX ADMIN — Medication: at 01:12

## 2018-08-16 RX ADMIN — VANCOMYCIN HYDROCHLORIDE 1750 MG: 1 INJECTION, POWDER, LYOPHILIZED, FOR SOLUTION INTRAVENOUS at 11:14

## 2018-08-16 RX ADMIN — SODIUM CHLORIDE, POTASSIUM CHLORIDE, SODIUM LACTATE AND CALCIUM CHLORIDE 75 ML/HR: 600; 310; 30; 20 INJECTION, SOLUTION INTRAVENOUS at 18:01

## 2018-08-16 RX ADMIN — HEPARIN SODIUM 15000 UNITS: 5000 INJECTION, SOLUTION INTRAVENOUS; SUBCUTANEOUS at 19:04

## 2018-08-16 RX ADMIN — DOCUSATE SODIUM 100 MG: 100 CAPSULE, LIQUID FILLED ORAL at 08:50

## 2018-08-16 RX ADMIN — HEPARIN SODIUM 15000 UNITS: 5000 INJECTION, SOLUTION INTRAVENOUS; SUBCUTANEOUS at 15:08

## 2018-08-16 RX ADMIN — HYDROMORPHONE HYDROCHLORIDE 1 MG: 1 INJECTION, SOLUTION INTRAMUSCULAR; INTRAVENOUS; SUBCUTANEOUS at 00:09

## 2018-08-16 RX ADMIN — HEPARIN SODIUM 15000 UNITS: 5000 INJECTION, SOLUTION INTRAVENOUS; SUBCUTANEOUS at 10:59

## 2018-08-16 RX ADMIN — HYDROMORPHONE HYDROCHLORIDE 1 MG: 1 INJECTION, SOLUTION INTRAMUSCULAR; INTRAVENOUS; SUBCUTANEOUS at 04:18

## 2018-08-16 RX ADMIN — DOCUSATE SODIUM 100 MG: 100 CAPSULE, LIQUID FILLED ORAL at 18:02

## 2018-08-16 RX ADMIN — HEPARIN SODIUM 15000 UNITS: 5000 INJECTION, SOLUTION INTRAVENOUS; SUBCUTANEOUS at 07:02

## 2018-08-16 RX ADMIN — HEPARIN SODIUM 15000 UNITS: 5000 INJECTION, SOLUTION INTRAVENOUS; SUBCUTANEOUS at 09:18

## 2018-08-16 RX ADMIN — HEPARIN SODIUM 5000 UNITS: 5000 INJECTION, SOLUTION INTRAVENOUS; SUBCUTANEOUS at 08:51

## 2018-08-16 RX ADMIN — PRAVASTATIN SODIUM 80 MG: 20 TABLET ORAL at 08:51

## 2018-08-16 RX ADMIN — ASPIRIN 325 MG ORAL TABLET 325 MG: 325 PILL ORAL at 08:50

## 2018-08-16 RX ADMIN — HEPARIN SODIUM 15000 UNITS: 5000 INJECTION, SOLUTION INTRAVENOUS; SUBCUTANEOUS at 02:03

## 2018-08-16 RX ADMIN — HEPARIN SODIUM 15000 UNITS: 5000 INJECTION, SOLUTION INTRAVENOUS; SUBCUTANEOUS at 22:03

## 2018-08-16 RX ADMIN — ASPIRIN 325 MG ORAL TABLET 325 MG: 325 PILL ORAL at 18:02

## 2018-08-16 RX ADMIN — HEPARIN SODIUM 15000 UNITS: 5000 INJECTION, SOLUTION INTRAVENOUS; SUBCUTANEOUS at 05:05

## 2018-08-16 RX ADMIN — LISINOPRIL 10 MG: 10 TABLET ORAL at 08:51

## 2018-08-16 RX ADMIN — HYDROMORPHONE HYDROCHLORIDE 1 MG: 1 INJECTION, SOLUTION INTRAMUSCULAR; INTRAVENOUS; SUBCUTANEOUS at 08:13

## 2018-08-16 RX ADMIN — SENNOSIDES 17.2 MG: 8.6 TABLET, FILM COATED ORAL at 08:52

## 2018-08-16 RX ADMIN — HYDROMORPHONE HYDROCHLORIDE 1 MG: 1 INJECTION, SOLUTION INTRAMUSCULAR; INTRAVENOUS; SUBCUTANEOUS at 16:19

## 2018-08-16 RX ADMIN — FAMOTIDINE 20 MG: 20 TABLET ORAL at 08:50

## 2018-08-16 RX ADMIN — HEPARIN SODIUM 15000 UNITS: 5000 INJECTION, SOLUTION INTRAVENOUS; SUBCUTANEOUS at 00:01

## 2018-08-16 RX ADMIN — HEPARIN SODIUM 15000 UNITS: 5000 INJECTION, SOLUTION INTRAVENOUS; SUBCUTANEOUS at 04:11

## 2018-08-16 RX ADMIN — HEPARIN SODIUM 15000 UNITS: 5000 INJECTION, SOLUTION INTRAVENOUS; SUBCUTANEOUS at 21:04

## 2018-08-16 RX ADMIN — SODIUM CHLORIDE, POTASSIUM CHLORIDE, SODIUM LACTATE AND CALCIUM CHLORIDE 75 ML/HR: 600; 310; 30; 20 INJECTION, SOLUTION INTRAVENOUS at 04:27

## 2018-08-16 NOTE — ASSESSMENT & PLAN NOTE
· Previous admission same- asymptomatic- had one episode of bigeminy - no indication of treatment at this time unless more frequent   · Check bmp and mag will add to labs   · Had echo last admission recent- normal

## 2018-08-16 NOTE — NURSING NOTE
Patient awake in bed, asking for Dilaudid rates pain 6/10  Still using PCA, independently  Dark edges surrounding flap, darker portion upper top of flap, leaching continues q1 with minimal bleeding  LD in range  Given IS patient using while awake, call bell in reach and instructed to use

## 2018-08-16 NOTE — PROGRESS NOTES
Progress Note - Christine Nguyen 1960, 63 y o  male MRN: [id number]    Unit/Bed#:  Encounter: [id number]    Primary Care Provider: Kristy Ortega MD   Date and time admitted to hospital: 8/13/2018  5:55 AM        Constipation   Assessment & Plan    Patient does not want senokot- still no bp as he is immobile and also on opioid for pain control   Will increase milk of mag to 30 ml po bidprn as he is requesting also on colace   Also added prune juice          Morbid obesity with BMI of 45 0-49 9, adult (HCC)   Assessment & Plan    Low-calorie diet,  PT evaluation for early mobilization if recommended by orthopedic surgeon        Acute hyponatremia   Assessment & Plan    · Can be a combination secondary to hypovolemic 0 per up postoperative pain the patient has been NPO with 2 surgeries 1 day apart  He is on lactated Ringer's  Will re-evaluate tomorrow post pain control he has an PCA pump and also if he starts taking p o  Ya Sinks labs pending         KIRIT on CPAP   Assessment & Plan    Continue CPAP        GERD (gastroesophageal reflux disease)   Assessment & Plan    Continue  Pepcid        Essential hypertension   Assessment & Plan    Improved just increased lisinopril to 10 mg po bid just yesterday will leave at that         PVC (premature ventricular contraction)   Assessment & Plan    · Previous admission same- asymptomatic- had one episode of bigeminy - no indication of treatment at this time unless more frequent   · Check bmp and mag will add to labs   · Had echo last admission recent- normal         HLD (hyperlipidemia)   Assessment & Plan    Low-cholesterol diet  Continue pravastatin every other day as recommended by patient's family doctor and Zetia- every other day  As he was having muscle spasm secondary to being on statin daily and his muscle  Spasms have resolved since going this way        * Ankle wound, right, sequela   Assessment & Plan    Patient underwent right-sided free flap to right heel surgery by Alejandra [place] on 18-  Previous free flap failure-and a flap failure with venous congestion and arterial clot was that he had to be taken to the OR again for the clock to be evacuated now he is on mechanical leaching by Plastic surgery  Continue current pain management  Bowel movement regimen  PT evaluate, DVT prophylaxis as per orthopedic surgeon on heparin    Evaluated by ID tissue culture -Growth of Achromobacter xylosoxidans (A) and MRSA cefepime 2 g IV q 12 and started vanco last night by ID, vanco trough has been ordered, final duration and final abx regimen per ID   PICC line   Labs for today pending   Leukocytosis improved from  secondary to postop reactive  VTE Pharmacologic Prophylaxis:   Pharmacologic: Heparin  Mechanical VTE Prophylaxis in Place: Yes    Patient Centered Rounds: I have performed bedside rounds with nursing staff today  Discussions with Specialists or Other Care Team Provider: yes    Education and Discussions with Family / Patient: patient    Time Spent for Care: 30 minutes  More than 50% of total time spent on counseling and coordination of care as described above  Current Length of Stay: 3 day(s)    Current Patient Status: Inpatient   Certification Statement: The patient will continue to require additional inpatient hospital stay due to post graft care    Discharge Plan: when cleared by plastic surgery     Code Status: No Order      Subjective:   Patient is seen and examined he started laying down he actually slept okay denies any chest pain or shortness of breath no belly pain still has not had a bowel movement requesting milk of magnesia twice a day  Objective:     Vitals:   Temp (24hrs), Av 1 °F (36 7 °C), Min:97 4 °F (36 3 °C), Max:98 7 °F (37 1 °C)    HR:  [71-93] 82  Resp:  [11-34] 21  BP: (116-169)/() 154/74  SpO2:  [90 %-98 %] 95 %  Body mass index is 48 66 kg/m²       Input and Output Summary (last 24 hours): Intake/Output Summary (Last 24 hours) at 08/16/18 0759  Last data filed at 08/16/18 0700   Gross per 24 hour   Intake             3279 ml   Output             4275 ml   Net             -996 ml       Physical Exam:     Physical Exam   Constitutional: He is oriented to person, place, and time  He appears well-developed  Morbid obesity   HENT:   Head: Normocephalic and atraumatic  Eyes: EOM are normal  Pupils are equal, round, and reactive to light  Neck: Normal range of motion  Cardiovascular: Normal rate, regular rhythm and normal heart sounds  Pulmonary/Chest: Effort normal and breath sounds normal    Abdominal: Soft  Bowel sounds are normal    obese   Musculoskeletal: Normal range of motion  He exhibits edema (RLE with large dressing - blood evident )  Neurological: He is alert and oriented to person, place, and time  He has normal reflexes  Skin: Skin is warm  Psychiatric: He has a normal mood and affect  Additional Data:     Labs:      Results from last 7 days  Lab Units 08/15/18  0453   WBC Thousand/uL 15 60*   HEMOGLOBIN g/dL 11 4*   HEMATOCRIT % 33 8*   PLATELETS Thousands/uL 329   LYMPHO PCT % 7*   MONO PCT MAN % 7       Results from last 7 days  Lab Units 08/15/18  0454   SODIUM mmol/L 133*   POTASSIUM mmol/L 4 3   CHLORIDE mmol/L 101   CO2 mmol/L 29   BUN mg/dL 13   CREATININE mg/dL 0 79   CALCIUM mg/dL 8 7   GLUCOSE RANDOM mg/dL 122*                     * I Have Reviewed All Lab Data Listed Above  * Additional Pertinent Lab Tests Reviewed:  [facility] Admission Reviewed    Imaging:    Imaging Reports Reviewed Today Include: none ordered  Imaging Personally Reviewed by Myself Includes:  none    Recent Cultures (last 7 days):       Results from last 7 days  Lab Units 08/13/18  0850   GRAM STAIN RESULT  No polys seen  4+ Gram positive cocci in pairs  4+ Gram Positive Rods Resembling Diphtheroids       Last 24 Hours Medication List:     Current Facility-Administered Medications:  aspirin 325 mg Oral BID Florence Esteves MD    cefepime 2,000 mg Intravenous Q12H Savanna Aldea, DO Last Rate: Stopped (08/16/18 0423)   dexamethasone 4 mg Intravenous Once PRN Summer Spark, DO    dexamethasone 8 mg Intravenous Once PRN Summer Spark, DO    docusate sodium 100 mg Oral BID Xochitl Hernandez MD    ezetimibe 10 mg Oral Daily Florence Esteves MD    famotidine 20 mg Oral Daily Florence Esteves MD    fentaNYL 25 mcg Intravenous Q5 Min PRN Summer Spark, DO    fentaNYL  Intravenous Continuous Summer Spark, DO    heparin (porcine) 10,000 Units Subcutaneous Once Florence Esteves MD    heparin (porcine) 15,000 Units Subcutaneous Q30 Min PRN Florence Esteves MD    heparin (porcine) 5,000 Units Subcutaneous Q12H [facility] Florence Esteves MD    HYDROmorphone 0 2 mg Intravenous Q5 Min PRN Summer Spark, DO    HYDROmorphone 1 mg Intravenous Q1H PRN Milly Ludwig MD    lactated ringers 75 mL/hr Intravenous Continuous Summer Spark, DO Last Rate: 75 mL/hr (08/16/18 0427)   lactated ringers 75 mL/hr Intravenous Continuous Summer Spark, DO Last Rate: 75 mL/hr (08/15/18 1510)   lisinopril 10 mg Oral BID Beulah Lowery MD    magnesium hydroxide 30 mL Oral BID PRN Beulah Lowery MD    meperidine 12 5 mg Intravenous Q10 Min PRN Summer Spark, DO    morphine injection 2 mg Intravenous Q3H PRN Florence Esteves MD    ondansetron 4 mg Intravenous Q6H PRN Florence Esteves MD    oxyCODONE-acetaminophen 1 tablet Oral Q4H PRN Florence Esteves MD    phenol 1 spray Mouth/Throat Q2H PRN Summer Spark, DO    pravastatin 80 mg Oral Every Other Day Xochitl Hernandez MD    senna 2 tablet Oral BID Bilal ALLI Buitrago MD    vancomycin 12 5 mg/kg Intravenous Q12H Savanna Aldjovita, DO Last Rate: Stopped (08/16/18 0105)        Today, Patient Was Seen By: Beulah Lowery MD    ** Please Note: Dictation voice to text software may have been used in the creation of this document   **

## 2018-08-16 NOTE — ASSESSMENT & PLAN NOTE
· Can be a combination secondary to hypovolemic 0 per up postoperative pain the patient has been NPO with 2 surgeries 1 day apart  He is on lactated Ringer's  Will re-evaluate tomorrow post pain control he has an PCA pump and also if he starts taking p o  Rhiannon Rodriguez labs pending

## 2018-08-16 NOTE — PROGRESS NOTES
Progress Note - Infectious Disease   Daniela Robbins 63 y o  male MRN: [id number]  Unit/Bed#:  Encounter: [id number]         Impression/Recommendations:  1   Right heel wound infection   Post multiple previous I and D's   Most recently status post free flap closure with STSG   As patient completed a postoperative course of oral amoxicillin and Levaquin for 14 days based on prior culture data which revealed Enterococcus, Pseudomonas, stenotrophomonas   Patient is now most recently status post repeat free flap coverage with STSG    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   Operative culture was sent from tissue which is now growing Achromobacter and MRSA   Fortunately, patient is without signs of sepsis   He is afebrile, hemodynamically stable      -discontinue vancomycin and cefepime  -start oral Bactrim DS twice a day based on most recent operative culture data  -tentative plan for 2 week course through    -serial exams of wound per Plastic surgery     2   Chronic right heel open wound   Status post free flap closure with STSG x2      3   Achilles tendon repair, status post repair with above complication      4   Morbid obesity   May have contributed to original injury and poor wound healing      5   Leukocytosis   Likely postoperative elevation   No associated fevers   Remains systemically well, nontoxic   WBC count trending down      Antibiotics:  Cefepime D3   Vancomycin D2    Discussed above with patient, and with Dr Bar Sibley  Subjective: Intermittent pain which is better controlled  Sleeping a little better  Denies fevers, chills, or sweats  Denies nausea, vomiting, or diarrhea        Objective:  Vitals:  HR:  [71-93] 88  Resp:  [11-34] 21  BP: (112-154)/(48-87) 112/64  SpO2:  [90 %-98 %] 92 %  Temp (24hrs), Av °F (36 7 °C), Min:97 4 °F (36 3 °C), Max:98 4 °F (36 9 °C)  Current: Temperature: 98 3 °F (36 8 °C)    Physical Exam:   General:  No acute distress  Eyes: Conjunctive clear with no hemorrhages or effusions  Oropharynx:  No ulcers, no lesions  Neck:  Supple, no lymphadenopathy  Lungs:  Clear to auscultation bilaterally, no accessory muscle use  Cardiac:  Regular rate and rhythm, no murmurs  Abdomen:  Soft, non-tender, non-distented, obese  Extremities:  No peripheral cyanosis, clubbing, or edema  Right lower extremity dressing intact  Skin:  No rashes, no ulcers  Neurological:  Moves all four extremities spontaneously, sensation grossly intact    Lab Results:  I have personally reviewed pertinent labs  Results from last 7 days  Lab Units 08/16/18  1123 08/15/18  0454 08/13/18  1915   SODIUM mmol/L 133* 133* 133*   POTASSIUM mmol/L 3 9 4 3 4 9   CHLORIDE mmol/L 100 101 99   CO2 mmol/L 29 29 24   ANION GAP mmol/L 4* 3* 10   BUN mg/dL 13 13 17   CREATININE mg/dL 0 79 0 79 1 07   EGFR ml/min/1 73sq m 100 100 77   GLUCOSE RANDOM mg/dL 104* 122* 175*   CALCIUM mg/dL 8 0* 8 7 8 7       Results from last 7 days  Lab Units 08/16/18  1123 08/15/18  0453 08/13/18  1915   WBC Thousand/uL 14 00* 15 60* 15 80*   HEMOGLOBIN g/dL 10 2* 11 4* 12 5*   PLATELETS Thousands/uL 308 329 407       Results from last 7 days  Lab Units 08/13/18  0850   GRAM STAIN RESULT  No polys seen  4+ Gram positive cocci in pairs  4+ Gram Positive Rods Resembling Diphtheroids       Imaging Studies:   I have personally reviewed pertinent imaging study reports and images in PACS  EKG, Pathology, and Other Studies:   I have personally reviewed pertinent reports

## 2018-08-16 NOTE — SOCIAL WORK
CM met with pt regarding discharge plans- final cx results of heel pending with pt currently on IV Abx (D2 Vanco and D3 Cefepime) with duration not yet known- continues with the laser doppler and hourly mechanical leeching- using PCA for pain  Spoke to pt if requires longer term Abx to consider TCF so to allow closer observation of wound/flap vs Home with Abx immediately post d/c- pt to consider options and make decision closer to d/c date  Referal made to TCF proactively  Will continue to follow for d/c needs

## 2018-08-16 NOTE — NURSING NOTE
Upon leaching right leg flap, right leg off elevation and pillows laying on left side of bed  Saturated bloody linens and pillows  JASMYN Best notified, " will be in early to see patient, keep leaching" JASMYN Best called back to verify leaching procedure, aware one suture has been leaking bloody drainage  Patient complaining of room temperature, adjusted and blankets applied, asking for pain medication rates pain 8/10 in right leg, continues to use PCA   Repositioned call bell in reach

## 2018-08-16 NOTE — NURSING NOTE
Went to monitor flap found patients leg off elevated pillows hanging out of bed  When asked patient to turn leg out so flap was not touching pillow patient stated " I cant do that right now I need to move around a little in here, the bed is constantly blowing up, its so cold in here I cant move and Im getting really cranky I think its time for my pain medication  Per patient " this is not how Dr Halle [place] wants my leg everyone always puts the pillows a different way and he does not want the flap touching" Explained to patient flap was not touching but if would like movement to ring for assistance  Readjusted right leg per patient satisfaction  Leaching continues q1   Will continue to monitor

## 2018-08-16 NOTE — ASSESSMENT & PLAN NOTE
Patient does not want senokot- still no bp as he is immobile and also on opioid for pain control    Will increase milk of mag to 30 ml po bidprn as he is requesting also on colace   Also added prune juice

## 2018-08-16 NOTE — NURSING NOTE
No change in general assessment  Seen by JASMYN Saez this AM  Sleeps intermittently  States poor appetite due to constipation   MOM, senekot, colace given this AM

## 2018-08-16 NOTE — ASSESSMENT & PLAN NOTE
Patient underwent right-sided free flap to right heel surgery by Molly Flower on 8/13/18-  Previous free flap failure-and a flap failure with venous congestion and arterial clot was that he had to be taken to the OR again for the clock to be evacuated now he is on mechanical leaching by Plastic surgery  Continue current pain management  Bowel movement regimen  PT evaluate, DVT prophylaxis as per orthopedic surgeon on heparin    Evaluated by ID tissue culture -Growth of Achromobacter xylosoxidans (A) and MRSA cefepime 2 g IV q 12 and started vanco last night by ID, vanco trough has been ordered, final duration and final abx regimen per ID   PICC line   Labs for today pending   Leukocytosis improved from 08/13 secondary to postop reactive

## 2018-08-16 NOTE — NURSING NOTE
Pt as per shift assessment  Offers C/O " I haven't slept all night except maybe 2 hours" Also C/O cold environment over night  Now also C/O constipation  Exposed flap remains cyanotically discolored with darker cyanosis at edges  No bleeding with mechanical leeching as before

## 2018-08-16 NOTE — PROGRESS NOTES
LD 9  Flap blue, pink  No real bleeding on pinprick  Continue heparin soaks  As long as laser Doppler reads some flow, will keep doing medicinal leeching  Not optimistic flap will survive, but trying to get some granulations to develop over Achilles

## 2018-08-16 NOTE — NURSING NOTE
Patient resting eyes closed, with personal CPAP in place  Per patient request, " hit PCA before completing your procedure pt referring to leaching, pt states has "discomfort when nursing staff does hourly leaching" LD in range, minimal bloody drainage with leaching, top part and edges of flap dark  No change from previous assessment   Call bell in reach and continue to monitor

## 2018-08-17 ENCOUNTER — ANESTHESIA EVENT (INPATIENT)
Dept: PERIOP | Facility: HOSPITAL | Age: 58
DRG: 857 | End: 2018-08-17
Payer: COMMERCIAL

## 2018-08-17 PROCEDURE — 99232 SBSQ HOSP IP/OBS MODERATE 35: CPT | Performed by: INTERNAL MEDICINE

## 2018-08-17 PROCEDURE — 99232 SBSQ HOSP IP/OBS MODERATE 35: CPT | Performed by: FAMILY MEDICINE

## 2018-08-17 RX ADMIN — HEPARIN SODIUM 15000 UNITS: 5000 INJECTION, SOLUTION INTRAVENOUS; SUBCUTANEOUS at 09:09

## 2018-08-17 RX ADMIN — DOCUSATE SODIUM 100 MG: 100 CAPSULE, LIQUID FILLED ORAL at 08:59

## 2018-08-17 RX ADMIN — HEPARIN SODIUM 15000 UNITS: 5000 INJECTION, SOLUTION INTRAVENOUS; SUBCUTANEOUS at 16:09

## 2018-08-17 RX ADMIN — HEPARIN SODIUM 15000 UNITS: 5000 INJECTION, SOLUTION INTRAVENOUS; SUBCUTANEOUS at 15:05

## 2018-08-17 RX ADMIN — HEPARIN SODIUM 15000 UNITS: 5000 INJECTION, SOLUTION INTRAVENOUS; SUBCUTANEOUS at 03:00

## 2018-08-17 RX ADMIN — HYDROMORPHONE HYDROCHLORIDE 1 MG: 1 INJECTION, SOLUTION INTRAMUSCULAR; INTRAVENOUS; SUBCUTANEOUS at 02:15

## 2018-08-17 RX ADMIN — HEPARIN SODIUM 15000 UNITS: 5000 INJECTION, SOLUTION INTRAVENOUS; SUBCUTANEOUS at 21:05

## 2018-08-17 RX ADMIN — HYDROMORPHONE HYDROCHLORIDE 1 MG: 1 INJECTION, SOLUTION INTRAMUSCULAR; INTRAVENOUS; SUBCUTANEOUS at 20:11

## 2018-08-17 RX ADMIN — HEPARIN SODIUM 15000 UNITS: 5000 INJECTION, SOLUTION INTRAVENOUS; SUBCUTANEOUS at 05:03

## 2018-08-17 RX ADMIN — HEPARIN SODIUM 15000 UNITS: 5000 INJECTION, SOLUTION INTRAVENOUS; SUBCUTANEOUS at 12:07

## 2018-08-17 RX ADMIN — HEPARIN SODIUM 15000 UNITS: 5000 INJECTION, SOLUTION INTRAVENOUS; SUBCUTANEOUS at 17:06

## 2018-08-17 RX ADMIN — HEPARIN SODIUM 15000 UNITS: 5000 INJECTION, SOLUTION INTRAVENOUS; SUBCUTANEOUS at 02:05

## 2018-08-17 RX ADMIN — HYDROMORPHONE HYDROCHLORIDE 1 MG: 1 INJECTION, SOLUTION INTRAMUSCULAR; INTRAVENOUS; SUBCUTANEOUS at 08:33

## 2018-08-17 RX ADMIN — SENNOSIDES 17.2 MG: 8.6 TABLET, FILM COATED ORAL at 17:17

## 2018-08-17 RX ADMIN — HEPARIN SODIUM 15000 UNITS: 5000 INJECTION, SOLUTION INTRAVENOUS; SUBCUTANEOUS at 18:05

## 2018-08-17 RX ADMIN — DOCUSATE SODIUM 100 MG: 100 CAPSULE, LIQUID FILLED ORAL at 17:17

## 2018-08-17 RX ADMIN — HEPARIN SODIUM 15000 UNITS: 5000 INJECTION, SOLUTION INTRAVENOUS; SUBCUTANEOUS at 11:02

## 2018-08-17 RX ADMIN — LISINOPRIL 10 MG: 10 TABLET ORAL at 08:59

## 2018-08-17 RX ADMIN — HEPARIN SODIUM 15000 UNITS: 5000 INJECTION, SOLUTION INTRAVENOUS; SUBCUTANEOUS at 20:02

## 2018-08-17 RX ADMIN — HEPARIN SODIUM 15000 UNITS: 5000 INJECTION, SOLUTION INTRAVENOUS; SUBCUTANEOUS at 04:02

## 2018-08-17 RX ADMIN — ASPIRIN 325 MG ORAL TABLET 325 MG: 325 PILL ORAL at 17:17

## 2018-08-17 RX ADMIN — SULFAMETHOXAZOLE AND TRIMETHOPRIM 1 TABLET: 800; 160 TABLET ORAL at 22:12

## 2018-08-17 RX ADMIN — HEPARIN SODIUM 15000 UNITS: 5000 INJECTION, SOLUTION INTRAVENOUS; SUBCUTANEOUS at 10:01

## 2018-08-17 RX ADMIN — FAMOTIDINE 20 MG: 20 TABLET ORAL at 09:00

## 2018-08-17 RX ADMIN — HEPARIN SODIUM 15000 UNITS: 5000 INJECTION, SOLUTION INTRAVENOUS; SUBCUTANEOUS at 06:56

## 2018-08-17 RX ADMIN — HEPARIN SODIUM 15000 UNITS: 5000 INJECTION, SOLUTION INTRAVENOUS; SUBCUTANEOUS at 13:09

## 2018-08-17 RX ADMIN — HYDROMORPHONE HYDROCHLORIDE 1 MG: 1 INJECTION, SOLUTION INTRAMUSCULAR; INTRAVENOUS; SUBCUTANEOUS at 12:21

## 2018-08-17 RX ADMIN — HEPARIN SODIUM 15000 UNITS: 5000 INJECTION, SOLUTION INTRAVENOUS; SUBCUTANEOUS at 00:00

## 2018-08-17 RX ADMIN — SULFAMETHOXAZOLE AND TRIMETHOPRIM 1 TABLET: 800; 160 TABLET ORAL at 08:59

## 2018-08-17 RX ADMIN — HEPARIN SODIUM 15000 UNITS: 5000 INJECTION, SOLUTION INTRAVENOUS; SUBCUTANEOUS at 22:08

## 2018-08-17 RX ADMIN — SENNOSIDES 17.2 MG: 8.6 TABLET, FILM COATED ORAL at 08:59

## 2018-08-17 RX ADMIN — HYDROMORPHONE HYDROCHLORIDE 1 MG: 1 INJECTION, SOLUTION INTRAMUSCULAR; INTRAVENOUS; SUBCUTANEOUS at 16:20

## 2018-08-17 RX ADMIN — LISINOPRIL 10 MG: 10 TABLET ORAL at 17:17

## 2018-08-17 RX ADMIN — ASPIRIN 325 MG ORAL TABLET 325 MG: 325 PILL ORAL at 08:59

## 2018-08-17 RX ADMIN — EZETIMIBE 10 MG: 10 TABLET ORAL at 09:00

## 2018-08-17 RX ADMIN — HEPARIN SODIUM 15000 UNITS: 5000 INJECTION, SOLUTION INTRAVENOUS; SUBCUTANEOUS at 06:04

## 2018-08-17 RX ADMIN — HEPARIN SODIUM 15000 UNITS: 5000 INJECTION, SOLUTION INTRAVENOUS; SUBCUTANEOUS at 01:07

## 2018-08-17 RX ADMIN — HEPARIN SODIUM 15000 UNITS: 5000 INJECTION, SOLUTION INTRAVENOUS; SUBCUTANEOUS at 19:03

## 2018-08-17 RX ADMIN — HEPARIN SODIUM 15000 UNITS: 5000 INJECTION, SOLUTION INTRAVENOUS; SUBCUTANEOUS at 14:08

## 2018-08-17 RX ADMIN — HEPARIN SODIUM 15000 UNITS: 5000 INJECTION, SOLUTION INTRAVENOUS; SUBCUTANEOUS at 23:03

## 2018-08-17 RX ADMIN — HEPARIN SODIUM 5000 UNITS: 5000 INJECTION, SOLUTION INTRAVENOUS; SUBCUTANEOUS at 21:10

## 2018-08-17 RX ADMIN — HEPARIN SODIUM 15000 UNITS: 5000 INJECTION, SOLUTION INTRAVENOUS; SUBCUTANEOUS at 08:25

## 2018-08-17 RX ADMIN — HEPARIN SODIUM 5000 UNITS: 5000 INJECTION, SOLUTION INTRAVENOUS; SUBCUTANEOUS at 08:58

## 2018-08-17 NOTE — NURSING NOTE
Patient requesting pain medication, rates pain 4/10  Continues to use PCA, Right leg flap dark edges and upper portion of flap  Leaching q, LD in range serosanguinous drainage noted  RLE elevated   Call bell in reach and instructed to use

## 2018-08-17 NOTE — NURSING NOTE
Patient sleeping with CPAP in place  Repositioned RLE, leaching q1 with minimal to no bleeding   Cyanotic areas to top of flap, LD numbers in range Call bell in reach

## 2018-08-17 NOTE — PROGRESS NOTES
Progress Note - Infectious Disease   Christine Nguyen 63 y o  male MRN: [id number]  Unit/Bed#:  Encounter: [id number]      Impression/Recommendations:  1   Right heel wound infection   Post multiple previous I and D's   Most recently status post free flap closure with STSG   As patient completed a postoperative course of oral amoxicillin and Levaquin for 14 days based on prior culture data which revealed Enterococcus, Pseudomonas, stenotrophomonas   Patient is now most recently status post repeat free flap coverage with STSG    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   Operative culture was sent from tissue which is now growing Achromobacter and MRSA   Fortunately, patient is without signs of sepsis   He is afebrile, hemodynamically stable  Patient now tolerating oral Bactrim without difficulty      -continue oral Bactrim DS twice a day based on most recent operative culture data  -tentative plan for 2 week course through    -serial exams of wound and management per Plastic surgery service     2   Chronic right heel open wound   Status post free flap closure with STSG x2      3   Achilles tendon repair, status post repair with above complication      4   Morbid obesity   May have contributed to original injury and poor wound healing      5   Leukocytosis   Likely postoperative elevation   No associated fevers   Remains systemically well, nontoxic  WBC count continues to improve      Antibiotics:  Antibiotic D4  Bactrim D2    Stable from ID standpoint  If still in hospital, will plan to see patient again on   Please call with any questions in the interim  Subjective:  Patient states his pain is better controlled  Tolerating oral diet  Denies fevers,  Denies fevers, chills, or sweats  Denies nausea, vomiting, or diarrhea        Objective:  Vitals:  HR:  [74-97] 81  Resp:  [9-33] 22  BP: (120-165)/(55-85) 120/61  SpO2:  [88 %-97 %] 97 %  Temp (24hrs), Av 8 °F (37 1 °C), Min:97 8 °F (36 6 °C), Max:100 5 °F (38 1 °C)  Current: Temperature: 97 8 °F (36 6 °C)    Physical Exam:   General:  No acute distress  Psychiatric:  Awake and alert  Pulmonary:  Normal respiratory excursion without accessory muscle use  Abdomen:  Soft, nontender  Extremities:  No edema  Right lower extremity dressing intact  Flap incision site appears clean  No purulent drainage  Flap edges appear dusky  Skin:  No rashes    Lab Results:  I have personally reviewed pertinent labs  Results from last 7 days  Lab Units 08/16/18  1123 08/15/18  0454 08/13/18  1915   SODIUM mmol/L 133* 133* 133*   POTASSIUM mmol/L 3 9 4 3 4 9   CHLORIDE mmol/L 100 101 99   CO2 mmol/L 29 29 24   ANION GAP mmol/L 4* 3* 10   BUN mg/dL 13 13 17   CREATININE mg/dL 0 79 0 79 1 07   EGFR ml/min/1 73sq m 100 100 77   GLUCOSE RANDOM mg/dL 104* 122* 175*   CALCIUM mg/dL 8 0* 8 7 8 7       Results from last 7 days  Lab Units 08/16/18  1123 08/15/18  0453 08/13/18  1915   WBC Thousand/uL 14 00* 15 60* 15 80*   HEMOGLOBIN g/dL 10 2* 11 4* 12 5*   PLATELETS Thousands/uL 308 329 407       Results from last 7 days  Lab Units 08/13/18  0850   GRAM STAIN RESULT  No polys seen  4+ Gram positive cocci in pairs  4+ Gram Positive Rods Resembling Diphtheroids       Imaging Studies:   I have personally reviewed pertinent imaging study reports and images in PACS  EKG, Pathology, and Other Studies:   I have personally reviewed pertinent reports

## 2018-08-17 NOTE — NURSING NOTE
No change from previous assessment  Pt continues to use PCA while awake  Leaching continues q1, producing minimal bleeding  LD numbers in range   Call bell in reach

## 2018-08-17 NOTE — ANESTHESIA PREPROCEDURE EVALUATION
Review of Systems/Medical History  Patient summary reviewed  Chart reviewed  History of anesthetic complications (traumatized oral muscosa during an earlier intubation has resolved)     Cardiovascular  Exercise tolerance (METS): <4,  Hyperlipidemia, Hypertension controlled,   Comment: Benign ICU post op course,  Pulmonary  Asthma , well controlled/ stable , Sleep apnea CPAP,   Comment: No new ICU issues     GI/Hepatic    GERD ,          Comment: Muñoz in place     Endo/Other  Negative endo/other ROS      GYN       Hematology  Negative hematology ROS Anemia ,     Musculoskeletal  Negative musculoskeletal ROS        Neurology  Negative neurology ROS      Psychology           Physical Exam    Airway    Mallampati score: II  TM Distance: >3 FB  Neck ROM: full     Dental       Cardiovascular  Cardiovascular exam normal    Pulmonary  Pulmonary exam normal     Other Findings        Anesthesia Plan  ASA Score- 3     Anesthesia Type- general with ASA Monitors  Additional Monitors:   Airway Plan: ETT  Plan Factors-Patient not instructed to abstain from smoking on day of procedure  Patient did not smoke on day of surgery  Induction- intravenous  Postoperative Plan- Plan for postoperative opioid use  Informed Consent- Anesthetic plan and risks discussed with patient  I personally reviewed this patient with the CRNA  Discussed and agreed on the Anesthesia Plan with the CRNA  Georgette Ormond       Check am Labs

## 2018-08-17 NOTE — PROGRESS NOTES
Progress Note - David Simons 1960, 63 y o  male MRN: [id number]    Unit/Bed#:  Encounter: [id number]    Primary Care Provider: Michelle Rosa MD   Date and time admitted to hospital: 8/13/2018  5:55 AM        Constipation   Assessment & Plan    Patient does not want senokot- still no bp as he is immobile and also on opioid for pain control   Will increase milk of mag to 30 ml po bidprn as he is requesting also on colace   Also added prune juice->>>> 4 bm yesterday          Morbid obesity with BMI of 45 0-49 9, adult ([facility] [address] )   Assessment & Plan    Low-calorie diet,  PT evaluation for early mobilization if recommended by orthopedic surgeon        Acute hyponatremia   Assessment & Plan    · Can be a combination secondary to hypovolemic 0 per up postoperative pain the patient has been NPO with 2 surgeries 1 day apart  He is on lactated Ringer's   Last sodium 133 stable - periodic checks- pain controled        KIRIT on CPAP   Assessment & Plan    Continue CPAP        GERD (gastroesophageal reflux disease)   Assessment & Plan    Continue  Pepcid        Essential hypertension   Assessment & Plan    Controled continue lisinopril         PVC (premature ventricular contraction)   Assessment & Plan    · Previous admission same- asymptomatic- had one episode of bigeminy - no indication of treatment at this time unless more frequent - no more bigeminy just infrequent pvc  · k and mag ok   · Had echo last admission recent- normal         HLD (hyperlipidemia)   Assessment & Plan    Low-cholesterol diet  Continue pravastatin every other day as recommended by patient's family doctor and Zetia- every other day  As he was having muscle spasm secondary to being on statin daily and his muscle  Spasms have resolved since going this way        * Ankle wound, right, sequela   Assessment & Plan    Patient underwent right-sided free flap to right heel surgery by Dr Alcantara on 8/13/18-  Previous free flap failure-and a flap failure with venous congestion and arterial clot was that he had to be taken to the OR again for the clock to be evacuated now he is on mechanical leaching by Plastic surgery  Flap does not look promising- tentative or for graft on Monday   Continue current pain management  Bowel movement regimen  PT evaluate, DVT prophylaxis as per plastic  surgeon on heparin    Evaluated by ID tissue culture -Growth of Achromobacter xylosoxidans (A) and MRSA cefepime 2 g IV q 12 -> changed to bactrim ds by ID till 18    Leukocytosis improved from  secondary to postop reactive  VTE Pharmacologic Prophylaxis:   Pharmacologic: Heparin  Mechanical VTE Prophylaxis in Place: Yes    Patient Centered Rounds: I have performed bedside rounds with nursing staff today  Discussions with Specialists or Other Care Team Provider: yes    Education and Discussions with Family / Patient: patient    Time Spent for Care: 30 minutes  More than 50% of total time spent on counseling and coordination of care as described above  Current Length of Stay: 4 day(s)    Current Patient Status: Inpatient   Certification Statement: The patient will continue to require additional inpatient hospital stay due to for flap assesment /possible graft monday    Discharge Plan: when cleared by plastics    Code Status: No Order      Subjective: The patient seen and examined feeling better after having multiple BMs  No chest pain or shortness of breath rule feels warmer he is happy about that  Objective:     Vitals:   Temp (24hrs), Av °F (37 2 °C), Min:98 3 °F (36 8 °C), Max:100 5 °F (38 1 °C)    HR:  [78-97] 78  Resp:  [9-28] 9  BP: (112-162)/(48-85) 133/55  SpO2:  [88 %-97 %] 90 %  Body mass index is 48 66 kg/m²  Input and Output Summary (last 24 hours):        Intake/Output Summary (Last 24 hours) at 18 0749  Last data filed at 18 0600   Gross per 24 hour   Intake             3427 ml   Output             3300 ml Net              127 ml       Physical Exam:     Physical Exam   Constitutional: He is oriented to person, place, and time  He appears well-developed  Morbid obesity   HENT:   Head: Normocephalic and atraumatic  Eyes: EOM are normal  Pupils are equal, round, and reactive to light  Neck: Normal range of motion  Cardiovascular: Normal rate, regular rhythm and normal heart sounds  Pulmonary/Chest: Effort normal and breath sounds normal    Abdominal: Soft  Bowel sounds are normal    Obese   Musculoskeletal: Normal range of motion  He exhibits edema (Right lower extremity with large dressing)  Neurological: He is alert and oriented to person, place, and time  He has normal reflexes  Skin: Skin is warm  Psychiatric: He has a normal mood and affect  Additional Data:     Labs:      Results from last 7 days  Lab Units 08/16/18  1123   WBC Thousand/uL 14 00*   HEMOGLOBIN g/dL 10 2*   HEMATOCRIT % 29 9*   PLATELETS Thousands/uL 308   NEUTROS PCT % 72*   LYMPHS PCT % 14*   MONOS PCT % 10   EOS PCT % 4       Results from last 7 days  Lab Units 08/16/18  1123   SODIUM mmol/L 133*   POTASSIUM mmol/L 3 9   CHLORIDE mmol/L 100   CO2 mmol/L 29   BUN mg/dL 13   CREATININE mg/dL 0 79   CALCIUM mg/dL 8 0*   GLUCOSE RANDOM mg/dL 104*                     * I Have Reviewed All Lab Data Listed Above  * Additional Pertinent Lab Tests Reviewed:  [address] Admission Reviewed    Imaging:    Imaging Reports Reviewed Today Include: none have been ordered  Imaging Personally Reviewed by Myself Includes:  none    Recent Cultures (last 7 days):       Results from last 7 days  Lab Units 08/13/18  0850   GRAM STAIN RESULT  No polys seen  4+ Gram positive cocci in pairs  4+ Gram Positive Rods Resembling Diphtheroids       Last 24 Hours Medication List:     Current Facility-Administered Medications:  aspirin 325 mg Oral BID Renée Wong MD   dexamethasone 4 mg Intravenous Once PRN Feli Esteves DO dexamethasone 8 mg Intravenous Once PRN Stephanie Rascon, DO   docusate sodium 100 mg Oral BID Ivette Funes MD   ezetimibe 10 mg Oral Daily Burgess Marcin MD   famotidine 20 mg Oral Daily Burgess Marcin MD   fentaNYL 25 mcg Intravenous Q5 Min PRN Stephanie Rascon, DO   fentaNYL  Intravenous Continuous Stephanie Rascon,    heparin (porcine) 10,000 Units Subcutaneous Once Burgess Marcin MD   heparin (porcine) 15,000 Units Subcutaneous Q30 Min PRN Burgess Marcin MD   heparin (porcine) 5,000 Units Subcutaneous Q12H [address] Burgess Marcin MD   HYDROmorphone 0 2 mg Intravenous Q5 Min PRN Stephanie Rascon,    HYDROmorphone 1 mg Intravenous Q1H PRN Shanti Montemayor MD   lisinopril 10 mg Oral BID Camron Callahan MD   magnesium hydroxide 30 mL Oral BID PRN Camron Callahan MD   meperidine 12 5 mg Intravenous Q10 Min PRN Stephanie Rascon,    morphine injection 2 mg Intravenous Q3H PRN Burgess Marcin MD   ondansetron 4 mg Intravenous Q6H PRN Burgess Marcin MD   oxyCODONE-acetaminophen 1 tablet Oral Q4H PRN Burgess Marcin MD   phenol 1 spray Mouth/Throat Q2H PRN Stephanie Rascon DO   pravastatin 80 mg Oral Every Other Day Ivette Funes MD   senna 2 tablet Oral BID Odalys San MD   sulfamethoxazole-trimethoprim 1 tablet Oral Q12H Juju Serrato MD        Today, Patient Was Seen By: Camron Callahan MD    ** Please Note: Dictation voice to text software may have been used in the creation of this document   **

## 2018-08-17 NOTE — ASSESSMENT & PLAN NOTE
Patient underwent right-sided free flap to right heel surgery by Artemio Ramirez on 8/13/18-  Previous free flap failure-and a flap failure with venous congestion and arterial clot was that he had to be taken to the OR again for the clock to be evacuated now he is on mechanical leaching by Plastic surgery  Flap does not look promising- tentative or for graft on Monday   Continue current pain management  Bowel movement regimen  PT evaluate, DVT prophylaxis as per plastic  surgeon on heparin    Evaluated by ID tissue culture -Growth of Achromobacter xylosoxidans (A) and MRSA cefepime 2 g IV q 12 -> changed to bactrim ds by ID till 08/28/18    Leukocytosis improved from 08/13 secondary to postop reactive

## 2018-08-17 NOTE — ASSESSMENT & PLAN NOTE
· Previous admission same- asymptomatic- had one episode of bigeminy - no indication of treatment at this time unless more frequent - no more bigeminy just infrequent pvc  · k and mag ok   · Had echo last admission recent- normal

## 2018-08-17 NOTE — ASSESSMENT & PLAN NOTE
Patient does not want senokot- still no bp as he is immobile and also on opioid for pain control    Will increase milk of mag to 30 ml po bidprn as he is requesting also on colace   Also added prune juice->>>> 4 bm yesterday

## 2018-08-17 NOTE — PROGRESS NOTES
Patient sleeping in bed  LD = 13 9  Flap continues "mechanical leeching"  Flap dusky, sutures intact

## 2018-08-17 NOTE — ASSESSMENT & PLAN NOTE
· Can be a combination secondary to hypovolemic 0 per up postoperative pain the patient has been NPO with 2 surgeries 1 day apart  He is on lactated Ringer's   Last sodium 133 stable - periodic checks- pain controled

## 2018-08-18 PROBLEM — J45.20 MILD INTERMITTENT ASTHMA WITHOUT COMPLICATION: Status: ACTIVE | Noted: 2018-08-18

## 2018-08-18 PROCEDURE — 99232 SBSQ HOSP IP/OBS MODERATE 35: CPT | Performed by: FAMILY MEDICINE

## 2018-08-18 RX ORDER — SODIUM BICARBONATE 650 MG/1
650 TABLET ORAL 2 TIMES DAILY PRN
Status: DISCONTINUED | OUTPATIENT
Start: 2018-08-18 | End: 2018-08-26

## 2018-08-18 RX ORDER — FAMOTIDINE 20 MG/1
20 TABLET, FILM COATED ORAL 2 TIMES DAILY
Status: DISCONTINUED | OUTPATIENT
Start: 2018-08-18 | End: 2018-08-29 | Stop reason: HOSPADM

## 2018-08-18 RX ORDER — ALBUTEROL SULFATE 90 UG/1
2 AEROSOL, METERED RESPIRATORY (INHALATION) EVERY 4 HOURS PRN
Status: DISCONTINUED | OUTPATIENT
Start: 2018-08-18 | End: 2018-08-24

## 2018-08-18 RX ORDER — PANTOPRAZOLE SODIUM 40 MG/1
40 TABLET, DELAYED RELEASE ORAL
Status: DISCONTINUED | OUTPATIENT
Start: 2018-08-18 | End: 2018-08-29 | Stop reason: HOSPADM

## 2018-08-18 RX ORDER — SODIUM BICARBONATE 650 MG/1
650 TABLET ORAL ONCE
Status: COMPLETED | OUTPATIENT
Start: 2018-08-18 | End: 2018-08-18

## 2018-08-18 RX ORDER — MAGNESIUM HYDROXIDE/ALUMINUM HYDROXICE/SIMETHICONE 120; 1200; 1200 MG/30ML; MG/30ML; MG/30ML
30 SUSPENSION ORAL EVERY 4 HOURS PRN
Status: DISCONTINUED | OUTPATIENT
Start: 2018-08-18 | End: 2018-08-29 | Stop reason: HOSPADM

## 2018-08-18 RX ORDER — SODIUM BICARBONATE 650 MG/1
TABLET ORAL
Status: COMPLETED
Start: 2018-08-18 | End: 2018-08-18

## 2018-08-18 RX ADMIN — HEPARIN SODIUM 5000 UNITS: 5000 INJECTION, SOLUTION INTRAVENOUS; SUBCUTANEOUS at 09:18

## 2018-08-18 RX ADMIN — ALUMINUM HYDROXIDE, MAGNESIUM HYDROXIDE, AND SIMETHICONE 30 ML: 200; 200; 20 SUSPENSION ORAL at 08:21

## 2018-08-18 RX ADMIN — Medication: at 15:33

## 2018-08-18 RX ADMIN — ASPIRIN 325 MG ORAL TABLET 325 MG: 325 PILL ORAL at 17:11

## 2018-08-18 RX ADMIN — LISINOPRIL 10 MG: 10 TABLET ORAL at 09:28

## 2018-08-18 RX ADMIN — HYDROMORPHONE HYDROCHLORIDE 1 MG: 1 INJECTION, SOLUTION INTRAMUSCULAR; INTRAVENOUS; SUBCUTANEOUS at 17:02

## 2018-08-18 RX ADMIN — HEPARIN SODIUM 15000 UNITS: 5000 INJECTION, SOLUTION INTRAVENOUS; SUBCUTANEOUS at 06:06

## 2018-08-18 RX ADMIN — HYDROMORPHONE HYDROCHLORIDE 1 MG: 1 INJECTION, SOLUTION INTRAMUSCULAR; INTRAVENOUS; SUBCUTANEOUS at 00:56

## 2018-08-18 RX ADMIN — SENNOSIDES 17.2 MG: 8.6 TABLET, FILM COATED ORAL at 09:26

## 2018-08-18 RX ADMIN — SODIUM BICARBONATE 650 MG: 650 TABLET ORAL at 06:38

## 2018-08-18 RX ADMIN — HEPARIN SODIUM 15000 UNITS: 5000 INJECTION, SOLUTION INTRAVENOUS; SUBCUTANEOUS at 11:07

## 2018-08-18 RX ADMIN — HEPARIN SODIUM 15000 UNITS: 5000 INJECTION, SOLUTION INTRAVENOUS; SUBCUTANEOUS at 03:01

## 2018-08-18 RX ADMIN — SODIUM BICARBONATE 650 MG: 650 TABLET ORAL at 08:21

## 2018-08-18 RX ADMIN — HEPARIN SODIUM 15000 UNITS: 5000 INJECTION, SOLUTION INTRAVENOUS; SUBCUTANEOUS at 21:23

## 2018-08-18 RX ADMIN — EZETIMIBE 10 MG: 10 TABLET ORAL at 09:31

## 2018-08-18 RX ADMIN — HEPARIN SODIUM 15000 UNITS: 5000 INJECTION, SOLUTION INTRAVENOUS; SUBCUTANEOUS at 04:04

## 2018-08-18 RX ADMIN — PANTOPRAZOLE SODIUM 40 MG: 40 TABLET, DELAYED RELEASE ORAL at 09:27

## 2018-08-18 RX ADMIN — HYDROMORPHONE HYDROCHLORIDE 1 MG: 1 INJECTION, SOLUTION INTRAMUSCULAR; INTRAVENOUS; SUBCUTANEOUS at 09:21

## 2018-08-18 RX ADMIN — HEPARIN SODIUM 15000 UNITS: 5000 INJECTION, SOLUTION INTRAVENOUS; SUBCUTANEOUS at 18:57

## 2018-08-18 RX ADMIN — HEPARIN SODIUM 15000 UNITS: 5000 INJECTION, SOLUTION INTRAVENOUS; SUBCUTANEOUS at 23:36

## 2018-08-18 RX ADMIN — HEPARIN SODIUM 15000 UNITS: 5000 INJECTION, SOLUTION INTRAVENOUS; SUBCUTANEOUS at 01:01

## 2018-08-18 RX ADMIN — HYDROMORPHONE HYDROCHLORIDE 1 MG: 1 INJECTION, SOLUTION INTRAMUSCULAR; INTRAVENOUS; SUBCUTANEOUS at 05:00

## 2018-08-18 RX ADMIN — FAMOTIDINE 20 MG: 20 TABLET ORAL at 17:11

## 2018-08-18 RX ADMIN — HEPARIN SODIUM 15000 UNITS: 5000 INJECTION, SOLUTION INTRAVENOUS; SUBCUTANEOUS at 22:22

## 2018-08-18 RX ADMIN — HEPARIN SODIUM 15000 UNITS: 5000 INJECTION, SOLUTION INTRAVENOUS; SUBCUTANEOUS at 16:00

## 2018-08-18 RX ADMIN — SENNOSIDES 17.2 MG: 8.6 TABLET, FILM COATED ORAL at 17:12

## 2018-08-18 RX ADMIN — HEPARIN SODIUM 15000 UNITS: 5000 INJECTION, SOLUTION INTRAVENOUS; SUBCUTANEOUS at 05:00

## 2018-08-18 RX ADMIN — HEPARIN SODIUM 15000 UNITS: 5000 INJECTION, SOLUTION INTRAVENOUS; SUBCUTANEOUS at 08:03

## 2018-08-18 RX ADMIN — HEPARIN SODIUM 15000 UNITS: 5000 INJECTION, SOLUTION INTRAVENOUS; SUBCUTANEOUS at 20:15

## 2018-08-18 RX ADMIN — LISINOPRIL 10 MG: 10 TABLET ORAL at 17:12

## 2018-08-18 RX ADMIN — HEPARIN SODIUM 15000 UNITS: 5000 INJECTION, SOLUTION INTRAVENOUS; SUBCUTANEOUS at 10:04

## 2018-08-18 RX ADMIN — SODIUM BICARBONATE 650 MG: 650 TABLET ORAL at 16:33

## 2018-08-18 RX ADMIN — HYDROMORPHONE HYDROCHLORIDE 1 MG: 1 INJECTION, SOLUTION INTRAMUSCULAR; INTRAVENOUS; SUBCUTANEOUS at 20:21

## 2018-08-18 RX ADMIN — HEPARIN SODIUM 15000 UNITS: 5000 INJECTION, SOLUTION INTRAVENOUS; SUBCUTANEOUS at 18:05

## 2018-08-18 RX ADMIN — HEPARIN SODIUM 15000 UNITS: 5000 INJECTION, SOLUTION INTRAVENOUS; SUBCUTANEOUS at 07:03

## 2018-08-18 RX ADMIN — ASPIRIN 325 MG ORAL TABLET 325 MG: 325 PILL ORAL at 09:26

## 2018-08-18 RX ADMIN — SULFAMETHOXAZOLE AND TRIMETHOPRIM 1 TABLET: 800; 160 TABLET ORAL at 20:49

## 2018-08-18 RX ADMIN — HEPARIN SODIUM 15000 UNITS: 5000 INJECTION, SOLUTION INTRAVENOUS; SUBCUTANEOUS at 17:03

## 2018-08-18 RX ADMIN — HEPARIN SODIUM 15000 UNITS: 5000 INJECTION, SOLUTION INTRAVENOUS; SUBCUTANEOUS at 00:03

## 2018-08-18 RX ADMIN — HEPARIN SODIUM 15000 UNITS: 5000 INJECTION, SOLUTION INTRAVENOUS; SUBCUTANEOUS at 02:01

## 2018-08-18 RX ADMIN — HEPARIN SODIUM 15000 UNITS: 5000 INJECTION, SOLUTION INTRAVENOUS; SUBCUTANEOUS at 12:57

## 2018-08-18 RX ADMIN — DOCUSATE SODIUM 100 MG: 100 CAPSULE, LIQUID FILLED ORAL at 09:27

## 2018-08-18 RX ADMIN — PRAVASTATIN SODIUM 80 MG: 20 TABLET ORAL at 09:26

## 2018-08-18 RX ADMIN — FAMOTIDINE 20 MG: 20 TABLET ORAL at 09:26

## 2018-08-18 RX ADMIN — HYDROMORPHONE HYDROCHLORIDE 1 MG: 1 INJECTION, SOLUTION INTRAMUSCULAR; INTRAVENOUS; SUBCUTANEOUS at 13:00

## 2018-08-18 RX ADMIN — SULFAMETHOXAZOLE AND TRIMETHOPRIM 1 TABLET: 800; 160 TABLET ORAL at 09:27

## 2018-08-18 RX ADMIN — ALUMINUM HYDROXIDE, MAGNESIUM HYDROXIDE, AND SIMETHICONE 30 ML: 200; 200; 20 SUSPENSION ORAL at 16:33

## 2018-08-18 RX ADMIN — HEPARIN SODIUM 15000 UNITS: 5000 INJECTION, SOLUTION INTRAVENOUS; SUBCUTANEOUS at 15:00

## 2018-08-18 RX ADMIN — HEPARIN SODIUM 5000 UNITS: 5000 INJECTION, SOLUTION INTRAVENOUS; SUBCUTANEOUS at 20:49

## 2018-08-18 RX ADMIN — HEPARIN SODIUM 15000 UNITS: 5000 INJECTION, SOLUTION INTRAVENOUS; SUBCUTANEOUS at 12:02

## 2018-08-18 RX ADMIN — HEPARIN SODIUM 15000 UNITS: 5000 INJECTION, SOLUTION INTRAVENOUS; SUBCUTANEOUS at 14:02

## 2018-08-18 RX ADMIN — DOCUSATE SODIUM 100 MG: 100 CAPSULE, LIQUID FILLED ORAL at 17:12

## 2018-08-18 RX ADMIN — HEPARIN SODIUM 15000 UNITS: 5000 INJECTION, SOLUTION INTRAVENOUS; SUBCUTANEOUS at 09:02

## 2018-08-18 NOTE — NURSING NOTE
Patient awake in bed, visitors at bedside  Through discussion with patient "slightly depressed,  This is going to be my fourth surgery nothing seems to be going right, Im stuck in this bed and I feel helpless" Allowed patient to express emotions  RLE elevated, flap site with erythema noted at suture sites, scattered areas of cyanosis  Top of flap dark, thick bloody drainage noted from suture sites  LD in range, Leaching continues q1  Patient using PCA, has own CPAP and applies intermittently   Call bell in reach and instructed to use

## 2018-08-18 NOTE — PROGRESS NOTES
Patient resting in bed  LD = 8 8  Flap is dusky with continued "mechanical leeching"  Dressings intact  Left thigh dressing removed to air dry  Left thigh incision intact with all sutures

## 2018-08-18 NOTE — ASSESSMENT & PLAN NOTE
· Uncontrolled symptoms patient does have a hiatal hernia status post sees EGD but his symptoms have been quite a controlled for a while  I will increase his Pepcid to 20 mg p  o  b i d  and Protonix 40 mg p  o  daily put him on sodium bicarb 650 mg p o  b i d  p r n  and Maalox patient agrees with this plan

## 2018-08-18 NOTE — ASSESSMENT & PLAN NOTE
Patient underwent right-sided free flap to right heel surgery by Kellen Messina on 8/13/18-  Previous free flap failure-and a flap failure with venous congestion and arterial clot was that he had to be taken to the OR again for the clock to be evacuated now he is on mechanical leaching by Plastic surgery  Flap does not look promising- tentative or for graft on Monday   Continue current pain management  Bowel movement regimen  PT evaluate, DVT prophylaxis as per plastic  surgeon on heparin    Evaluated by ID tissue culture -Growth of Achromobacter xylosoxidans (A) and MRSA cefepime 2 g IV q 12 -> changed to bactrim ds by ID till 08/28/18    Leukocytosis improved from 08/13 secondary to postop reactive    Labs in am

## 2018-08-18 NOTE — ASSESSMENT & PLAN NOTE
· Previous admission same- asymptomatic- had one episode of bigeminy - no indication of treatment at this time unless more frequent - no more bigeminy just infrequent pvc- non evident since yesterday  · k and mag ok   · Had echo last admission recent- normal

## 2018-08-18 NOTE — ASSESSMENT & PLAN NOTE
· Can be a combination secondary to hypovolemic 0 per up postoperative pain the patient has been NPO with 2 surgeries 1 day apart  He is on lactated Ringer's   Last sodium 133 stable - periodic checks- pain controled- labs in am

## 2018-08-18 NOTE — PROGRESS NOTES
Progress Note - Christopher Grove 1960, 63 y o  male MRN: [id number]    Unit/Bed#:  Encounter: [id number]    Primary Care Provider: Kashmir Smith MD   Date and time admitted to hospital: 8/13/2018  5:55 AM        Mild intermittent asthma without complication   Assessment & Plan    · Has not had any trouble while hospitalized but if needed we will put p r n  albuterol        Constipation   Assessment & Plan    Patient does not want senokot-  he is immobile and also on opioid for pain control   Will continue  milk of mag to 30 ml po bidprn as he is  on colace   Also added prune juice->>>> 4 bm 2 days prior- no bm yesterday           Morbid obesity with BMI of 45 0-49 9, adult ([facility])   Assessment & Plan    Low-calorie diet,  PT evaluation for early mobilization if recommended by orthopedic surgeon- since being hospitalized states lost weight        Acute hyponatremia   Assessment & Plan    · Can be a combination secondary to hypovolemic 0 per up postoperative pain the patient has been NPO with 2 surgeries 1 day apart  He is on lactated Ringer's  Last sodium 133 stable - periodic checks- pain controled- labs in am        KIRIT on CPAP   Assessment & Plan    Continue CPAP        GERD (gastroesophageal reflux disease)   Assessment & Plan      · Uncontrolled symptoms patient does have a hiatal hernia status post sees EGD but his symptoms have been quite a controlled for a while  I will increase his Pepcid to 20 mg p  o  b i d  and Protonix 40 mg p  o  daily put him on sodium bicarb 650 mg p o  b i d  p r n  and Maalox patient agrees with this plan                 Essential hypertension   Assessment & Plan    Controled continue lisinopril         PVC (premature ventricular contraction)   Assessment & Plan    · Previous admission same- asymptomatic- had one episode of bigeminy - no indication of treatment at this time unless more frequent - no more bigeminy just infrequent pvc- non evident since yesterday  · k and mag ok   · Had echo last admission recent- normal         HLD (hyperlipidemia)   Assessment & Plan    Low-cholesterol diet  Continue pravastatin every other day as recommended by patient's family doctor and Marjan- every other day  As he was having muscle spasm secondary to being on statin daily and his muscle  Spasms have resolved since going this way        * Ankle wound, right, sequela   Assessment & Plan    Patient underwent right-sided free flap to right heel surgery by Molly Flower on 8/13/18-  Previous free flap failure-and a flap failure with venous congestion and arterial clot was that he had to be taken to the OR again for the clock to be evacuated now he is on mechanical leaching by Plastic surgery  Flap does not look promising- tentative or for graft on Monday   Continue current pain management  Bowel movement regimen  PT evaluate, DVT prophylaxis as per plastic  surgeon on heparin    Evaluated by ID tissue culture -Growth of Achromobacter xylosoxidans (A) and MRSA cefepime 2 g IV q 12 -> changed to bactrim ds by ID till 08/28/18    Leukocytosis improved from 08/13 secondary to postop reactive  Labs in am              VTE Pharmacologic Prophylaxis:   Pharmacologic: Heparin  Mechanical VTE Prophylaxis in Place: Yes    Patient Centered Rounds: I have performed bedside rounds with nursing staff today  Discussions with Specialists or Other Care Team Provider: yes    Education and Discussions with Family / Patient: yes    Time Spent for Care: 30 minutes  More than 50% of total time spent on counseling and coordination of care as described above      Current Length of Stay: 5 day(s)    Current Patient Status: Inpatient   Certification Statement: The patient will continue to require additional inpatient hospital stay due to possible graft monday     Discharge Plan: when cleared by plastic surgery    Code Status: No Order      Subjective:   Patient seen and examined complaining of GERD in the chest shin which he has suffered for very long time secondary to having a hiatal hernia  Requesting sodium bicarb also stating Maalox will help  Denies any chest pain or shortness of breath leg pain is controlled no BM yesterday will try to go today  Objective:     Vitals:   Temp (24hrs), Av °F (36 7 °C), Min:97 8 °F (36 6 °C), Max:98 4 °F (36 9 °C)    HR:  [68-94] 77  Resp:  [11-50] 22  BP: (112-165)/(61-84) 120/67  SpO2:  [91 %-99 %] 95 %  Body mass index is 48 66 kg/m²  Input and Output Summary (last 24 hours): Intake/Output Summary (Last 24 hours) at 18 0807  Last data filed at 18 0700   Gross per 24 hour   Intake             1441 ml   Output             4975 ml   Net            -3534 ml       Physical Exam:     Physical Exam   Constitutional: He is oriented to person, place, and time  He appears well-developed  Obese   HENT:   Head: Normocephalic and atraumatic  Eyes: EOM are normal  Pupils are equal, round, and reactive to light  Neck: Normal range of motion  Cardiovascular: Normal rate, regular rhythm and normal heart sounds  Pulmonary/Chest: Effort normal and breath sounds normal    Abdominal: Soft  Bowel sounds are normal    Obese   Musculoskeletal: Normal range of motion  He exhibits edema (Right lower extremity)  Neurological: He is alert and oriented to person, place, and time  He has normal reflexes  Skin: Skin is warm  Psychiatric: He has a normal mood and affect           Additional Data:     Labs:      Results from last 7 days  Lab Units 18  1123   WBC Thousand/uL 14 00*   HEMOGLOBIN g/dL 10 2*   HEMATOCRIT % 29 9*   PLATELETS Thousands/uL 308   NEUTROS PCT % 72*   LYMPHS PCT % 14*   MONOS PCT % 10   EOS PCT % 4       Results from last 7 days  Lab Units 18  1123   SODIUM mmol/L 133*   POTASSIUM mmol/L 3 9   CHLORIDE mmol/L 100   CO2 mmol/L 29   BUN mg/dL 13   CREATININE mg/dL 0 79   CALCIUM mg/dL 8 0*   GLUCOSE RANDOM mg/dL 104* * I Have Reviewed All Lab Data Listed Above  * Additional Pertinent Lab Tests Reviewed:  [address] Admission Reviewed    Imaging:    Imaging Reports Reviewed Today Include: none ordered  Imaging Personally Reviewed by Myself Includes:  None    Recent Cultures (last 7 days):       Results from last 7 days  Lab Units 08/13/18  0850   GRAM STAIN RESULT  No polys seen  4+ Gram positive cocci in pairs  4+ Gram Positive Rods Resembling Diphtheroids       Last 24 Hours Medication List:     Current Facility-Administered Medications:  albuterol 2 puff Inhalation Q4H PRN Ximena Rogel MD   aluminum-magnesium hydroxide-simethicone 30 mL Oral Q4H PRN Ximena Rogel MD   aspirin 325 mg Oral BID Christina Parada MD   dexamethasone 4 mg Intravenous Once PRN Lico Schscoobyke, DO   dexamethasone 8 mg Intravenous Once PRN Lico Fischerke, DO   docusate sodium 100 mg Oral BID Ketan Butler MD   ezetimibe 10 mg Oral Daily Christina Parada MD   famotidine 20 mg Oral BID Ximena Rogel MD   fentaNYL 25 mcg Intravenous Q5 Min PRN Lico Reyes, DO   fentaNYL  Intravenous Continuous Lico Reyes DO   heparin (porcine) 10,000 Units Subcutaneous Once Christina Parada MD   heparin (porcine) 15,000 Units Subcutaneous Q30 Min PRN Christina Parada MD   heparin (porcine) 5,000 Units Subcutaneous Q12H [facility] Christina Parada MD   HYDROmorphone 0 2 mg Intravenous Q5 Min PRN Lico Reyes, DO   HYDROmorphone 1 mg Intravenous Q1H PRN Hilton Rosas MD   lisinopril 10 mg Oral BID Ximena Rogel MD   magnesium hydroxide 30 mL Oral BID PRN Ximena Rogel MD   meperidine 12 5 mg Intravenous Q10 Min PRN Lico Reyes, DO   morphine injection 2 mg Intravenous Q3H PRN Christina Parada MD   ondansetron 4 mg Intravenous Q6H PRN Christina Parada MD   oxyCODONE-acetaminophen 1 tablet Oral Q4H PRN Christina Parada MD   pantoprazole 40 mg Oral Early Morning Ximena Rogel MD   phenol 1 spray Mouth/Throat Q2H PRN Lico Reyes, DO pravastatin 80 mg Oral Every Other Day Radha Rudolph MD   senna 2 tablet Oral BID Odalys Majano MD   sodium bicarbonate 650 mg Oral BID PRN Jero Etienne MD   sulfamethoxazole-trimethoprim 1 tablet Oral Q12H Linda Villatoro MD        Today, Patient Was Seen By: Jero Etienne MD    ** Please Note: Dictation voice to text software may have been used in the creation of this document   **

## 2018-08-18 NOTE — ASSESSMENT & PLAN NOTE
Low-calorie diet,  PT evaluation for early mobilization if recommended by orthopedic surgeon- since being hospitalized states lost weight

## 2018-08-18 NOTE — NURSING NOTE
Patient awake in bed, completing morning ADLS with set up  Rates pain 5/10 in RLE  PRN given, RLE elevated with minimal bloody/tanish drainage around sutures  Erythema noted  Top of flap dark with dark edges, leaching continues q1   Call bell in reach and instructed to use

## 2018-08-18 NOTE — NURSING NOTE
Patient awake in bed intermittently sleeping, independently using CPAP  Using PCA pump, requesting PRN medication pain 5/10  Leaching Q1, LD in range  No change from previous assessment   Call bell in reach and instructed to use

## 2018-08-18 NOTE — NURSING NOTE
Patient complaining of "severe indigestion" asking for sodium bicarb instead of tums due to them causing patient kidney stones, telephone order obtained from Dr Makenzie Barone

## 2018-08-18 NOTE — NURSING NOTE
Assessment unchanged Brother visits  Eating well  Using PCA , but requires Dilaudid ~Q4h  States has increased pain when we are leeching the flap  No BM yet today, has drank a lot of prune juice!

## 2018-08-18 NOTE — ASSESSMENT & PLAN NOTE
Patient does not want senokot-  he is immobile and also on opioid for pain control    Will continue  milk of mag to 30 ml po bidprn as he is  on colace   Also added prune juice->>>> 4 bm 2 days prior- no bm yesterday

## 2018-08-18 NOTE — NURSING NOTE
Resting in bed - complains of indigestion  Dr Socorro Martinez aware  Leeching flap Q1H , not much bleeding present  Old drainage on dressing of the R leg ankle  Foot elevated on pillow  SR on monitor  Voids clear yellow  Continue to monitor

## 2018-08-19 PROBLEM — K59.00 CONSTIPATION: Status: RESOLVED | Noted: 2018-08-15 | Resolved: 2018-08-19

## 2018-08-19 PROBLEM — S91.001A OPEN WOUND OF RIGHT ANKLE: Status: RESOLVED | Noted: 2018-08-07 | Resolved: 2018-08-19

## 2018-08-19 LAB
ANION GAP SERPL CALCULATED.3IONS-SCNC: 6 MMOL/L (ref 5–14)
BASOPHILS # BLD AUTO: 0.1 THOUSANDS/ΜL (ref 0–0.1)
BASOPHILS NFR BLD AUTO: 1 % (ref 0–1)
BUN SERPL-MCNC: 18 MG/DL (ref 5–25)
CALCIUM SERPL-MCNC: 9.1 MG/DL (ref 8.4–10.2)
CHLORIDE SERPL-SCNC: 98 MMOL/L (ref 97–108)
CO2 SERPL-SCNC: 28 MMOL/L (ref 22–30)
CREAT SERPL-MCNC: 1.05 MG/DL (ref 0.7–1.5)
EOSINOPHIL # BLD AUTO: 1.1 THOUSAND/ΜL (ref 0–0.4)
EOSINOPHIL NFR BLD AUTO: 8 % (ref 0–6)
ERYTHROCYTE [DISTWIDTH] IN BLOOD BY AUTOMATED COUNT: 15 %
GFR SERPL CREATININE-BSD FRML MDRD: 78 ML/MIN/1.73SQ M
GLUCOSE SERPL-MCNC: 104 MG/DL (ref 70–99)
HCT VFR BLD AUTO: 34 % (ref 41–53)
HGB BLD-MCNC: 11.3 G/DL (ref 13.5–17.5)
LYMPHOCYTES # BLD AUTO: 2.5 THOUSANDS/ΜL (ref 0.5–4)
LYMPHOCYTES NFR BLD AUTO: 19 % (ref 20–50)
MCH RBC QN AUTO: 29.9 PG (ref 26–34)
MCHC RBC AUTO-ENTMCNC: 33.4 G/DL (ref 31–36)
MCV RBC AUTO: 90 FL (ref 80–100)
MONOCYTES # BLD AUTO: 1.4 THOUSAND/ΜL (ref 0.2–0.9)
MONOCYTES NFR BLD AUTO: 10 % (ref 1–10)
NEUTROPHILS # BLD AUTO: 8.7 THOUSANDS/ΜL (ref 1.8–7.8)
NEUTS SEG NFR BLD AUTO: 63 % (ref 45–65)
OSMOLALITY UR/SERPL-RTO: 285 MMOL/KG (ref 282–298)
OSMOLALITY UR: 339 MMOL/KG
PLATELET # BLD AUTO: 378 THOUSANDS/UL (ref 150–450)
PMV BLD AUTO: 8.5 FL (ref 8.9–12.7)
POTASSIUM SERPL-SCNC: 4.4 MMOL/L (ref 3.6–5)
RBC # BLD AUTO: 3.79 MILLION/UL (ref 4.5–5.9)
SODIUM 24H UR-SCNC: 71 MOL/L
SODIUM SERPL-SCNC: 132 MMOL/L (ref 137–147)
VANCOMYCIN TROUGH SERPL-MCNC: <5 UG/ML (ref 10–20)
WBC # BLD AUTO: 13.8 THOUSAND/UL (ref 4.5–11)

## 2018-08-19 PROCEDURE — 83935 ASSAY OF URINE OSMOLALITY: CPT | Performed by: FAMILY MEDICINE

## 2018-08-19 PROCEDURE — 99232 SBSQ HOSP IP/OBS MODERATE 35: CPT | Performed by: FAMILY MEDICINE

## 2018-08-19 PROCEDURE — 80202 ASSAY OF VANCOMYCIN: CPT | Performed by: PLASTIC SURGERY

## 2018-08-19 PROCEDURE — 84300 ASSAY OF URINE SODIUM: CPT | Performed by: FAMILY MEDICINE

## 2018-08-19 PROCEDURE — 80048 BASIC METABOLIC PNL TOTAL CA: CPT | Performed by: FAMILY MEDICINE

## 2018-08-19 PROCEDURE — 85025 COMPLETE CBC W/AUTO DIFF WBC: CPT | Performed by: FAMILY MEDICINE

## 2018-08-19 PROCEDURE — 83930 ASSAY OF BLOOD OSMOLALITY: CPT | Performed by: FAMILY MEDICINE

## 2018-08-19 RX ADMIN — HEPARIN SODIUM 15000 UNITS: 5000 INJECTION, SOLUTION INTRAVENOUS; SUBCUTANEOUS at 04:26

## 2018-08-19 RX ADMIN — DOCUSATE SODIUM 100 MG: 100 CAPSULE, LIQUID FILLED ORAL at 17:30

## 2018-08-19 RX ADMIN — LISINOPRIL 10 MG: 10 TABLET ORAL at 17:30

## 2018-08-19 RX ADMIN — EZETIMIBE 10 MG: 10 TABLET ORAL at 08:40

## 2018-08-19 RX ADMIN — FAMOTIDINE 20 MG: 20 TABLET ORAL at 08:40

## 2018-08-19 RX ADMIN — HEPARIN SODIUM 15000 UNITS: 5000 INJECTION, SOLUTION INTRAVENOUS; SUBCUTANEOUS at 15:24

## 2018-08-19 RX ADMIN — HEPARIN SODIUM 15000 UNITS: 5000 INJECTION, SOLUTION INTRAVENOUS; SUBCUTANEOUS at 12:28

## 2018-08-19 RX ADMIN — HEPARIN SODIUM 15000 UNITS: 5000 INJECTION, SOLUTION INTRAVENOUS; SUBCUTANEOUS at 01:02

## 2018-08-19 RX ADMIN — ONDANSETRON HYDROCHLORIDE 4 MG: 2 INJECTION, SOLUTION INTRAMUSCULAR; INTRAVENOUS at 06:21

## 2018-08-19 RX ADMIN — HEPARIN SODIUM 15000 UNITS: 5000 INJECTION, SOLUTION INTRAVENOUS; SUBCUTANEOUS at 11:19

## 2018-08-19 RX ADMIN — HEPARIN SODIUM 15000 UNITS: 5000 INJECTION, SOLUTION INTRAVENOUS; SUBCUTANEOUS at 05:39

## 2018-08-19 RX ADMIN — HEPARIN SODIUM 15000 UNITS: 5000 INJECTION, SOLUTION INTRAVENOUS; SUBCUTANEOUS at 09:24

## 2018-08-19 RX ADMIN — HEPARIN SODIUM 5000 UNITS: 5000 INJECTION, SOLUTION INTRAVENOUS; SUBCUTANEOUS at 20:14

## 2018-08-19 RX ADMIN — HEPARIN SODIUM 15000 UNITS: 5000 INJECTION, SOLUTION INTRAVENOUS; SUBCUTANEOUS at 10:24

## 2018-08-19 RX ADMIN — HEPARIN SODIUM 15000 UNITS: 5000 INJECTION, SOLUTION INTRAVENOUS; SUBCUTANEOUS at 18:21

## 2018-08-19 RX ADMIN — HEPARIN SODIUM 15000 UNITS: 5000 INJECTION, SOLUTION INTRAVENOUS; SUBCUTANEOUS at 21:35

## 2018-08-19 RX ADMIN — HEPARIN SODIUM 15000 UNITS: 5000 INJECTION, SOLUTION INTRAVENOUS; SUBCUTANEOUS at 02:36

## 2018-08-19 RX ADMIN — SENNOSIDES 17.2 MG: 8.6 TABLET, FILM COATED ORAL at 08:55

## 2018-08-19 RX ADMIN — SULFAMETHOXAZOLE AND TRIMETHOPRIM 1 TABLET: 800; 160 TABLET ORAL at 08:40

## 2018-08-19 RX ADMIN — FAMOTIDINE 20 MG: 20 TABLET ORAL at 17:30

## 2018-08-19 RX ADMIN — HEPARIN SODIUM 15000 UNITS: 5000 INJECTION, SOLUTION INTRAVENOUS; SUBCUTANEOUS at 20:13

## 2018-08-19 RX ADMIN — LISINOPRIL 10 MG: 10 TABLET ORAL at 08:40

## 2018-08-19 RX ADMIN — HEPARIN SODIUM 5000 UNITS: 5000 INJECTION, SOLUTION INTRAVENOUS; SUBCUTANEOUS at 08:37

## 2018-08-19 RX ADMIN — PANTOPRAZOLE SODIUM 40 MG: 40 TABLET, DELAYED RELEASE ORAL at 05:56

## 2018-08-19 RX ADMIN — HEPARIN SODIUM 15000 UNITS: 5000 INJECTION, SOLUTION INTRAVENOUS; SUBCUTANEOUS at 17:29

## 2018-08-19 RX ADMIN — SENNOSIDES 17.2 MG: 8.6 TABLET, FILM COATED ORAL at 17:30

## 2018-08-19 RX ADMIN — SULFAMETHOXAZOLE AND TRIMETHOPRIM 1 TABLET: 800; 160 TABLET ORAL at 21:35

## 2018-08-19 RX ADMIN — HEPARIN SODIUM 15000 UNITS: 5000 INJECTION, SOLUTION INTRAVENOUS; SUBCUTANEOUS at 22:44

## 2018-08-19 RX ADMIN — HEPARIN SODIUM 15000 UNITS: 5000 INJECTION, SOLUTION INTRAVENOUS; SUBCUTANEOUS at 14:32

## 2018-08-19 RX ADMIN — HEPARIN SODIUM 15000 UNITS: 5000 INJECTION, SOLUTION INTRAVENOUS; SUBCUTANEOUS at 06:22

## 2018-08-19 RX ADMIN — HEPARIN SODIUM 15000 UNITS: 5000 INJECTION, SOLUTION INTRAVENOUS; SUBCUTANEOUS at 16:44

## 2018-08-19 RX ADMIN — ASPIRIN 325 MG ORAL TABLET 325 MG: 325 PILL ORAL at 08:40

## 2018-08-19 RX ADMIN — HYDROMORPHONE HYDROCHLORIDE 1 MG: 1 INJECTION, SOLUTION INTRAMUSCULAR; INTRAVENOUS; SUBCUTANEOUS at 20:21

## 2018-08-19 RX ADMIN — DOCUSATE SODIUM 100 MG: 100 CAPSULE, LIQUID FILLED ORAL at 08:55

## 2018-08-19 RX ADMIN — HEPARIN SODIUM 15000 UNITS: 5000 INJECTION, SOLUTION INTRAVENOUS; SUBCUTANEOUS at 08:25

## 2018-08-19 RX ADMIN — ONDANSETRON HYDROCHLORIDE 4 MG: 2 INJECTION, SOLUTION INTRAMUSCULAR; INTRAVENOUS at 12:42

## 2018-08-19 RX ADMIN — ASPIRIN 325 MG ORAL TABLET 325 MG: 325 PILL ORAL at 17:30

## 2018-08-19 RX ADMIN — HEPARIN SODIUM 15000 UNITS: 5000 INJECTION, SOLUTION INTRAVENOUS; SUBCUTANEOUS at 03:44

## 2018-08-19 RX ADMIN — HEPARIN SODIUM 15000 UNITS: 5000 INJECTION, SOLUTION INTRAVENOUS; SUBCUTANEOUS at 13:22

## 2018-08-19 RX ADMIN — HEPARIN SODIUM 15000 UNITS: 5000 INJECTION, SOLUTION INTRAVENOUS; SUBCUTANEOUS at 07:35

## 2018-08-19 NOTE — PROGRESS NOTES
Progress Note - William Garnett 1960, 63 y o  male MRN: [id number]    Unit/Bed#:  Encounter: [id number]    Primary Care Provider: Amelia Owen MD   Date and time admitted to hospital: 8/13/2018  5:55 AM        Mild intermittent asthma without complication   Assessment & Plan    · Has not had any trouble while hospitalized but if needed we will put p r n  albuterol        Morbid obesity with BMI of 45 0-49 9, adult ([facility] [address] )   Assessment & Plan    Low-calorie diet,  PT evaluation for early mobilization if recommended by orthopedic surgeon- since being hospitalized states lost weight        Acute hyponatremia   Assessment & Plan    · Sodium 132- suspect siadh/pain when leaching - will check sodium studies and place on 1500 mol restriction - recheck in am  Last sodium 133 stable prior to 132        KIRIT on CPAP   Assessment & Plan    Continue CPAP        GERD (gastroesophageal reflux disease)   Assessment & Plan    Resolved post below adjustment  · Uncontrolled symptoms patient does have a hiatal hernia status post sees EGD but his symptoms have been quite a controlled for a while  I will increase his Pepcid to 20 mg p  o  b i d  and Protonix 40 mg p  o  daily put him on sodium bicarb 650 mg p o  b i d  p r n  and Maalox patient agrees with this plan                 Essential hypertension   Assessment & Plan    Controled continue lisinopril         PVC (premature ventricular contraction)   Assessment & Plan    · Previous admission same- asymptomatic - no indication of treatment at this time unless more frequent - infrequent bigeminy   · k and mag ok   · Had echo last admission recent- normal         HLD (hyperlipidemia)   Assessment & Plan    Low-cholesterol diet  Continue pravastatin every other day as recommended by patient's family doctor and Zetia- every other day  As he was having muscle spasm secondary to being on statin daily and his muscle  Spasms have resolved since going this way        * Ankle wound, right, sequela   Assessment & Plan    Patient underwent right-sided free flap to right heel surgery by Divina Hernández on 18-  Previous free flap failure-and a flap failure with venous congestion and arterial clot was that he had to be taken to the OR again for the clock to be evacuated now he is on mechanical leaching by Plastic surgery  Flap does not look promising- tentative or for graft on Monday   Continue current pain management  Bowel movement regimen  PT evaluate, DVT prophylaxis as per plastic  surgeon on heparin    Evaluated by ID tissue culture -Growth of Achromobacter xylosoxidans (A) and MRSA cefepime 2 g IV q 12 -> changed to bactrim ds by ID till 18    Leukocytosis improving /afebrile  Labs in am            VTE Pharmacologic Prophylaxis:   Pharmacologic: Heparin  Mechanical VTE Prophylaxis in Place: Yes    Patient Centered Rounds: I have performed bedside rounds with nursing staff today  Discussions with Specialists or Other Care Team Provider: yes    Education and Discussions with Family / Patient: patient    Time Spent for Care: 30 minutes  More than 50% of total time spent on counseling and coordination of care as described above  Current Length of Stay: 6 day(s)    Current Patient Status: Inpatient   Certification Statement: The patient will continue to require additional inpatient hospital stay due to graft caden     Discharge Plan: when cleared by plastic surgery    Code Status: No Order      Subjective:   Patient is seen and examined denies any chest pain or shortness of breath no abdominal pain and some nausea when having BM but currently resolved  In the just should have resolved       Objective:     Vitals:   Temp (24hrs), Av 9 °F (36 6 °C), Min:97 7 °F (36 5 °C), Max:98 °F (36 7 °C)    HR:  [72-87] 79  Resp:  [11-24] 16  BP: ()/(62-95) 143/70  SpO2:  [91 %-99 %] 94 %  Body mass index is 48 66 kg/m²       Input and Output Summary (last 24 hours): Intake/Output Summary (Last 24 hours) at 08/19/18 0800  Last data filed at 08/19/18 0400   Gross per 24 hour   Intake             1635 ml   Output             2950 ml   Net            -1315 ml       Physical Exam:     Physical Exam   Constitutional: He is oriented to person, place, and time  He appears well-developed and well-nourished  HENT:   Head: Normocephalic and atraumatic  Eyes: EOM are normal  Pupils are equal, round, and reactive to light  Neck: Normal range of motion  Cardiovascular: Normal rate, regular rhythm and normal heart sounds  Pulmonary/Chest: Effort normal and breath sounds normal    Abdominal: Soft  Bowel sounds are normal    Musculoskeletal: Normal range of motion  He exhibits edema (Right lower extremity with bulk dressing)  Neurological: He is alert and oriented to person, place, and time  He has normal reflexes  Skin: Skin is warm  Psychiatric: He has a normal mood and affect  Additional Data:     Labs:      Results from last 7 days  Lab Units 08/19/18  0542   WBC Thousand/uL 13 80*   HEMOGLOBIN g/dL 11 3*   HEMATOCRIT % 34 0*   PLATELETS Thousands/uL 378   NEUTROS PCT % 63   LYMPHS PCT % 19*   MONOS PCT % 10   EOS PCT % 8*       Results from last 7 days  Lab Units 08/19/18  0407   SODIUM mmol/L 132*   POTASSIUM mmol/L 4 4   CHLORIDE mmol/L 98   CO2 mmol/L 28   BUN mg/dL 18   CREATININE mg/dL 1 05   CALCIUM mg/dL 9 1   GLUCOSE RANDOM mg/dL 104*                     * I Have Reviewed All Lab Data Listed Above  * Additional Pertinent Lab Tests Reviewed:  All Labs Within Last 24 Hours Reviewed    Imaging:    Imaging Reports Reviewed Today Include:  None ordered  Imaging Personally Reviewed by Myself Includes:  None    Recent Cultures (last 7 days):       Results from last 7 days  Lab Units 08/13/18  0850   GRAM STAIN RESULT  No polys seen  4+ Gram positive cocci in pairs  4+ Gram Positive Rods Resembling Diphtheroids       Last 24 Hours Medication List: Current Facility-Administered Medications:  albuterol 2 puff Inhalation Q4H PRN Tamara Panda MD   aluminum-magnesium hydroxide-simethicone 30 mL Oral Q4H PRN Tamara Panda MD   aspirin 325 mg Oral BID Damaris Joseph MD   dexamethasone 4 mg Intravenous Once PRN Rip Plank, DO   dexamethasone 8 mg Intravenous Once PRN Rip Plank, DO   docusate sodium 100 mg Oral BID Tyrell Gaspar MD   ezetimibe 10 mg Oral Daily Damaris Joseph MD   famotidine 20 mg Oral BID Tamara Panda MD   fentaNYL 25 mcg Intravenous Q5 Min PRN Rip Plank, DO   fentaNYL  Intravenous Continuous Rip Plank, DO   heparin (porcine) 10,000 Units Subcutaneous Once Damaris Joseph MD   heparin (porcine) 15,000 Units Subcutaneous Q30 Min PRN Damaris Joseph MD   heparin (porcine) 5,000 Units Subcutaneous Q12H [facility] Damaris Joseph MD   HYDROmorphone 0 2 mg Intravenous Q5 Min PRN Rip Plank, DO   HYDROmorphone 1 mg Intravenous Q1H PRN Salvatore Thomson MD   lisinopril 10 mg Oral BID Tamara Panda MD   magnesium hydroxide 30 mL Oral BID PRN Tamara Panda MD   meperidine 12 5 mg Intravenous Q10 Min PRN Rip Plank, DO   morphine injection 2 mg Intravenous Q3H PRN Damaris Joseph MD   ondansetron 4 mg Intravenous Q6H PRN Damaris Joseph MD   oxyCODONE-acetaminophen 1 tablet Oral Q4H PRN Damaris Joseph MD   pantoprazole 40 mg Oral Early Morning Tamara Panda MD   phenol 1 spray Mouth/Throat Q2H PRN Rip Plank, DO   pravastatin 80 mg Oral Every Other Day Tyrell Gaspar MD   senna 2 tablet Oral BID Odalys Kendall MD   sodium bicarbonate 650 mg Oral BID PRN Tamara Panda MD   sulfamethoxazole-trimethoprim 1 tablet Oral Q12H Olman York MD        Today, Patient Was Seen By: Tamara Panda MD    ** Please Note: Dictation voice to text software may have been used in the creation of this document   **

## 2018-08-19 NOTE — ASSESSMENT & PLAN NOTE
· Previous admission same- asymptomatic - no indication of treatment at this time unless more frequent - infrequent bigeminy   · k and mag ok   · Had echo last admission recent- normal

## 2018-08-19 NOTE — ASSESSMENT & PLAN NOTE
Resolved post below adjustment  · Uncontrolled symptoms patient does have a hiatal hernia status post sees EGD but his symptoms have been quite a controlled for a while  I will increase his Pepcid to 20 mg p  o  b i d  and Protonix 40 mg p  o  daily put him on sodium bicarb 650 mg p o  b i d  p r n  and Maalox patient agrees with this plan

## 2018-08-19 NOTE — NURSING NOTE
Flap color dusky and mechanical leeching  No bleeding when leeched  Covered with gauze and heparin as ordered   Patient tolerating treatment   Laser doppler numbers =5 range

## 2018-08-19 NOTE — ASSESSMENT & PLAN NOTE
Patient underwent right-sided free flap to right heel surgery by Briana Dunn on 8/13/18-  Previous free flap failure-and a flap failure with venous congestion and arterial clot was that he had to be taken to the OR again for the clock to be evacuated now he is on mechanical leaching by Plastic surgery   Flap does not look promising- tentative or for graft on Monday   Continue current pain management  Bowel movement regimen  PT evaluate, DVT prophylaxis as per plastic  surgeon on heparin    Evaluated by ID tissue culture -Growth of Achromobacter xylosoxidans (A) and MRSA cefepime 2 g IV q 12 -> changed to bactrim ds by ID till 08/28/18    Leukocytosis improving /afebrile  Labs in am

## 2018-08-19 NOTE — ASSESSMENT & PLAN NOTE
· Sodium 132- suspect siadh/pain when leaching - will check sodium studies and place on 1500 mol restriction - recheck in am  Last sodium 133 stable prior to 132

## 2018-08-19 NOTE — PLAN OF CARE
CARDIOVASCULAR - ADULT     Maintains optimal cardiac output and hemodynamic stability Progressing     Absence of cardiac dysrhythmias or at baseline rhythm Progressing        MUSCULOSKELETAL - ADULT     Maintain or return mobility to safest level of function Progressing     Maintain proper alignment of affected body part Progressing        Potential for Falls     Patient will remain free of falls Progressing        Prexisting or High Potential for Compromised Skin Integrity     Skin integrity is maintained or improved Progressing        SKIN/TISSUE INTEGRITY - ADULT     Skin integrity remains intact Progressing     Incision(s), wounds(s) or drain site(s) healing without S/S of infection Progressing     Oral mucous membranes remain intact Progressing

## 2018-08-19 NOTE — PROGRESS NOTES
Patient resting in bed , family at bed side , Flap is dusky with continued leeching every hr, left thigh incision all Sture intact , O  T A  , pain well controlled continue Fentanyl PCA 10mcg/ml, no other c/o call bell in reach will continue to monitor

## 2018-08-19 NOTE — PROGRESS NOTES
Assessment no change from previous,  Continued leeching every hr, not much bleeding, will continue to monitor

## 2018-08-19 NOTE — PROGRESS NOTES
LD flap 11 0  Pink under monitor  That may be alive  Rest black, necrotic    Will wait for more neovascularization, then perhaps Thursday, 8-23 go to OR, remove dead part of flap, STSG granulations, perhaps local rotation flap

## 2018-08-20 ENCOUNTER — ANESTHESIA (INPATIENT)
Dept: PERIOP | Facility: HOSPITAL | Age: 58
DRG: 857 | End: 2018-08-20
Payer: COMMERCIAL

## 2018-08-20 PROCEDURE — 97530 THERAPEUTIC ACTIVITIES: CPT

## 2018-08-20 PROCEDURE — 99232 SBSQ HOSP IP/OBS MODERATE 35: CPT | Performed by: FAMILY MEDICINE

## 2018-08-20 PROCEDURE — G8982 BODY POS GOAL STATUS: HCPCS

## 2018-08-20 PROCEDURE — 97163 PT EVAL HIGH COMPLEX 45 MIN: CPT

## 2018-08-20 PROCEDURE — G8981 BODY POS CURRENT STATUS: HCPCS

## 2018-08-20 RX ORDER — LISINOPRIL 10 MG/1
10 TABLET ORAL DAILY
Status: DISCONTINUED | OUTPATIENT
Start: 2018-08-20 | End: 2018-08-27

## 2018-08-20 RX ADMIN — HEPARIN SODIUM 15000 UNITS: 5000 INJECTION, SOLUTION INTRAVENOUS; SUBCUTANEOUS at 01:28

## 2018-08-20 RX ADMIN — HEPARIN SODIUM 5000 UNITS: 5000 INJECTION, SOLUTION INTRAVENOUS; SUBCUTANEOUS at 21:26

## 2018-08-20 RX ADMIN — ASPIRIN 325 MG ORAL TABLET 325 MG: 325 PILL ORAL at 17:02

## 2018-08-20 RX ADMIN — DOCUSATE SODIUM 100 MG: 100 CAPSULE, LIQUID FILLED ORAL at 08:11

## 2018-08-20 RX ADMIN — HEPARIN SODIUM 15000 UNITS: 5000 INJECTION, SOLUTION INTRAVENOUS; SUBCUTANEOUS at 17:55

## 2018-08-20 RX ADMIN — HEPARIN SODIUM 15000 UNITS: 5000 INJECTION, SOLUTION INTRAVENOUS; SUBCUTANEOUS at 03:42

## 2018-08-20 RX ADMIN — HYDROMORPHONE HYDROCHLORIDE 1 MG: 1 INJECTION, SOLUTION INTRAMUSCULAR; INTRAVENOUS; SUBCUTANEOUS at 20:00

## 2018-08-20 RX ADMIN — HEPARIN SODIUM 15000 UNITS: 5000 INJECTION, SOLUTION INTRAVENOUS; SUBCUTANEOUS at 11:16

## 2018-08-20 RX ADMIN — ONDANSETRON HYDROCHLORIDE 4 MG: 2 INJECTION, SOLUTION INTRAMUSCULAR; INTRAVENOUS at 08:22

## 2018-08-20 RX ADMIN — HEPARIN SODIUM 15000 UNITS: 5000 INJECTION, SOLUTION INTRAVENOUS; SUBCUTANEOUS at 08:03

## 2018-08-20 RX ADMIN — HEPARIN SODIUM 15000 UNITS: 5000 INJECTION, SOLUTION INTRAVENOUS; SUBCUTANEOUS at 18:40

## 2018-08-20 RX ADMIN — HEPARIN SODIUM 15000 UNITS: 5000 INJECTION, SOLUTION INTRAVENOUS; SUBCUTANEOUS at 09:57

## 2018-08-20 RX ADMIN — HEPARIN SODIUM 15000 UNITS: 5000 INJECTION, SOLUTION INTRAVENOUS; SUBCUTANEOUS at 15:36

## 2018-08-20 RX ADMIN — HEPARIN SODIUM 15000 UNITS: 5000 INJECTION, SOLUTION INTRAVENOUS; SUBCUTANEOUS at 13:29

## 2018-08-20 RX ADMIN — PANTOPRAZOLE SODIUM 40 MG: 40 TABLET, DELAYED RELEASE ORAL at 07:01

## 2018-08-20 RX ADMIN — HEPARIN SODIUM 15000 UNITS: 5000 INJECTION, SOLUTION INTRAVENOUS; SUBCUTANEOUS at 04:53

## 2018-08-20 RX ADMIN — HEPARIN SODIUM 15000 UNITS: 5000 INJECTION, SOLUTION INTRAVENOUS; SUBCUTANEOUS at 14:37

## 2018-08-20 RX ADMIN — EZETIMIBE 10 MG: 10 TABLET ORAL at 08:12

## 2018-08-20 RX ADMIN — HYDROMORPHONE HYDROCHLORIDE 1 MG: 1 INJECTION, SOLUTION INTRAMUSCULAR; INTRAVENOUS; SUBCUTANEOUS at 13:48

## 2018-08-20 RX ADMIN — HEPARIN SODIUM 15000 UNITS: 5000 INJECTION, SOLUTION INTRAVENOUS; SUBCUTANEOUS at 16:50

## 2018-08-20 RX ADMIN — HYDROMORPHONE HYDROCHLORIDE 1 MG: 1 INJECTION, SOLUTION INTRAMUSCULAR; INTRAVENOUS; SUBCUTANEOUS at 16:49

## 2018-08-20 RX ADMIN — DOCUSATE SODIUM 100 MG: 100 CAPSULE, LIQUID FILLED ORAL at 17:03

## 2018-08-20 RX ADMIN — ASPIRIN 325 MG ORAL TABLET 325 MG: 325 PILL ORAL at 08:12

## 2018-08-20 RX ADMIN — HYDROMORPHONE HYDROCHLORIDE 1 MG: 1 INJECTION, SOLUTION INTRAMUSCULAR; INTRAVENOUS; SUBCUTANEOUS at 01:37

## 2018-08-20 RX ADMIN — HEPARIN SODIUM 15000 UNITS: 5000 INJECTION, SOLUTION INTRAVENOUS; SUBCUTANEOUS at 05:53

## 2018-08-20 RX ADMIN — HYDROMORPHONE HYDROCHLORIDE 1 MG: 1 INJECTION, SOLUTION INTRAMUSCULAR; INTRAVENOUS; SUBCUTANEOUS at 08:31

## 2018-08-20 RX ADMIN — HEPARIN SODIUM 15000 UNITS: 5000 INJECTION, SOLUTION INTRAVENOUS; SUBCUTANEOUS at 23:53

## 2018-08-20 RX ADMIN — HEPARIN SODIUM 15000 UNITS: 5000 INJECTION, SOLUTION INTRAVENOUS; SUBCUTANEOUS at 20:01

## 2018-08-20 RX ADMIN — FAMOTIDINE 20 MG: 20 TABLET ORAL at 17:03

## 2018-08-20 RX ADMIN — PRAVASTATIN SODIUM 80 MG: 20 TABLET ORAL at 08:22

## 2018-08-20 RX ADMIN — HEPARIN SODIUM 15000 UNITS: 5000 INJECTION, SOLUTION INTRAVENOUS; SUBCUTANEOUS at 09:03

## 2018-08-20 RX ADMIN — SULFAMETHOXAZOLE AND TRIMETHOPRIM 1 TABLET: 800; 160 TABLET ORAL at 08:12

## 2018-08-20 RX ADMIN — SENNOSIDES 17.2 MG: 8.6 TABLET, FILM COATED ORAL at 17:03

## 2018-08-20 RX ADMIN — HEPARIN SODIUM 15000 UNITS: 5000 INJECTION, SOLUTION INTRAVENOUS; SUBCUTANEOUS at 12:26

## 2018-08-20 RX ADMIN — LISINOPRIL 10 MG: 10 TABLET ORAL at 08:12

## 2018-08-20 RX ADMIN — HEPARIN SODIUM 15000 UNITS: 5000 INJECTION, SOLUTION INTRAVENOUS; SUBCUTANEOUS at 22:39

## 2018-08-20 RX ADMIN — HEPARIN SODIUM 15000 UNITS: 5000 INJECTION, SOLUTION INTRAVENOUS; SUBCUTANEOUS at 02:47

## 2018-08-20 RX ADMIN — FAMOTIDINE 20 MG: 20 TABLET ORAL at 08:13

## 2018-08-20 RX ADMIN — HEPARIN SODIUM 15000 UNITS: 5000 INJECTION, SOLUTION INTRAVENOUS; SUBCUTANEOUS at 07:00

## 2018-08-20 RX ADMIN — HEPARIN SODIUM 15000 UNITS: 5000 INJECTION, SOLUTION INTRAVENOUS; SUBCUTANEOUS at 00:11

## 2018-08-20 RX ADMIN — HEPARIN SODIUM 5000 UNITS: 5000 INJECTION, SOLUTION INTRAVENOUS; SUBCUTANEOUS at 08:25

## 2018-08-20 RX ADMIN — SENNOSIDES 17.2 MG: 8.6 TABLET, FILM COATED ORAL at 08:12

## 2018-08-20 RX ADMIN — HEPARIN SODIUM 15000 UNITS: 5000 INJECTION, SOLUTION INTRAVENOUS; SUBCUTANEOUS at 21:25

## 2018-08-20 NOTE — PHYSICAL THERAPY NOTE
PHYSICAL THERAPY EVALUATION    Time In: 11:05  Time Out: 11:30  Total Time: 25 min  MRN: [id number]    Patient is a 63 y o  male evaluated by Physical Therapy s/p admit to [place] [address] on 8/13/2018 with admitting diagnosis(es) of: Open wound of right ankle [S91 001A] and with principal problem(s) of: Ankle wound, right, sequela  Patient Active Problem List   Diagnosis    Open wound of right foot with tendon involvement    HLD (hyperlipidemia)    PVC (premature ventricular contraction)    Essential hypertension    GERD (gastroesophageal reflux disease)    KIRIT on CPAP    Ankle wound, right, sequela    Acute hyponatremia    Open wound of right foot    Morbid obesity with BMI of 45 0-49 9, adult ([facility] [address] )    Mild intermittent asthma without complication     Please refer to H & P for further details  Past Medical History:   Diagnosis Date    Asthma     CPAP (continuous positive airway pressure) dependence     Diverticulosis     GERD (gastroesophageal reflux disease)     Hyperlipidemia     Morbid obesity ([facility] [address] )     Sleep apnea     Ventricular tachycardia ([facility] [address] )     1 episode       Past Surgical History:   Procedure Laterality Date    CARPAL TUNNEL RELEASE      COLONOSCOPY  2007    FLAP LOCAL EXTREMITY Right 8/14/2018    Procedure: ASPIRATION FAILING FREE FLAP RIGHT LEG;  Surgeon: David Momin MD;  Location: [address] [place] [address];  Service: Plastics    FREE FLAP GRAFT Right 8/13/2018    Procedure: TRANSFER RIGHT THIGH FREE FLAP TO RIGHT HEEL;  Surgeon: David Momin MD;  Location: [address] [place] [address];  Service: Plastics    HERNIA REPAIR      abdominal x 2    POLYPECTOMY      hyperplastic    SC COLONOSCOPY FLX DX W/COLLJ SPEC WHEN PFRMD N/A 11/27/2017    Procedure: COLONOSCOPY;  Surgeon: Debo Gillette MD;  Location: [facility];   Service: Gastroenterology    SC DEBRIDEMENT, SKIN, SUB-Q TISSUE,=<20 SQ CM Right 7/18/2018    Procedure: DEBRIDEMENT ANKLE WOUND;  Surgeon: Niki Dalton [place] MD;  Location: [facility] MAIN OR;  Service: Plastics    NC DEBRIDEMENT, SKIN, SUB-Q TISSUE,=<20 SQ CM Right 7/23/2018    Procedure: DEBRIDEMENT RIGHT ANKLE WOUND;  Surgeon: Lesia Trujillo MD;  Location: [facility] MAIN OR;  Service: Plastics    NC FREE MUSC-SKIN FLAP W/MICROVASC ANAST Right 7/30/2018    Procedure: COVERAGE HEEL WITH GRACILIS MUSCLE FLAP/SKINGRAFT ;  Surgeon: Lesia Trujillo MD;  Location: [facility] MAIN OR;  Service: Plastics    [id number] [facility] Right 4/23/2018    Procedure: REPAIR TENDON ACHILLES  FHL TRANSFER;  Surgeon: Jumana Ambriz DPM;  Location: [facility];  Service: Podiatry    TRIGGER FINGER RELEASE      thumb       Current Length Of Hospital Stay: 7 day(s)    PT was consulted to assess patient's functional mobility and discharge needs  Ordered are PT Evaluation and treatment  Chart reviewed  RN cleared patient for PT  Comorbidities affecting patient's physical performance at time of assessment include: asthma and morbid obesity  Personal factors affecting the patient at time of Initial Evaluation include: ambulating with assistive device, visual impairments and ramp to enter home  Please locate the subjective and objective findings outlined below:   08/20/18 1105   Note Type   Note type Eval/Treat   Pain Assessment   Pain Assessment 0-10   Pain Score 2   Pain Type Surgical pain   Pain Location Leg   Pain Orientation Right; Lower   Patient's Stated Pain Goal No pain   Hospital Pain Intervention(s) Medication (See MAR)  (Patient using PCA)   [address] [facility] [address]; Other (Comment)  (States 0 GAETANO)   Home Layout Able to live on main level with bedroom/bathroom; Ramped entrance; Other (Comment)  (Ramp with right HR)   Bathroom Shower/Tub Walk-in shower   Bathroom Toilet Standard  (Commode over toilet)   [address]; Other (Comment)  (Commode over toilet)   Home Equipment Other (Comment); Cane  (Knee rolling walker with hand brakes, rollator walker)   Additional Comments Patient states he has been at modified independent level for ambulation with his knee walker   Prior Function   Level of [place] Needs assistance with IADLs; Needs assistance with ADLs and functional mobility; Other (Comment)  (Wife assisted)   Lives With Spouse; states wife has mobility limitations   ADL Assistance Needs assistance   IADLs Needs assistance   Falls in the last 6 months 0   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   Restrictions/Precautions   Weight Bearing Precautions Per Order Yes   RLE Weight Bearing Per Order NWB   Other Precautions Fall Risk;Pain;Visual impairment;Contact/isolation;Telemetry  (Alternating pressure mattress)   General   Family/Caregiver Present No   Cognition   Overall Cognitive Status WFL   Arousal/Participation Alert   Orientation Level Oriented X4   Following Commands Follows all commands and directions without difficulty   RLE Assessment   RLE Assessment (NT; able to wiggle toes)   LLE Assessment   LLE Assessment WFL  (Knee ext and ankle DF 5/5)   Coordination   Movements are Fluid and Coordinated 1   Bed Mobility   Supine to Sit 5  Supervision  (For safety; patient requested multiple trials)   Additional items HOB elevated; Bedrails   Sit to Supine 5  Supervision  (For safety; patient requested multiple trials)   Additional items HOB elevated; Bedrails   Transfers   Sit to Stand 4  Minimal assistance  (For support)   Additional items Other;Assist x 1  (Bed level elevated)   Stand to Sit 4  Minimal assistance  (Secondary to decreased eccentric control)   Additional items Assist x 1   Ambulation/Elevation   Gait pattern Short stride   Gait Assistance 5  Supervision  (Close, for safety)   Assistive Device (Knee rolling walker (patient's own))   Distance 3 ft  (Forward and back)   Ramp Technique Other (Comment)  (NT)   Ramp Assistance Not tested   Balance   Static Standing Fair -   Dynamic Standing Fair - Endurance Deficit   Endurance Deficit Yes   Endurance Deficit Description Limited endurance for activity; requesting increased time to sit up   Activity Tolerance   Activity Tolerance Patient limited by pain; Other (Comment)  (Pt expressed fear of passing out; slow with supine > sit)   Assessment   Prognosis Fair   Problem List Decreased endurance; Impaired balance;Decreased mobility; Impaired vision;Decreased skin integrity;Pain   Assessment In summary, guiding factors including patient history, examination of body system(s), clinical presentation and clinical decision making were considered  Patient presents with comorbid conditions that impact function, context of current functional limitations as compared to the prior level of function, limited physical support, impaired prior level of function, recent hospital admission and with living environment deficits  Patient also presents with c/o right lower leg pain, skin integrity issue(s), impaired bed mobility, transfers, endurance for activity, standing balance and gait abilities while maintaining NWB on right foot  Clinical presentation is unstable/unpredictable at this time  The assigned level of complexity is: high  Patient would benefit from continued PT treatment to address deficits as defined above and restore or maximize level of functional mobility with consistency  From PT/mobility standpoint, preliminary discharge recommendation would be: short-term/sub-acute level of rehab, in order to facilitate return home with his wife and decreased burden of care  Barriers to Discharge Other (Comment)  (Ramp to enter home, limited to NWB on right foot)   Goals   Patient Goals "Complete rehabilitation and conditioning"   LTG Expiration Date 08/30/18   Long Term Goal #1 1  Patient will perform all bed mobility with modified independence in order to get in/out of bed     2  Patient will perform all functional transfers NWB on right foot with modified independence in order to facilitate return to prior/baseline level of function  3  Patient will ambulate with modified independence x at least 75 ft with knee walker NWB on right foot in order to safely access all necessary areas of home  4  Patient will ascend/descend ramp with right handrail available with knee walkerNWB on right foot with modified independence to enter/exit home  Treatment Day 1   Plan   Treatment/Interventions ADL retraining;Functional transfer training   PT Frequency Other (Comment)  (At least 3x/wk in 8 PT treatment sessions)   Recommendation   Recommendation Short-term skilled PT   Equipment Recommended Other (Comment)  (To be determined)   PT - OK to Discharge (When medically cleared; recommend sub-acute rehab)   Barthel Index   Feeding 10   Bathing 0   Grooming Score 5   Dressing Score 0   Bladder Score 10   Bowels Score 10   Toilet Use Score 5   Transfers (Bed/Chair) Score 5   Mobility (Level Surface) Score 0   Stairs Score 0   Barthel Index Score 45     PHYSICAL THERAPY TREATMENT NOTE    Time In: 11:30    Time Out: 12:15  Total Time: 45 min  MRN: [id number]    S: "I don't want to rush it!", patient stated regarding getting OOB  Requested to semi-sit with LEs in dependent position then return to supine with HOB elevated with gradual upright sitting, alternating to return to supine with HOB elevated  O: Therapeutic Activities:   Supine < > sit with supervision for safety with HOB elevated and with use of siderail support; VCs to avoid valsalva maneuver  Patient perform this cycle 3 times with ~ 3-5 min maintenance of position at a time  Vitals: Seated post-ambulation and right forearm BP = 124/87, Heart Rate = 75 bpm     A: Patient denied dizziness/lightheadedness; reported occasional rush of blood circulation into right foot with dependent position  P: Continue PT for same mobility maintaining NWB on right to promote continued healing      Patient transferred to recliner chair at bedside with LEs elevated; patient positioned with all needs, including call bell, within reach      Adan Vela, PT, DPT

## 2018-08-20 NOTE — SOCIAL WORK
Pt (+) MRSA with ID changing to PO Abx- to return to OR possibly Thursday with plastics  Therapy recommending STR thereafter with pt agreeable to [facility] for a short period of time- referral made for same  Will need OT/PT reordered after surgery later in week  Will continue to follow for d/c needs

## 2018-08-20 NOTE — PROGRESS NOTES
Progress Note - Kendrick Davey 1960, 63 y o  male MRN: [id number]    Unit/Bed#:  Encounter: [id number]    Primary Care Provider: Rodrigo Chopra MD   Date and time admitted to hospital: 8/13/2018  5:55 AM        Morbid obesity with BMI of 45 0-49 9, adult [facility])   Assessment & Plan    Low-calorie diet,  PT evaluation for early mobilization if recommended by orthopedic surgeon- since being hospitalized states lost weight        KIRIT on CPAP   Assessment & Plan    Continue CPAP        GERD (gastroesophageal reflux disease)   Assessment & Plan    Resolved post below adjustment  · Uncontrolled symptoms patient does have a hiatal hernia status post sees EGD but his symptoms have been quite a controlled for a while  I will increase his Pepcid to 20 mg p  o  b i d  and Protonix 40 mg p  o  daily put him on sodium bicarb 650 mg p o  b i d  p r n  and Maalox patient agrees with this plan                 Essential hypertension   Assessment & Plan    Controled continue lisinopril but decrease to once daily dc pm dose as early am bp was in 90's        PVC (premature ventricular contraction)   Assessment & Plan    · Previous admission same- asymptomatic - no indication of treatment at this time unless more frequent - infrequent bigeminy - no recurrence overnight   · k and mag ok   · Had echo last admission recent- normal         HLD (hyperlipidemia)   Assessment & Plan    Low-cholesterol diet  Continue pravastatin every other day as recommended by patient's family doctor and Zetia- every other day  As he was having muscle spasm secondary to being on statin daily and his muscle  Spasms have resolved since going this way        * Ankle wound, right, sequela   Assessment & Plan    Patient underwent right-sided free flap to right heel surgery by Gwyn Nolasco on 8/13/18-  Previous free flap failure-and a flap failure with venous congestion and arterial clot was that he had to be taken to the OR again for the clock to be evacuated now he is on mechanical leaching by Plastic surgery  Flap does not look promising-  for graft on Thursday  He declined labs today  Continue current pain management  Bowel movement regimen  PT evaluate, DVT prophylaxis as per plastic  surgeon on heparin    Evaluated by ID tissue culture -Growth of Achromobacter xylosoxidans (A) and MRSA cefepime 2 g IV q 12 -> changed to bactrim ds by ID till 18    Leukocytosis improving /afebrile  Labs on Thursday prior to or- as he is not going today              VTE Pharmacologic Prophylaxis:   Pharmacologic: Heparin  Mechanical VTE Prophylaxis in Place: Yes    Patient Centered Rounds: I have performed bedside rounds with nursing staff today  Discussions with Specialists or Other Care Team Provider: yes    Education and Discussions with Family / Patient: patient    Time Spent for Care: 30 minutes  More than 50% of total time spent on counseling and coordination of care as described above  Current Length of Stay: 7 day(s)    Current Patient Status: Inpatient   Certification Statement: The patient will continue to require additional inpatient hospital stay due to graft on thursday    Discharge Plan: when cleared by plastic surgery    Code Status: No Order      Subjective:   Patient seen examined slept well no chest pain or shortness of breath will drink use prune juice today  In no indigestion issues  Objective:     Vitals:   Temp (24hrs), Av 8 °F (36 6 °C), Min:97 4 °F (36 3 °C), Max:98 °F (36 7 °C)    HR:  [66-85] 74  Resp:  [15-36] 24  BP: ()/(58-88) 117/75  SpO2:  [91 %-99 %] 91 %  Body mass index is 48 66 kg/m²  Input and Output Summary (last 24 hours):        Intake/Output Summary (Last 24 hours) at 18 0815  Last data filed at 18 0700   Gross per 24 hour   Intake              990 ml   Output             2450 ml   Net            -1460 ml       Physical Exam:     Physical Exam   Constitutional: He is oriented to person, place, and time  He appears well-developed  Obese   HENT:   Head: Normocephalic and atraumatic  Eyes: EOM are normal  Pupils are equal, round, and reactive to light  Neck: Normal range of motion  Cardiovascular: Normal rate, regular rhythm and normal heart sounds  Pulmonary/Chest: Effort normal and breath sounds normal    Obese   Abdominal: Soft  Bowel sounds are normal    Musculoskeletal: Normal range of motion  Neurological: He is alert and oriented to person, place, and time  He has normal reflexes  Skin: Skin is warm  Psychiatric: He has a normal mood and affect  Additional Data:     Labs:      Results from last 7 days  Lab Units 08/19/18  0542   WBC Thousand/uL 13 80*   HEMOGLOBIN g/dL 11 3*   HEMATOCRIT % 34 0*   PLATELETS Thousands/uL 378   NEUTROS PCT % 63   LYMPHS PCT % 19*   MONOS PCT % 10   EOS PCT % 8*       Results from last 7 days  Lab Units 08/19/18  0407   SODIUM mmol/L 132*   POTASSIUM mmol/L 4 4   CHLORIDE mmol/L 98   CO2 mmol/L 28   BUN mg/dL 18   CREATININE mg/dL 1 05   CALCIUM mg/dL 9 1   GLUCOSE RANDOM mg/dL 104*                     * I Have Reviewed All Lab Data Listed Above  * Additional Pertinent Lab Tests Reviewed:  [address] Admission Reviewed    Imaging:    Imaging Reports Reviewed Today Include: none ordered  Imaging Personally Reviewed by Myself Includes:  none    Recent Cultures (last 7 days):       Results from last 7 days  Lab Units 08/13/18  0850   GRAM STAIN RESULT  No polys seen  4+ Gram positive cocci in pairs  4+ Gram Positive Rods Resembling Diphtheroids       Last 24 Hours Medication List:     Current Facility-Administered Medications:  albuterol 2 puff Inhalation Q4H PRN Mani Ray MD   aluminum-magnesium hydroxide-simethicone 30 mL Oral Q4H PRN Mani Ray MD   aspirin 325 mg Oral BID Phill Tello MD   dexamethasone 4 mg Intravenous Once PRN Chucho Hood DO   dexamethasone 8 mg Intravenous Once PRN Chucho Hood DO   docusate sodium 100 mg Oral BID Antonette Mariano MD   ezetimibe 10 mg Oral Daily Brandi Green MD   famotidine 20 mg Oral BID Alvina Llanes MD   fentaNYL 25 mcg Intravenous Q5 Min PRN Tera Locket, DO   fentaNYL  Intravenous Continuous Tera Locket, DO   heparin (porcine) 10,000 Units Subcutaneous Once Brandi Green MD   heparin (porcine) 15,000 Units Subcutaneous Q30 Min PRN Brandi Green MD   heparin (porcine) 5,000 Units Subcutaneous Q12H [address] Brandi Green MD   HYDROmorphone 0 2 mg Intravenous Q5 Min PRN Tera Locket, DO   HYDROmorphone 1 mg Intravenous Q1H PRN Greer Hylton MD   lisinopril 10 mg Oral Daily Alvina Llanes MD   magnesium hydroxide 30 mL Oral BID PRN Alvina Llanes MD   meperidine 12 5 mg Intravenous Q10 Min PRN Tera Locket, DO   morphine injection 2 mg Intravenous Q3H PRN Brandi Green MD   ondansetron 4 mg Intravenous Q6H PRN Brandi Green MD   oxyCODONE-acetaminophen 1 tablet Oral Q4H PRN Brandi Green MD   pantoprazole 40 mg Oral Early Morning Alvina Llanes MD   phenol 1 spray Mouth/Throat Q2H PRN Tera Locket, DO   pravastatin 80 mg Oral Every Other Day Antonette Mariano MD   senna 2 tablet Oral BID Odalys Garg MD   sodium bicarbonate 650 mg Oral BID PRN Alvina Llanes MD   sulfamethoxazole-trimethoprim 1 tablet Oral Q12H Carolyne Diallo MD        Today, Patient Was Seen By: Alvina Llanes MD    ** Please Note: Dictation voice to text software may have been used in the creation of this document   **

## 2018-08-20 NOTE — ASSESSMENT & PLAN NOTE
· Previous admission same- asymptomatic - no indication of treatment at this time unless more frequent - infrequent bigeminy - no recurrence overnight   · k and mag ok   · Had echo last admission recent- normal

## 2018-08-20 NOTE — PROGRESS NOTES
Patient afebrile,stable , states that his pain better controlled  Right lower extremity dressing intact ,continued leeching every hr , no bleeding present LD 7 8  Wife at bed side  continue to monitor

## 2018-08-20 NOTE — ASSESSMENT & PLAN NOTE
Patient underwent right-sided free flap to right heel surgery by Anton Medina on 8/13/18-  Previous free flap failure-and a flap failure with venous congestion and arterial clot was that he had to be taken to the OR again for the clock to be evacuated now he is on mechanical leaching by Plastic surgery  Flap does not look promising-  for graft on Thursday  He declined labs today    Continue current pain management  Bowel movement regimen  PT evaluate, DVT prophylaxis as per plastic  surgeon on heparin    Evaluated by ID tissue culture -Growth of Achromobacter xylosoxidans (A) and MRSA cefepime 2 g IV q 12 -> changed to bactrim ds by ID till 08/28/18    Leukocytosis improving /afebrile  Labs on Thursday prior to or- as he is not going today

## 2018-08-20 NOTE — PROGRESS NOTES
Edges flap pink, purple; upper part black  Continue to watch for neovascularization  LD 17! Ramon Mcmahon Stop LD today  OOB but no walking left foot

## 2018-08-20 NOTE — NURSING NOTE
Patient refused blood work , continued leeching every hr, Assessment no change from previous  , will continue to monitor

## 2018-08-20 NOTE — CASE MANAGEMENT
Continued Stay Review    Date/POD#: 8/20 POD # 7/POD# 6      Vital Signs: /57   Pulse 75   Temp (!) 97 °F (36 1 °C) (Temporal)   Resp 13   Ht 5' 8" (1 727 m)   Wt (!) 145 kg (320 lb)   SpO2 91%   BMI 48 66 kg/m²     Medication:   Scheduled Meds:   Current Facility-Administered Medications:  albuterol 2 puff Inhalation Q4H PRN   aluminum-magnesium hydroxide-simethicone 30 mL Oral Q4H PRN   aspirin 325 mg Oral BID   docusate sodium 100 mg Oral BID   ezetimibe 10 mg Oral Daily   famotidine 20 mg Oral BID   fentaNYL 25 mcg Intravenous Q5 Min PRN   heparin (porcine) 10,000 Units Subcutaneous Once   heparin (porcine) 15,000 Units Subcutaneous Q30 Min PRN   heparin (porcine) 5,000 Units Subcutaneous Q12H [facility]   HYDROmorphone 0 2 mg Intravenous Q5 Min PRN   HYDROmorphone 1 mg Intravenous Q1H PRN   lisinopril 10 mg Oral Daily   magnesium hydroxide 30 mL Oral BID PRN   meperidine 12 5 mg Intravenous Q10 Min PRN   morphine injection 2 mg Intravenous Q3H PRN   ondansetron 4 mg Intravenous Q6H PRN   oxyCODONE-acetaminophen 1 tablet Oral Q4H PRN   pantoprazole 40 mg Oral Early Morning   phenol 1 spray Mouth/Throat Q2H PRN   pravastatin 80 mg Oral Every Other Day   senna 2 tablet Oral BID   sodium bicarbonate 650 mg Oral BID PRN   sulfamethoxazole-trimethoprim 1 tablet Oral Q12H PRISCILA       Abnormal Labs/Diagnostic Results:  Intraop cx 3 (+) Growth of Methicillin Resistant Staphylococcus aureus, 3+ Growth of Achromobacter xylosoxidans    8/18 na 132, wbc 13 8, H&H 11 3/34 0     Age/Sex: 63 y o  male     Assessment/Plan: 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  Previous free flap failure-and a flap failure with venous congestion and arterial clot was that he had to be taken to the OR again for the clot to be evacuated now he is on mechanical leaching by Plastic surgery  Flap does not look promising-  return to OR for graft on Thursday, 8/23     Evaluated by ID tissue culture -Growth of Achromobacter xylosoxidans (A) and MRSA  Currently on Bactrim DS and will need to continue thru 08/28/18    PICC line LUE inserted 8/14  Discharge Plan: Therapy recommending short term rehab @ this point  Will be re evaluated after surgery on 8/23  Thank you,  Sharon Jacobsen  Utilization Review Department  Phone: [phone]; Fax [phone]  ATTENTION: Please call with any questions or concerns to [phone]  and carefully follow the prompts so that you are directed to the right person  Send all requests for admission clinical reviews, approved or denied determinations and any other requests to fax [phone]   All voicemails are confidential

## 2018-08-20 NOTE — PLAN OF CARE
Problem: PHYSICAL THERAPY ADULT  Goal: Performs mobility at highest level of function for planned discharge setting  See evaluation for individualized goals  Treatment/Interventions: ADL retraining, Functional transfer training  Equipment Recommended: Other (Comment) (To be determined)       See flowsheet documentation for full assessment, interventions and recommendations  Outcome: Progressing  Prognosis: Fair  Problem List: Decreased endurance, Impaired balance, Decreased mobility, Impaired vision, Decreased skin integrity, Pain  Assessment: In summary, guiding factors including patient history, examination of body system(s), clinical presentation and clinical decision making were considered  Patient presents with comorbid conditions that impact function, context of current functional limitations as compared to the prior level of function, limited physical support, impaired prior level of function, recent hospital admission and with living environment deficits  Patient also presents with c/o right lower leg pain, skin integrity issue(s), impaired bed mobility, transfers, endurance for activity, standing balance and gait abilities while maintaining NWB on right foot  Clinical presentation is unstable/unpredictable at this time  The assigned level of complexity is: high  Patient would benefit from continued PT treatment to address deficits as defined above and restore or maximize level of functional mobility with consistency  From PT/mobility standpoint, preliminary discharge recommendation would be: short-term/sub-acute level of rehab, in order to facilitate return home with his wife and decreased burden of care  Barriers to Discharge: Other (Comment) (Ramp to enter home, limited to NWB on right foot)     Recommendation: Short-term skilled PT     PT - OK to Discharge:  (When medically cleared; recommend sub-acute rehab)    See flowsheet documentation for full assessment

## 2018-08-20 NOTE — ASSESSMENT & PLAN NOTE
Resolved post below adjustment  · Uncontrolled symptoms patient does have a hiatal hernia status post sees EGD but his symptoms have been quite a controlled for a while  I will increase his Pepcid to 20 mg p  o  b i d  and Protonix 40 mg p  o  daily put him on sodium bicarb 650 mg p o  b i d  p r n  and Maalox patient agrees with this plan  Consent (Nose)/Introductory Paragraph: The rationale for Mohs was explained to the patient and consent was obtained. The risks, benefits and alternatives to therapy were discussed in detail. Specifically, the risks of nasal deformity, changes in the flow of air through the nose, infection, scarring, bleeding, prolonged wound healing, incomplete removal, allergy to anesthesia, nerve injury and recurrence were addressed. Prior to the procedure, the treatment site was clearly identified and confirmed by the patient. All components of Universal Protocol/PAUSE Rule completed.

## 2018-08-21 PROCEDURE — 97530 THERAPEUTIC ACTIVITIES: CPT

## 2018-08-21 PROCEDURE — 99232 SBSQ HOSP IP/OBS MODERATE 35: CPT | Performed by: FAMILY MEDICINE

## 2018-08-21 PROCEDURE — 94760 N-INVAS EAR/PLS OXIMETRY 1: CPT

## 2018-08-21 PROCEDURE — 97116 GAIT TRAINING THERAPY: CPT

## 2018-08-21 RX ADMIN — EZETIMIBE 10 MG: 10 TABLET ORAL at 09:06

## 2018-08-21 RX ADMIN — SENNOSIDES 17.2 MG: 8.6 TABLET, FILM COATED ORAL at 09:06

## 2018-08-21 RX ADMIN — HEPARIN SODIUM 15000 UNITS: 5000 INJECTION, SOLUTION INTRAVENOUS; SUBCUTANEOUS at 21:02

## 2018-08-21 RX ADMIN — HEPARIN SODIUM 15000 UNITS: 5000 INJECTION, SOLUTION INTRAVENOUS; SUBCUTANEOUS at 08:44

## 2018-08-21 RX ADMIN — HEPARIN SODIUM 15000 UNITS: 5000 INJECTION, SOLUTION INTRAVENOUS; SUBCUTANEOUS at 02:45

## 2018-08-21 RX ADMIN — HEPARIN SODIUM 15000 UNITS: 5000 INJECTION, SOLUTION INTRAVENOUS; SUBCUTANEOUS at 03:07

## 2018-08-21 RX ADMIN — HEPARIN SODIUM 15000 UNITS: 5000 INJECTION, SOLUTION INTRAVENOUS; SUBCUTANEOUS at 07:41

## 2018-08-21 RX ADMIN — SULFAMETHOXAZOLE AND TRIMETHOPRIM 1 TABLET: 800; 160 TABLET ORAL at 21:01

## 2018-08-21 RX ADMIN — HEPARIN SODIUM 15000 UNITS: 5000 INJECTION, SOLUTION INTRAVENOUS; SUBCUTANEOUS at 09:42

## 2018-08-21 RX ADMIN — DOCUSATE SODIUM 100 MG: 100 CAPSULE, LIQUID FILLED ORAL at 09:06

## 2018-08-21 RX ADMIN — HEPARIN SODIUM 5000 UNITS: 5000 INJECTION, SOLUTION INTRAVENOUS; SUBCUTANEOUS at 21:01

## 2018-08-21 RX ADMIN — DOCUSATE SODIUM 100 MG: 100 CAPSULE, LIQUID FILLED ORAL at 18:00

## 2018-08-21 RX ADMIN — HEPARIN SODIUM 15000 UNITS: 5000 INJECTION, SOLUTION INTRAVENOUS; SUBCUTANEOUS at 14:51

## 2018-08-21 RX ADMIN — HEPARIN SODIUM 15000 UNITS: 5000 INJECTION, SOLUTION INTRAVENOUS; SUBCUTANEOUS at 13:41

## 2018-08-21 RX ADMIN — HEPARIN SODIUM 15000 UNITS: 5000 INJECTION, SOLUTION INTRAVENOUS; SUBCUTANEOUS at 11:43

## 2018-08-21 RX ADMIN — HEPARIN SODIUM 15000 UNITS: 5000 INJECTION, SOLUTION INTRAVENOUS; SUBCUTANEOUS at 23:03

## 2018-08-21 RX ADMIN — HEPARIN SODIUM 15000 UNITS: 5000 INJECTION, SOLUTION INTRAVENOUS; SUBCUTANEOUS at 16:53

## 2018-08-21 RX ADMIN — PANTOPRAZOLE SODIUM 40 MG: 40 TABLET, DELAYED RELEASE ORAL at 06:28

## 2018-08-21 RX ADMIN — HEPARIN SODIUM 15000 UNITS: 5000 INJECTION, SOLUTION INTRAVENOUS; SUBCUTANEOUS at 01:47

## 2018-08-21 RX ADMIN — HEPARIN SODIUM 15000 UNITS: 5000 INJECTION, SOLUTION INTRAVENOUS; SUBCUTANEOUS at 10:43

## 2018-08-21 RX ADMIN — HEPARIN SODIUM 15000 UNITS: 5000 INJECTION, SOLUTION INTRAVENOUS; SUBCUTANEOUS at 00:54

## 2018-08-21 RX ADMIN — HYDROMORPHONE HYDROCHLORIDE 1 MG: 1 INJECTION, SOLUTION INTRAMUSCULAR; INTRAVENOUS; SUBCUTANEOUS at 14:48

## 2018-08-21 RX ADMIN — HYDROMORPHONE HYDROCHLORIDE 1 MG: 1 INJECTION, SOLUTION INTRAMUSCULAR; INTRAVENOUS; SUBCUTANEOUS at 18:54

## 2018-08-21 RX ADMIN — HEPARIN SODIUM 15000 UNITS: 5000 INJECTION, SOLUTION INTRAVENOUS; SUBCUTANEOUS at 18:58

## 2018-08-21 RX ADMIN — FAMOTIDINE 20 MG: 20 TABLET ORAL at 18:00

## 2018-08-21 RX ADMIN — SULFAMETHOXAZOLE AND TRIMETHOPRIM 1 TABLET: 800; 160 TABLET ORAL at 09:06

## 2018-08-21 RX ADMIN — HYDROMORPHONE HYDROCHLORIDE 1 MG: 1 INJECTION, SOLUTION INTRAMUSCULAR; INTRAVENOUS; SUBCUTANEOUS at 21:37

## 2018-08-21 RX ADMIN — MAGNESIUM HYDROXIDE 30 ML: 400 SUSPENSION ORAL at 09:13

## 2018-08-21 RX ADMIN — HEPARIN SODIUM 15000 UNITS: 5000 INJECTION, SOLUTION INTRAVENOUS; SUBCUTANEOUS at 12:42

## 2018-08-21 RX ADMIN — HEPARIN SODIUM 15000 UNITS: 5000 INJECTION, SOLUTION INTRAVENOUS; SUBCUTANEOUS at 05:00

## 2018-08-21 RX ADMIN — FAMOTIDINE 20 MG: 20 TABLET ORAL at 09:06

## 2018-08-21 RX ADMIN — HYDROMORPHONE HYDROCHLORIDE 1 MG: 1 INJECTION, SOLUTION INTRAMUSCULAR; INTRAVENOUS; SUBCUTANEOUS at 23:59

## 2018-08-21 RX ADMIN — ASPIRIN 325 MG ORAL TABLET 325 MG: 325 PILL ORAL at 09:06

## 2018-08-21 RX ADMIN — HEPARIN SODIUM 5000 UNITS: 5000 INJECTION, SOLUTION INTRAVENOUS; SUBCUTANEOUS at 09:05

## 2018-08-21 RX ADMIN — HYDROMORPHONE HYDROCHLORIDE 1 MG: 1 INJECTION, SOLUTION INTRAMUSCULAR; INTRAVENOUS; SUBCUTANEOUS at 11:42

## 2018-08-21 RX ADMIN — HEPARIN SODIUM 15000 UNITS: 5000 INJECTION, SOLUTION INTRAVENOUS; SUBCUTANEOUS at 06:27

## 2018-08-21 RX ADMIN — HEPARIN SODIUM 15000 UNITS: 5000 INJECTION, SOLUTION INTRAVENOUS; SUBCUTANEOUS at 22:07

## 2018-08-21 RX ADMIN — MAGNESIUM HYDROXIDE 30 ML: 400 SUSPENSION ORAL at 13:52

## 2018-08-21 RX ADMIN — SENNOSIDES 17.2 MG: 8.6 TABLET, FILM COATED ORAL at 18:00

## 2018-08-21 RX ADMIN — HEPARIN SODIUM 15000 UNITS: 5000 INJECTION, SOLUTION INTRAVENOUS; SUBCUTANEOUS at 15:51

## 2018-08-21 RX ADMIN — HYDROMORPHONE HYDROCHLORIDE 1 MG: 1 INJECTION, SOLUTION INTRAMUSCULAR; INTRAVENOUS; SUBCUTANEOUS at 07:46

## 2018-08-21 RX ADMIN — HEPARIN SODIUM 15000 UNITS: 5000 INJECTION, SOLUTION INTRAVENOUS; SUBCUTANEOUS at 17:50

## 2018-08-21 RX ADMIN — HEPARIN SODIUM 15000 UNITS: 5000 INJECTION, SOLUTION INTRAVENOUS; SUBCUTANEOUS at 20:06

## 2018-08-21 RX ADMIN — ASPIRIN 325 MG ORAL TABLET 325 MG: 325 PILL ORAL at 18:00

## 2018-08-21 RX ADMIN — LISINOPRIL 10 MG: 10 TABLET ORAL at 09:07

## 2018-08-21 RX ADMIN — HEPARIN SODIUM 15000 UNITS: 5000 INJECTION, SOLUTION INTRAVENOUS; SUBCUTANEOUS at 04:25

## 2018-08-21 NOTE — PROGRESS NOTES
Patient resting in bed  Right leg dressing dry and intact  Flap appears raven dark and dry  Plan to ST[facility]

## 2018-08-21 NOTE — PROGRESS NOTES
Progress Note - Dutch Ortiz 1960, 63 y o  male MRN: [id number]    Unit/Bed#:  Encounter: [id number]    Primary Care Provider: Criss Krabbe, MD   Date and time admitted to hospital: 8/13/2018  5:55 AM        Mild intermittent asthma without complication   Assessment & Plan    · Has not had any trouble while hospitalized but if needed we will put p r n  albuterol        Morbid obesity with BMI of 45 0-49 9, adult ([facility] [address] )   Assessment & Plan    Low-calorie diet,  PT evaluation for early mobilization if recommended by orthopedic surgeon- since being hospitalized states lost weight-        Acute hyponatremia   Assessment & Plan    · Sodium 132- suspect siadh/pain when leaching - will check sodium studies and place on 1500 mol restriction - recheck in am  Last sodium 133 stable prior to 132        KIRIT on CPAP   Assessment & Plan    Continue CPAP        GERD (gastroesophageal reflux disease)   Assessment & Plan    Resolved post below adjustment  · Uncontrolled symptoms patient does have a hiatal hernia status post sees EGD but his symptoms have been quite a controlled for a while  I will increase his Pepcid to 20 mg p  o  b i d  and Protonix 40 mg p  o  daily put him on sodium bicarb 650 mg p o  b i d  p r n  and Maalox patient agrees with this plan  Essential hypertension   Assessment & Plan    Continue lisinopril 10 mg daily it fluctuates with pain medicine and pain overall stable            PVC (premature ventricular contraction)   Assessment & Plan    · Previous admission same- asymptomatic - no indication of treatment at this time unless more frequent - infrequent bigeminy - no recurrence overnight   · k and mag ok   · Had echo last admission recent- normal         HLD (hyperlipidemia)   Assessment & Plan    Low-cholesterol diet  Continue pravastatin every other day as recommended by patient's family doctor and Zetia- every other day  As he was having muscle spasm secondary to being on statin daily and his muscle  Spasms have resolved since going this way        * Ankle wound, right, sequela   Assessment & Plan    Patient underwent right-sided free flap to right heel surgery by Dr Alcantara on 8/13/18-  Previous free flap failure-and a flap failure with venous congestion and arterial clot was that he had to be taken to the OR again for the clock to be evacuated now he is on mechanical leaching by Plastic surgery  Flap does not look promising-  for graft on Thursday, will obtain labs Thursday morning prior to the OR  Continue current pain management  Bowel movement regimen  PT evaluate, DVT prophylaxis as per plastic  surgeon on heparin    Evaluated by ID tissue culture -Growth of Achromobacter xylosoxidans (A) and MRSA cefepime 2 g IV q 12 -> changed to bactrim ds by ID till 08/28/18    Leukocytosis improving /afebrile  Labs on Thursday prior to or-  Physical therapy recommended transitional care facility patient is in agreement             VTE Pharmacologic Prophylaxis:   Pharmacologic: Heparin  Mechanical VTE Prophylaxis in Place: Yes    Patient Centered Rounds: I have performed bedside rounds with nursing staff today  Discussions with Specialists or Other Care Team Provider:  Yes    Education and Discussions with Family / Patient:  Patient    Time Spent for Care: 30 minutes  More than 50% of total time spent on counseling and coordination of care as described above  Current Length of Stay: 8 day(s)    Current Patient Status: Inpatient   Certification Statement: The patient will continue to require additional inpatient hospital stay due to For graft on Thursday    Discharge Plan:  Transitional care facility    Code Status: No Order      Subjective:   Patient seen and examined pain controlled just pain when they do mechanical each ink  Denies any chest pain or shortness of breath is going to try to get out of bed today again  No abdominal pain      Objective:     Vitals:   Temp (24hrs), Av 3 °F (36 3 °C), Min:97 °F (36 1 °C), Max:98 °F (36 7 °C)    HR:  [75-88] 79  Resp:  [8-30] 26  BP: ()/(42-68) 91/68  SpO2:  [100 %] 100 %  Body mass index is 48 66 kg/m²  Input and Output Summary (last 24 hours): Intake/Output Summary (Last 24 hours) at 18 5387  Last data filed at 18 0856   Gross per 24 hour   Intake              480 ml   Output             1690 ml   Net            -1210 ml       Physical Exam:     Physical Exam   Constitutional: He is oriented to person, place, and time  He appears well-developed  Obese   HENT:   Head: Normocephalic and atraumatic  Eyes: EOM are normal  Pupils are equal, round, and reactive to light  Neck: Normal range of motion  Cardiovascular: Normal rate, regular rhythm and normal heart sounds  Pulmonary/Chest: Effort normal and breath sounds normal    Abdominal: Soft  Bowel sounds are normal    Obese   Musculoskeletal: Normal range of motion  He exhibits edema (Right with bulky dressing-areas with bloody drainage)  Neurological: He is alert and oriented to person, place, and time  He has normal reflexes  Skin: Skin is warm  Psychiatric: He has a normal mood and affect  Additional Data:     Labs:      Results from last 7 days  Lab Units 18  0542   WBC Thousand/uL 13 80*   HEMOGLOBIN g/dL 11 3*   HEMATOCRIT % 34 0*   PLATELETS Thousands/uL 378   NEUTROS PCT % 63   LYMPHS PCT % 19*   MONOS PCT % 10   EOS PCT % 8*       Results from last 7 days  Lab Units 18  0407   SODIUM mmol/L 132*   POTASSIUM mmol/L 4 4   CHLORIDE mmol/L 98   CO2 mmol/L 28   BUN mg/dL 18   CREATININE mg/dL 1 05   CALCIUM mg/dL 9 1   GLUCOSE RANDOM mg/dL 104*                     * I Have Reviewed All Lab Data Listed Above  * Additional Pertinent Lab Tests Reviewed:  [address] Admission Reviewed    Imaging:    Imaging Reports Reviewed Today Include:  None ordered  Imaging Personally Reviewed by Myself Includes: None ordered    Recent Cultures (last 7 days):           Last 24 Hours Medication List:     Current Facility-Administered Medications:  albuterol 2 puff Inhalation Q4H PRN Sukh Edward MD   aluminum-magnesium hydroxide-simethicone 30 mL Oral Q4H PRN Sukh Edward MD   aspirin 325 mg Oral BID Jerome Medrano MD   docusate sodium 100 mg Oral BID Qamar Medina MD   ezetimibe 10 mg Oral Daily Jerome Medrano MD   famotidine 20 mg Oral BID Sukh Edward MD   fentaNYL 25 mcg Intravenous Q5 Min PRN Tj Moan, DO   heparin (porcine) 10,000 Units Subcutaneous Once Jerome Medrano MD   heparin (porcine) 15,000 Units Subcutaneous Q30 Min PRN Jerome Medrano MD   heparin (porcine) 5,000 Units Subcutaneous Q12H [address] Jerome Medrano MD   HYDROmorphone 0 2 mg Intravenous Q5 Min PRN Tj Moan, DO   HYDROmorphone 1 mg Intravenous Q1H PRN Paco Fernandes MD   lisinopril 10 mg Oral Daily Sukh Edward MD   magnesium hydroxide 30 mL Oral BID PRN Sukh Edward MD   meperidine 12 5 mg Intravenous Q10 Min PRN Tj Moan, DO   morphine injection 2 mg Intravenous Q3H PRN Jerome Medrano MD   ondansetron 4 mg Intravenous Q6H PRN Jerome Medrano MD   oxyCODONE-acetaminophen 1 tablet Oral Q4H PRN Jerome Medrano MD   pantoprazole 40 mg Oral Early Morning Sukh Edward MD   phenol 1 spray Mouth/Throat Q2H PRN Tj Moan, DO   pravastatin 80 mg Oral Every Other Day Qamar Medina MD   senna 2 tablet Oral BID Bilal A  Niles Ramirez MD   sodium bicarbonate 650 mg Oral BID PRN Sukh Edward MD   sulfamethoxazole-trimethoprim 1 tablet Oral Q12H Nara Morillo MD        Today, Patient Was Seen By: Sukh Edward MD    ** Please Note: Dictation voice to text software may have been used in the creation of this document   **

## 2018-08-21 NOTE — PLAN OF CARE
Problem: PHYSICAL THERAPY ADULT  Goal: Performs mobility at highest level of function for planned discharge setting  See evaluation for individualized goals  Treatment/Interventions: ADL retraining, Functional transfer training  Equipment Recommended: Other (Comment) (To be determined)       See flowsheet documentation for full assessment, interventions and recommendations  Outcome: Progressing  Prognosis: Fair  Problem List: Decreased endurance, Impaired balance, Decreased mobility, Impaired vision, Decreased skin integrity, Pain  Assessment: Pts gait is steady  No LOB  Pt requires increased time to complete transfers and when turning w/ rollabout  Pt requests bed level to be elevatedThis PTA switched pts recliner chair 2* pt reporting the other recliner chair dropped down  Pt appeared to be happy w/ the current blue recliner he is sitting in  Educated pt on performing quad sets and L ankle pumps  Barriers to Discharge: Other (Comment) (Ramp to enter home, limited to NWB on right foot)     Recommendation: Short-term skilled PT     PT - OK to Discharge:  (When medically cleared; recommend sub-acute rehab)    See flowsheet documentation for full assessment

## 2018-08-21 NOTE — PROGRESS NOTES
Patent sitting in Chair, afebrile, pain well controlled , continue leeching every hr, will continue to monitor

## 2018-08-21 NOTE — PHYSICAL THERAPY NOTE
47 minute treatment       08/21/18 2655   Pain Assessment   Pain Assessment 0-10   Pain Score 6   Pain Type Surgical pain   Pain Location Ankle   Pain Orientation Right   Patient's Stated Pain Goal No pain   Hospital Pain Intervention(s) Medication (See MAR); Ambulation/increased activity   Diversional Activities Television   Restrictions/Precautions   Weight Bearing Precautions Per Order Yes   RLE Weight Bearing Per Order NWB   Other Precautions Limb alert;Telemetry; Fall Risk;Contact/isolation   General   Chart Reviewed Yes   Family/Caregiver Present No   Cognition   Overall Cognitive Status WFL   Following Commands Follows multistep commands without difficulty   Bed Mobility   Supine to Sit 5  Supervision   Additional items HOB elevated; Bedrails   Transfers   Sit to Stand 4  Minimal assistance   Additional items Assist x 1  (bed raised up)   Stand to Sit 4  Minimal assistance   Additional items Assist x 1   Stand pivot 5  Supervision   Additional items Increased time required  (rollabout)   Ambulation/Elevation   Gait pattern Short stride   Gait Assistance 5  Supervision   Assistive Device Rollabout   Distance 140' x 1   Endurance Deficit   Endurance Deficit Yes   Activity Tolerance   Activity Tolerance Patient tolerated treatment well   Assessment   Prognosis Fair   Problem List Decreased endurance; Impaired balance;Decreased mobility; Impaired vision;Decreased skin integrity;Pain   Assessment Pts gait is steady  No LOB  Pt requires increased time to complete transfers and when turning w/ rollabout  Pt requests bed level to be elevatedThis PTA switched pts recliner chair 2* pt reporting the other recliner chair dropped down  Pt appeared to be happy w/ the current blue recliner he is sitting in  Educated pt on performing quad sets and L ankle pumps     Goals   Patient Goals "complete rehabilitation and conditioning"   LTG Expiration Date 08/30/18   Treatment Day 2   Plan   Treatment/Interventions (Continue per plan of care)   Progress Progressing toward goals   PT Frequency (at least 3x/week in 8 sessions)   Recommendation   Recommendation Short-term skilled PT   Jack Willis, PTA

## 2018-08-21 NOTE — ASSESSMENT & PLAN NOTE
Patient underwent right-sided free flap to right heel surgery by Karlo Broderick on 8/13/18-  Previous free flap failure-and a flap failure with venous congestion and arterial clot was that he had to be taken to the OR again for the clock to be evacuated now he is on mechanical leaching by Plastic surgery   Flap does not look promising-  for graft on Thursday, will obtain labs Thursday morning prior to the OR  Continue current pain management  Bowel movement regimen  PT evaluate, DVT prophylaxis as per plastic  surgeon on heparin    Evaluated by ID tissue culture -Growth of Achromobacter xylosoxidans (A) and MRSA cefepime 2 g IV q 12 -> changed to bactrim ds by ID till 08/28/18    Leukocytosis improving /afebrile  Labs on Thursday prior to or-  Physical therapy recommended transitional care facility patient is in agreement

## 2018-08-21 NOTE — NURSING NOTE
Assessment unchanged from 0800  Continuing to leech flap [facility]  Thigh incision reddened

## 2018-08-21 NOTE — ASSESSMENT & PLAN NOTE
Low-calorie diet,  PT evaluation for early mobilization if recommended by orthopedic surgeon- since being hospitalized states lost weight-

## 2018-08-22 PROCEDURE — 99232 SBSQ HOSP IP/OBS MODERATE 35: CPT | Performed by: FAMILY MEDICINE

## 2018-08-22 PROCEDURE — 97530 THERAPEUTIC ACTIVITIES: CPT

## 2018-08-22 PROCEDURE — 99232 SBSQ HOSP IP/OBS MODERATE 35: CPT | Performed by: INTERNAL MEDICINE

## 2018-08-22 RX ORDER — SODIUM PHOSPHATE, DIBASIC AND SODIUM PHOSPHATE, MONOBASIC 7; 19 G/133ML; G/133ML
1 ENEMA RECTAL DAILY PRN
Status: DISCONTINUED | OUTPATIENT
Start: 2018-08-22 | End: 2018-08-26

## 2018-08-22 RX ORDER — SODIUM CHLORIDE, SODIUM LACTATE, POTASSIUM CHLORIDE, CALCIUM CHLORIDE 600; 310; 30; 20 MG/100ML; MG/100ML; MG/100ML; MG/100ML
75 INJECTION, SOLUTION INTRAVENOUS CONTINUOUS
Status: DISCONTINUED | OUTPATIENT
Start: 2018-08-22 | End: 2018-08-23

## 2018-08-22 RX ADMIN — HEPARIN SODIUM 15000 UNITS: 5000 INJECTION, SOLUTION INTRAVENOUS; SUBCUTANEOUS at 16:48

## 2018-08-22 RX ADMIN — HYDROMORPHONE HYDROCHLORIDE 1 MG: 1 INJECTION, SOLUTION INTRAMUSCULAR; INTRAVENOUS; SUBCUTANEOUS at 20:20

## 2018-08-22 RX ADMIN — HEPARIN SODIUM 15000 UNITS: 5000 INJECTION, SOLUTION INTRAVENOUS; SUBCUTANEOUS at 04:22

## 2018-08-22 RX ADMIN — SULFAMETHOXAZOLE AND TRIMETHOPRIM 1 TABLET: 800; 160 TABLET ORAL at 08:49

## 2018-08-22 RX ADMIN — ASPIRIN 325 MG ORAL TABLET 325 MG: 325 PILL ORAL at 08:48

## 2018-08-22 RX ADMIN — HYDROMORPHONE HYDROCHLORIDE 1 MG: 1 INJECTION, SOLUTION INTRAMUSCULAR; INTRAVENOUS; SUBCUTANEOUS at 08:13

## 2018-08-22 RX ADMIN — HEPARIN SODIUM 15000 UNITS: 5000 INJECTION, SOLUTION INTRAVENOUS; SUBCUTANEOUS at 09:07

## 2018-08-22 RX ADMIN — HEPARIN SODIUM 15000 UNITS: 5000 INJECTION, SOLUTION INTRAVENOUS; SUBCUTANEOUS at 12:27

## 2018-08-22 RX ADMIN — PRAVASTATIN SODIUM 80 MG: 20 TABLET ORAL at 08:48

## 2018-08-22 RX ADMIN — HEPARIN SODIUM 15000 UNITS: 5000 INJECTION, SOLUTION INTRAVENOUS; SUBCUTANEOUS at 00:10

## 2018-08-22 RX ADMIN — MAGNESIUM HYDROXIDE 30 ML: 400 SUSPENSION ORAL at 08:50

## 2018-08-22 RX ADMIN — HEPARIN SODIUM 15000 UNITS: 5000 INJECTION, SOLUTION INTRAVENOUS; SUBCUTANEOUS at 14:29

## 2018-08-22 RX ADMIN — SENNOSIDES 17.2 MG: 8.6 TABLET, FILM COATED ORAL at 18:17

## 2018-08-22 RX ADMIN — FAMOTIDINE 20 MG: 20 TABLET ORAL at 08:49

## 2018-08-22 RX ADMIN — HYDROMORPHONE HYDROCHLORIDE 1 MG: 1 INJECTION, SOLUTION INTRAMUSCULAR; INTRAVENOUS; SUBCUTANEOUS at 23:01

## 2018-08-22 RX ADMIN — HYDROMORPHONE HYDROCHLORIDE 1 MG: 1 INJECTION, SOLUTION INTRAMUSCULAR; INTRAVENOUS; SUBCUTANEOUS at 12:22

## 2018-08-22 RX ADMIN — HEPARIN SODIUM 15000 UNITS: 5000 INJECTION, SOLUTION INTRAVENOUS; SUBCUTANEOUS at 23:03

## 2018-08-22 RX ADMIN — SULFAMETHOXAZOLE AND TRIMETHOPRIM 1 TABLET: 800; 160 TABLET ORAL at 20:20

## 2018-08-22 RX ADMIN — EZETIMIBE 10 MG: 10 TABLET ORAL at 08:50

## 2018-08-22 RX ADMIN — SENNOSIDES 17.2 MG: 8.6 TABLET, FILM COATED ORAL at 08:49

## 2018-08-22 RX ADMIN — DOCUSATE SODIUM 100 MG: 100 CAPSULE, LIQUID FILLED ORAL at 18:18

## 2018-08-22 RX ADMIN — HEPARIN SODIUM 15000 UNITS: 5000 INJECTION, SOLUTION INTRAVENOUS; SUBCUTANEOUS at 08:14

## 2018-08-22 RX ADMIN — HEPARIN SODIUM 15000 UNITS: 5000 INJECTION, SOLUTION INTRAVENOUS; SUBCUTANEOUS at 07:00

## 2018-08-22 RX ADMIN — HEPARIN SODIUM 15000 UNITS: 5000 INJECTION, SOLUTION INTRAVENOUS; SUBCUTANEOUS at 03:11

## 2018-08-22 RX ADMIN — SODIUM CHLORIDE, POTASSIUM CHLORIDE, SODIUM LACTATE AND CALCIUM CHLORIDE 75 ML/HR: 600; 310; 30; 20 INJECTION, SOLUTION INTRAVENOUS at 18:35

## 2018-08-22 RX ADMIN — MAGNESIUM HYDROXIDE 30 ML: 400 SUSPENSION ORAL at 16:47

## 2018-08-22 RX ADMIN — HEPARIN SODIUM 15000 UNITS: 5000 INJECTION, SOLUTION INTRAVENOUS; SUBCUTANEOUS at 15:21

## 2018-08-22 RX ADMIN — DOCUSATE SODIUM 100 MG: 100 CAPSULE, LIQUID FILLED ORAL at 08:47

## 2018-08-22 RX ADMIN — LISINOPRIL 10 MG: 10 TABLET ORAL at 08:50

## 2018-08-22 RX ADMIN — HEPARIN SODIUM 15000 UNITS: 5000 INJECTION, SOLUTION INTRAVENOUS; SUBCUTANEOUS at 10:16

## 2018-08-22 RX ADMIN — HEPARIN SODIUM 15000 UNITS: 5000 INJECTION, SOLUTION INTRAVENOUS; SUBCUTANEOUS at 05:05

## 2018-08-22 RX ADMIN — HEPARIN SODIUM 15000 UNITS: 5000 INJECTION, SOLUTION INTRAVENOUS; SUBCUTANEOUS at 22:04

## 2018-08-22 RX ADMIN — HEPARIN SODIUM 15000 UNITS: 5000 INJECTION, SOLUTION INTRAVENOUS; SUBCUTANEOUS at 02:12

## 2018-08-22 RX ADMIN — HEPARIN SODIUM 15000 UNITS: 5000 INJECTION, SOLUTION INTRAVENOUS; SUBCUTANEOUS at 19:55

## 2018-08-22 RX ADMIN — HEPARIN SODIUM 15000 UNITS: 5000 INJECTION, SOLUTION INTRAVENOUS; SUBCUTANEOUS at 01:18

## 2018-08-22 RX ADMIN — HYDROMORPHONE HYDROCHLORIDE 1 MG: 1 INJECTION, SOLUTION INTRAMUSCULAR; INTRAVENOUS; SUBCUTANEOUS at 04:27

## 2018-08-22 RX ADMIN — HEPARIN SODIUM 15000 UNITS: 5000 INJECTION, SOLUTION INTRAVENOUS; SUBCUTANEOUS at 13:15

## 2018-08-22 RX ADMIN — ASPIRIN 325 MG ORAL TABLET 325 MG: 325 PILL ORAL at 18:18

## 2018-08-22 RX ADMIN — HEPARIN SODIUM 15000 UNITS: 5000 INJECTION, SOLUTION INTRAVENOUS; SUBCUTANEOUS at 21:04

## 2018-08-22 RX ADMIN — PANTOPRAZOLE SODIUM 40 MG: 40 TABLET, DELAYED RELEASE ORAL at 06:02

## 2018-08-22 RX ADMIN — HEPARIN SODIUM 15000 UNITS: 5000 INJECTION, SOLUTION INTRAVENOUS; SUBCUTANEOUS at 11:13

## 2018-08-22 RX ADMIN — HYDROMORPHONE HYDROCHLORIDE 1 MG: 1 INJECTION, SOLUTION INTRAMUSCULAR; INTRAVENOUS; SUBCUTANEOUS at 16:08

## 2018-08-22 RX ADMIN — HEPARIN SODIUM 5000 UNITS: 5000 INJECTION, SOLUTION INTRAVENOUS; SUBCUTANEOUS at 08:51

## 2018-08-22 RX ADMIN — FAMOTIDINE 20 MG: 20 TABLET ORAL at 18:18

## 2018-08-22 RX ADMIN — HEPARIN SODIUM 5000 UNITS: 5000 INJECTION, SOLUTION INTRAVENOUS; SUBCUTANEOUS at 20:19

## 2018-08-22 RX ADMIN — HEPARIN SODIUM 15000 UNITS: 5000 INJECTION, SOLUTION INTRAVENOUS; SUBCUTANEOUS at 18:17

## 2018-08-22 RX ADMIN — HEPARIN SODIUM 15000 UNITS: 5000 INJECTION, SOLUTION INTRAVENOUS; SUBCUTANEOUS at 06:01

## 2018-08-22 NOTE — PROGRESS NOTES
Progress Note - Infectious Disease   Lias Guzmán 63 y o  male MRN: [id number]  Unit/Bed#:  Encounter: [id number]      Impression/Recommendations:  1   Right heel wound infection   Post multiple previous I and D's   Most recently status post free flap closure with STSG   As patient completed a postoperative course of oral amoxicillin and Levaquin for 14 days based on prior culture data which revealed Enterococcus, Pseudomonas, stenotrophomonas   Patient is now most recently status post repeat free flap coverage with STSG    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   Operative culture was sent from tissue which is now growing Achromobacter and MRSA   Fortunately, patient is without signs of sepsis   He is afebrile, hemodynamically stable  Patient now tolerating oral Bactrim without difficulty      -continue oral Bactrim DS twice a day based on most recent operative culture data  Continue for 2 week course through 8/28   -serial exams of wound and management per Plastic surgery service  -tentative plan for further STSG were local flap tomorrow  Will follow up operative findings      2   Chronic right heel open wound   Status post free flap closure with STSG x2   Plastic surgery unsure of viability of flap  Patient going back to OR tomorrow      3   Achilles tendon repair, status post repair with above complication      4   Morbid obesity   May have contributed to original injury and poor wound healing      5   Leukocytosis   Likely postoperative elevation   No associated fevers   Remains systemically well, nontoxic  WBC count has improved      Antibiotics:  Antibiotic D10  Bactrim D7    Subjective:  Pain is controlled  Plastic surgery is unsure if flap is still viable  Patient going back to OR tomorrow  Denies fevers, chills, or sweats  Denies nausea, vomiting, or diarrhea  Is requiring laxatives for constipation      Objective:  Vitals:  HR:  [79-99] 80  Resp:  [16-29] 20  BP: (118-139)/(55-74) 118/72  SpO2:  [95 %-99 %] 99 %  Temp (24hrs), Av 2 °F (36 2 °C), Min:96 7 °F (35 9 °C), Max:98 °F (36 7 °C)  Current: Temperature: 97 7 °F (36 5 °C)    Physical Exam:   General:  No acute distress  Psychiatric:  Awake and alert  Pulmonary:  Normal respiratory excursion without accessory muscle use  Abdomen:  Soft, nontender  Extremities:  No edema  Right leg dressing intact  Skin:  No rashes    Lab Results:  I have personally reviewed pertinent labs  Results from last 7 days  Lab Units 18  0407 18  1123   SODIUM mmol/L 132* 133*   POTASSIUM mmol/L 4 4 3 9   CHLORIDE mmol/L 98 100   CO2 mmol/L 28 29   ANION GAP mmol/L 6 4*   BUN mg/dL 18 13   CREATININE mg/dL 1 05 0 79   EGFR ml/min/1 73sq m 78 100   GLUCOSE RANDOM mg/dL 104* 104*   CALCIUM mg/dL 9 1 8 0*       Results from last 7 days  Lab Units 18  0542 18  1123   WBC Thousand/uL 13 80* 14 00*   HEMOGLOBIN g/dL 11 3* 10 2*   PLATELETS Thousands/uL 378 308           Imaging Studies:   I have personally reviewed pertinent imaging study reports and images in PACS  EKG, Pathology, and Other Studies:   I have personally reviewed pertinent reports

## 2018-08-22 NOTE — NURSING NOTE
Pleasant  Asking for pain meds  Dilaudid given  Flap unchanged, remains black and hard, except for the middle

## 2018-08-22 NOTE — SOCIAL WORK
Pt reports anxiety re: surgery in a m - hoping flap is salvageable- emotional support given  Remains compliant with NWB on (R)- OOB last evening with use of Kneewalker Scooter requiring supervision- therapy recommending STR when medically cleared and pt agreeable to TCF  Will need PT/OT re-ordered post surgery    Will continue to follow throughout hospitalization for d/c needs

## 2018-08-22 NOTE — NURSING NOTE
Alert and oriented  OOB to chair  C/o pain to right leg beneath flap  Medicated per order with result for decreased pain  To BR with supervision and use of roundabout device for mobility  Pt states + BM  Heart  tones distant  Pulses palpable  Right lower leg is edematous  Lungs decreased but clear  Dyspnea on exertion  Using Incentive for max volume  Abdomen is large and semi firm with tenderness  States he has a poor appetite and feels nauseated when constipated  Voiding clear lilliam  PICC intact  All ports flush but with no blood return  Flap leeching per order  Flap appears mostly black with no bleeding  Incision to upper thigh is open to air and slightly pink  Skin graft donor site is also pink and open to air  Dressing to lower leg is saturated with dark to bright red serosanguineous drainage

## 2018-08-22 NOTE — ASSESSMENT & PLAN NOTE
· Sodium 132- suspect siadh/pain when leaching - will check sodium studies - c/w siadh- post op pain with mechanical leaching- controlled now- continue  on 1500 ml restriction - recheck in am  Last sodium 133 stable prior to 132

## 2018-08-22 NOTE — PHYSICAL THERAPY NOTE
PHYSICAL THERAPY TREATMENT NOTE    Time In: 15:50   Time Out[de-identified] 16:30  Total Treatment Time: 40 min  MRN: [id number]    Chart reviewed  RN cleared patient for PT  Admit Date: 8/13/2018 Admit Dx: Open wound of right ankle [S91 001A] Length Of Stay: 9 days    Subjective and Objective findings as follows:     08/22/18 1630   Pain Assessment   Pain Assessment 0-10   Pain Score 3   Pain Type Surgical pain   Pain Location Ankle   Pain Orientation Right   Patient's Stated Pain Goal No pain   Hospital Pain Intervention(s) Other (Comment)  (Vanessa RN made aware and medicated patient)   Restrictions/Precautions   Weight Bearing Precautions Per Order Yes   RLE Weight Bearing Per Order NWB   Other Precautions Fall Risk;Pain;Visual impairment;Telemetry;Contact/isolation   General   Chart Reviewed Yes   Family/Caregiver Present Yes  (Patient's wife)   Cognition   Overall Cognitive Status WFL   Arousal/Participation Alert   Orientation Level Oriented X4   Following Commands Follows all commands and directions without difficulty   Subjective   Subjective "I need to do some isometrics I think because I'm getting weak"   Bed Mobility   Supine to Sit 5  Supervision  (For safety; multiple trials prior to getting OOB)   Additional items HOB elevated; Bedrails; Comment  (Reported throbbing of right lower leg in dependent position)   Sit to Supine 5  Supervision  (Multiple trials)   Additional items HOB elevated; Bedrails   Transfers   Sit to Stand 5  Supervision  (Close, for safety)   Additional items Other  (Elevated level of bed)   Stand to Sit 5  Supervision  (Close, for safety)   Additional items Other  (Poor eccentric control)   Stand pivot 5  Supervision  (Close, for safety from bed > knee walker)   Ambulation/Elevation   Gait pattern Short stride   Gait Assistance 5  Supervision  (For safety)   Assistive Device (Knee walker (patient's own))   Distance 140 ft   Ramp Technique Not tested   Ramp Assistance Not tested   Balance Static Standing Fair -   Dynamic Standing Fair -   Endurance Deficit   Endurance Deficit Yes   Endurance Deficit Description Limited endurance for activity   Activity Tolerance   Activity Tolerance Patient limited by fatigue;Patient limited by pain   Exercises   Quad Sets Sitting;5 reps;AROM; Bilateral   Heelslides Sitting  (Advised to perform on left)   Glute Sets 5 reps; Sitting;AROM   Hip Flexion AROM; Bilateral;Sitting;AAROM  (SLRs (AAROM on right, AROM on left) x 2 reps)   UE Exercise Bilateral;Sitting  (Recommended AROM b/l shoulder elevation & chair push-ups)   Assessment   Prognosis Fair   Problem List Decreased strength;Decreased endurance;Decreased mobility; Impaired vision;Pain;Decreased skin integrity;Obesity   Assessment Patient with steady gait on his knee walker  Tends to be impulsive with transfers and with poor eccentric control with stand > sit  Patient fatigued easily with mobility and limited exercises  Instructed patient on bedside exercises that he can perform on his own  Increased malodorous drainage noted from right ankle; RN planning to change dressings  Patient scheduled for surgery 8/23/18; will await new PT orders post-op  Barriers to Discharge Other (Comment)  (Ramp to enter home, limited to NWB on right foot)   Goals   Patient Goals "I'd like to get my strength back"  LTG Expiration Date 08/30/18   Treatment Day 3   Plan   Treatment/Interventions ADL retraining;Functional transfer training;LE strengthening/ROM; Elevations; Therapeutic exercise; Endurance training;Patient/family training;Equipment eval/education; Bed mobility;Gait training;Spoke to nursing;OT;Family   Progress Progressing toward goals   PT Frequency Other (Comment)  (At least 3x/wk in 8 PT treatment sessions)   Recommendation   Recommendation Short-term skilled PT   Equipment Recommended Other (Comment)  (To be determined)   PT - OK to Discharge (When medically cleared)     Patient transferred to recliner chair at bedside with LEs elevated; patient positioned with all needs, including call bell, within reach      Yosef Vela, PT, DPT

## 2018-08-22 NOTE — PROGRESS NOTES
Progress Note - Suzie Guzman 1960, 63 y o  male MRN: [id number]    Unit/Bed#:  Encounter: [id number]    Primary Care Provider: Annia Hernandez MD   Date and time admitted to hospital: 8/13/2018  5:55 AM        Mild intermittent asthma without complication   Assessment & Plan    · Has not had any trouble while hospitalized but if needed we will put p r n  albuterol        Morbid obesity with BMI of 45 0-49 9, adult ([facility] [address] )   Assessment & Plan    Low-calorie diet,  PT evaluation for early mobilization if recommended by orthopedic surgeon- since being hospitalized states lost weight- weight decreased to 292 lb patient prior to surgical start even first admission was 320 lb as he states        Acute hyponatremia   Assessment & Plan    · Sodium 132- suspect siadh/pain when leaching - will check sodium studies - c/w siadh- post op pain with mechanical leaching- controlled now- continue  on 1500 ml restriction - recheck in am  Last sodium 133 stable prior to 132        KIRIT on CPAP   Assessment & Plan    Continue CPAP        GERD (gastroesophageal reflux disease)   Assessment & Plan    Resolved post below adjustment  · Uncontrolled symptoms patient does have a hiatal hernia status post sees EGD but his symptoms have been quite a controlled for a while  I will increase his Pepcid to 20 mg p  o  b i d  and Protonix 40 mg p  o  daily put him on sodium bicarb 650 mg p o  b i d  p r n  and Maalox patient agrees with this plan  Essential hypertension   Assessment & Plan    Continue lisinopril 10 mg daily it fluctuates with pain medicine and pain overall stable            PVC (premature ventricular contraction)   Assessment & Plan    · Previous admission same- asymptomatic - no indication of treatment at this time unless more frequent - infrequent bigeminy - no recurrence overnight   · k and mag ok   · Had echo last admission recent- normal         HLD (hyperlipidemia)   Assessment & Plan Low-cholesterol diet  Continue pravastatin every other day as recommended by patient's family doctor and Zetia- every other day  As he was having muscle spasm secondary to being on statin daily and his muscle  Spasms have resolved since going this way        * Ankle wound, right, sequela   Assessment & Plan    Patient underwent right-sided free flap to right heel surgery by Cherelle Franco on 8/13/18-  Previous free flap failure-and a flap failure with venous congestion and arterial clot was that he had to be taken to the OR again for the clock to be evacuated now he is on mechanical leaching by Plastic surgery  Flap does not look promising-  for graft on Thursday, will obtain labs Thursday morning prior to the OR  Continue current pain management  Bowel movement regimen  PT evaluate, DVT prophylaxis as per plastic  surgeon on heparin    Evaluated by ID tissue culture -Growth of Achromobacter xylosoxidans (A) and MRSA cefepime 2 g IV q 12 -> changed to bactrim ds by ID till 08/28/18    Leukocytosis improving /afebrile  Labs on Thursday prior to or-  Physical therapy recommended transitional care facility patient is in agreement               VTE Pharmacologic Prophylaxis:   Pharmacologic: Heparin  Mechanical VTE Prophylaxis in Place: Yes    Patient Centered Rounds: I have performed bedside rounds with nursing staff today  Discussions with Specialists or Other Care Team Provider: yes    Education and Discussions with Family / Patient: patient    Time Spent for Care: 30 minutes  More than 50% of total time spent on counseling and coordination of care as described above      Current Length of Stay: 9 day(s)    Current Patient Status: Inpatient   Certification Statement: The patient will continue to require additional inpatient hospital stay due to for graft in am    Discharge Plan: tcf    Code Status: No Order      Subjective:   Patient seen and examined took a lot of laxatives yesterday still had no BM asking to put a p r n  Sharon  He is not very optimistic about even the graft working  He is going for the graft tomorrow  Otherwise no chest pain or shortness of breath no abdominal pain  Pain is only when they do mechanical leaching  Objective:     Vitals:   Temp (24hrs), Av 3 °F (36 3 °C), Min:96 7 °F (35 9 °C), Max:98 2 °F (36 8 °C)    HR:  [79-99] 79  Resp:  [13-29] 18  BP: (107-139)/(55-74) 118/72  SpO2:  [95 %-99 %] 95 %  Body mass index is 46 33 kg/m²  Input and Output Summary (last 24 hours): Intake/Output Summary (Last 24 hours) at 18 0908  Last data filed at 18 0400   Gross per 24 hour   Intake              540 ml   Output             2000 ml   Net            -1460 ml       Physical Exam:     Physical Exam   Constitutional: He is oriented to person, place, and time  He appears well-developed  Obese   HENT:   Head: Normocephalic and atraumatic  Eyes: EOM are normal  Pupils are equal, round, and reactive to light  Neck: Normal range of motion  Cardiovascular: Normal rate, regular rhythm and normal heart sounds  Pulmonary/Chest: Effort normal and breath sounds normal    Abdominal: Soft  Bowel sounds are normal    Obese   Musculoskeletal: Normal range of motion  He exhibits edema (Right lower extremity with bulky dressing-the graft looks dark, bloody drainage on the dressing)  Neurological: He is alert and oriented to person, place, and time  He has normal reflexes  Skin: Skin is warm  Psychiatric: He has a normal mood and affect           Additional Data:     Labs:      Results from last 7 days  Lab Units 18  0542   WBC Thousand/uL 13 80*   HEMOGLOBIN g/dL 11 3*   HEMATOCRIT % 34 0*   PLATELETS Thousands/uL 378   NEUTROS PCT % 63   LYMPHS PCT % 19*   MONOS PCT % 10   EOS PCT % 8*       Results from last 7 days  Lab Units 18  0407   SODIUM mmol/L 132*   POTASSIUM mmol/L 4 4   CHLORIDE mmol/L 98   CO2 mmol/L 28   BUN mg/dL 18   CREATININE mg/dL 1 05   CALCIUM mg/dL 9 1   GLUCOSE RANDOM mg/dL 104*                     * I Have Reviewed All Lab Data Listed Above  * Additional Pertinent Lab Tests Reviewed: [facility] 67 Admission Reviewed    Imaging:    Imaging Reports Reviewed Today Include: none done  Imaging Personally Reviewed by Myself Includes:  None done    Recent Cultures (last 7 days):           Last 24 Hours Medication List:     Current Facility-Administered Medications:  albuterol 2 puff Inhalation Q4H PRN Karen Aguirre MD   aluminum-magnesium hydroxide-simethicone 30 mL Oral Q4H PRN Karen Aguirre MD   aspirin 325 mg Oral BID Bethanne Necessary, MD   docusate sodium 100 mg Oral BID Yesenia Friedman MD   ezetimibe 10 mg Oral Daily Shayleehanne Necessary, MD   famotidine 20 mg Oral BID Karen Aguirre MD   fentaNYL 25 mcg Intravenous Q5 Min PRN Makenna Castle DO   heparin (porcine) 10,000 Units Subcutaneous Once Brayden Necessary, MD   heparin (porcine) 15,000 Units Subcutaneous Q30 Min PRN Brayden Necessary, MD   heparin (porcine) 5,000 Units Subcutaneous Q12H [facility] Brayden Kumar, MD   HYDROmorphone 0 2 mg Intravenous Q5 Min PRN Makenna Castle, DO   HYDROmorphone 1 mg Intravenous Q1H PRN Sadaf Kang MD   lisinopril 10 mg Oral Daily Karen Aguirre MD   magnesium hydroxide 30 mL Oral BID PRN Karen Aguirre MD   meperidine 12 5 mg Intravenous Q10 Min PRN Makenna Castle DO   morphine injection 2 mg Intravenous Q3H PRN Brayden Necessary, MD   ondansetron 4 mg Intravenous Q6H PRN Brayden Kumar, MD   oxyCODONE-acetaminophen 1 tablet Oral Q4H PRN Brayden Necessary, MD   pantoprazole 40 mg Oral Early Morning Karen Aguirre MD   phenol 1 spray Mouth/Throat Q2H PRN Makenna Castle DO   pravastatin 80 mg Oral Every Other Day Yesenia Friedman MD   senna 2 tablet Oral BID Bilal A   [place] Jim, MD   sodium bicarbonate 650 mg Oral BID PRN Karen Aguirre MD   sodium phosphate-biphosphate 1 enema Rectal Daily PRN Karen Aguirre MD   sulfamethoxazole-trimethoprim 1 tablet Oral Q12H Myra Smart MD        Today, Patient Was Seen By: Miky Castro MD    ** Please Note: Dictation voice to text software may have been used in the creation of this document   **

## 2018-08-22 NOTE — ASSESSMENT & PLAN NOTE
Low-calorie diet,  PT evaluation for early mobilization if recommended by orthopedic surgeon- since being hospitalized states lost weight- weight decreased to 292 lb patient prior to surgical start even first admission was 320 lb as he states

## 2018-08-22 NOTE — ASSESSMENT & PLAN NOTE
Patient underwent right-sided free flap to right heel surgery by Briana Dunn on 8/13/18-  Previous free flap failure-and a flap failure with venous congestion and arterial clot was that he had to be taken to the OR again for the clock to be evacuated now he is on mechanical leaching by Plastic surgery   Flap does not look promising-  for graft on Thursday, will obtain labs Thursday morning prior to the OR  Continue current pain management  Bowel movement regimen  PT evaluate, DVT prophylaxis as per plastic  surgeon on heparin    Evaluated by ID tissue culture -Growth of Achromobacter xylosoxidans (A) and MRSA cefepime 2 g IV q 12 -> changed to bactrim ds by ID till 08/28/18    Leukocytosis improving /afebrile  Labs on Thursday prior to or-  Physical therapy recommended transitional care facility patient is in agreement

## 2018-08-22 NOTE — PLAN OF CARE
Problem: PHYSICAL THERAPY ADULT  Goal: Performs mobility at highest level of function for planned discharge setting  See evaluation for individualized goals  Treatment/Interventions: ADL retraining, Functional transfer training  Equipment Recommended: Other (Comment) (To be determined)       See flowsheet documentation for full assessment, interventions and recommendations  Outcome: Progressing  Prognosis: Fair  Problem List: Decreased strength, Decreased endurance, Decreased mobility, Impaired vision, Pain, Decreased skin integrity, Obesity  Assessment: Patient with steady gait on his knee walker  Tends to be impulsive with transfers and with poor eccentric control with stand > sit  Patient fatigued easily with mobility and limited exercises  Instructed patient on bedside exercises that he can perform on his own  Patient scheduled for surgery 8/23/18; will await new PT orders post-op  Barriers to Discharge: Other (Comment) (Ramp to enter home, limited to NWB on right foot)     Recommendation: Short-term skilled PT     PT - OK to Discharge:  (When medically cleared; recommend sub-acute rehab)    See flowsheet documentation for full assessment

## 2018-08-22 NOTE — PROGRESS NOTES
Patients wants fluid restriction dced- seems pain controlled- sodium should be ok - will dc nd has bmp in am

## 2018-08-22 NOTE — OCCUPATIONAL THERAPY NOTE
OT orders received and chart reviewed  Nursing reported pt scheduled for OR tomorrow  Will await resume order after sx       Kenji Cervantes, OT

## 2018-08-23 LAB
ANION GAP SERPL CALCULATED.3IONS-SCNC: 5 MMOL/L (ref 5–14)
BUN SERPL-MCNC: 27 MG/DL (ref 5–25)
CALCIUM SERPL-MCNC: 9 MG/DL (ref 8.4–10.2)
CHLORIDE SERPL-SCNC: 99 MMOL/L (ref 97–108)
CO2 SERPL-SCNC: 27 MMOL/L (ref 22–30)
CREAT SERPL-MCNC: 1.27 MG/DL (ref 0.7–1.5)
EOSINOPHIL # BLD AUTO: 0.82 THOUSAND/UL (ref 0–0.4)
EOSINOPHIL NFR BLD MANUAL: 7 % (ref 0–6)
ERYTHROCYTE [DISTWIDTH] IN BLOOD BY AUTOMATED COUNT: 15.5 %
GFR SERPL CREATININE-BSD FRML MDRD: 62 ML/MIN/1.73SQ M
GLUCOSE SERPL-MCNC: 92 MG/DL (ref 70–99)
HCT VFR BLD AUTO: 31.4 % (ref 41–53)
HGB BLD-MCNC: 10.7 G/DL (ref 13.5–17.5)
LYMPHOCYTES # BLD AUTO: 2.81 THOUSAND/UL (ref 0.5–4)
LYMPHOCYTES # BLD AUTO: 24 % (ref 20–50)
MCH RBC QN AUTO: 30.1 PG (ref 26–34)
MCHC RBC AUTO-ENTMCNC: 33.9 G/DL (ref 31–36)
MCV RBC AUTO: 89 FL (ref 80–100)
MONOCYTES # BLD AUTO: 1.4 THOUSAND/UL (ref 0.2–0.9)
MONOCYTES NFR BLD AUTO: 12 % (ref 1–10)
NEUTS SEG # BLD: 6.67 THOUSAND/UL (ref 1.8–7.8)
NEUTS SEG NFR BLD AUTO: 57 %
PLATELET # BLD AUTO: 342 THOUSANDS/UL (ref 150–450)
PLATELET BLD QL SMEAR: ADEQUATE
PMV BLD AUTO: 8.5 FL (ref 8.9–12.7)
POTASSIUM SERPL-SCNC: 4.9 MMOL/L (ref 3.6–5)
RBC # BLD AUTO: 3.55 MILLION/UL (ref 4.5–5.9)
RBC MORPH BLD: NORMAL
SODIUM SERPL-SCNC: 131 MMOL/L (ref 137–147)
TOTAL CELLS COUNTED SPEC: 100
WBC # BLD AUTO: 11.7 THOUSAND/UL (ref 4.5–11)

## 2018-08-23 PROCEDURE — 85007 BL SMEAR W/DIFF WBC COUNT: CPT | Performed by: FAMILY MEDICINE

## 2018-08-23 PROCEDURE — 0HBHXZZ EXCISION OF RIGHT UPPER LEG SKIN, EXTERNAL APPROACH: ICD-10-PCS | Performed by: PLASTIC SURGERY

## 2018-08-23 PROCEDURE — 0HRMX74 REPLACEMENT OF RIGHT FOOT SKIN WITH AUTOLOGOUS TISSUE SUBSTITUTE, PARTIAL THICKNESS, EXTERNAL APPROACH: ICD-10-PCS | Performed by: PLASTIC SURGERY

## 2018-08-23 PROCEDURE — 85027 COMPLETE CBC AUTOMATED: CPT | Performed by: FAMILY MEDICINE

## 2018-08-23 PROCEDURE — 99232 SBSQ HOSP IP/OBS MODERATE 35: CPT | Performed by: FAMILY MEDICINE

## 2018-08-23 PROCEDURE — 87070 CULTURE OTHR SPECIMN AEROBIC: CPT | Performed by: PLASTIC SURGERY

## 2018-08-23 PROCEDURE — 87186 SC STD MICRODIL/AGAR DIL: CPT | Performed by: PLASTIC SURGERY

## 2018-08-23 PROCEDURE — 87205 SMEAR GRAM STAIN: CPT | Performed by: PLASTIC SURGERY

## 2018-08-23 PROCEDURE — 87147 CULTURE TYPE IMMUNOLOGIC: CPT | Performed by: PLASTIC SURGERY

## 2018-08-23 PROCEDURE — 88304 TISSUE EXAM BY PATHOLOGIST: CPT | Performed by: PATHOLOGY

## 2018-08-23 PROCEDURE — 80048 BASIC METABOLIC PNL TOTAL CA: CPT | Performed by: FAMILY MEDICINE

## 2018-08-23 RX ORDER — EPHEDRINE SULFATE 50 MG/ML
INJECTION, SOLUTION INTRAVENOUS AS NEEDED
Status: DISCONTINUED | OUTPATIENT
Start: 2018-08-23 | End: 2018-08-23 | Stop reason: SURG

## 2018-08-23 RX ORDER — MIDAZOLAM HYDROCHLORIDE 1 MG/ML
INJECTION INTRAMUSCULAR; INTRAVENOUS AS NEEDED
Status: DISCONTINUED | OUTPATIENT
Start: 2018-08-23 | End: 2018-08-23 | Stop reason: SURG

## 2018-08-23 RX ORDER — GINSENG 100 MG
CAPSULE ORAL AS NEEDED
Status: DISCONTINUED | OUTPATIENT
Start: 2018-08-23 | End: 2018-08-23 | Stop reason: SURG

## 2018-08-23 RX ORDER — MAGNESIUM HYDROXIDE 1200 MG/15ML
LIQUID ORAL AS NEEDED
Status: DISCONTINUED | OUTPATIENT
Start: 2018-08-23 | End: 2018-08-23 | Stop reason: HOSPADM

## 2018-08-23 RX ORDER — VECURONIUM BROMIDE 1 MG/ML
INJECTION, POWDER, LYOPHILIZED, FOR SOLUTION INTRAVENOUS AS NEEDED
Status: DISCONTINUED | OUTPATIENT
Start: 2018-08-23 | End: 2018-08-23 | Stop reason: SURG

## 2018-08-23 RX ORDER — SUCCINYLCHOLINE/SOD CL,ISO/PF 100 MG/5ML
SYRINGE (ML) INTRAVENOUS AS NEEDED
Status: DISCONTINUED | OUTPATIENT
Start: 2018-08-23 | End: 2018-08-23 | Stop reason: SURG

## 2018-08-23 RX ORDER — LEVOFLOXACIN 750 MG/1
750 TABLET ORAL EVERY 24 HOURS
Status: DISCONTINUED | OUTPATIENT
Start: 2018-08-23 | End: 2018-08-27

## 2018-08-23 RX ORDER — FENTANYL CITRATE 50 UG/ML
INJECTION, SOLUTION INTRAMUSCULAR; INTRAVENOUS AS NEEDED
Status: DISCONTINUED | OUTPATIENT
Start: 2018-08-23 | End: 2018-08-23 | Stop reason: SURG

## 2018-08-23 RX ORDER — LIDOCAINE HYDROCHLORIDE 10 MG/ML
INJECTION, SOLUTION INFILTRATION; PERINEURAL AS NEEDED
Status: DISCONTINUED | OUTPATIENT
Start: 2018-08-23 | End: 2018-08-23 | Stop reason: SURG

## 2018-08-23 RX ORDER — SODIUM CHLORIDE 9 MG/ML
INJECTION, SOLUTION INTRAVENOUS CONTINUOUS PRN
Status: DISCONTINUED | OUTPATIENT
Start: 2018-08-23 | End: 2018-08-23 | Stop reason: SURG

## 2018-08-23 RX ORDER — ONDANSETRON 4 MG/1
4 TABLET, ORALLY DISINTEGRATING ORAL EVERY 6 HOURS PRN
Status: DISCONTINUED | OUTPATIENT
Start: 2018-08-23 | End: 2018-08-25

## 2018-08-23 RX ORDER — DEXAMETHASONE SODIUM PHOSPHATE 4 MG/ML
INJECTION, SOLUTION INTRA-ARTICULAR; INTRALESIONAL; INTRAMUSCULAR; INTRAVENOUS; SOFT TISSUE AS NEEDED
Status: DISCONTINUED | OUTPATIENT
Start: 2018-08-23 | End: 2018-08-23 | Stop reason: SURG

## 2018-08-23 RX ORDER — MINERAL OIL
OIL (ML) MISCELLANEOUS AS NEEDED
Status: DISCONTINUED | OUTPATIENT
Start: 2018-08-23 | End: 2018-08-23 | Stop reason: HOSPADM

## 2018-08-23 RX ORDER — PROPOFOL 10 MG/ML
INJECTION, EMULSION INTRAVENOUS AS NEEDED
Status: DISCONTINUED | OUTPATIENT
Start: 2018-08-23 | End: 2018-08-23 | Stop reason: SURG

## 2018-08-23 RX ORDER — GLYCOPYRROLATE 0.2 MG/ML
INJECTION INTRAMUSCULAR; INTRAVENOUS AS NEEDED
Status: DISCONTINUED | OUTPATIENT
Start: 2018-08-23 | End: 2018-08-23 | Stop reason: SURG

## 2018-08-23 RX ADMIN — FENTANYL CITRATE 100 MCG: 50 INJECTION INTRAMUSCULAR; INTRAVENOUS at 10:49

## 2018-08-23 RX ADMIN — LEVOFLOXACIN 750 MG: 750 INJECTION, SOLUTION INTRAVENOUS at 10:36

## 2018-08-23 RX ADMIN — EPHEDRINE SULFATE 10 MG: 50 INJECTION INTRAMUSCULAR; INTRAVENOUS; SUBCUTANEOUS at 11:11

## 2018-08-23 RX ADMIN — ONDANSETRON HYDROCHLORIDE 4 MG: 2 INJECTION, SOLUTION INTRAMUSCULAR; INTRAVENOUS at 12:23

## 2018-08-23 RX ADMIN — HEPARIN SODIUM 15000 UNITS: 5000 INJECTION, SOLUTION INTRAVENOUS; SUBCUTANEOUS at 01:58

## 2018-08-23 RX ADMIN — GLYCOPYRROLATE 0.6 MG: 0.2 INJECTION, SOLUTION INTRAMUSCULAR; INTRAVENOUS at 13:03

## 2018-08-23 RX ADMIN — HEPARIN SODIUM 15000 UNITS: 5000 INJECTION, SOLUTION INTRAVENOUS; SUBCUTANEOUS at 09:55

## 2018-08-23 RX ADMIN — Medication 1 APPLICATION: at 11:01

## 2018-08-23 RX ADMIN — EPHEDRINE SULFATE 10 MG: 50 INJECTION INTRAMUSCULAR; INTRAVENOUS; SUBCUTANEOUS at 12:01

## 2018-08-23 RX ADMIN — ASPIRIN 325 MG ORAL TABLET 325 MG: 325 PILL ORAL at 08:37

## 2018-08-23 RX ADMIN — SULFAMETHOXAZOLE AND TRIMETHOPRIM 1 TABLET: 800; 160 TABLET ORAL at 08:37

## 2018-08-23 RX ADMIN — SENNOSIDES 17.2 MG: 8.6 TABLET, FILM COATED ORAL at 08:37

## 2018-08-23 RX ADMIN — DEXAMETHASONE SODIUM PHOSPHATE 4 MG: 4 INJECTION, SOLUTION INTRA-ARTICULAR; INTRALESIONAL; INTRAMUSCULAR; INTRAVENOUS; SOFT TISSUE at 11:10

## 2018-08-23 RX ADMIN — DOCUSATE SODIUM 100 MG: 100 CAPSULE, LIQUID FILLED ORAL at 08:37

## 2018-08-23 RX ADMIN — HEPARIN SODIUM 15000 UNITS: 5000 INJECTION, SOLUTION INTRAVENOUS; SUBCUTANEOUS at 07:08

## 2018-08-23 RX ADMIN — HYDROMORPHONE HYDROCHLORIDE 1 MG: 1 INJECTION, SOLUTION INTRAMUSCULAR; INTRAVENOUS; SUBCUTANEOUS at 23:38

## 2018-08-23 RX ADMIN — HEPARIN SODIUM 15000 UNITS: 5000 INJECTION, SOLUTION INTRAVENOUS; SUBCUTANEOUS at 04:06

## 2018-08-23 RX ADMIN — MIDAZOLAM HYDROCHLORIDE 2 MG: 1 INJECTION, SOLUTION INTRAMUSCULAR; INTRAVENOUS at 10:47

## 2018-08-23 RX ADMIN — ASPIRIN 325 MG ORAL TABLET 325 MG: 325 PILL ORAL at 17:26

## 2018-08-23 RX ADMIN — SODIUM CHLORIDE: 0.9 INJECTION, SOLUTION INTRAVENOUS at 11:43

## 2018-08-23 RX ADMIN — HEPARIN SODIUM 15000 UNITS: 5000 INJECTION, SOLUTION INTRAVENOUS; SUBCUTANEOUS at 02:58

## 2018-08-23 RX ADMIN — LISINOPRIL 10 MG: 10 TABLET ORAL at 08:38

## 2018-08-23 RX ADMIN — HYDROMORPHONE HYDROCHLORIDE 1 MG: 1 INJECTION, SOLUTION INTRAMUSCULAR; INTRAVENOUS; SUBCUTANEOUS at 21:46

## 2018-08-23 RX ADMIN — EZETIMIBE 10 MG: 10 TABLET ORAL at 08:37

## 2018-08-23 RX ADMIN — HEPARIN SODIUM 5000 UNITS: 5000 INJECTION, SOLUTION INTRAVENOUS; SUBCUTANEOUS at 08:38

## 2018-08-23 RX ADMIN — SODIUM CHLORIDE, POTASSIUM CHLORIDE, SODIUM LACTATE AND CALCIUM CHLORIDE: 600; 310; 30; 20 INJECTION, SOLUTION INTRAVENOUS at 09:00

## 2018-08-23 RX ADMIN — VECURONIUM BROMIDE 8 MG: 1 INJECTION, POWDER, LYOPHILIZED, FOR SOLUTION INTRAVENOUS at 11:00

## 2018-08-23 RX ADMIN — NEOSTIGMINE METHYLSULFATE 3 MG: 1 INJECTION, SOLUTION INTRAMUSCULAR; INTRAVENOUS; SUBCUTANEOUS at 13:03

## 2018-08-23 RX ADMIN — FAMOTIDINE 20 MG: 20 TABLET ORAL at 08:37

## 2018-08-23 RX ADMIN — EPHEDRINE SULFATE 10 MG: 50 INJECTION INTRAMUSCULAR; INTRAVENOUS; SUBCUTANEOUS at 11:07

## 2018-08-23 RX ADMIN — HEPARIN SODIUM 15000 UNITS: 5000 INJECTION, SOLUTION INTRAVENOUS; SUBCUTANEOUS at 08:17

## 2018-08-23 RX ADMIN — SENNOSIDES 17.2 MG: 8.6 TABLET, FILM COATED ORAL at 17:26

## 2018-08-23 RX ADMIN — HYDROMORPHONE HYDROCHLORIDE 1 MG: 1 INJECTION, SOLUTION INTRAMUSCULAR; INTRAVENOUS; SUBCUTANEOUS at 01:57

## 2018-08-23 RX ADMIN — DOCUSATE SODIUM 100 MG: 100 CAPSULE, LIQUID FILLED ORAL at 17:26

## 2018-08-23 RX ADMIN — HEPARIN SODIUM 15000 UNITS: 5000 INJECTION, SOLUTION INTRAVENOUS; SUBCUTANEOUS at 05:15

## 2018-08-23 RX ADMIN — SULFAMETHOXAZOLE AND TRIMETHOPRIM 1 TABLET: 800; 160 TABLET ORAL at 20:55

## 2018-08-23 RX ADMIN — HEPARIN SODIUM 15000 UNITS: 5000 INJECTION, SOLUTION INTRAVENOUS; SUBCUTANEOUS at 06:22

## 2018-08-23 RX ADMIN — HYDROMORPHONE HYDROCHLORIDE 1 MG: 1 INJECTION, SOLUTION INTRAMUSCULAR; INTRAVENOUS; SUBCUTANEOUS at 18:54

## 2018-08-23 RX ADMIN — EPHEDRINE SULFATE 10 MG: 50 INJECTION INTRAMUSCULAR; INTRAVENOUS; SUBCUTANEOUS at 11:22

## 2018-08-23 RX ADMIN — LIDOCAINE HYDROCHLORIDE 100 MG: 10 INJECTION, SOLUTION INFILTRATION; PERINEURAL at 13:03

## 2018-08-23 RX ADMIN — SODIUM CHLORIDE, POTASSIUM CHLORIDE, SODIUM LACTATE AND CALCIUM CHLORIDE 75 ML/HR: 600; 310; 30; 20 INJECTION, SOLUTION INTRAVENOUS at 05:15

## 2018-08-23 RX ADMIN — Medication 140 MG: at 10:52

## 2018-08-23 RX ADMIN — FAMOTIDINE 20 MG: 20 TABLET ORAL at 17:27

## 2018-08-23 RX ADMIN — PROPOFOL 250 MG: 10 INJECTION, EMULSION INTRAVENOUS at 10:52

## 2018-08-23 RX ADMIN — HEPARIN SODIUM 15000 UNITS: 5000 INJECTION, SOLUTION INTRAVENOUS; SUBCUTANEOUS at 00:01

## 2018-08-23 RX ADMIN — HYDROMORPHONE HYDROCHLORIDE 1 MG: 1 INJECTION, SOLUTION INTRAMUSCULAR; INTRAVENOUS; SUBCUTANEOUS at 09:54

## 2018-08-23 RX ADMIN — HEPARIN SODIUM 15000 UNITS: 5000 INJECTION, SOLUTION INTRAVENOUS; SUBCUTANEOUS at 00:57

## 2018-08-23 RX ADMIN — HEPARIN SODIUM 5000 UNITS: 5000 INJECTION, SOLUTION INTRAVENOUS; SUBCUTANEOUS at 20:55

## 2018-08-23 RX ADMIN — HYDROMORPHONE HYDROCHLORIDE 1 MG: 1 INJECTION, SOLUTION INTRAMUSCULAR; INTRAVENOUS; SUBCUTANEOUS at 06:22

## 2018-08-23 RX ADMIN — HYDROMORPHONE HYDROCHLORIDE 1 MG: 1 INJECTION, SOLUTION INTRAMUSCULAR; INTRAVENOUS; SUBCUTANEOUS at 04:06

## 2018-08-23 NOTE — OP NOTE
OPERATIVE REPORT  PATIENT NAME: Tiffanie Carrizales    :  1960  MRN: [id number]  Pt Location:  OR ROOM 08    SURGERY DATE: 2018    Surgeon(s) and Role:     * Ozzy Pradhan MD - Primary     * Ricky Schwab - Assisting    Preop and postoperative Diagnosis:  Exposed Achilles tendon right heel with necrotic free flap    OperativeProcedure(s) (LRB):  LOCAL FLAP POSS STSG ACHILLES TENDON (Right)    Specimen(s):  ID Type Source Tests Collected by Time Destination   1 : right free flap ankle Tissue Ankle TISSUE EXAM Ozzy Pradhan MD 2018 1202    A : right ankle Wound Ankle WOUND CULTURE Ozzy Pradhan MD 2018 1148        Operative history:  Patient returns with a necrotic free flap covering where he had ruptured his right Achilles tendon  That was removed  Only about 2 centimeter squared area tendon remained exposed with the rest granulating  The wound was cleaned up and a distal based flap overlying the posterior tibial vessels measuring 8 x 15 cm in size was rotated based on the fasciocutaneous plexus  The flap bled from the distal end and nicely covered the tendon  A skin graft was placed on the remaining open wound in this area  Operative procedure:  Patient was taken to the operating room placed supine the operating table  his prepped and draped in usual fashion  Anesthesia was general via endotracheal tube  The free flap was cut and there was no bleeding  It was removed  A 4-0 Vicryl suture ligature was placed about about the pedicle of the flap to the posterior tibial vessels  A curette was used to remove debris with copious saline irrigation  The upper part of the defect and the calf was closed with 3-0 nylon interrupted vertical mattress sutures  Next the right lower extremity was exsanguinated with an Esmarch and a mid thigh sterile tourniquet inflated to 350 mm pressure  A distal  Based flap in a triangular shape was marked as described above    It was raised above the deep fascia using the Bovie for cutting coagulation purposes  It easily rotated over to cover the exposed tendon  The tourniquet was deflated hemostasis achieved with the Bovie  Bleeding was noted from the distal into the flap  The flap was inset with 3-0 nylon simple sutures of partially  Next a Localist electric dermatome was used to harvest a 14 1 thousands of an inch split-thickness skin graft from the right medial thigh  The graft was meshed 1/2 to 1  It was placed over the flap donor site and granulations over the remaining Achilles tendon using staples for fixation  Some extra skin was returned to the center of the donor site of the right thigh where it was also stapled into position with the intent to hasten donor site healing  A bulky wrap was placed on the right lower leg and light dressings on the right thigh  Patient tolerated the procedure well was taken back to the intensive care unit for recovery      Estimated Blood Loss:   Minimal        Anesthesia Type:   General      SIGNATURE: Fiordaliza James MD  DATE: August 23, 2018  TIME: 1:39 PM

## 2018-08-23 NOTE — ASSESSMENT & PLAN NOTE
Controlled on medications  Continue lisinopril 10 mg daily it fluctuates with pain medicine and pain overall stable

## 2018-08-23 NOTE — NURSING NOTE
Patient currently laying in bed in semi-ly's position  He is calm and cooperative with care  He is awake, alert, and oriented times four  Leeching q hour continues, patient tolerating well  Remains in normal sinus rhythm on the monitor  Bed in low position and call bell in reach

## 2018-08-23 NOTE — ASSESSMENT & PLAN NOTE
· Sodium 132- suspect siadh/pain when leaching - will check sodium studies - c/w siadh- post op pain with mechanical leaching- controlled now- continue  on 1500 ml restriction - recheck in am  Last sodium 131 stable prior to 132

## 2018-08-23 NOTE — ANESTHESIA POSTPROCEDURE EVALUATION
Post-Op Assessment Note      CV Status:  Stable    Mental Status:  Alert and awake    Hydration Status:  Stable    PONV Controlled:  None    Airway Patency:  Patent    Post Op Vitals Reviewed: Yes          Staff: CRNA           BP   152/72   Temp     Pulse  86   Resp   18   SpO2   100% Asher

## 2018-08-23 NOTE — PROGRESS NOTES
Progress Note - Antonietta Carnes 1960, 63 y o  male MRN: [id number]    Unit/Bed#:  Encounter: [id number]    Primary Care Provider: Ady Mcallister MD   Date and time admitted to hospital: 8/13/2018  5:55 AM        Mild intermittent asthma without complication   Assessment & Plan    · Has not had any trouble while hospitalized but if needed we will put p r n  albuterol        Morbid obesity with BMI of 45 0-49 9, adult ([facility] [address] )   Assessment & Plan    Low-calorie diet,  PT evaluation for early mobilization if recommended by surgeon- since being hospitalized states lost weight- weight decreased to 304 lb patient prior to surgical start even first admission was 320 lb as he states        Acute hyponatremia   Assessment & Plan    · Sodium 132- suspect siadh/pain when leaching - will check sodium studies - c/w siadh- post op pain with mechanical leaching- controlled now- continue  on 1500 ml restriction - recheck in am  Last sodium 131 stable prior to 132        KIRIT on CPAP   Assessment & Plan    Continue CPAP        GERD (gastroesophageal reflux disease)   Assessment & Plan    Resolved now  will cont Pepcid 20 mg p  o  b i d  and Protonix 40 mg p  o  daily put him on sodium bicarb 650 mg p o  b i d  p r n  and Maalox patient agrees with this plan  Essential hypertension   Assessment & Plan    Controlled on medications  Continue lisinopril 10 mg daily it fluctuates with pain medicine and pain overall stable            PVC (premature ventricular contraction)   Assessment & Plan    · Previous admission same- asymptomatic - no indication of treatment at this time unless more frequent - infrequent bigeminy - no recurrence overnight   · k and mag ok   · Had echo last admission recent- normal         HLD (hyperlipidemia)   Assessment & Plan    Low-cholesterol diet  Continue pravastatin every other day as recommended by patient's family doctor and Zetia- every other day  As he was having muscle spasm secondary to being on statin daily and his muscle  Spasms have resolved since going this way        * Ankle wound, right, sequela   Assessment & Plan    Patient underwent right-sided free flap to right heel surgery by Dr Alcantara on 8/13/18-  Previous free flap failure-and a flap failure with venous congestion and arterial clot was that he had to be taken to the OR again for the clock to be evacuated now he is on mechanical leaching by Plastic surgery  Flap does not look promising-  for OR again today,   Continue current pain management  Bowel movement regimen  PT evaluate, DVT prophylaxis as per plastic  surgeon on heparin    Evaluated by ID tissue culture -Growth of Achromobacter xylosoxidans (A) and MRSA cefepime 2 g IV q 12 -> changed to bactrim ds by ID till 08/28/18    Leukocytosis improving /afebrile  Labs on Thursday prior to or-  Physical therapy recommended transitional care facility patient is in agreement             VTE Pharmacologic Prophylaxis:   Pharmacologic: Heparin  Mechanical VTE Prophylaxis in Place: Yes    Patient Centered Rounds: I have performed bedside rounds with nursing staff today  Discussions with Specialists or Other Care Team Provider: none    Education and Discussions with Family / Patient:  Discussed with the patient about his hospital course today  Time Spent for Care: 30 minutes  More than 50% of total time spent on counseling and coordination of care as described above  Current Length of Stay: 10 day(s)    Current Patient Status: Inpatient   Certification Statement: The patient will continue to require additional inpatient hospital stay due to surgery again today    Discharge Plan: none    Code Status: No Order      Subjective:   Patient denies any complaints today  He denies any chest pain or shortness of breath or abdominal pain or nausea or vomiting  He did have a bowel movement yesterday    He is going back to the OR today for repeat surgery of his right leg     Objective:     Vitals:   Temp (24hrs), Av 1 °F (36 7 °C), Min:97 6 °F (36 4 °C), Max:98 5 °F (36 9 °C)    HR:  [77-95] 78  Resp:  [14-22] 18  BP: (115-152)/(48-72) 152/72  SpO2:  [96 %-97 %] 97 %  Body mass index is 46 33 kg/m²  Input and Output Summary (last 24 hours): Intake/Output Summary (Last 24 hours) at 18 0859  Last data filed at 18 0258   Gross per 24 hour   Intake           75 ml   Output             2520 ml   Net          -511 25 ml       Physical Exam:     Physical Exam   Constitutional: He is oriented to person, place, and time  He appears well-developed and well-nourished  HENT:   Head: Normocephalic and atraumatic  Right Ear: External ear normal    Left Ear: External ear normal    Mouth/Throat: Oropharynx is clear and moist    Eyes: Conjunctivae and EOM are normal  Pupils are equal, round, and reactive to light  Neck: Normal range of motion  Neck supple  Cardiovascular: Normal rate, regular rhythm, normal heart sounds and intact distal pulses  Pulmonary/Chest: Effort normal and breath sounds normal    Abdominal: Soft  Bowel sounds are normal  He exhibits no mass  There is no tenderness  There is no rebound and no guarding  Genitourinary:   Genitourinary Comments: deferred   Musculoskeletal:   Right leg dressings with discoloration of graft noted   Neurological: He is alert and oriented to person, place, and time  He has normal reflexes  Skin: Skin is warm and dry  No rash noted  Psychiatric: He has a normal mood and affect  Nursing note and vitals reviewed          Additional Data:     Labs:      Results from last 7 days  Lab Units 18  0509 18  0542   WBC Thousand/uL 11 70* 13 80*   HEMOGLOBIN g/dL 10 7* 11 3*   HEMATOCRIT % 31 4* 34 0*   PLATELETS Thousands/uL 342 378   NEUTROS PCT %  --  63   LYMPHS PCT %  --  19*   LYMPHO PCT % 24  --    MONOS PCT %  --  10   MONO PCT MAN % 12*  --    EOS PCT %  --  8*   EOSINO PCT MANUAL % 7*  -- Results from last 7 days  Lab Units 08/23/18  0509   SODIUM mmol/L 131*   POTASSIUM mmol/L 4 9   CHLORIDE mmol/L 99   CO2 mmol/L 27   BUN mg/dL 27*   CREATININE mg/dL 1 27   CALCIUM mg/dL 9 0   GLUCOSE RANDOM mg/dL 92                     * I Have Reviewed All Lab Data Listed Above  * Additional Pertinent Lab Tests Reviewed:  [facility] Admission Reviewed    Imaging:    Imaging Reports Reviewed Today Include: none  Imaging Personally Reviewed by Myself Includes:  none    Recent Cultures (last 7 days):           Last 24 Hours Medication List:     Current Facility-Administered Medications:  albuterol 2 puff Inhalation Q4H PRN Rebel Holt MD    aluminum-magnesium hydroxide-simethicone 30 mL Oral Q4H PRN Rebel Holt MD    aspirin 325 mg Oral BID David Momin MD    docusate sodium 100 mg Oral BID Javan Morejon MD    ezetimibe 10 mg Oral Daily David Momin MD    famotidine 20 mg Oral BID Rebel Holt MD    fentaNYL 25 mcg Intravenous Q5 Min PRN Leisa Henry DO    heparin (porcine) 10,000 Units Subcutaneous Once David Momin MD    heparin (porcine) 15,000 Units Subcutaneous Q30 Min PRN David Momin MD    heparin (porcine) 5,000 Units Subcutaneous Q12H [facility] David Momin MD    HYDROmorphone 0 2 mg Intravenous Q5 Min PRN Leisa Henry DO    HYDROmorphone 1 mg Intravenous Q1H PRN Vince Barnes MD    lactated ringers 75 mL/hr Intravenous Continuous Leisa Henry DO Last Rate: 75 mL/hr (08/23/18 0515)   lisinopril 10 mg Oral Daily Rebel Holt MD    magnesium hydroxide 30 mL Oral BID PRN Rebel Holt MD    meperidine 12 5 mg Intravenous Q10 Min PRN Leisa Henry DO    morphine injection 2 mg Intravenous Q3H PRN David Momin MD    ondansetron 4 mg Intravenous Q6H PRN David Momin MD    oxyCODONE-acetaminophen 1 tablet Oral Q4H PRN David Momin MD    pantoprazole 40 mg Oral Early Morning Rebel Holt MD    phenol 1 spray Mouth/Throat Q2H PRN Natalie Izaguirre DO    pravastatin 80 mg Oral Every Other Day Zara Artis MD    senna 2 tablet Oral BID Mgal ALLI Read MD    sodium bicarbonate 650 mg Oral BID PRN Miky Castro MD    sodium phosphate-biphosphate 1 enema Rectal Daily PRN Miky Castro MD    sulfamethoxazole-trimethoprim 1 tablet Oral Q12H Myra Smart MD         Today, Patient Was Seen By: Jono Juan MD    ** Please Note: Dictation voice to text software may have been used in the creation of this document   **

## 2018-08-23 NOTE — PHYSICAL THERAPY NOTE
PT CANCELLATION    Chart reviewed  PT Treatment deferred at this time due to: patient s/p surgery today   Will require new order for PT Re-Evaluation prior to proceeding with PT     Elizabeth Livers, PT, DPT

## 2018-08-23 NOTE — ASSESSMENT & PLAN NOTE
Patient underwent right-sided free flap to right heel surgery by Cherelle Franco on 8/13/18-  Previous free flap failure-and a flap failure with venous congestion and arterial clot was that he had to be taken to the OR again for the clock to be evacuated now he is on mechanical leaching by Plastic surgery   Flap does not look promising-  for OR again today,   Continue current pain management  Bowel movement regimen  PT evaluate, DVT prophylaxis as per plastic  surgeon on heparin    Evaluated by ID tissue culture -Growth of Achromobacter xylosoxidans (A) and MRSA cefepime 2 g IV q 12 -> changed to bactrim ds by ID till 08/28/18    Leukocytosis improving /afebrile  Labs on Thursday prior to or-  Physical therapy recommended transitional care facility patient is in agreement

## 2018-08-23 NOTE — SOCIAL WORK
Pt returned to OR this a m  Undergoing STSG to (R) ankle- cont NWB- will need PT/OT reordered (not yet done but requested)  DC Disposition:  TCF when medically/surgically cleared

## 2018-08-23 NOTE — CASE MANAGEMENT
Continued Stay Review  Date: 8/23/18  Vital Signs: /83   Pulse 84   Temp (!) 97 3 °F (36 3 °C) (Temporal)   Resp (!) 23   Ht 5' 8" (1 727 m)   Wt (!) 138 kg (304 lb 10 8 oz)   SpO2 98%   BMI 46 33 kg/m²   Medications:   Scheduled Meds:   Current Facility-Administered Medications:  albuterol 2 puff Inhalation Q4H PRN Kiya Henry MD   aluminum-magnesium hydroxide-simethicone 30 mL Oral Q4H PRN Kiya Henry MD   aspirin 325 mg Oral BID Sandip Penn MD   docusate sodium 100 mg Oral BID Junior Carter MD   ezetimibe 10 mg Oral Daily Sandip Penn MD   famotidine 20 mg Oral BID Kiya Henry MD   fentaNYL 25 mcg Intravenous Q5 Min PRN Junie Whyte DO   heparin (porcine) 10,000 Units Subcutaneous Once Sandip Penn MD   heparin (porcine) 15,000 Units Subcutaneous Q30 Min PRN Sandip Penn MD   heparin (porcine) 5,000 Units Subcutaneous Q12H [address] Sandip Penn MD   HYDROmorphone 1 mg Intravenous Q1H PRN Darby Rob MD   levofloxacin 750 mg Oral Q24H Sandip Penn MD   lisinopril 10 mg Oral Daily Kiya Henry MD   magnesium hydroxide 30 mL Oral BID PRN Kiya Henry MD   meperidine 12 5 mg Intravenous Q10 Min PRN Junie Whyte DO   morphine injection 2 mg Intravenous Q3H PRN Sandip Penn MD   ondansetron 4 mg Intravenous Q6H PRN Sandip Penn MD   ondansetron 4 mg Oral Q6H PRN Sandip Penn MD   oxyCODONE-acetaminophen 1 tablet Oral Q4H PRN Sandip Penn MD   pantoprazole 40 mg Oral Early Morning Kiya Henry MD   phenol 1 spray Mouth/Throat Q2H PRN Junie Whyte DO   pravastatin 80 mg Oral Every Other Day Junior Carter MD   senna 2 tablet Oral BID Bildg Issa MD   sodium bicarbonate 650 mg Oral BID PRN Kiya Henry MD   sodium phosphate-biphosphate 1 enema Rectal Daily PRN Kiya Henry MD   sulfamethoxazole-trimethoprim 1 tablet Oral Q12H [address] Anisha Menjivar MD   Continuous Infusions:    PRN Meds:   albuterol   aluminum-magnesium hydroxide-simethicone    fentaNYL    heparin (porcine)    HYDROmorphone    magnesium hydroxide    meperidine    morphine injection    ondansetron    ondansetron    oxyCODONE-acetaminophen    phenol    sodium bicarbonate    sodium phosphate-biphosphate  Abnormal Labs/Diagnostic Results:   WBC 11 70 HGB 10 7  BUN 27   Age/Sex: 63 y o  male   Assessment/Plan:   Ankle wound, right, sequela   Assessment & Plan     Patient underwent right-sided free flap to right heel surgery by Dr Alcantara on 8/13/18-  Previous free flap failure-and a flap failure with venous congestion and arterial clot was that he had to be taken to the OR again for the clock to be evacuated now he is on mechanical leaching by Plastic surgery  Flap does not look promising-  for OR again today,   Continue current pain management  Bowel movement regimen  PT evaluate, DVT prophylaxis as per plastic  surgeon on heparin    Evaluated by ID tissue culture -Growth of Achromobacter xylosoxidans (A) and MRSA cefepime 2 g IV q 12 -> changed to bactrim ds by ID till 08/28/18     TO OR FOR LOCAL FLAP POSS STSG ACHILLES TENDON, RIGHT  Preop and postoperative Diagnosis:  Exposed Achilles tendon right heel with necrotic free flap    Discharge Plan: TBD  Thank you,  [address] [place] [address] Utilization Review Department  Phone: [phone]; Fax [phone]  ATTENTION: Please call with any questions or concerns to [phone]  and carefully follow the prompts so that you are directed to the right person  Send all requests for admission clinical reviews, approved or denied determinations and any other requests to fax [phone]   All voicemails are confidential

## 2018-08-23 NOTE — ASSESSMENT & PLAN NOTE
Low-calorie diet,  PT evaluation for early mobilization if recommended by surgeon- since being hospitalized states lost weight- weight decreased to 304 lb patient prior to surgical start even first admission was 320 lb as he states

## 2018-08-23 NOTE — ASSESSMENT & PLAN NOTE
Resolved now  will cont Pepcid 20 mg p  o  b i d  and Protonix 40 mg p  o  daily put him on sodium bicarb 650 mg p o  b i d  p r n  and Maalox patient agrees with this plan

## 2018-08-23 NOTE — NURSING NOTE
Returned to unit from OR  Awake, alert, oriented  C/o pain 5/10  Right foot and thigh dressings dry and intact  Toes right foot pink warm  VSS

## 2018-08-24 PROCEDURE — 99232 SBSQ HOSP IP/OBS MODERATE 35: CPT | Performed by: INTERNAL MEDICINE

## 2018-08-24 PROCEDURE — 99232 SBSQ HOSP IP/OBS MODERATE 35: CPT | Performed by: FAMILY MEDICINE

## 2018-08-24 RX ORDER — BISACODYL 10 MG
10 SUPPOSITORY, RECTAL RECTAL DAILY PRN
Status: DISCONTINUED | OUTPATIENT
Start: 2018-08-24 | End: 2018-08-26

## 2018-08-24 RX ORDER — MINERAL OIL 100 G/100G
1 OIL RECTAL ONCE
Status: DISCONTINUED | OUTPATIENT
Start: 2018-08-24 | End: 2018-08-29 | Stop reason: HOSPADM

## 2018-08-24 RX ADMIN — PRAVASTATIN SODIUM 80 MG: 20 TABLET ORAL at 08:56

## 2018-08-24 RX ADMIN — ONDANSETRON 4 MG: 4 TABLET, ORALLY DISINTEGRATING ORAL at 20:35

## 2018-08-24 RX ADMIN — SENNOSIDES 17.2 MG: 8.6 TABLET, FILM COATED ORAL at 19:09

## 2018-08-24 RX ADMIN — HYDROMORPHONE HYDROCHLORIDE 1 MG: 1 INJECTION, SOLUTION INTRAMUSCULAR; INTRAVENOUS; SUBCUTANEOUS at 08:56

## 2018-08-24 RX ADMIN — LISINOPRIL 10 MG: 10 TABLET ORAL at 08:56

## 2018-08-24 RX ADMIN — HYDROMORPHONE HYDROCHLORIDE 1 MG: 1 INJECTION, SOLUTION INTRAMUSCULAR; INTRAVENOUS; SUBCUTANEOUS at 23:25

## 2018-08-24 RX ADMIN — HYDROMORPHONE HYDROCHLORIDE 1 MG: 1 INJECTION, SOLUTION INTRAMUSCULAR; INTRAVENOUS; SUBCUTANEOUS at 16:33

## 2018-08-24 RX ADMIN — HYDROMORPHONE HYDROCHLORIDE 1 MG: 1 INJECTION, SOLUTION INTRAMUSCULAR; INTRAVENOUS; SUBCUTANEOUS at 01:53

## 2018-08-24 RX ADMIN — EZETIMIBE 10 MG: 10 TABLET ORAL at 08:55

## 2018-08-24 RX ADMIN — FAMOTIDINE 20 MG: 20 TABLET ORAL at 16:34

## 2018-08-24 RX ADMIN — SODIUM BICARBONATE 650 MG: 650 TABLET ORAL at 04:10

## 2018-08-24 RX ADMIN — HYDROMORPHONE HYDROCHLORIDE 1 MG: 1 INJECTION, SOLUTION INTRAMUSCULAR; INTRAVENOUS; SUBCUTANEOUS at 04:03

## 2018-08-24 RX ADMIN — HEPARIN SODIUM 5000 UNITS: 5000 INJECTION, SOLUTION INTRAVENOUS; SUBCUTANEOUS at 20:36

## 2018-08-24 RX ADMIN — HEPARIN SODIUM 5000 UNITS: 5000 INJECTION, SOLUTION INTRAVENOUS; SUBCUTANEOUS at 08:55

## 2018-08-24 RX ADMIN — DOCUSATE SODIUM 100 MG: 100 CAPSULE, LIQUID FILLED ORAL at 08:54

## 2018-08-24 RX ADMIN — SULFAMETHOXAZOLE AND TRIMETHOPRIM 1 TABLET: 800; 160 TABLET ORAL at 08:57

## 2018-08-24 RX ADMIN — SENNOSIDES 17.2 MG: 8.6 TABLET, FILM COATED ORAL at 08:57

## 2018-08-24 RX ADMIN — LEVOFLOXACIN 750 MG: 750 TABLET, FILM COATED ORAL at 12:52

## 2018-08-24 RX ADMIN — SULFAMETHOXAZOLE AND TRIMETHOPRIM 1 TABLET: 800; 160 TABLET ORAL at 20:58

## 2018-08-24 RX ADMIN — PANTOPRAZOLE SODIUM 40 MG: 40 TABLET, DELAYED RELEASE ORAL at 06:07

## 2018-08-24 RX ADMIN — DOCUSATE SODIUM 100 MG: 100 CAPSULE, LIQUID FILLED ORAL at 16:34

## 2018-08-24 RX ADMIN — FAMOTIDINE 20 MG: 20 TABLET ORAL at 08:55

## 2018-08-24 RX ADMIN — ASPIRIN 325 MG ORAL TABLET 325 MG: 325 PILL ORAL at 16:33

## 2018-08-24 RX ADMIN — ONDANSETRON HYDROCHLORIDE 4 MG: 2 INJECTION, SOLUTION INTRAMUSCULAR; INTRAVENOUS at 09:12

## 2018-08-24 RX ADMIN — ALUMINUM HYDROXIDE, MAGNESIUM HYDROXIDE, AND SIMETHICONE 30 ML: 200; 200; 20 SUSPENSION ORAL at 04:09

## 2018-08-24 RX ADMIN — ASPIRIN 325 MG ORAL TABLET 325 MG: 325 PILL ORAL at 08:54

## 2018-08-24 NOTE — PROGRESS NOTES
Progress Note - Lisa Guzmán 1960, 63 y o  male MRN: [id number]    Unit/Bed#:  Encounter: [id number]    Primary Care Provider: Sanket Hardwick MD   Date and time admitted to hospital: 8/13/2018  5:55 AM        Mild intermittent asthma without complication   Assessment & Plan    · Has not had any trouble while hospitalized but if needed we will put p r n  albuterol        Morbid obesity with BMI of 45 0-49 9, adult ([facility] [address] )   Assessment & Plan    Low-calorie diet,  PT evaluation for early mobilization if recommended by surgeon- since being hospitalized states lost weight- weight decreased to 304 lb patient prior to surgical start even first admission was 320 lb as he states        Acute hyponatremia   Assessment & Plan    · Sodium 132- suspect siadh/pain when leaching - will check sodium studies - c/w siadh- post op pain with mechanical leaching- controlled now- continue  on 1500 ml restriction - recheck in am  Last sodium 131 stable prior to 132        KIRIT on CPAP   Assessment & Plan    Continue CPAP        GERD (gastroesophageal reflux disease)   Assessment & Plan    Resolved now  will cont Pepcid 20 mg p  o  b i d  and Protonix 40 mg p  o  daily put him on sodium bicarb 650 mg p o  b i d  p r n  and Maalox patient agrees with this plan  Essential hypertension   Assessment & Plan    Controlled on medications  Continue lisinopril 10 mg daily it fluctuates with pain medicine and pain overall stable            PVC (premature ventricular contraction)   Assessment & Plan    · Previous admission same- asymptomatic - no indication of treatment at this time unless more frequent - infrequent bigeminy - no recurrence overnight   · k and mag ok   · Had echo last admission recent- normal         HLD (hyperlipidemia)   Assessment & Plan    Low-cholesterol diet  Continue pravastatin every other day as recommended by patient's family doctor and Zetia- every other day  As he was having muscle spasm secondary to being on statin daily and his muscle  Spasms have resolved since going this way        * Ankle wound, right, sequela   Assessment & Plan    Patient underwent right-sided free flap to right heel surgery by Dr Alcantara on 8/13/18-  Previous free flap failure-and a flap failure with venous congestion and arterial clot was that he had to be taken to the OR again for the clock to be evacuated now he is on mechanical leaching by Plastic surgery  Flap does not look promising-  for OR again today,   Continue current pain management  Bowel movement regimen  PT evaluate, DVT prophylaxis as per plastic  surgeon on heparin    Evaluated by ID tissue culture -Growth of Achromobacter xylosoxidans (A) and MRSA cefepime 2 g IV q 12 -> changed to bactrim ds by ID till 08/28/18    Leukocytosis improving /afebrile  Physical therapy recommended transitional care facility patient is in agreement           constipation delayed transit and medication induced  will add dulcolax suppository and mineral oil suppository and observe for now  cont oral regimen  VTE Pharmacologic Prophylaxis:   Pharmacologic: Heparin  Mechanical VTE Prophylaxis in Place: No    Patient Centered Rounds: I have performed bedside rounds with nursing staff today  Discussions with Specialists or Other Care Team Provider: none    Education and Discussions with Family / Patient: discussed with patient about his hospital course    Time Spent for Care: 30 minutes  More than 50% of total time spent on counseling and coordination of care as described above  Current Length of Stay: 11 day(s)    Current Patient Status: Inpatient   Certification Statement: The patient will continue to require additional inpatient hospital stay due to graft care    Discharge Plan: as per surgery    Code Status: No Order      Subjective:   Patient states he had a tough night  He feels better this morning  Has not had a bowel movement last 2 days    Had some heartburn this morning and feels better now  Denies any chest pain or shortness of breath  Objective:     Vitals:   Temp (24hrs), Av 3 °F (36 3 °C), Min:96 8 °F (36 °C), Max:97 7 °F (36 5 °C)    HR:  [69-94] 82  Resp:  [6-32] 22  BP: (111-157)/(11-96) 142/11  SpO2:  [89 %-100 %] 98 %  Body mass index is 46 33 kg/m²  Input and Output Summary (last 24 hours): Intake/Output Summary (Last 24 hours) at 18 1349  Last data filed at 18 1126   Gross per 24 hour   Intake              910 ml   Output             3350 ml   Net            -2440 ml       Physical Exam:     Physical Exam   Constitutional: He is oriented to person, place, and time  He appears well-developed and well-nourished  HENT:   Head: Normocephalic and atraumatic  Right Ear: External ear normal    Left Ear: External ear normal    Mouth/Throat: Oropharynx is clear and moist    Eyes: Conjunctivae and EOM are normal  Pupils are equal, round, and reactive to light  Neck: Normal range of motion  Neck supple  Cardiovascular: Normal rate, regular rhythm, normal heart sounds and intact distal pulses  Pulmonary/Chest: Effort normal and breath sounds normal    Abdominal: Soft  Bowel sounds are normal  He exhibits no mass  There is no tenderness  There is no rebound and no guarding  Genitourinary:   Genitourinary Comments: deferred   Musculoskeletal: Normal range of motion  Neurological: He is alert and oriented to person, place, and time  He has normal reflexes  Skin: Skin is warm and dry  No rash noted  Psychiatric: He has a normal mood and affect  Nursing note and vitals reviewed          Additional Data:     Labs:      Results from last 7 days  Lab Units 18  0509 18  0542   WBC Thousand/uL 11 70* 13 80*   HEMOGLOBIN g/dL 10 7* 11 3*   HEMATOCRIT % 31 4* 34 0*   PLATELETS Thousands/uL 342 378   NEUTROS PCT %  --  63   LYMPHS PCT %  --  19*   LYMPHO PCT % 24  --    MONOS PCT %  --  10   MONO PCT MAN % 12*  --    EOS PCT % --  8*   EOSINO PCT MANUAL % 7*  --        Results from last 7 days  Lab Units 08/23/18  0509   SODIUM mmol/L 131*   POTASSIUM mmol/L 4 9   CHLORIDE mmol/L 99   CO2 mmol/L 27   BUN mg/dL 27*   CREATININE mg/dL 1 27   CALCIUM mg/dL 9 0   GLUCOSE RANDOM mg/dL 92                     * I Have Reviewed All Lab Data Listed Above  * Additional Pertinent Lab Tests Reviewed:  [address] Admission Reviewed    Imaging:    Imaging Reports Reviewed Today Include: none  Imaging Personally Reviewed by Myself Includes:  none    Recent Cultures (last 7 days):       Results from last 7 days  Lab Units 08/23/18  1148   GRAM STAIN RESULT  4+ Polys  3+ Gram positive cocci in clusters   WOUND CULTURE  Culture too young- will reincubate       Last 24 Hours Medication List:     Current Facility-Administered Medications:  aluminum-magnesium hydroxide-simethicone 30 mL Oral Q4H PRN Jas Bojorquez MD   aspirin 325 mg Oral BID Flaco Duarte MD   docusate sodium 100 mg Oral BID Jacqui Good MD   ezetimibe 10 mg Oral Daily Flaco Duarte MD   famotidine 20 mg Oral BID Jas Bojorquez MD   fentaNYL 25 mcg Intravenous Q5 Min PRN Isra Olives, DO   heparin (porcine) 15,000 Units Subcutaneous Q30 Min PRN Flaco Duarte MD   heparin (porcine) 5,000 Units Subcutaneous Q12H [address] Flaco Duarte MD   HYDROmorphone 1 mg Intravenous Q1H PRN Bernice Aguero MD   levofloxacin 750 mg Oral Q24H Flaco Duarte MD   lisinopril 10 mg Oral Daily Jas Bojorquez MD   magnesium hydroxide 30 mL Oral BID PRN Jas Bojorquez MD   meperidine 12 5 mg Intravenous Q10 Min PRN Isra Olives, DO   morphine injection 2 mg Intravenous Q3H PRN Flaco Duarte MD   ondansetron 4 mg Intravenous Q6H PRN Flaco Duarte MD   ondansetron 4 mg Oral Q6H PRN Flaco Duarte MD   oxyCODONE-acetaminophen 1 tablet Oral Q4H PRN Flaco Duarte MD   pantoprazole 40 mg Oral Early Morning Jas Bojorquez MD   phenol 1 spray Mouth/Throat Q2H PRN Junie Whyte DO   pravastatin 80 mg Oral Every Other Day Junior Carter MD   senna 2 tablet Oral BID Bilal ALLI Issa MD   sodium bicarbonate 650 mg Oral BID PRN Kiya Henry MD   sodium phosphate-biphosphate 1 enema Rectal Daily PRN Kiya Henry MD   sulfamethoxazole-trimethoprim 1 tablet Oral Q12H Prema Walden MD        Today, Patient Was Seen By: Portia Villanueva MD    ** Please Note: Dictation voice to text software may have been used in the creation of this document   **

## 2018-08-24 NOTE — NURSING NOTE
Patient appears to be resting comfortably in bed in semi-ly's position watching TV  Patient is calm, pleasant, and cooperative with care  Patient is awake and oriented times four  Vital signs remain stable and continues in normal sinus rhythm on the monitor  Call bell in reach and bed in low position  Assessment other wise unchanged at this time, will continue to monitor

## 2018-08-24 NOTE — OCCUPATIONAL THERAPY NOTE
OT orders received and chart reviewed  Per case management, Dr Gerianne Goltz does not want pt seen for therapy until tomorrow 8/25/18  Therefore, unable to perform OT evaluation at this time  Will complete eval when cleared and schedule permits       Jose C Montgomery OT

## 2018-08-24 NOTE — PROGRESS NOTES
Anesthesia Post Evaluation    Patient: Zonia Swann    Procedures performed: Procedure(s):  LOCAL FLAP POSS STSG ACHILLES TENDON    Anesthesia type: general    Post pain: adequate analgesia    Last vitals:   Vitals:    08/24/18 0600   BP: 127/72   Pulse: 76   Resp: 17   Temp:    SpO2: 95%       Post vital signs: stable    Level of consciousness: awake, alert and oriented    Respiratory: unassisted, spontaneous ventilation    Cardiovascular: stable and blood pressure at baseline    Hydration:  Adequate    No anesthesia complications

## 2018-08-24 NOTE — ASSESSMENT & PLAN NOTE
Patient underwent right-sided free flap to right heel surgery by Kory Farnsworth on 8/13/18-  Previous free flap failure-and a flap failure with venous congestion and arterial clot was that he had to be taken to the OR again for the clock to be evacuated now he is on mechanical leaching by Plastic surgery   Flap does not look promising-  for OR again today,   Continue current pain management  Bowel movement regimen  PT evaluate, DVT prophylaxis as per plastic  surgeon on heparin    Evaluated by ID tissue culture -Growth of Achromobacter xylosoxidans (A) and MRSA cefepime 2 g IV q 12 -> changed to bactrim ds by ID till 08/28/18    Leukocytosis improving [facility]  Physical therapy recommended transitional care facility patient is in agreement

## 2018-08-24 NOTE — PROGRESS NOTES
Patient resting in bed  Right foot dressing dry and intact  Right thigh STSG donor site dressed and dry

## 2018-08-24 NOTE — PHYSICAL THERAPY NOTE
PT Evaluation performed on 8/20/18  Effective 8/22/18, pt was seen for a total of 3 treatment days  PT treatment was deferred on 8/23/18;  due to pt underwent local flap poss achilles tendon (right ankle)  Per case management, Dr Joya Zuñiga does not want pt seen for therapy until tomorrow 8/25/18  Therefore, unable to perform PT Re-Evaluation at this time   Will complete Re-evaluation when cleared and schedule permits

## 2018-08-24 NOTE — PROGRESS NOTES
Progress Note - Infectious Disease   William Garnett 63 y o  male MRN: [id number]  Unit/Bed#:  Encounter: [id number]         Impression/Recommendations:  1   Right heel wound infection   Post multiple previous I and D's   Status post free flap closure with STSG  With necrotic free flap which was removed, now most recently status post new skin graft on 08/23    As patient completed a postoperative course of oral amoxicillin and Levaquin for 14 days based on prior culture data which revealed Enterococcus, Pseudomonas, stenotrophomonas   Patient is now most recently status post repeat free flap coverage with STSG    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   Operative culture was sent from tissue on 08/13 which is now grew Achromobacter and MRSA   Fortunately, patient is without signs of sepsis   He is afebrile, hemodynamically stable   Patient now tolerating oral Bactrim without difficulty  OR culture from 08/23 is pending      -follow up OR culture from 08/23 to guide final recommendations  If remains positive for MRSA, would extend treatment course of oral Bactrim for an additional 2 weeks postop, through 9/5   -serial exams of wound and management per Plastic surgery service  -tentative plan for further STSG were local flap tomorrow  Will follow up operative findings      2   Chronic right heel open wound   Status post free flap closure with STSG x3    Previous free flap was necrotic and was removed  Now status post skin grafting on 08/23      3   Achilles tendon repair, status post repair with above complication      4   Morbid obesity   May have contributed to original injury and poor wound healing      5   Leukocytosis   Likely postoperative elevation   No associated fevers   Remains systemically well, nontoxic   WBC count has improved      Antibiotics:  Antibiotic D12  Bactrim D9  POD1    Discussed above with patient in detail    If still here, will plan to see patient again on   Please call with any questions in the interim  Subjective:  Patient again went to OR yesterday  He had a necrotic free flap which was removed  The wound was cleaned and the skin graft was placed  No gross purulence  No fevers  He complains of mild nausea and constipation  No vomiting  Objective:  Vitals:  HR:  [69-94] 76  Resp:  [8-32] 18  BP: (118-157)/(11-96) 139/82  SpO2:  [89 %-100 %] 100 %  Temp (24hrs), Av 5 °F (36 4 °C), Min:97 1 °F (36 2 °C), Max:98 3 °F (36 8 °C)  Current: Temperature: 98 3 °F (36 8 °C)    Physical Exam:   General:  No acute distress  Psychiatric:  Awake and alert  Pulmonary:  Normal respiratory excursion without accessory muscle use  Abdomen:  Soft, nontender  Extremities:  No edema  Right lower extremity dressing intact  Skin:  No rashes    Lab Results:  I have personally reviewed pertinent labs  Results from last 7 days  Lab Units 18  0509 18  0407   SODIUM mmol/L 131* 132*   POTASSIUM mmol/L 4 9 4 4   CHLORIDE mmol/L 99 98   CO2 mmol/L 27 28   ANION GAP mmol/L 5 6   BUN mg/dL 27* 18   CREATININE mg/dL 1 27 1 05   EGFR ml/min/1 73sq m 62 78   GLUCOSE RANDOM mg/dL 92 104*   CALCIUM mg/dL 9 0 9 1       Results from last 7 days  Lab Units 18  0509 18  0542   WBC Thousand/uL 11 70* 13 80*   HEMOGLOBIN g/dL 10 7* 11 3*   PLATELETS Thousands/uL 342 378       Results from last 7 days  Lab Units 18  1148   GRAM STAIN RESULT  4+ Polys  3+ Gram positive cocci in clusters   WOUND CULTURE  Culture too young- will reincubate       Imaging Studies:   I have personally reviewed pertinent imaging study reports and images in PACS  EKG, Pathology, and Other Studies:   I have personally reviewed pertinent reports  Operative report reviewed

## 2018-08-24 NOTE — SOCIAL WORK
As requested by surgeon patient is to remain in bed until tomorrow, PT/OT  This worker contact [address] [place] [address]  At  and some one will be on call clinical can be fax to [phone] and /or provide clinical , [phone]  Weekend  informed

## 2018-08-24 NOTE — NURSING NOTE
Patient finished bathing with chlorhexidine  Monitor reapplied and blood pressure obtained  Patient given PRN Mylanta and sodium bicarb per request   Also medicated for pain as well  Room straightened, bed in low position, and call bell in reach

## 2018-08-25 PROCEDURE — 99232 SBSQ HOSP IP/OBS MODERATE 35: CPT | Performed by: FAMILY MEDICINE

## 2018-08-25 RX ORDER — ONDANSETRON 2 MG/ML
4 INJECTION INTRAMUSCULAR; INTRAVENOUS EVERY 4 HOURS PRN
Status: DISPENSED | OUTPATIENT
Start: 2018-08-25 | End: 2018-08-26

## 2018-08-25 RX ADMIN — EZETIMIBE 10 MG: 10 TABLET ORAL at 09:51

## 2018-08-25 RX ADMIN — PANTOPRAZOLE SODIUM 40 MG: 40 TABLET, DELAYED RELEASE ORAL at 06:44

## 2018-08-25 RX ADMIN — OXYCODONE HYDROCHLORIDE AND ACETAMINOPHEN 1 TABLET: 5; 325 TABLET ORAL at 20:37

## 2018-08-25 RX ADMIN — ONDANSETRON 4 MG: 2 INJECTION, SOLUTION INTRAMUSCULAR; INTRAVENOUS at 19:57

## 2018-08-25 RX ADMIN — HYDROMORPHONE HYDROCHLORIDE 1 MG: 1 INJECTION, SOLUTION INTRAMUSCULAR; INTRAVENOUS; SUBCUTANEOUS at 04:35

## 2018-08-25 RX ADMIN — MORPHINE SULFATE 2 MG: 2 INJECTION, SOLUTION INTRAMUSCULAR; INTRAVENOUS at 08:31

## 2018-08-25 RX ADMIN — LISINOPRIL 10 MG: 10 TABLET ORAL at 09:48

## 2018-08-25 RX ADMIN — HEPARIN SODIUM 5000 UNITS: 5000 INJECTION, SOLUTION INTRAVENOUS; SUBCUTANEOUS at 20:20

## 2018-08-25 RX ADMIN — HEPARIN SODIUM 5000 UNITS: 5000 INJECTION, SOLUTION INTRAVENOUS; SUBCUTANEOUS at 09:47

## 2018-08-25 RX ADMIN — OXYCODONE HYDROCHLORIDE AND ACETAMINOPHEN 1 TABLET: 5; 325 TABLET ORAL at 13:02

## 2018-08-25 RX ADMIN — FAMOTIDINE 20 MG: 20 TABLET ORAL at 18:01

## 2018-08-25 RX ADMIN — SENNOSIDES 17.2 MG: 8.6 TABLET, FILM COATED ORAL at 09:53

## 2018-08-25 RX ADMIN — FAMOTIDINE 20 MG: 20 TABLET ORAL at 09:47

## 2018-08-25 RX ADMIN — SULFAMETHOXAZOLE AND TRIMETHOPRIM 1 TABLET: 800; 160 TABLET ORAL at 09:54

## 2018-08-25 RX ADMIN — BISACODYL 10 MG: 10 SUPPOSITORY RECTAL at 00:12

## 2018-08-25 RX ADMIN — LEVOFLOXACIN 750 MG: 750 TABLET, FILM COATED ORAL at 13:04

## 2018-08-25 RX ADMIN — DOCUSATE SODIUM 100 MG: 100 CAPSULE, LIQUID FILLED ORAL at 18:02

## 2018-08-25 RX ADMIN — ASPIRIN 325 MG ORAL TABLET 325 MG: 325 PILL ORAL at 18:01

## 2018-08-25 RX ADMIN — DOCUSATE SODIUM 100 MG: 100 CAPSULE, LIQUID FILLED ORAL at 09:47

## 2018-08-25 RX ADMIN — OXYCODONE HYDROCHLORIDE AND ACETAMINOPHEN 1 TABLET: 5; 325 TABLET ORAL at 17:03

## 2018-08-25 RX ADMIN — MAGNESIUM HYDROXIDE 30 ML: 400 SUSPENSION ORAL at 01:27

## 2018-08-25 RX ADMIN — SULFAMETHOXAZOLE AND TRIMETHOPRIM 1 TABLET: 800; 160 TABLET ORAL at 22:39

## 2018-08-25 RX ADMIN — SENNOSIDES 17.2 MG: 8.6 TABLET, FILM COATED ORAL at 18:01

## 2018-08-25 RX ADMIN — ASPIRIN 325 MG ORAL TABLET 325 MG: 325 PILL ORAL at 09:47

## 2018-08-25 RX ADMIN — ONDANSETRON HYDROCHLORIDE 4 MG: 2 INJECTION, SOLUTION INTRAMUSCULAR; INTRAVENOUS at 16:07

## 2018-08-25 RX ADMIN — ALTEPLASE 2 MG: 2.2 INJECTION, POWDER, LYOPHILIZED, FOR SOLUTION INTRAVENOUS at 20:19

## 2018-08-25 NOTE — PROGRESS NOTES
Progress Note - Lois Rodriguez 1960, 63 y o  male MRN: [id number]    Unit/Bed#:  Encounter: [id number]    Primary Care Provider: Will Unger MD   Date and time admitted to hospital: 8/13/2018  5:55 AM        Mild intermittent asthma without complication   Assessment & Plan    · Has not had any trouble while hospitalized but if needed we will put p r n  albuterol        Morbid obesity with BMI of 45 0-49 9, adult ([facility] [address] )   Assessment & Plan    Low-calorie diet,  PT evaluation for early mobilization if recommended by surgeon- since being hospitalized states lost weight- weight decreased to 304 lb patient prior to surgical start even first admission was 320 lb as he states        Acute hyponatremia   Assessment & Plan    · Sodium 132- suspect siadh/pain when leaching - will check sodium studies - c/w siadh- post op pain with mechanical leaching- controlled now- continue  on 1500 ml restriction -         KIRIT on CPAP   Assessment & Plan    Continue CPAP        GERD (gastroesophageal reflux disease)   Assessment & Plan    Resolved now  will cont Pepcid 20 mg p  o  b i d  and Protonix 40 mg p  o  daily put him on sodium bicarb 650 mg p o  b i d  p r n  and Maalox patient agrees with this plan  Essential hypertension   Assessment & Plan    Controlled on medications  Continue lisinopril 10 mg daily it fluctuates with pain medicine and pain overall stable            PVC (premature ventricular contraction)   Assessment & Plan    · Previous admission same- asymptomatic - no indication of treatment at this time unless more frequent - infrequent bigeminy - no recurrence overnight   · k and mag ok   · Had echo last admission recent- normal         HLD (hyperlipidemia)   Assessment & Plan    Low-cholesterol diet  Continue pravastatin every other day as recommended by patient's family doctor and Zetia- every other day  As he was having muscle spasm secondary to being on statin daily and his muscle Spasms have resolved since going this way        * Ankle wound, right, sequela   Assessment & Plan    Patient underwent right-sided free flap to right heel surgery by Gwyn Nolasco on 18-  Previous free flap failure-and a flap failure with venous congestion and arterial clot was that he had to be taken to the OR again for the clock to be evacuated now he is on mechanical leaching by Plastic surgery  Flap does not look promising-  for OR again today,   Continue current pain management  Bowel movement regimen  PT evaluate, DVT prophylaxis as per plastic  surgeon on heparin    Evaluated by ID tissue culture -Growth of Achromobacter xylosoxidans (A) and MRSA cefepime 2 g IV q 12 -> changed to bactrim ds by ID till 18    Leukocytosis improving [facility]  Physical therapy recommended transitional care facility patient is in agreement             VTE Pharmacologic Prophylaxis:   Pharmacologic: Heparin  Mechanical VTE Prophylaxis in Place: Yes    Patient Centered Rounds: I have performed bedside rounds with nursing staff today  Discussions with Specialists or Other Care Team Provider: none    Education and Discussions with Family / Patient: discussed with patient about his bowel regimen  Time Spent for Care: 30 minutes  More than 50% of total time spent on counseling and coordination of care as described above  Current Length of Stay: 12 day(s)    Current Patient Status: Inpatient   Certification Statement: The patient will continue to require additional inpatient hospital stay due to right leg graft care    Discharge Plan: possibly tomorrow to rehab    Code Status: No Order      Subjective:   Patient denies any chest pain or sob  gets constipated and complains of right leg pain 5/10 today had a small bm today and nausea is a little better now      Objective:     Vitals:   Temp (24hrs), Av 9 °F (36 6 °C), Min:97 1 °F (36 2 °C), Max:98 3 °F (36 8 °C)    HR:  [69-87] 78  Resp:  [9-31] 14  BP: (110-160)/(11-85) 110/79  SpO2:  [91 %-100 %] 94 %  Body mass index is 43 91 kg/m²  Input and Output Summary (last 24 hours): Intake/Output Summary (Last 24 hours) at 08/25/18 0752  Last data filed at 08/25/18 0400   Gross per 24 hour   Intake              940 ml   Output             3415 ml   Net            -2475 ml       Physical Exam:     Physical Exam   Constitutional: He is oriented to person, place, and time  He appears well-developed and well-nourished  HENT:   Head: Normocephalic and atraumatic  Right Ear: External ear normal    Left Ear: External ear normal    Mouth/Throat: Oropharynx is clear and moist    Eyes: Conjunctivae and EOM are normal  Pupils are equal, round, and reactive to light  Neck: Normal range of motion  Neck supple  Cardiovascular: Normal rate, regular rhythm, normal heart sounds and intact distal pulses  Pulmonary/Chest: Effort normal and breath sounds normal    Abdominal: Soft  Bowel sounds are normal  He exhibits no mass  There is no tenderness  There is no rebound and no guarding  Genitourinary:   Genitourinary Comments: deferred   Musculoskeletal:   Right leg dressing   Neurological: He is alert and oriented to person, place, and time  He has normal reflexes  Skin: Skin is warm and dry  No rash noted  Psychiatric: He has a normal mood and affect  Nursing note and vitals reviewed          Additional Data:     Labs:      Results from last 7 days  Lab Units 08/23/18  0509 08/19/18  0542   WBC Thousand/uL 11 70* 13 80*   HEMOGLOBIN g/dL 10 7* 11 3*   HEMATOCRIT % 31 4* 34 0*   PLATELETS Thousands/uL 342 378   NEUTROS PCT %  --  63   LYMPHS PCT %  --  19*   LYMPHO PCT % 24  --    MONOS PCT %  --  10   MONO PCT MAN % 12*  --    EOS PCT %  --  8*   EOSINO PCT MANUAL % 7*  --        Results from last 7 days  Lab Units 08/23/18  0509   SODIUM mmol/L 131*   POTASSIUM mmol/L 4 9   CHLORIDE mmol/L 99   CO2 mmol/L 27   BUN mg/dL 27*   CREATININE mg/dL 1 27   CALCIUM mg/dL 9 0   GLUCOSE RANDOM mg/dL 92                     * I Have Reviewed All Lab Data Listed Above  * Additional Pertinent Lab Tests Reviewed: [address] Admission Reviewed    Imaging:    Imaging Reports Reviewed Today Include: none  Imaging Personally Reviewed by Myself Includes:  none    Recent Cultures (last 7 days):       Results from last 7 days  Lab Units 08/23/18  1148   GRAM STAIN RESULT  4+ Polys  3+ Gram positive cocci in clusters   WOUND CULTURE  4+ Growth of Staphylococcus aureus*       Last 24 Hours Medication List:     Current Facility-Administered Medications:  aluminum-magnesium hydroxide-simethicone 30 mL Oral Q4H PRN Sukh Edward MD   aspirin 325 mg Oral BID Jerome Medrano MD   bisacodyl 10 mg Rectal Daily PRN Khris Villafuerte MD   docusate sodium 100 mg Oral BID Qamar Medina MD   ezetimibe 10 mg Oral Daily Jerome Medrano MD   famotidine 20 mg Oral BID Sukh Edward MD   fentaNYL 25 mcg Intravenous Q5 Min PRN Tj Moan, DO   heparin (porcine) 15,000 Units Subcutaneous Q30 Min PRN Jerome Medrano MD   heparin (porcine) 5,000 Units Subcutaneous Q12H [facility] Jerome Medrano MD   HYDROmorphone 1 mg Intravenous Q1H PRN Paco Fernandes MD   levofloxacin 750 mg Oral Q24H Jerome Medrano MD   lisinopril 10 mg Oral Daily Sukh Edward MD   magnesium hydroxide 30 mL Oral BID PRN Sukh Edward MD   meperidine 12 5 mg Intravenous Q10 Min PRN Tj Zelaya, DO   mineral oil 1 enema Rectal Once Khris Villafuerte MD   morphine injection 2 mg Intravenous Q3H PRN Jerome Medrano MD   ondansetron 4 mg Intravenous Q6H PRN Jerome Medrano MD   ondansetron 4 mg Oral Q6H PRN Jerome Medrano MD   oxyCODONE-acetaminophen 1 tablet Oral Q4H PRN Jerome Medrano MD   pantoprazole 40 mg Oral Early Morning Sukh Edward MD   phenol 1 spray Mouth/Throat Q2H PRN Tj Moan, DO   pravastatin 80 mg Oral Every Other Day Qamar Medina MD   senna 2 tablet Oral BID Odalys Ramirez, MD   sodium bicarbonate 650 mg Oral BID PRN Margaret Conner MD   sodium phosphate-biphosphate 1 enema Rectal Daily PRN Margaret Conner MD   sulfamethoxazole-trimethoprim 1 tablet Oral Q12H Irene Fish MD        Today, Patient Was Seen By: Shahram Ontiveros MD    ** Please Note: Dictation voice to text software may have been used in the creation of this document   **

## 2018-08-25 NOTE — ASSESSMENT & PLAN NOTE
Patient underwent right-sided free flap to right heel surgery by Corrine Dixon on 8/13/18-  Previous free flap failure-and a flap failure with venous congestion and arterial clot was that he had to be taken to the OR again for the clock to be evacuated now he is on mechanical leaching by Plastic surgery   Flap does not look promising-  for OR again today,   Continue current pain management  Bowel movement regimen  PT evaluate, DVT prophylaxis as per plastic  surgeon on heparin    Evaluated by ID tissue culture -Growth of Achromobacter xylosoxidans (A) and MRSA cefepime 2 g IV q 12 -> changed to bactrim ds by ID till 08/28/18    Leukocytosis improving Colton Reed  Physical therapy recommended transitional care facility patient is in agreement

## 2018-08-25 NOTE — NURSING NOTE
Resting in bed, trying to sleep, says  Did not sleep much last night, Complains of pain asking for med  SR on monitor   Voids clear lilliam

## 2018-08-25 NOTE — ASSESSMENT & PLAN NOTE
· Sodium 132- suspect siadh/pain when leaching - will check sodium studies - c/w siadh- post op pain with mechanical leaching- controlled now- continue  on 1500 ml restriction -

## 2018-08-25 NOTE — NURSING NOTE
Feels better now  States  Has a little numbness in toes of the R foot , toes are warm -good color  position changed and Foot elevated on pillow

## 2018-08-26 PROBLEM — K59.01 SLOW TRANSIT CONSTIPATION: Status: ACTIVE | Noted: 2018-08-15

## 2018-08-26 LAB
BACTERIA WND AEROBE CULT: ABNORMAL
GRAM STN SPEC: ABNORMAL
GRAM STN SPEC: ABNORMAL

## 2018-08-26 PROCEDURE — 99232 SBSQ HOSP IP/OBS MODERATE 35: CPT | Performed by: FAMILY MEDICINE

## 2018-08-26 RX ORDER — ONDANSETRON 2 MG/ML
4 INJECTION INTRAMUSCULAR; INTRAVENOUS EVERY 6 HOURS PRN
Status: DISCONTINUED | OUTPATIENT
Start: 2018-08-26 | End: 2018-08-29 | Stop reason: HOSPADM

## 2018-08-26 RX ORDER — ACETAMINOPHEN 325 MG/1
650 TABLET ORAL EVERY 8 HOURS SCHEDULED
Status: DISCONTINUED | OUTPATIENT
Start: 2018-08-26 | End: 2018-08-29 | Stop reason: HOSPADM

## 2018-08-26 RX ORDER — POLYETHYLENE GLYCOL 3350 17 G/17G
17 POWDER, FOR SOLUTION ORAL DAILY
Status: DISCONTINUED | OUTPATIENT
Start: 2018-08-26 | End: 2018-08-29 | Stop reason: HOSPADM

## 2018-08-26 RX ORDER — SODIUM PHOSPHATE, DIBASIC AND SODIUM PHOSPHATE, MONOBASIC 7; 19 G/133ML; G/133ML
1 ENEMA RECTAL ONCE
Status: COMPLETED | OUTPATIENT
Start: 2018-08-26 | End: 2018-08-26

## 2018-08-26 RX ORDER — SODIUM PHOSPHATE, DIBASIC AND SODIUM PHOSPHATE, MONOBASIC 3.5; 9.5 G/66ML; G/66ML
1 ENEMA RECTAL ONCE
Status: DISCONTINUED | OUTPATIENT
Start: 2018-08-26 | End: 2018-08-26 | Stop reason: SDUPTHER

## 2018-08-26 RX ORDER — IBUPROFEN 600 MG/1
600 TABLET ORAL EVERY 6 HOURS PRN
Status: DISCONTINUED | OUTPATIENT
Start: 2018-08-26 | End: 2018-08-27

## 2018-08-26 RX ORDER — BISACODYL 10 MG
10 SUPPOSITORY, RECTAL RECTAL DAILY PRN
Status: DISCONTINUED | OUTPATIENT
Start: 2018-08-26 | End: 2018-08-29 | Stop reason: HOSPADM

## 2018-08-26 RX ADMIN — MORPHINE SULFATE 2 MG: 2 INJECTION, SOLUTION INTRAMUSCULAR; INTRAVENOUS at 00:23

## 2018-08-26 RX ADMIN — SODIUM PHOSPHATE, DIBASIC AND SODIUM PHOSPHATE, MONOBASIC 1 ENEMA: 7; 19 ENEMA RECTAL at 15:27

## 2018-08-26 RX ADMIN — MAGNESIUM HYDROXIDE 30 ML: 400 SUSPENSION ORAL at 17:58

## 2018-08-26 RX ADMIN — ACETAMINOPHEN 650 MG: 325 TABLET ORAL at 14:48

## 2018-08-26 RX ADMIN — FAMOTIDINE 20 MG: 20 TABLET ORAL at 17:58

## 2018-08-26 RX ADMIN — ACETAMINOPHEN 650 MG: 325 TABLET ORAL at 10:05

## 2018-08-26 RX ADMIN — POLYETHYLENE GLYCOL 3350 17 G: 17 POWDER, FOR SOLUTION ORAL at 10:08

## 2018-08-26 RX ADMIN — HEPARIN SODIUM 5000 UNITS: 5000 INJECTION, SOLUTION INTRAVENOUS; SUBCUTANEOUS at 10:02

## 2018-08-26 RX ADMIN — PRAVASTATIN SODIUM 80 MG: 20 TABLET ORAL at 10:06

## 2018-08-26 RX ADMIN — FAMOTIDINE 20 MG: 20 TABLET ORAL at 10:07

## 2018-08-26 RX ADMIN — ASPIRIN 325 MG ORAL TABLET 325 MG: 325 PILL ORAL at 10:06

## 2018-08-26 RX ADMIN — ACETAMINOPHEN 650 MG: 325 TABLET ORAL at 21:51

## 2018-08-26 RX ADMIN — SENNOSIDES 17.2 MG: 8.6 TABLET, FILM COATED ORAL at 10:06

## 2018-08-26 RX ADMIN — MORPHINE SULFATE 2 MG: 2 INJECTION, SOLUTION INTRAMUSCULAR; INTRAVENOUS at 05:44

## 2018-08-26 RX ADMIN — DOCUSATE SODIUM 100 MG: 100 CAPSULE, LIQUID FILLED ORAL at 17:58

## 2018-08-26 RX ADMIN — ONDANSETRON 4 MG: 2 INJECTION, SOLUTION INTRAMUSCULAR; INTRAVENOUS at 19:47

## 2018-08-26 RX ADMIN — HEPARIN SODIUM 5000 UNITS: 5000 INJECTION, SOLUTION INTRAVENOUS; SUBCUTANEOUS at 21:51

## 2018-08-26 RX ADMIN — LISINOPRIL 10 MG: 10 TABLET ORAL at 10:07

## 2018-08-26 RX ADMIN — MORPHINE SULFATE 2 MG: 2 INJECTION, SOLUTION INTRAMUSCULAR; INTRAVENOUS at 10:16

## 2018-08-26 RX ADMIN — ASPIRIN 325 MG ORAL TABLET 325 MG: 325 PILL ORAL at 17:59

## 2018-08-26 RX ADMIN — IBUPROFEN 600 MG: 600 TABLET ORAL at 17:58

## 2018-08-26 RX ADMIN — SULFAMETHOXAZOLE AND TRIMETHOPRIM 1 TABLET: 800; 160 TABLET ORAL at 10:05

## 2018-08-26 RX ADMIN — LEVOFLOXACIN 750 MG: 750 TABLET, FILM COATED ORAL at 13:24

## 2018-08-26 RX ADMIN — PANTOPRAZOLE SODIUM 40 MG: 40 TABLET, DELAYED RELEASE ORAL at 05:45

## 2018-08-26 RX ADMIN — IBUPROFEN 600 MG: 600 TABLET ORAL at 23:31

## 2018-08-26 RX ADMIN — SENNOSIDES 17.2 MG: 8.6 TABLET, FILM COATED ORAL at 17:59

## 2018-08-26 RX ADMIN — DOCUSATE SODIUM 100 MG: 100 CAPSULE, LIQUID FILLED ORAL at 10:07

## 2018-08-26 RX ADMIN — SULFAMETHOXAZOLE AND TRIMETHOPRIM 1 TABLET: 800; 160 TABLET ORAL at 21:52

## 2018-08-26 RX ADMIN — BISACODYL 10 MG: 10 SUPPOSITORY RECTAL at 13:26

## 2018-08-26 RX ADMIN — IBUPROFEN 600 MG: 600 TABLET ORAL at 11:45

## 2018-08-26 RX ADMIN — ONDANSETRON 4 MG: 2 INJECTION, SOLUTION INTRAMUSCULAR; INTRAVENOUS at 09:09

## 2018-08-26 NOTE — NURSING NOTE
No stool return with fleets  Pt now OOB  ambulated in the [place] with his" scooter" No weight was applied to the R leg  Pt than returned to room to sit in chair  Does indicates that he feels a little better  Wants prune juice and MOM, trying some soup for supper  Dressing to the R leg is dry and intact

## 2018-08-26 NOTE — NURSING NOTE
Dr Pedersen Cap here  and  Dressing of the R foot & leg changed by him  Pt premedicated with morphine

## 2018-08-26 NOTE — PROGRESS NOTES
Patient resting in bed  Right leg dressing dry and intact  STSG donor site dressings removed to allow air dry

## 2018-08-26 NOTE — ASSESSMENT & PLAN NOTE
Resolved now  will cont Pepcid 20 mg p  o  b i d  and Protonix 40 mg p  o  daily and Maalox   stop sodium bicarb

## 2018-08-26 NOTE — ASSESSMENT & PLAN NOTE
Low-cholesterol diet  Continue pravastatin every other day as recommended by patient's family doctor and Beck Moulton is held for now Patient returning call, please call patient back. Thank you.

## 2018-08-26 NOTE — ASSESSMENT & PLAN NOTE
· Previous admission same- asymptomatic - no indication of treatment at this time unless more frequent - infrequent bigeminy - no recurrence overnight   Check labs tomorrow   · Had echo last admission recent- normal

## 2018-08-26 NOTE — PROGRESS NOTES
Patient resting in bed at present time, c/o right leg pain medicated with Percocet 1 tab po  Right foot and thigh dressings dry and intact , Toes right foot pink warm  Will continue to monitor

## 2018-08-26 NOTE — NURSING NOTE
Still OOB in the chair  Assisted to again ambulate in the riv[place] with scooter supporting the  R leg  Did well but tires easily

## 2018-08-26 NOTE — ASSESSMENT & PLAN NOTE
Patient underwent right-sided free flap to right heel surgery by Zenon Briseno on 8/13/18-  Previous free flap failure-and a flap failure with venous congestion and arterial clot was that he had to be taken to the OR again for the clock to be evacuated now he is on mechanical leaching by Plastic surgery  Flap does not look promising-  for OR again today,   Continue current pain management  Bowel movement regimen  PT evaluate, DVT prophylaxis as per plastic  surgeon on heparin    Evaluated by ID tissue culture -Growth of Achromobacter xylosoxidans (A) and MRSA cefepime 2 g IV q 12 -> changed to bactrim ds by ID till 08/28/18  Repeat cultures are again showing MRSA    Sensitivities are pending     Leukocytosis improving /afebrile  Physical therapy recommended transitional care facility patient is in agreement

## 2018-08-26 NOTE — NURSING NOTE
Pt c/o constipation had Doculax supp with no result  Would like to have a fleets enema  Dr Kaity Pham for order, says pt may have

## 2018-08-26 NOTE — ASSESSMENT & PLAN NOTE
Low-calorie diet,  PT evaluation for early mobilization if recommended by surgeon-since he has been in the hospital he is losing weight    It is good for him in general

## 2018-08-26 NOTE — NURSING NOTE
Awake alert  In poor spirits  Upset because Physical Therapy did not make a visit yesterday  Complains of nausea today  Has not been able to eat normally

## 2018-08-26 NOTE — ASSESSMENT & PLAN NOTE
Continue current bowel regimen observe for now    Try to have a bowel movement every other day at least

## 2018-08-26 NOTE — PROGRESS NOTES
Progress Note - Excell Deed 1960, 63 y o  male MRN: [id number]    Unit/Bed#:  Encounter: [id number]    Primary Care Provider: Iveth Thomas MD   Date and time admitted to hospital: 8/13/2018  5:55 AM        Mild intermittent asthma without complication   Assessment & Plan    · Has not had any trouble while hospitalized but if needed we will put p r n  albuterol        Slow transit constipation   Assessment & Plan    Continue current bowel regimen observe for now  Try to have a bowel movement every other day at least           Morbid obesity with BMI of 45 0-49 9, adult [facility])   Assessment & Plan    Low-calorie diet,  PT evaluation for early mobilization if recommended by surgeon-since he has been in the hospital he is losing weight  It is good for him in general         Acute hyponatremia   Assessment & Plan    · Sodium 132- suspect siadh/pain induced  Check labs tomorrow        KIRIT on CPAP   Assessment & Plan    Continue CPAP        GERD (gastroesophageal reflux disease)   Assessment & Plan    Resolved now  will cont Pepcid 20 mg p  o  b i d  and Protonix 40 mg p  o  daily and Maalox   stop sodium bicarb              Essential hypertension   Assessment & Plan    Controlled on medications  Continue lisinopril 10 mg daily         PVC (premature ventricular contraction)   Assessment & Plan    · Previous admission same- asymptomatic - no indication of treatment at this time unless more frequent - infrequent bigeminy - no recurrence overnight   Check labs tomorrow   · Had echo last admission recent- normal         HLD (hyperlipidemia)   Assessment & Plan    Low-cholesterol diet  Continue pravastatin every other day as recommended by patient's family doctor and Tato Polanco is held for now        * Ankle wound, right, sequela   Assessment & Plan    Patient underwent right-sided free flap to right heel surgery by Luis Yanes on 8/13/18-  Previous free flap failure-and a flap failure with venous congestion and arterial clot was that he had to be taken to the OR again for the clock to be evacuated now he is on mechanical leaching by Plastic surgery  Flap does not look promising-  for OR again today,   Continue current pain management  Bowel movement regimen  PT evaluate, DVT prophylaxis as per plastic  surgeon on heparin    Evaluated by ID tissue culture -Growth of Achromobacter xylosoxidans (A) and MRSA cefepime 2 g IV q 12 -> changed to bactrim ds by ID till 18  Repeat cultures are again showing MRSA  Sensitivities are pending     Leukocytosis improving /afebrile  Physical therapy recommended transitional care facility patient is in agreement             VTE Pharmacologic Prophylaxis:   Pharmacologic: Heparin  Mechanical VTE Prophylaxis in Place: Yes    Patient Centered Rounds: I have performed bedside rounds with nursing staff today  Discussions with Specialists or Other Care Team Provider: nursing staff    Education and Discussions with Family / Patient: Discussed with the patient about his constipation and nausea  Time Spent for Care: 20 minutes  More than 50% of total time spent on counseling and coordination of care as described above  Current Length of Stay: 13 day(s)    Current Patient Status: Inpatient   Certification Statement: The patient will continue to require additional inpatient hospital stay due to graft care needs    Discharge Plan: as per surgery    Code Status: No Order      Subjective:   Patient states that he is tired of being in bed  He is nauseous  He has no appetite  He wants to reduce opioid use and try to use Tylenol or NSAIDs instead  Looking forward to having his dressings changed today by Dr Vicki Rizo    Objective:     Vitals:   Temp (24hrs), Av 8 °F (36 6 °C), Min:97 2 °F (36 2 °C), Max:98 °F (36 7 °C)    HR:  [] 72  Resp:  [7-40] 10  BP: (103-143)/(29-85) 143/64  SpO2:  [94 %-100 %] 98 %  Body mass index is 43 91 kg/m²       Input and Output Summary (last 24 hours): Intake/Output Summary (Last 24 hours) at 08/26/18 0857  Last data filed at 08/26/18 0601   Gross per 24 hour   Intake             1290 ml   Output             1450 ml   Net             -160 ml       Physical Exam:     Physical Exam   Constitutional: He is oriented to person, place, and time  He appears well-developed and well-nourished  HENT:   Head: Normocephalic and atraumatic  Right Ear: External ear normal    Left Ear: External ear normal    Mouth/Throat: Oropharynx is clear and moist    Eyes: Conjunctivae and EOM are normal  Pupils are equal, round, and reactive to light  Neck: Normal range of motion  Neck supple  Cardiovascular: Normal rate, regular rhythm, normal heart sounds and intact distal pulses  Pulmonary/Chest: Effort normal and breath sounds normal    Abdominal: Soft  Bowel sounds are normal  He exhibits no mass  There is no tenderness  There is no rebound and no guarding  Genitourinary:   Genitourinary Comments: deferred   Musculoskeletal:   Right leg wound   Neurological: He is alert and oriented to person, place, and time  He has normal reflexes  Skin: Skin is warm and dry  No rash noted  Psychiatric: He has a normal mood and affect  Nursing note and vitals reviewed  Additional Data:     Labs:      Results from last 7 days  Lab Units 08/23/18  0509   WBC Thousand/uL 11 70*   HEMOGLOBIN g/dL 10 7*   HEMATOCRIT % 31 4*   PLATELETS Thousands/uL 342   LYMPHO PCT % 24   MONO PCT MAN % 12*   EOSINO PCT MANUAL % 7*       Results from last 7 days  Lab Units 08/23/18  0509   SODIUM mmol/L 131*   POTASSIUM mmol/L 4 9   CHLORIDE mmol/L 99   CO2 mmol/L 27   BUN mg/dL 27*   CREATININE mg/dL 1 27   CALCIUM mg/dL 9 0   GLUCOSE RANDOM mg/dL 92                     * I Have Reviewed All Lab Data Listed Above  * Additional Pertinent Lab Tests Reviewed:  All Labs For Current Hospital Admission Reviewed    Imaging:    Imaging Reports Reviewed Today Include: none  Imaging Personally Reviewed by Myself Includes:  none    Recent Cultures (last 7 days):       Results from last 7 days  Lab Units 08/23/18  1148   GRAM STAIN RESULT  4+ Polys  3+ Gram positive cocci in clusters   WOUND CULTURE  4+ Growth of Methicillin Resistant Staphylococcus aureus*       Last 24 Hours Medication List:     Current Facility-Administered Medications:  acetaminophen 650 mg Oral Q8H [address] Luis Daniel Miller MD   aluminum-magnesium hydroxide-simethicone 30 mL Oral Q4H PRN Millicent Easley MD   aspirin 325 mg Oral BID Sharron Diaz MD   bisacodyl 10 mg Rectal Daily PRN Luis Daniel Miller MD   docusate sodium 100 mg Oral BID Meena Delarosa MD   famotidine 20 mg Oral BID Millicent Easley MD   heparin (porcine) 5,000 Units Subcutaneous Q12H [address] Sharron Diaz MD   levofloxacin 750 mg Oral Q24H Sharron Diaz MD   lisinopril 10 mg Oral Daily Millicent Easley MD   magnesium hydroxide 30 mL Oral BID PRN Millicent Easley MD   mineral oil 1 enema Rectal Once Luis Daniel Miller MD   morphine injection 2 mg Intravenous Q3H PRN Sharron Diaz MD   ondansetron 4 mg Intravenous Q6H PRN Luis Daniel Miller MD   oxyCODONE-acetaminophen 1 tablet Oral Q4H PRN Sharron Diaz MD   pantoprazole 40 mg Oral Early Morning Millicent Easley MD   phenol 1 spray Mouth/Throat Q2H PRN Capo Sanchez DO   polyethylene glycol 17 g Oral Daily Luis Daniel Miller MD   pravastatin 80 mg Oral Every Other Day Meena Delarosa MD   senna 2 tablet Oral BID Bilal ALLI Du MD   sulfamethoxazole-trimethoprim 1 tablet Oral Q12H Sj Looney MD        Today, Patient Was Seen By: Luis Daniel Miller MD    ** Please Note: Dictation voice to text software may have been used in the creation of this document   **

## 2018-08-27 PROBLEM — N17.9 AKI (ACUTE KIDNEY INJURY) (HCC): Status: ACTIVE | Noted: 2018-08-27

## 2018-08-27 LAB
ALBUMIN SERPL BCP-MCNC: 3.3 G/DL (ref 3–5.2)
ALP SERPL-CCNC: 51 U/L (ref 43–122)
ALT SERPL W P-5'-P-CCNC: 31 U/L (ref 9–52)
ANION GAP SERPL CALCULATED.3IONS-SCNC: 10 MMOL/L (ref 5–14)
AST SERPL W P-5'-P-CCNC: 36 U/L (ref 17–59)
BASOPHILS # BLD AUTO: 0.1 THOUSANDS/ΜL (ref 0–0.1)
BASOPHILS NFR BLD AUTO: 1 % (ref 0–1)
BILIRUB SERPL-MCNC: 0.6 MG/DL
BUN SERPL-MCNC: 19 MG/DL (ref 5–25)
CALCIUM SERPL-MCNC: 8.6 MG/DL (ref 8.4–10.2)
CHLORIDE SERPL-SCNC: 100 MMOL/L (ref 97–108)
CO2 SERPL-SCNC: 22 MMOL/L (ref 22–30)
CREAT SERPL-MCNC: 1.71 MG/DL (ref 0.7–1.5)
EOSINOPHIL # BLD AUTO: 0.9 THOUSAND/ΜL (ref 0–0.4)
EOSINOPHIL NFR BLD AUTO: 6 % (ref 0–6)
ERYTHROCYTE [DISTWIDTH] IN BLOOD BY AUTOMATED COUNT: 15.3 %
GFR SERPL CREATININE-BSD FRML MDRD: 43 ML/MIN/1.73SQ M
GLUCOSE SERPL-MCNC: 90 MG/DL (ref 70–99)
HCT VFR BLD AUTO: 30 % (ref 41–53)
HGB BLD-MCNC: 10.2 G/DL (ref 13.5–17.5)
LYMPHOCYTES # BLD AUTO: 2.1 THOUSANDS/ΜL (ref 0.5–4)
LYMPHOCYTES NFR BLD AUTO: 15 % (ref 20–50)
MCH RBC QN AUTO: 30.1 PG (ref 26–34)
MCHC RBC AUTO-ENTMCNC: 33.9 G/DL (ref 31–36)
MCV RBC AUTO: 89 FL (ref 80–100)
MONOCYTES # BLD AUTO: 1.2 THOUSAND/ΜL (ref 0.2–0.9)
MONOCYTES NFR BLD AUTO: 8 % (ref 1–10)
NEUTROPHILS # BLD AUTO: 9.6 THOUSANDS/ΜL (ref 1.8–7.8)
NEUTS SEG NFR BLD AUTO: 70 % (ref 45–65)
PLATELET # BLD AUTO: 327 THOUSANDS/UL (ref 150–450)
PMV BLD AUTO: 7.9 FL (ref 8.9–12.7)
POTASSIUM SERPL-SCNC: 4.8 MMOL/L (ref 3.6–5)
PROT SERPL-MCNC: 6.9 G/DL (ref 5.9–8.4)
RBC # BLD AUTO: 3.38 MILLION/UL (ref 4.5–5.9)
SODIUM SERPL-SCNC: 132 MMOL/L (ref 137–147)
WBC # BLD AUTO: 13.9 THOUSAND/UL (ref 4.5–11)

## 2018-08-27 PROCEDURE — 80053 COMPREHEN METABOLIC PANEL: CPT | Performed by: FAMILY MEDICINE

## 2018-08-27 PROCEDURE — 85025 COMPLETE CBC W/AUTO DIFF WBC: CPT | Performed by: FAMILY MEDICINE

## 2018-08-27 PROCEDURE — 97530 THERAPEUTIC ACTIVITIES: CPT

## 2018-08-27 PROCEDURE — 97164 PT RE-EVAL EST PLAN CARE: CPT

## 2018-08-27 PROCEDURE — 99233 SBSQ HOSP IP/OBS HIGH 50: CPT | Performed by: INTERNAL MEDICINE

## 2018-08-27 PROCEDURE — 99232 SBSQ HOSP IP/OBS MODERATE 35: CPT | Performed by: FAMILY MEDICINE

## 2018-08-27 RX ORDER — DOXYCYCLINE HYCLATE 100 MG/1
100 CAPSULE ORAL EVERY 12 HOURS SCHEDULED
Status: DISCONTINUED | OUTPATIENT
Start: 2018-08-27 | End: 2018-08-29 | Stop reason: HOSPADM

## 2018-08-27 RX ORDER — SODIUM CHLORIDE 9 MG/ML
125 INJECTION, SOLUTION INTRAVENOUS CONTINUOUS
Status: DISCONTINUED | OUTPATIENT
Start: 2018-08-27 | End: 2018-08-28

## 2018-08-27 RX ORDER — LEVOFLOXACIN 750 MG/1
750 TABLET ORAL
Status: DISCONTINUED | OUTPATIENT
Start: 2018-08-28 | End: 2018-08-27

## 2018-08-27 RX ORDER — LEVOFLOXACIN 750 MG/1
750 TABLET ORAL EVERY OTHER DAY
Status: DISCONTINUED | OUTPATIENT
Start: 2018-08-27 | End: 2018-08-27 | Stop reason: DRUGHIGH

## 2018-08-27 RX ADMIN — ACETAMINOPHEN 325 MG: 325 TABLET ORAL at 14:49

## 2018-08-27 RX ADMIN — SULFAMETHOXAZOLE AND TRIMETHOPRIM 1 TABLET: 800; 160 TABLET ORAL at 08:24

## 2018-08-27 RX ADMIN — POLYETHYLENE GLYCOL 3350 17 G: 17 POWDER, FOR SOLUTION ORAL at 08:23

## 2018-08-27 RX ADMIN — LISINOPRIL 10 MG: 10 TABLET ORAL at 08:24

## 2018-08-27 RX ADMIN — SODIUM CHLORIDE 125 ML/HR: 9 INJECTION, SOLUTION INTRAVENOUS at 09:22

## 2018-08-27 RX ADMIN — HEPARIN SODIUM 5000 UNITS: 5000 INJECTION, SOLUTION INTRAVENOUS; SUBCUTANEOUS at 08:23

## 2018-08-27 RX ADMIN — HEPARIN SODIUM 5000 UNITS: 5000 INJECTION, SOLUTION INTRAVENOUS; SUBCUTANEOUS at 22:08

## 2018-08-27 RX ADMIN — SENNOSIDES 17.2 MG: 8.6 TABLET, FILM COATED ORAL at 18:34

## 2018-08-27 RX ADMIN — ONDANSETRON 4 MG: 2 INJECTION, SOLUTION INTRAMUSCULAR; INTRAVENOUS at 08:23

## 2018-08-27 RX ADMIN — IBUPROFEN 600 MG: 600 TABLET ORAL at 05:45

## 2018-08-27 RX ADMIN — FAMOTIDINE 20 MG: 20 TABLET ORAL at 08:24

## 2018-08-27 RX ADMIN — ASPIRIN 325 MG ORAL TABLET 325 MG: 325 PILL ORAL at 08:23

## 2018-08-27 RX ADMIN — SODIUM CHLORIDE 125 ML/HR: 9 INJECTION, SOLUTION INTRAVENOUS at 18:35

## 2018-08-27 RX ADMIN — FAMOTIDINE 20 MG: 20 TABLET ORAL at 18:35

## 2018-08-27 RX ADMIN — ACETAMINOPHEN 650 MG: 325 TABLET ORAL at 22:08

## 2018-08-27 RX ADMIN — PANTOPRAZOLE SODIUM 40 MG: 40 TABLET, DELAYED RELEASE ORAL at 06:33

## 2018-08-27 RX ADMIN — SENNOSIDES 17.2 MG: 8.6 TABLET, FILM COATED ORAL at 08:25

## 2018-08-27 RX ADMIN — ASPIRIN 325 MG ORAL TABLET 325 MG: 325 PILL ORAL at 18:34

## 2018-08-27 RX ADMIN — SODIUM CHLORIDE 500 ML: 9 INJECTION, SOLUTION INTRAVENOUS at 09:21

## 2018-08-27 RX ADMIN — DOCUSATE SODIUM 100 MG: 100 CAPSULE, LIQUID FILLED ORAL at 18:34

## 2018-08-27 RX ADMIN — DOXYCYCLINE 100 MG: 100 CAPSULE ORAL at 22:08

## 2018-08-27 RX ADMIN — ACETAMINOPHEN 650 MG: 325 TABLET ORAL at 06:33

## 2018-08-27 RX ADMIN — DOCUSATE SODIUM 100 MG: 100 CAPSULE, LIQUID FILLED ORAL at 08:24

## 2018-08-27 NOTE — PROGRESS NOTES
Progress Note - Ana Cameron 1960, 63 y o  male MRN: [id number]    Unit/Bed#:  Encounter: [id number]    Primary Care Provider: Christiano Washington MD   Date and time admitted to hospital: 8/13/2018  5:55 AM        LUIS CARLOS (acute kidney injury) [facility])   Assessment & Plan    Patient creatinine 1 7 today  elevated compared to baseline due to poor oral intake,nsaid use and lisinopril use will hold nsaids and lisinopril and place on iv fluid 500 cc bolus and iv at 100 cc/hr  recheck bmp tomorrow and avoid nephrotoxins        Mild intermittent asthma without complication   Assessment & Plan    · Has not had any trouble while hospitalized but if needed we will put p r n  albuterol        Slow transit constipation   Assessment & Plan    Continue current bowel regimen observe for now  Try to have a bowel movement every other day at least           Morbid obesity with BMI of 45 0-49 9, adult [facility])   Assessment & Plan    Low-calorie diet,  PT evaluation for early mobilization if recommended by surgeon-since he has been in the hospital he is losing weight  It is good for him in general         Acute hyponatremia   Assessment & Plan    · Sodium 132- suspect siadh/pain induced  KIRIT on CPAP   Assessment & Plan    Continue CPAP        GERD (gastroesophageal reflux disease)   Assessment & Plan    Resolved now  will cont Pepcid 20 mg p  o  b i d  and Protonix 40 mg p  o  daily and Maalox   stop sodium bicarb              Essential hypertension   Assessment & Plan    Controlled on medications  hold lisinopril secondary to luis carlos          PVC (premature ventricular contraction)   Assessment & Plan    · Previous admission same- asymptomatic - no indication of treatment at this time unless more frequent - infrequent bigeminy - no recurrence overnight   Check labs tomorrow   · Had echo last admission recent- normal         HLD (hyperlipidemia)   Assessment & Plan    Low-cholesterol diet  Continue pravastatin every other day as recommended by patient's family doctor and Keyla Borrero is held for now        * Ankle wound, right, sequela   Assessment & Plan    Patient underwent right-sided free flap to right heel surgery by Azalea Rangel on 18-  Previous free flap failure-and a flap failure with venous congestion and arterial clot was that he had to be taken to the OR again for the clock to be evacuated now he is on mechanical leaching by Plastic surgery  Flap does not look promising-  for OR again today,   Continue current pain management  Bowel movement regimen  PT evaluate, DVT prophylaxis as per plastic  surgeon on heparin    Evaluated by ID tissue culture -Growth of Achromobacter xylosoxidans (A) and MRSA cefepime 2 g IV q 12 -> changed to bactrim ds by ID till 18  Repeat cultures are again showing MRSA  Leukocytosis improving Loli Victoria  Physical therapy recommended transitional care facility patient is in agreement   Cont levaquin and bactrim for now            VTE Pharmacologic Prophylaxis:   Pharmacologic: on heparin subcut  Mechanical VTE Prophylaxis in Place: Yes    Patient Centered Rounds: I have performed bedside rounds with nursing staff today  Discussions with Specialists or Other Care Team Provider: none    Education and Discussions with Family / Patient: discussed with patient about saloni    Time Spent for Care: 30 minutes  More than 50% of total time spent on counseling and coordination of care as described above  Current Length of Stay: 14 day(s)    Current Patient Status: Inpatient   Certification Statement:     Discharge Plan: today or tomorrow to rehab    Code Status: No Order      Subjective:   Denies any chest pain or sob does complain of nausea and fatigue    Objective:     Vitals:   Temp (24hrs), Av 8 °F (36 6 °C), Min:97 4 °F (36 3 °C), Max:98 °F (36 7 °C)    HR:  [70-98] 75  Resp:  [0-22] 20  BP: ()/(58-71) 118/64  SpO2:  [96 %-100 %] 99 %  Body mass index is 43 91 kg/m²       Input and Output Summary (last 24 hours): Intake/Output Summary (Last 24 hours) at 08/27/18 1352  Last data filed at 08/27/18 1020   Gross per 24 hour   Intake             3430 ml   Output             1200 ml   Net             2230 ml       Physical Exam:     Physical Exam   Constitutional: He is oriented to person, place, and time  He appears well-developed and well-nourished  HENT:   Head: Normocephalic and atraumatic  Right Ear: External ear normal    Left Ear: External ear normal    Mouth/Throat: Oropharynx is clear and moist    Eyes: Conjunctivae and EOM are normal  Pupils are equal, round, and reactive to light  Neck: Normal range of motion  Neck supple  Cardiovascular: Normal rate, regular rhythm, normal heart sounds and intact distal pulses  Pulmonary/Chest: Effort normal and breath sounds normal    Abdominal: Soft  Bowel sounds are normal  He exhibits no mass  There is no tenderness  There is no rebound and no guarding  Genitourinary:   Genitourinary Comments: deferred   Musculoskeletal:   Right leg dressing   Neurological: He is alert and oriented to person, place, and time  He has normal reflexes  Skin: Skin is warm and dry  No rash noted  Psychiatric: He has a normal mood and affect  Nursing note and vitals reviewed  Additional Data:     Labs:      Results from last 7 days  Lab Units 08/27/18  0548   WBC Thousand/uL 13 90*   HEMOGLOBIN g/dL 10 2*   HEMATOCRIT % 30 0*   PLATELETS Thousands/uL 327   NEUTROS PCT % 70*   LYMPHS PCT % 15*   MONOS PCT % 8   EOS PCT % 6       Results from last 7 days  Lab Units 08/27/18  0548   SODIUM mmol/L 132*   POTASSIUM mmol/L 4 8   CHLORIDE mmol/L 100   CO2 mmol/L 22   BUN mg/dL 19   CREATININE mg/dL 1 71*   CALCIUM mg/dL 8 6   ALK PHOS U/L 51   ALT U/L 31   AST U/L 36                     * I Have Reviewed All Lab Data Listed Above  * Additional Pertinent Lab Tests Reviewed:  [facility] Admission Reviewed    Imaging:    Imaging Reports Reviewed Today Include: none  Imaging Personally Reviewed by Myself Includes:  none    Recent Cultures (last 7 days):       Results from last 7 days  Lab Units 08/23/18  1148   GRAM STAIN RESULT  4+ Polys  3+ Gram positive cocci in clusters   WOUND CULTURE  4+ Growth of Methicillin Resistant Staphylococcus aureus*       Last 24 Hours Medication List:     Current Facility-Administered Medications:  acetaminophen 650 mg Oral Q8H [address] Frida Nuñez MD    aluminum-magnesium hydroxide-simethicone 30 mL Oral Q4H PRN Noe Hua MD    aspirin 325 mg Oral BID Brandon Hough MD    bisacodyl 10 mg Rectal Daily PRN Frida Nuñez MD    docusate sodium 100 mg Oral BID Clarisa Ronquillo MD    famotidine 20 mg Oral BID Noe Hua MD    heparin (porcine) 5,000 Units Subcutaneous Q12H [address] Brandon Hough MD    [START ON 8/28/2018] levofloxacin 750 mg Oral Q48H Frida Nuñez MD    magnesium hydroxide 30 mL Oral BID PRN Noe Hua MD    mineral oil 1 enema Rectal Once Frida Nuñez MD    morphine injection 2 mg Intravenous Q3H PRN Brandon Hough MD    ondansetron 4 mg Intravenous Q6H PRN Frida Nuñez MD    oxyCODONE-acetaminophen 1 tablet Oral Q4H PRN Brandon Hough MD    pantoprazole 40 mg Oral Early Morning Noe Hua MD    phenol 1 spray Mouth/Throat Q2H PRN Murtaza Rosas DO    polyethylene glycol 17 g Oral Daily Frida Nuñez MD    pravastatin 80 mg Oral Every Other Day Clarisa Ronquillo MD    senna 2 tablet Oral BID Odalys Finley MD    sodium chloride 125 mL/hr Intravenous Continuous Frida Nuñez MD Last Rate: 125 mL/hr (08/27/18 6390)   sulfamethoxazole-trimethoprim 1 tablet Oral Q12H Dyana Polk MD         Today, Patient Was Seen By: Frida Nuñez MD    ** Please Note: Dictation voice to text software may have been used in the creation of this document   **

## 2018-08-27 NOTE — CASE MANAGEMENT
Continued Stay Review    Date: 8/27/18    Vital Signs: /64 (BP Location: Right arm)   Pulse 75   Temp 97 8 °F (36 6 °C) (Temporal)   Resp 20   Ht 5' 8" (1 727 m)   Wt 131 kg (288 lb 12 8 oz)   SpO2 99%   BMI 43 91 kg/m²     Medications:   Scheduled Meds:   Current Facility-Administered Medications:  acetaminophen 650 mg Oral Q8H [address]   aluminum-magnesium hydroxide-simethicone 30 mL Oral Q4H PRN   aspirin 325 mg Oral BID   bisacodyl 10 mg Rectal Daily PRN   docusate sodium 100 mg Oral BID   famotidine 20 mg Oral BID   heparin (porcine) 5,000 Units Subcutaneous Q12H [address]   [START ON 8/28/2018] levofloxacin 750 mg Oral Q48H   magnesium hydroxide 30 mL Oral BID PRN   mineral oil 1 enema Rectal Once   morphine injection 2 mg Intravenous Q3H PRN   ondansetron 4 mg Intravenous Q6H PRN   oxyCODONE-acetaminophen 1 tablet Oral Q4H PRN   pantoprazole 40 mg Oral Early Morning   phenol 1 spray Mouth/Throat Q2H PRN   polyethylene glycol 17 g Oral Daily   pravastatin 80 mg Oral Every Other Day   senna 2 tablet Oral BID   sulfamethoxazole-trimethoprim 1 tablet Oral Q12H [address]     Continuous Infusions:   sodium chloride 125 mL/hr Last Rate: 125 mL/hr (08/27/18 0922)       Abnormal Labs/Diagnostic Results:     Lab Units 08/27/18  0548   WBC Thousand/uL 13 90*   HEMOGLOBIN g/dL 10 2*   HEMATOCRIT % 30 0*   PLATELETS Thousands/uL 327   NEUTROS PCT % 70*   LYMPHS PCT % 15*   MONOS PCT % 8   EOS PCT % 6       Lab Units 08/27/18  0548   SODIUM mmol/L 132*   POTASSIUM mmol/L 4 8   CHLORIDE mmol/L 100   CO2 mmol/L 22   BUN mg/dL 19   CREATININE mg/dL 1 71*   CALCIUM mg/dL 8 6   ALK PHOS U/L 51   ALT U/L 31   AST U/L 36             Lab Units 08/23/18  1148   GRAM STAIN RESULT   4+ Polys  3+ Gram positive cocci in clusters   WOUND CULTURE   4+ Growth of Methicillin Resistant Staphylococcus aureus*        Age/Sex: 63 y o  male     Assessment/Plan:          LUIS CARLOS (acute kidney injury) [facility])   Assessment & Plan     Patient creatinine 1 7 today elevated compared to baseline due to poor oral intake,nsaid use and lisinopril use will hold nsaids and lisinopril and place on iv fluid 500 cc bolus and iv at 100 cc/hr  recheck bmp tomorrow and avoid nephrotoxins          Mild intermittent asthma without complication   Assessment & Plan     · Has not had any trouble while hospitalized but if needed we will put p r n  albuterol          Slow transit constipation   Assessment & Plan     Continue current bowel regimen observe for now  Try to have a bowel movement every other day at least              Morbid obesity with BMI of 45 0-49 9, adult [facility])   Assessment & Plan     Low-calorie diet,  PT evaluation for early mobilization if recommended by surgeon-since he has been in the hospital he is losing weight  It is good for him in general           Acute hyponatremia   Assessment & Plan     · Sodium 132- suspect siadh/pain induced             KIRIT on CPAP   Assessment & Plan     Continue CPAP          GERD (gastroesophageal reflux disease)   Assessment & Plan     Resolved now  will cont Pepcid 20 mg p  o  b i d  and Protonix 40 mg p  o  daily and Maalox   stop sodium bicarb                   Essential hypertension   Assessment & Plan     Controlled on medications  hold lisinopril secondary to saloni           PVC (premature ventricular contraction)   Assessment & Plan     · Previous admission same- asymptomatic - no indication of treatment at this time unless more frequent - infrequent bigeminy - no recurrence overnight   Check labs tomorrow   · Had echo last admission recent- normal           HLD (hyperlipidemia)   Assessment & Plan     Low-cholesterol diet  Continue pravastatin every other day as recommended by patient's family doctor and Catherine Crisp is held for now          * Ankle wound, right, sequela   Assessment & Plan     Patient underwent right-sided free flap to right heel surgery by Deni Weeks on 8/13/18-  Previous free flap failure-and a flap failure with venous congestion and arterial clot was that he had to be taken to the OR again for the clock to be evacuated now he is on mechanical leaching by Plastic surgery  Flap does not look promising-  for OR again today,   Continue current pain management  Bowel movement regimen  PT evaluate, DVT prophylaxis as per plastic  surgeon on heparin    Evaluated by ID tissue culture -Growth of Achromobacter xylosoxidans (A) and MRSA cefepime 2 g IV q 12 -> changed to bactrim ds by ID till 08/28/18  Repeat cultures are again showing MRSA       Leukocytosis improving Kristy Zheng  Physical therapy recommended transitional care facility patient is in agreement   Cont levaquin and bactrim for now                VTE Pharmacologic Prophylaxis:   Pharmacologic: on heparin subcut  Mechanical VTE Prophylaxis in Place:  Yes      Current Length of Stay: 14 day(s)     Current Patient Status: Inpatient         Discharge Plan: TBD

## 2018-08-27 NOTE — SOCIAL WORK
Pt not yet surgically cleared for d/c- having nausea, pain and in LUIS CARLOS with IVF's given  Pt continues to be agreeable to TCF on d/c for short period of time to regain strength/ endurance- will require authorization for same  Will cont to follow throughout hospitalization

## 2018-08-27 NOTE — PLAN OF CARE
CARDIOVASCULAR - ADULT     Absence of cardiac dysrhythmias or at baseline rhythm Progressing        GASTROINTESTINAL - ADULT     Minimal or absence of nausea and/or vomiting Progressing     Maintains or returns to baseline bowel function Progressing     Maintains adequate nutritional intake Progressing     Establish and maintain optimal ostomy function Progressing        MUSCULOSKELETAL - ADULT     Maintain or return mobility to safest level of function Progressing     Maintain proper alignment of affected body part Progressing        Nutrition/Hydration-ADULT     Nutrient/Hydration intake appropriate for improving, restoring or maintaining nutritional needs Progressing        Potential for Falls     Patient will remain free of falls Progressing        Prexisting or High Potential for Compromised Skin Integrity     Skin integrity is maintained or improved Progressing        SKIN/TISSUE INTEGRITY - ADULT     Skin integrity remains intact Progressing     Incision(s), wounds(s) or drain site(s) healing without S/S of infection Progressing     Oral mucous membranes remain intact Progressing

## 2018-08-27 NOTE — ASSESSMENT & PLAN NOTE
Patient creatinine 1 7 today  elevated compared to baseline due to poor oral intake,nsaid use and lisinopril use will hold nsaids and lisinopril and place on iv fluid 500 cc bolus and iv at 100 cc/hr  recheck bmp tomorrow and avoid nephrotoxins

## 2018-08-27 NOTE — ASSESSMENT & PLAN NOTE
Patient underwent right-sided free flap to right heel surgery by Molly Flower on 8/13/18-  Previous free flap failure-and a flap failure with venous congestion and arterial clot was that he had to be taken to the OR again for the clock to be evacuated now he is on mechanical leaching by Plastic surgery  Flap does not look promising-  for OR again today,   Continue current pain management  Bowel movement regimen  PT evaluate, DVT prophylaxis as per plastic  surgeon on heparin    Evaluated by ID tissue culture -Growth of Achromobacter xylosoxidans (A) and MRSA cefepime 2 g IV q 12 -> changed to bactrim ds by ID till 08/28/18  Repeat cultures are again showing MRSA       Leukocytosis improving Marquis Sainz  Physical therapy recommended transitional care facility patient is in agreement   Cont levaquin and bactrim for now

## 2018-08-27 NOTE — PROGRESS NOTES
Patient sitting in Chair at present time, states his pain is better controlled, right lower extremity dressing intact, flap incision no drainage noted , will continue to monitor

## 2018-08-27 NOTE — PHYSICAL THERAPY NOTE
PHYSICAL THERAPY RE-EVALUATION and TREATMENT    Time In: 10:20  Time Out: 10:55  Total Time: 15 min Re-Evaluation @ 10:20 - 10:35; 20 min Treatment @ 10:35 - 10:55  MRN: [id number]    Patient is a 63 y o  male evaluated by Physical Therapy s/p admit to [facility]  on 8/13/2018 with admitting diagnosis(es) of: Open wound of right ankle [S91 001A] and with principal problem(s) of: Ankle wound, right, sequela  Patient Active Problem List   Diagnosis    Open wound of right foot with tendon involvement    HLD (hyperlipidemia)    PVC (premature ventricular contraction)    Essential hypertension    GERD (gastroesophageal reflux disease)    KIRIT on CPAP    Ankle wound, right, sequela    Acute hyponatremia    Open wound of right foot    Morbid obesity with BMI of 45 0-49 9, adult ([address] )    Slow transit constipation    Mild intermittent asthma without complication     Please refer to H & P for further details      Past Medical History:   Diagnosis Date    Asthma     CPAP (continuous positive airway pressure) dependence     Diverticulosis     GERD (gastroesophageal reflux disease)     Hyperlipidemia     Morbid obesity ([address] )     Sleep apnea     Ventricular tachycardia ([facility] [address] )     1 episode       Past Surgical History:   Procedure Laterality Date    CARPAL TUNNEL RELEASE      COLONOSCOPY  2007    FLAP LOCAL EXTREMITY Right 8/14/2018    Procedure: ASPIRATION FAILING FREE FLAP RIGHT LEG;  Surgeon: Xochitl Fallon MD;  Location: [address] [place] [address];  Service: Plastics    FREE FLAP GRAFT Right 8/13/2018    Procedure: TRANSFER RIGHT THIGH FREE FLAP TO RIGHT HEEL;  Surgeon: Xochitl Fallon MD;  Location: [address] [place] [address] OR;  Service: Plastics    HERNIA REPAIR      abdominal x 2    POLYPECTOMY      hyperplastic    AL ADJ TISS XFER SCALP,EXTREM 10 1-30 SQCM Right 8/23/2018    Procedure: LOCAL FLAP POSS STSG ACHILLES TENDON;  Surgeon: Xochitl Fallon MD;  Location: [address] [place] [address] OR;  Service: Plastics    AZ COLONOSCOPY FLX DX W/COLLJ SPEC WHEN PFRMD N/A 11/27/2017    Procedure: COLONOSCOPY;  Surgeon: Neida Almazan MD;  Location: [facility]; Service: Gastroenterology    AZ DEBRIDEMENT, SKIN, SUB-Q TISSUE,=<20 SQ CM Right 7/18/2018    Procedure: DEBRIDEMENT ANKLE WOUND;  Surgeon: Jerome Medrano MD;  Location: [address] [place] [address];  Service: Plastics    AZ DEBRIDEMENT, SKIN, SUB-Q TISSUE,=<20 SQ CM Right 7/23/2018    Procedure: DEBRIDEMENT RIGHT ANKLE WOUND;  Surgeon: Jerome Medrano MD;  Location: [address] [place] [address] OR;  Service: Plastics    AZ FREE MUSC-SKIN FLAP W/MICROVASC ANAST Right 7/30/2018    Procedure: COVERAGE HEEL WITH GRACILIS MUSCLE FLAP/SKINGRAFT ;  Surgeon: Jerome Medrano MD;  Location: [address] [place] [address];  Service: Plastics    [id number] [place] [address] Right 4/23/2018    Procedure: REPAIR TENDON ACHILLES  FHL TRANSFER;  Surgeon: Marcelo Roberts DPM;  Location[facility];  Service: Podiatry    TRIGGER FINGER RELEASE      thumb       Current Length Of Hospital Stay: 14 day(s)    PT was reconsulted to assess patient's functional mobility and discharge needs  Re-ordered are PT Evaluation and treatment  Chart reviewed  RN cleared patient for PT  Please locate the subjective and objective findings outlined below:     08/27/18 1020   Note Type   Note type Re-eval; Treatment   Pain Assessment   Pain Assessment 0-10   Pain Score 5   Pain Type Surgical pain   Pain Location Ankle   Pain Orientation Right   Patient's Stated Pain Goal No pain   Hospital Pain Intervention(s) Medication (See MAR)  (States he was pre-medicated for pain)   Home Living   Type of Home Other (Comment)  (Please refer to PT Evaluation dated 8/20/18)   Prior Function   Level of [place] Other (Comment)  (Please refer to PT Evaluation dated 8/20/18)   Restrictions/Precautions   Weight Bearing Precautions Per Order Yes   RLE Weight Bearing Per Order NWB   Other Precautions Fall Risk;Pain;Telemetry; Visual impairment;Contact/isolation General   Additional Pertinent History Patient s/p local flap with STSG right Achilles tendon on 8/23/18 by Dr Morena Guadarrama  Family/Caregiver Present No   Cognition   Overall Cognitive Status WFL   Arousal/Participation Alert   Orientation Level Oriented X4   Following Commands Follows all commands and directions without difficulty   Bed Mobility   Supine to Sit 5  Supervision  (For safety)   Additional items HOB elevated; Bedrails; Increased time required;Comment  (Multiple trials prior to OOB)   Sit to Supine 5  Supervision   Additional items HOB elevated; Bedrails; Increased time required;Comment  (Multiple trials)   Transfers   Sit to Stand 5  Supervision  (Close, for safety)   Additional items Other;Verbal cues  (Bed elevated; VCs for safety & proper hand placement)   Toilet transfer 6  Modified independent; Independent with gown management   Additional items Increased time required   Ambulation/Elevation   Gait pattern Forward Flexion   Gait Assistance 5  Supervision  (Close, for safety)   Additional items Other (Comment)  (Denied lightheadedness/dizziness)   Assistive Device (Knee walker (patient's own))   Distance 140 ft, 20 ft   Ramp Technique Not tested   Ramp Assistance Not tested   Endurance Deficit   Endurance Deficit Yes   Endurance Deficit Description Limited endurance for activity   Activity Tolerance   Activity Tolerance Patient limited by pain; Other (Comment)  (Patient limited by c/o nausea)   Assessment   Prognosis Fair   Problem List Decreased endurance;Decreased mobility; Decreased safety awareness; Impaired vision;Pain  (Limited to NWB on right foot)   Assessment Patient reporting not feeling well x last 2 days due to nausea and constipation  Continued limited endurance for OOB activity; concerned about difficulty managing at home as his wife has limited ability to assist him     Barriers to Discharge Inaccessible home environment;Decreased caregiver support   Goals   Patient Goals "To maybe go to the bathroom again  Darryl Dunn I just don't feel good today"  LTG Expiration Date 09/06/18   Long Term Goal #1 1  Patient will perform all bed mobility with modified independence in order to get in/out of bed  2  Patient will perform all functional transfers NWB on right foot with modified independence in order to facilitate return to prior/baseline level of function  3  Patient will ambulate with modified independence x at least 150 ft with knee walker NWB on right foot in order to safely access all necessary areas of home  4  Patient will ascend/descend ramp with right handrail available with knee walker NWB on right foot with modified independence to enter/exit home  Treatment Day 1   Plan   Treatment/Interventions ADL retraining;Functional transfer training;LE strengthening/ROM; Elevations; Therapeutic exercise; Endurance training;Patient/family training;Equipment eval/education; Bed mobility;Gait training;Spoke to nursing;OT;Family;Spoke to case management   PT Frequency Other (Comment)  (At least 3x/wk in 8 PT treatment sessions)   Recommendation   Recommendation Short-term skilled PT   Equipment Recommended Other (Comment)  (To be determined)   PT - OK to Discharge (When medically cleared; recommend sub-acute rehab)     Vitals: Seated post-ambulation Heart Rate = 93 bpm, SpO2 = 98%  on RA  Patient transferred to recliner chair at bedside with LEs elevated; patient positioned with all needs, including call bell, within reach      Mindi Vela, PT, DPT

## 2018-08-27 NOTE — PROGRESS NOTES
Progress Note - Infectious Disease   Renan Mondragon 63 y o  male MRN: [id number]  Unit/Bed#:  Encounter: [id number]    Impression/Recommendations:  1   Right heel wound infection   Post multiple previous I and D's   Status post free flap closure with STSG  With necrotic free flap which was removed, now most recently status post new skin graft on 08/23    As patient completed a postoperative course of oral amoxicillin and Levaquin for 14 days based on prior culture data which revealed Enterococcus, Pseudomonas, stenotrophomonas   Patient is now most recently status post repeat free flap coverage with STSG    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   Operative culture was sent from tissue on 08/13 which is now grew Achromobacter and MRSA   Fortunately, patient is without signs of sepsis   He is afebrile, hemodynamically stable   Repeat operative culture again growing MRSA  Will target MRSA at this point as this is the likely pathogen      -discontinue Bactrim  -start oral doxycycline 100 mg b i d   -as OR culture from 08/23 remains positive for MRSA, will extend treatment course of oral antibiotic for an additional 2 weeks postop, through 9/5   -serial exams of wound and management per Plastic surgery service     2   Chronic right heel open wound   Status post free flap closure with STSG x3    Previous free flap was necrotic and was removed  Now status post skin grafting on 08/23  3   Elevated creatinine  May be secondary to poor oral intake, NSAID use, lisinopril  Also consideration that oral Bactrim is playing a role as it does decrease creatinine secretion  IV fluids initiated today  Will discontinue oral Bactrim for this reason    Check BMP in a m      4   Achilles tendon repair, status post repair with above complication      5   Morbid obesity   May have contributed to original injury and poor wound healing      6   Leukocytosis   Likely postoperative elevation   No associated fevers   Remains systemically well, nontoxic   WBC count has improved      Antibiotics:  Antibiotic D15  Bactrim D12  POD4     Discussed above with patient in detail  Discussed above with Dr Regi Taylor  Subjective:   Overnight, patient was not feeling well  He was feeling more nauseous  Poor oral intake  Decreased urine output  His creatinine went up to 1 7  This afternoon, he is feeling a little better  No fevers or chills  Objective:  Vitals:  HR:  [70-98] 75  Resp:  [15-22] 20  BP: (118-139)/(64-71) 118/64  SpO2:  [99 %-100 %] 99 %  Temp (24hrs), Av 9 °F (36 6 °C), Min:97 8 °F (36 6 °C), Max:98 °F (36 7 °C)  Current: Temperature: 97 8 °F (36 6 °C)    Physical Exam:   General:  No acute distress, sitting comfortably in chair  Eyes:  Conjunctive clear with no hemorrhages or effusions  Oropharynx:  No ulcers, no lesions  Neck:  Supple, no lymphadenopathy  Lungs:  Clear to auscultation bilaterally, no accessory muscle use  Cardiac:  Regular rate and rhythm, no murmurs  Abdomen:  Soft, non-tender, non-distented  Extremities:  No peripheral cyanosis, clubbing, or edema  Right leg dressing intact  Skin:  No rashes, no ulcers  Neurological:  Moves all four extremities spontaneously, sensation grossly intact    Lab Results:  I have personally reviewed pertinent labs      Results from last 7 days  Lab Units 18  0548 18  0509   SODIUM mmol/L 132* 131*   POTASSIUM mmol/L 4 8 4 9   CHLORIDE mmol/L 100 99   CO2 mmol/L 22 27   BUN mg/dL 19 27*   CREATININE mg/dL 1 71* 1 27   EGFR ml/min/1 73sq m 43* 62   CALCIUM mg/dL 8 6 9 0   AST U/L 36  --    ALT U/L 31  --    ALK PHOS U/L 51  --        Results from last 7 days  Lab Units 18  0548 18  0509   WBC Thousand/uL 13 90* 11 70*   HEMOGLOBIN g/dL 10 2* 10 7*   PLATELETS Thousands/uL 327 342       Results from last 7 days  Lab Units 18  1148   GRAM STAIN RESULT  4+ Polys  3+ Gram positive cocci in clusters   WOUND CULTURE  4+ Growth of Methicillin Resistant Staphylococcus aureus*       Imaging Studies:   I have personally reviewed pertinent imaging study reports and images in PACS  EKG, Pathology, and Other Studies:   I have personally reviewed pertinent reports

## 2018-08-28 LAB
ANION GAP SERPL CALCULATED.3IONS-SCNC: 5 MMOL/L (ref 5–14)
BASOPHILS # BLD AUTO: 0.1 THOUSANDS/ΜL (ref 0–0.1)
BASOPHILS NFR BLD AUTO: 1 % (ref 0–1)
BUN SERPL-MCNC: 13 MG/DL (ref 5–25)
CALCIUM SERPL-MCNC: 8.3 MG/DL (ref 8.4–10.2)
CHLORIDE SERPL-SCNC: 104 MMOL/L (ref 97–108)
CO2 SERPL-SCNC: 26 MMOL/L (ref 22–30)
CREAT SERPL-MCNC: 1.29 MG/DL (ref 0.7–1.5)
EOSINOPHIL # BLD AUTO: 0.8 THOUSAND/ΜL (ref 0–0.4)
EOSINOPHIL NFR BLD AUTO: 7 % (ref 0–6)
ERYTHROCYTE [DISTWIDTH] IN BLOOD BY AUTOMATED COUNT: 15.3 %
GFR SERPL CREATININE-BSD FRML MDRD: 61 ML/MIN/1.73SQ M
GLUCOSE SERPL-MCNC: 106 MG/DL (ref 70–99)
HCT VFR BLD AUTO: 29 % (ref 41–53)
HGB BLD-MCNC: 9.8 G/DL (ref 13.5–17.5)
LYMPHOCYTES # BLD AUTO: 2.1 THOUSANDS/ΜL (ref 0.5–4)
LYMPHOCYTES NFR BLD AUTO: 21 % (ref 20–50)
MCH RBC QN AUTO: 30.2 PG (ref 26–34)
MCHC RBC AUTO-ENTMCNC: 33.8 G/DL (ref 31–36)
MCV RBC AUTO: 89 FL (ref 80–100)
MONOCYTES # BLD AUTO: 0.9 THOUSAND/ΜL (ref 0.2–0.9)
MONOCYTES NFR BLD AUTO: 9 % (ref 1–10)
NEUTROPHILS # BLD AUTO: 6.5 THOUSANDS/ΜL (ref 1.8–7.8)
NEUTS SEG NFR BLD AUTO: 63 % (ref 45–65)
PLATELET # BLD AUTO: 338 THOUSANDS/UL (ref 150–450)
PMV BLD AUTO: 8.5 FL (ref 8.9–12.7)
POTASSIUM SERPL-SCNC: 4.2 MMOL/L (ref 3.6–5)
RBC # BLD AUTO: 3.24 MILLION/UL (ref 4.5–5.9)
SODIUM SERPL-SCNC: 135 MMOL/L (ref 137–147)
WBC # BLD AUTO: 10.3 THOUSAND/UL (ref 4.5–11)

## 2018-08-28 PROCEDURE — 85025 COMPLETE CBC W/AUTO DIFF WBC: CPT | Performed by: FAMILY MEDICINE

## 2018-08-28 PROCEDURE — 80048 BASIC METABOLIC PNL TOTAL CA: CPT | Performed by: FAMILY MEDICINE

## 2018-08-28 PROCEDURE — 99232 SBSQ HOSP IP/OBS MODERATE 35: CPT | Performed by: INTERNAL MEDICINE

## 2018-08-28 PROCEDURE — 99232 SBSQ HOSP IP/OBS MODERATE 35: CPT | Performed by: FAMILY MEDICINE

## 2018-08-28 RX ORDER — TRAMADOL HYDROCHLORIDE 50 MG/1
50 TABLET ORAL EVERY 6 HOURS PRN
Status: DISCONTINUED | OUTPATIENT
Start: 2018-08-28 | End: 2018-08-29 | Stop reason: HOSPADM

## 2018-08-28 RX ADMIN — PRAVASTATIN SODIUM 80 MG: 20 TABLET ORAL at 08:46

## 2018-08-28 RX ADMIN — ASPIRIN 325 MG ORAL TABLET 325 MG: 325 PILL ORAL at 08:45

## 2018-08-28 RX ADMIN — DOCUSATE SODIUM 100 MG: 100 CAPSULE, LIQUID FILLED ORAL at 08:45

## 2018-08-28 RX ADMIN — PANTOPRAZOLE SODIUM 40 MG: 40 TABLET, DELAYED RELEASE ORAL at 06:07

## 2018-08-28 RX ADMIN — ACETAMINOPHEN 650 MG: 325 TABLET ORAL at 21:24

## 2018-08-28 RX ADMIN — SENNOSIDES 17.2 MG: 8.6 TABLET, FILM COATED ORAL at 18:27

## 2018-08-28 RX ADMIN — ONDANSETRON 4 MG: 2 INJECTION, SOLUTION INTRAMUSCULAR; INTRAVENOUS at 08:56

## 2018-08-28 RX ADMIN — FAMOTIDINE 20 MG: 20 TABLET ORAL at 08:46

## 2018-08-28 RX ADMIN — SENNOSIDES 17.2 MG: 8.6 TABLET, FILM COATED ORAL at 08:46

## 2018-08-28 RX ADMIN — DOXYCYCLINE 100 MG: 100 CAPSULE ORAL at 08:46

## 2018-08-28 RX ADMIN — DOXYCYCLINE 100 MG: 100 CAPSULE ORAL at 21:25

## 2018-08-28 RX ADMIN — ACETAMINOPHEN 650 MG: 325 TABLET ORAL at 06:07

## 2018-08-28 RX ADMIN — HEPARIN SODIUM 5000 UNITS: 5000 INJECTION, SOLUTION INTRAVENOUS; SUBCUTANEOUS at 21:25

## 2018-08-28 RX ADMIN — SODIUM CHLORIDE 125 ML/HR: 9 INJECTION, SOLUTION INTRAVENOUS at 03:16

## 2018-08-28 RX ADMIN — POLYETHYLENE GLYCOL 3350 17 G: 17 POWDER, FOR SOLUTION ORAL at 08:46

## 2018-08-28 RX ADMIN — HEPARIN SODIUM 5000 UNITS: 5000 INJECTION, SOLUTION INTRAVENOUS; SUBCUTANEOUS at 08:46

## 2018-08-28 RX ADMIN — ASPIRIN 325 MG ORAL TABLET 325 MG: 325 PILL ORAL at 18:27

## 2018-08-28 RX ADMIN — DOCUSATE SODIUM 100 MG: 100 CAPSULE, LIQUID FILLED ORAL at 18:27

## 2018-08-28 RX ADMIN — FAMOTIDINE 20 MG: 20 TABLET ORAL at 18:27

## 2018-08-28 RX ADMIN — ACETAMINOPHEN 650 MG: 325 TABLET ORAL at 14:01

## 2018-08-28 NOTE — ASSESSMENT & PLAN NOTE
Low-cholesterol diet  Continue pravastatin every other day as recommended by patient's family doctor and Tato Polanco is held for now

## 2018-08-28 NOTE — NURSING NOTE
Refused provided lunch tray- ordered take out from nearby pizzaria  Nausea from this AM has subsided

## 2018-08-28 NOTE — SOCIAL WORK
Jordi Suárez and medicine physician Dr Gilmer Velasquez met with pt to discuss recommendation for TCF  Pt at this time is not agreeable and reports that he will discharge home Wednesday to his home in [place]  Pt agreeable to referral to homecare for RN/PT/OT  TESS discussed preferred providers as pt lives in [place]  Pt reports he has no preference; however, did suggest MARANDA BAÑUELOS  SW to f/u with [facility] referral  Pt informed TESS and Dr Gilmer Velasquez that he will discharge tomorrow and if not would leave AMA  Pt also stated that he discussed this with Dr Adriana Lai who is planned to complete a dressing change tomorrow morning  Nursing notified regarding change in discharge plan

## 2018-08-28 NOTE — NURSING NOTE
Remains oob in chair  Ambulated several times in unit  Feels much better than he dis this morning  Now wishes he could go home  Toe right foot remain warm and pink

## 2018-08-28 NOTE — NURSING NOTE
Pt c/o constipation  Attempted several times to have BM on bedpan, only passed flatus  Requested laxative order  Dulcolax tablets administered  Also c/o headache having returned  Medicated with tylenol at this time

## 2018-08-28 NOTE — NURSING NOTE
Patient asked to wash, walked, and try to use the toilet  Patient was ambulated by staff member around the hallway with knee walker  He then tried to use the bathroom  When he returned to bed he washed up with chlorhexidine wash and changed his clothing  Patient is calm, pleasant, and cooperative with care  He required only set-up help  Vital signs are stable  He did receive his scheduled dose of Tylenol at this time as well  Call bell in reach and bed in low position  Will continue to monitor

## 2018-08-28 NOTE — PROGRESS NOTES
Progress Note - Infectious Disease   Harpreet Langford 63 y o  male MRN: [id number]  Unit/Bed#:  Encounter: [id number]      Impression/Recommendations:  1   Chronic right heel nonhealing wound with infection  In the setting of tendon rupture status post repair 1668  Complicated by wound nonhealing, polymicrobial infection status post flap#1/STSG PO antibiotics x14 days 2018  MRI 2018 without osteomyeltis  Complicated by flap necrosis, MRSA/Achromobacter infection status post revision flap #2  Complicated by necrosis status post flap removal, redo flap, skin graft  Clinically stable without signs of sepsis  Rec:  · Continue doxycycline 2 weeks postop through   · Serial exams and LWC per Plastics  · If infection recurs after this course of antibiotics may need repeat MRI to evaluate for evolving osteomyelitis which, if present, would necessitate prolonged IV antibiotics     2   LUIS CARLOS  Likely multifactorial due to poor oral intake, NSAIDs, lisinopril, Bactrim  Improving  Rec:  · Follow creatinine closely  · Supportive care as per the primary service     3   Achilles tendon rupture  Status post repair with above complication      4   Morbid obesity  May have contributed to original injury and poor wound healing      Discussed the above plan in detail with the patient  All questions answered  The patient is stable from an ID standpoint  Antibiotics:  Antibiotic #16  Doxycycline # 2  POD #5    Subjective:  Patient seen on AM rounds  Overall feeling better  Nausea improving  Denies diarrhea  Denies fevers, chills, or sweats  24 Hour Events:  No documented fevers, chills, sweats, nausea, vomiting, or diarrhea      Objective:  Vitals:  HR:  [74-88] 84  Resp:  [18-20] 18  BP: (118-161)/(64-79) 140/72  SpO2:  [98 %-100 %] 98 %  Temp (24hrs), Av 6 °F (36 4 °C), Min:97 1 °F (36 2 °C), Max:97 8 °F (36 6 °C)  Current: Temperature: 97 7 °F (36 5 °C)    Physical Exam:   General:  No acute distress  Psychiatric:  Awake and alert  Pulmonary:  Normal respiratory excursion without accessory muscle use  Abdomen:  Soft, nontender  Extremities:  Right leg Ace wrap intact without strike through  Skin:  No rashes    Lab Results:  I have personally reviewed pertinent labs  Results from last 7 days  Lab Units 08/28/18  0501 08/27/18  0548 08/23/18  0509   SODIUM mmol/L 135* 132* 131*   POTASSIUM mmol/L 4 2 4 8 4 9   CHLORIDE mmol/L 104 100 99   CO2 mmol/L 26 22 27   BUN mg/dL 13 19 27*   CREATININE mg/dL 1 29 1 71* 1 27   EGFR ml/min/1 73sq m 61 43* 62   CALCIUM mg/dL 8 3* 8 6 9 0   AST U/L  --  36  --    ALT U/L  --  31  --    ALK PHOS U/L  --  51  --        Results from last 7 days  Lab Units 08/28/18  0501 08/27/18  0548 08/23/18  0509   WBC Thousand/uL 10 30 13 90* 11 70*   HEMOGLOBIN g/dL 9 8* 10 2* 10 7*   PLATELETS Thousands/uL 338 327 342       Results from last 7 days  Lab Units 08/23/18  1148   GRAM STAIN RESULT  4+ Polys  3+ Gram positive cocci in clusters   WOUND CULTURE  4+ Growth of Methicillin Resistant Staphylococcus aureus*       Imaging Studies:   I have personally reviewed pertinent imaging study reports and images in PACS  EKG, Pathology, and Other Studies:   I have personally reviewed pertinent reports

## 2018-08-28 NOTE — PROGRESS NOTES
Progress Note - Lois Rodriguez 1960, 63 y o  male MRN: [id number]    Unit/Bed#:  Encounter: [id number]    Primary Care Provider: Will Unger MD   Date and time admitted to hospital: 8/13/2018  5:55 AM        LUIS CARLOS (acute kidney injury) [facility])   Assessment & Plan    Patient creatinine improved from 1 7-1 29 today  Stop IV fluid hydration and avoid nephrotoxins  Mild intermittent asthma without complication   Assessment & Plan    · Has not had any trouble while hospitalized but if needed we will put p r n  albuterol        Slow transit constipation   Assessment & Plan    Continue current bowel regimen observe for now  Try to have a bowel movement every other day at least           Morbid obesity with BMI of 45 0-49 9, adult [facility])   Assessment & Plan    Low-calorie diet,  PT evaluation for early mobilization if recommended by surgeon-since he has been in the hospital he is losing weight  It is good for him in general         Acute hyponatremia   Assessment & Plan    · Sodium 135 today-clinically improved  It was possibly secondary to SIADH and dehydration        KIRIT on CPAP   Assessment & Plan    Continue CPAP        GERD (gastroesophageal reflux disease)   Assessment & Plan    Resolved now  will cont Pepcid 20 mg p  o  b i d  and Protonix 40 mg p  o  daily and Maalox   stop sodium bicarb              Essential hypertension   Assessment & Plan    Controlled on medications  hold lisinopril secondary to luis carlos          PVC (premature ventricular contraction)   Assessment & Plan    · Previous admission same- asymptomatic - no indication of treatment at this time unless more frequent - infrequent bigeminy - no recurrence overnight   Check labs tomorrow   · Had echo last admission recent- normal         HLD (hyperlipidemia)   Assessment & Plan    Low-cholesterol diet  Continue pravastatin every other day as recommended by patient's family doctor and Kirsten Clemente is held for now        * Ankle wound, right, sequela   Assessment & Plan    Patient underwent multiple flap surgeries  Continue current pain management  Bowel movement regimen  PT evaluation, DVT prophylaxis as per plastic  surgeon on heparin    Evaluated by ID tissue culture -Growth of Achromobacter xylosoxidans (A) and MRSA cefepime 2 g IV q 12 -> changed to bactrim ds by ID till 18  Repeat cultures are again showing MRSA  As per ID he is placed on doxycycline course for 2 weeks  VTE Pharmacologic Prophylaxis:   Pharmacologic: Heparin  Mechanical VTE Prophylaxis in Place: Yes    Patient Centered Rounds: I have performed bedside rounds with nursing staff today  Discussions with Specialists or Other Care Team Provider:   Discussed with social service about disposition    Education and Discussions with Family / Patient:   Discussed with the patient about his Andre    Time Spent for Care: 30 minutes  More than 50% of total time spent on counseling and coordination of care as described above  Current Length of Stay: 15 day(s)    Current Patient Status: Inpatient   Certification Statement:   Discharge today    Discharge Plan:   Discharge today    Code Status: No Order      Subjective:   Patient denies any chest pain or shortness of breath or nausea today  He feels much better he feels like his energy is returning  Objective:     Vitals:   Temp (24hrs), Av 6 °F (36 4 °C), Min:97 1 °F (36 2 °C), Max:97 8 °F (36 6 °C)    HR:  [75-88] 75  Resp:  [18-20] 20  BP: (118-161)/(61-79) 129/61  SpO2:  [98 %-100 %] 100 %  Body mass index is 43 91 kg/m²  Input and Output Summary (last 24 hours): Intake/Output Summary (Last 24 hours) at 18 1013  Last data filed at 18 0900   Gross per 24 hour   Intake          5005 41 ml   Output             3975 ml   Net          1030 41 ml       Physical Exam:     Physical Exam   Constitutional: He is oriented to person, place, and time  He appears well-developed and well-nourished  HENT:   Head: Normocephalic and atraumatic  Right Ear: External ear normal    Left Ear: External ear normal    Mouth/Throat: Oropharynx is clear and moist    Eyes: Conjunctivae and EOM are normal  Pupils are equal, round, and reactive to light  Neck: Normal range of motion  Neck supple  Cardiovascular: Normal rate, regular rhythm, normal heart sounds and intact distal pulses  Pulmonary/Chest: Effort normal and breath sounds normal    Abdominal: Soft  Bowel sounds are normal  He exhibits no mass  There is no tenderness  There is no rebound and no guarding  Genitourinary:   Genitourinary Comments: deferred   Musculoskeletal:   Right leg dressing   Neurological: He is alert and oriented to person, place, and time  He has normal reflexes  Skin: Skin is warm and dry  No rash noted  Psychiatric: He has a normal mood and affect  Nursing note and vitals reviewed  Additional Data:     Labs:      Results from last 7 days  Lab Units 08/28/18  0501   WBC Thousand/uL 10 30   HEMOGLOBIN g/dL 9 8*   HEMATOCRIT % 29 0*   PLATELETS Thousands/uL 338   NEUTROS PCT % 63   LYMPHS PCT % 21   MONOS PCT % 9   EOS PCT % 7*       Results from last 7 days  Lab Units 08/28/18  0501 08/27/18  0548   SODIUM mmol/L 135* 132*   POTASSIUM mmol/L 4 2 4 8   CHLORIDE mmol/L 104 100   CO2 mmol/L 26 22   BUN mg/dL 13 19   CREATININE mg/dL 1 29 1 71*   CALCIUM mg/dL 8 3* 8 6   ALK PHOS U/L  --  51   ALT U/L  --  31   AST U/L  --  36                     * I Have Reviewed All Lab Data Listed Above  * Additional Pertinent Lab Tests Reviewed:  [facility] Admission Reviewed    Imaging:    Imaging Reports Reviewed Today Include: none  Imaging Personally Reviewed by Myself Includes:  none    Recent Cultures (last 7 days):       Results from last 7 days  Lab Units 08/23/18  1148   GRAM STAIN RESULT  4+ Polys  3+ Gram positive cocci in clusters   WOUND CULTURE  4+ Growth of Methicillin Resistant Staphylococcus aureus* Last 24 Hours Medication List:     Current Facility-Administered Medications:  acetaminophen 650 mg Oral Q8H [facility] Remi Bobo MD    aluminum-magnesium hydroxide-simethicone 30 mL Oral Q4H PRN Melissa Robert MD    aspirin 325 mg Oral BID Slick Garcia MD    bisacodyl 10 mg Rectal Daily PRN Remi Bobo MD    docusate sodium 100 mg Oral BID Desi Downey MD    doxycycline hyclate 100 mg Oral Q12H [facility] aSvanna Copeland DO    famotidine 20 mg Oral BID Melissa Robert MD    heparin (porcine) 5,000 Units Subcutaneous Q12H [facility] Slick Garcia MD    magnesium hydroxide 30 mL Oral BID PRN Melissa Robert MD    mineral oil 1 enema Rectal Once Remi Bobo MD    morphine injection 2 mg Intravenous Q3H PRN Slick Garcia MD    ondansetron 4 mg Intravenous Q6H PRN Remi Bobo MD    oxyCODONE-acetaminophen 1 tablet Oral Q4H PRN Slick Garcia MD    pantoprazole 40 mg Oral Early Morning Melissa Robert MD    phenol 1 spray Mouth/Throat Q2H PRN Brandonyemi Franco,     polyethylene glycol 17 g Oral Daily Remi Bobo MD    pravastatin 80 mg Oral Every Other Day Desi Downey MD    senna 2 tablet Oral BID Odalys Young MD    sodium chloride 125 mL/hr Intravenous Continuous Remi Bobo MD Last Rate: 125 mL/hr (08/28/18 0316)        Today, Patient Was Seen By: Remi Bobo MD    ** Please Note: Dictation voice to text software may have been used in the creation of this document   **

## 2018-08-28 NOTE — ASSESSMENT & PLAN NOTE
Patient underwent multiple flap surgeries  Continue current pain management  Bowel movement regimen  PT evaluation, DVT prophylaxis as per plastic  surgeon on heparin    Evaluated by ID tissue culture -Growth of Achromobacter xylosoxidans (A) and MRSA cefepime 2 g IV q 12 -> changed to bactrim ds by ID till 08/28/18  Repeat cultures are again showing MRSA  As per ID he is placed on doxycycline course for 2 weeks

## 2018-08-28 NOTE — NURSING NOTE
Patient appears to be resting comfortably in bed on his right side laterally; eyes closed, c-pap applied, and chest movements noted  Patient woke easily for vital signs and had no complaints of pain or discomfort at this time  Patient is calm, pleasant, and cooperative with care  Patient is awake and oriented times four  Vital signs remain stable  Call bell in reach and bed in low position  Assessment other wise unchanged at this time, will continue to monitor

## 2018-08-29 VITALS
SYSTOLIC BLOOD PRESSURE: 134 MMHG | RESPIRATION RATE: 18 BRPM | HEIGHT: 68 IN | HEART RATE: 73 BPM | OXYGEN SATURATION: 99 % | DIASTOLIC BLOOD PRESSURE: 84 MMHG | TEMPERATURE: 97.5 F | BODY MASS INDEX: 43.77 KG/M2 | WEIGHT: 288.8 LBS

## 2018-08-29 PROCEDURE — 94664 DEMO&/EVAL PT USE INHALER: CPT

## 2018-08-29 PROCEDURE — 99232 SBSQ HOSP IP/OBS MODERATE 35: CPT | Performed by: INTERNAL MEDICINE

## 2018-08-29 PROCEDURE — 94760 N-INVAS EAR/PLS OXIMETRY 1: CPT

## 2018-08-29 PROCEDURE — 94640 AIRWAY INHALATION TREATMENT: CPT

## 2018-08-29 PROCEDURE — 99232 SBSQ HOSP IP/OBS MODERATE 35: CPT | Performed by: FAMILY MEDICINE

## 2018-08-29 RX ORDER — IPRATROPIUM BROMIDE AND ALBUTEROL SULFATE 2.5; .5 MG/3ML; MG/3ML
3 SOLUTION RESPIRATORY (INHALATION) EVERY 4 HOURS PRN
Status: DISCONTINUED | OUTPATIENT
Start: 2018-08-29 | End: 2018-08-29 | Stop reason: HOSPADM

## 2018-08-29 RX ORDER — SENNA AND DOCUSATE SODIUM 50; 8.6 MG/1; MG/1
2 TABLET, FILM COATED ORAL DAILY
Qty: 60 TABLET | Refills: 0 | Status: SHIPPED | OUTPATIENT
Start: 2018-08-29 | End: 2019-10-12 | Stop reason: HOSPADM

## 2018-08-29 RX ORDER — BISACODYL 10 MG
10 SUPPOSITORY, RECTAL RECTAL DAILY PRN
Qty: 12 SUPPOSITORY | Refills: 0 | Status: SHIPPED | OUTPATIENT
Start: 2018-08-29 | End: 2019-10-12 | Stop reason: HOSPADM

## 2018-08-29 RX ORDER — DOXYCYCLINE HYCLATE 100 MG/1
100 CAPSULE ORAL EVERY 12 HOURS SCHEDULED
Qty: 24 CAPSULE | Refills: 0 | Status: SHIPPED | OUTPATIENT
Start: 2018-08-29 | End: 2018-09-10

## 2018-08-29 RX ORDER — MAGNESIUM HYDROXIDE/ALUMINUM HYDROXICE/SIMETHICONE 120; 1200; 1200 MG/30ML; MG/30ML; MG/30ML
30 SUSPENSION ORAL EVERY 4 HOURS PRN
Qty: 355 ML | Refills: 0
Start: 2018-08-29 | End: 2019-10-12 | Stop reason: HOSPADM

## 2018-08-29 RX ORDER — POLYETHYLENE GLYCOL 3350 17 G/17G
17 POWDER, FOR SOLUTION ORAL DAILY
Qty: 14 EACH | Refills: 0 | Status: SHIPPED | OUTPATIENT
Start: 2018-08-30 | End: 2019-10-12 | Stop reason: HOSPADM

## 2018-08-29 RX ORDER — FAMOTIDINE 20 MG/1
20 TABLET, FILM COATED ORAL 2 TIMES DAILY
Qty: 60 TABLET | Refills: 0 | Status: SHIPPED | OUTPATIENT
Start: 2018-08-29

## 2018-08-29 RX ORDER — ACETAMINOPHEN 325 MG/1
650 TABLET ORAL 3 TIMES DAILY
Qty: 30 TABLET | Refills: 0
Start: 2018-08-29

## 2018-08-29 RX ADMIN — SENNOSIDES 17.2 MG: 8.6 TABLET, FILM COATED ORAL at 08:59

## 2018-08-29 RX ADMIN — ASPIRIN 325 MG ORAL TABLET 325 MG: 325 PILL ORAL at 08:59

## 2018-08-29 RX ADMIN — DOCUSATE SODIUM 100 MG: 100 CAPSULE, LIQUID FILLED ORAL at 08:58

## 2018-08-29 RX ADMIN — HEPARIN SODIUM 5000 UNITS: 5000 INJECTION, SOLUTION INTRAVENOUS; SUBCUTANEOUS at 08:58

## 2018-08-29 RX ADMIN — DOXYCYCLINE 100 MG: 100 CAPSULE ORAL at 08:59

## 2018-08-29 RX ADMIN — ACETAMINOPHEN 650 MG: 325 TABLET ORAL at 06:08

## 2018-08-29 RX ADMIN — IPRATROPIUM BROMIDE AND ALBUTEROL SULFATE 3 ML: 2.5; .5 SOLUTION RESPIRATORY (INHALATION) at 00:19

## 2018-08-29 RX ADMIN — PANTOPRAZOLE SODIUM 40 MG: 40 TABLET, DELAYED RELEASE ORAL at 06:08

## 2018-08-29 RX ADMIN — POLYETHYLENE GLYCOL 3350 17 G: 17 POWDER, FOR SOLUTION ORAL at 08:58

## 2018-08-29 RX ADMIN — FAMOTIDINE 20 MG: 20 TABLET ORAL at 08:59

## 2018-08-29 NOTE — ASSESSMENT & PLAN NOTE
Low-cholesterol diet  Continue pravastatin every other day as recommended by patient's family doctor and Alena Johnson is held for now

## 2018-08-29 NOTE — SOCIAL WORK
Pt being d/c home this day with [facility]- all paperwork faxed as requested with Antonieta for Friday  Wife to transport home  No further d/c needs identified

## 2018-08-29 NOTE — ASSESSMENT & PLAN NOTE
Patient creatinine improved from 1 7-1 29 today  avoid nephrotoxins    Advised to increase oral fluid intake and repeat CMP in 2 weeks to be followed up by PCP

## 2018-08-29 NOTE — OCCUPATIONAL THERAPY NOTE
OT orders received and chart reviewed  Spoke with CM, whom reports pt declined subacute rehab and is discharging home today  Therefore, reports no need for OT to complete evaluation  Discharge OT order       Brain Bautista, OT

## 2018-08-29 NOTE — NURSING NOTE
Discharged to home  Pt  Expresses written and verbal understanding of discharge instructions  Accompanied to main entrance  By RN  Pt states all belongings are with him

## 2018-08-29 NOTE — ASSESSMENT & PLAN NOTE
· Previous admission same- asymptomatic - no indication of treatment at this time unless more frequent - infrequent bigeminy - no recurrence overnight   · Had echo last admission recent- normal

## 2018-08-29 NOTE — NURSING NOTE
Alert and oriented  In good spirits  C/o intermittent pain that is poorly relieved by tylenol though he refuses narcotic pain control at this time  Moves well with minimal assist of one  Ambulating in rivas with round about scooter  Heart tones clear with palpable pulses  +2 edema to right foot  Right lower leg is overall larger than left  Lungs decreased but clear  Frequent dry cough is noted  Abdomen is large and soft  Eating well  Voiding clear lilliam urine  Dressing to skin graft donor site is dry and adhered  Dressing to right lower leg is intact with ACE  Proximal incision is exposed and has small amount of tan foul smelling drainage  PICC intact    Red port is functional

## 2018-08-29 NOTE — PROGRESS NOTES
Progress Note - David Simons 1960, 63 y o  male MRN: [id number]    Unit/Bed#:  Encounter: [id number]    Primary Care Provider: Mihcelle Rosa MD   Date and time admitted to hospital: 8/13/2018  5:55 AM        LUIS CARLOS (acute kidney injury) [facility])   Assessment & Plan    Patient creatinine improved from 1 7-1 29 today  avoid nephrotoxins  Advised to increase oral fluid intake and repeat CMP in 2 weeks to be followed up by PCP        Mild intermittent asthma without complication   Assessment & Plan    · Has not had any trouble while hospitalized but if needed we will put p r n  albuterol        Slow transit constipation   Assessment & Plan    Continue current bowel regimen observe for now  Try to have a bowel movement every other day at least           Morbid obesity with BMI of 45 0-49 9, adult [facility])   Assessment & Plan    Low-calorie diet, patient did lose around 40 lb over the last few hospital stays  Encouraged him to continue losing weight and have a healthy lifestyle and dietary modifications recommended        Acute hyponatremia   Assessment & Plan    · Sodium 135 today-clinically improved  It was possibly secondary to SIADH and dehydration        KIRIT on CPAP   Assessment & Plan    Continue CPAP        GERD (gastroesophageal reflux disease)   Assessment & Plan    Resolved now  will cont Pepcid 20 mg p  o  b i d  and Protonix 40 mg p  o  daily and Maalox   stop sodium bicarb              Essential hypertension   Assessment & Plan    Controlled off medications  hold lisinopril secondary to luis carlos          PVC (premature ventricular contraction)   Assessment & Plan    · Previous admission same- asymptomatic - no indication of treatment at this time unless more frequent - infrequent bigeminy - no recurrence overnight   · Had echo last admission recent- normal         HLD (hyperlipidemia)   Assessment & Plan    Low-cholesterol diet  Continue pravastatin every other day as recommended by patient's family doctor and Catherine Rosas is held for now        * Ankle wound, right, sequela   Assessment & Plan    Patient underwent multiple flap surgeries  Continue current pain management  Bowel movement regimen  PT evaluation, DVT prophylaxis as per plastic  surgeon on heparin    Evaluated by ID tissue culture -Growth of Achromobacter xylosoxidans (A) and MRSA cefepime 2 g IV q 12 -> changed to bactrim ds by ID till 18  Repeat cultures are again showing MRSA  As per ID he is placed on doxycycline course for 2 weeks  VTE Pharmacologic Prophylaxis:   Pharmacologic: Heparin  Mechanical VTE Prophylaxis in Place: Yes    Patient Centered Rounds: I have performed bedside rounds with nursing staff today  Discussions with Specialists or Other Care Team Provider: plastic surgery    Education and Discussions with Family / Patient:   Discussed with the patient about lifestyle modification and dietary compliance  Time Spent for Care: 30 minutes  More than 50% of total time spent on counseling and coordination of care as described above  Current Length of Stay: 16 day(s)    Current Patient Status: Inpatient   Certification Statement:     Discharge Plan:   Discharge today    Code Status: No Order      Subjective:   Patient states he feels well  Denies any nausea or abdominal pain or vomiting  No body cramps    Objective:     Vitals:   Temp (24hrs), Av 4 °F (36 3 °C), Min:97 2 °F (36 2 °C), Max:97 5 °F (36 4 °C)    HR:  [71-92] 71  Resp:  [18-24] 24  BP: (126-164)/(57-96) 154/96  SpO2:  [98 %-100 %] 100 %  Body mass index is 43 91 kg/m²  Input and Output Summary (last 24 hours): Intake/Output Summary (Last 24 hours) at 18 1037  Last data filed at 18 1028   Gross per 24 hour   Intake             1680 ml   Output             2375 ml   Net             -695 ml       Physical Exam:     Physical Exam   Constitutional: He is oriented to person, place, and time   He appears well-developed and well-nourished  HENT:   Head: Normocephalic and atraumatic  Right Ear: External ear normal    Left Ear: External ear normal    Mouth/Throat: Oropharynx is clear and moist    Eyes: Conjunctivae and EOM are normal  Pupils are equal, round, and reactive to light  Neck: Normal range of motion  Neck supple  Cardiovascular: Normal rate, regular rhythm, normal heart sounds and intact distal pulses  Pulmonary/Chest: Effort normal and breath sounds normal    Abdominal: Soft  Bowel sounds are normal  He exhibits no mass  There is no tenderness  There is no rebound and no guarding  Genitourinary:   Genitourinary Comments: deferred   Musculoskeletal:   Right leg wound with sutures and graft healing  Neurological: He is alert and oriented to person, place, and time  He has normal reflexes  Skin: Skin is warm and dry  No rash noted  Psychiatric: He has a normal mood and affect  Nursing note and vitals reviewed  Additional Data:     Labs:      Results from last 7 days  Lab Units 08/28/18  0501   WBC Thousand/uL 10 30   HEMOGLOBIN g/dL 9 8*   HEMATOCRIT % 29 0*   PLATELETS Thousands/uL 338   NEUTROS PCT % 63   LYMPHS PCT % 21   MONOS PCT % 9   EOS PCT % 7*       Results from last 7 days  Lab Units 08/28/18  0501 08/27/18  0548   SODIUM mmol/L 135* 132*   POTASSIUM mmol/L 4 2 4 8   CHLORIDE mmol/L 104 100   CO2 mmol/L 26 22   BUN mg/dL 13 19   CREATININE mg/dL 1 29 1 71*   CALCIUM mg/dL 8 3* 8 6   ALK PHOS U/L  --  51   ALT U/L  --  31   AST U/L  --  36                     * I Have Reviewed All Lab Data Listed Above  * Additional Pertinent Lab Tests Reviewed:  [facility] [address] Admission Reviewed    Imaging:    Imaging Reports Reviewed Today Include: none  Imaging Personally Reviewed by Myself Includes:  none    Recent Cultures (last 7 days):       Results from last 7 days  Lab Units 08/23/18  1148   GRAM STAIN RESULT  4+ Polys  3+ Gram positive cocci in clusters   WOUND CULTURE  4+ Growth of Methicillin Resistant Staphylococcus aureus*       Last 24 Hours Medication List:     Current Facility-Administered Medications:  acetaminophen 650 mg Oral Q8H [address] Willie Clayton MD   aluminum-magnesium hydroxide-simethicone 30 mL Oral Q4H PRN Rebel Holt MD   aspirin 325 mg Oral BID David Momin MD   bisacodyl 10 mg Rectal Daily PRN Willie Clayton MD   docusate sodium 100 mg Oral BID Javan Morejon MD   doxycycline hyclate 100 mg Oral Q12H [address] Savanna Copeland DO   famotidine 20 mg Oral BID Rebel Holt MD   heparin (porcine) 5,000 Units Subcutaneous Q12H [address] David Momin MD   ipratropium-albuterol 3 mL Nebulization Q4H PRN Yun Jamison MD   magnesium hydroxide 30 mL Oral BID PRN Rebel Holt MD   mineral oil 1 enema Rectal Once Willie Clayton MD   morphine injection 2 mg Intravenous Q3H PRN David Momin MD   ondansetron 4 mg Intravenous Q6H PRN Willie Clayton MD   oxyCODONE-acetaminophen 1 tablet Oral Q4H PRN David Momin MD   pantoprazole 40 mg Oral Early Morning Rebel Holt MD   phenol 1 spray Mouth/Throat Q2H PRN Leisa Henyr DO   polyethylene glycol 17 g Oral Daily Willie Clayton MD   pravastatin 80 mg Oral Every Other Day Javan Morejon MD   senna 2 tablet Oral BID Odalys Harris MD   traMADol 50 mg Oral Q6H PRN Willie Clayton MD        Today, Patient Was Seen By: Willie Clayton MD    ** Please Note: Dictation voice to text software may have been used in the creation of this document   **

## 2018-08-29 NOTE — PROGRESS NOTES
Progress Note - Infectious Disease   Christopher Grove 63 y o  male MRN: [id number]  Unit/Bed#:  Encounter: [id number]      Impression/Recommendations:  1   Chronic right heel nonhealing wound with infection  In the setting of tendon rupture status post repair 4169  Complicated by wound nonhealing, polymicrobial infection status post flap#1/STSG PO antibiotics x14 days 2018  MRI 2018 without osteomyeltis  Complicated by flap necrosis, MRSA/Achromobacter infection status post revision flap #2  Complicated by necrosis status post flap removal, redo flap, skin graft  Clinically stable without signs of sepsis  Rec:  ? Continue doxycycline 2 weeks postop through   ? Serial exams and [address] per Plastics  ? If infection recurs after this course of antibiotics may need repeat MRI to evaluate for evolving osteomyelitis which, if present, would necessitate prolonged IV antibiotics  ? Outpatient follow-up with ID in 2 weeks     2   LUIS CARLOS  Likely multifactorial due to poor oral intake, NSAIDs, lisinopril, Bactrim  Improving  Rec:  ? Follow creatinine closely  ? Supportive care as per the primary service     3   Achilles tendon rupture  Status post repair with above complication      4   Morbid obesity  May have contributed to original injury and poor wound healing      Discussed the above plan in detail with the patient and his wife  All questions answered  The patient is stable from an ID standpoint      Antibiotics:  Antibiotic #17  Doxycycline # 3  POD #6    Subjective:  Patient seen on AM rounds  Feels well  Nausea improved  Ready to go home  24 Hour Events:  No documented fevers, chills, sweats, nausea, vomiting, or diarrhea      Objective:  Vitals:  HR:  [71-92] 71  Resp:  [18-24] 24  BP: (126-164)/(57-96) 154/96  SpO2:  [98 %-100 %] 100 %  Temp (24hrs), Av 4 °F (36 3 °C), Min:97 2 °F (36 2 °C), Max:97 5 °F (36 4 °C)  Current: Temperature: (!) 97 2 °F (36 2 °C)    Physical Exam:   General:  No acute distress  Psychiatric:  Awake and alert  Pulmonary:  Normal respiratory excursion without accessory muscle use  Abdomen:  Soft, nontender  Extremities:  Right leg Ace wrap intact without strike through  Skin:  No rashes    Lab Results:  I have personally reviewed pertinent labs  Results from last 7 days  Lab Units 08/28/18  0501 08/27/18  0548 08/23/18  0509   SODIUM mmol/L 135* 132* 131*   POTASSIUM mmol/L 4 2 4 8 4 9   CHLORIDE mmol/L 104 100 99   CO2 mmol/L 26 22 27   BUN mg/dL 13 19 27*   CREATININE mg/dL 1 29 1 71* 1 27   EGFR ml/min/1 73sq m 61 43* 62   CALCIUM mg/dL 8 3* 8 6 9 0   AST U/L  --  36  --    ALT U/L  --  31  --    ALK PHOS U/L  --  51  --        Results from last 7 days  Lab Units 08/28/18  0501 08/27/18  0548 08/23/18  0509   WBC Thousand/uL 10 30 13 90* 11 70*   HEMOGLOBIN g/dL 9 8* 10 2* 10 7*   PLATELETS Thousands/uL 338 327 342       Results from last 7 days  Lab Units 08/23/18  1148   GRAM STAIN RESULT  4+ Polys  3+ Gram positive cocci in clusters   WOUND CULTURE  4+ Growth of Methicillin Resistant Staphylococcus aureus*       Imaging Studies:   I have personally reviewed pertinent imaging study reports and images in PACS  EKG, Pathology, and Other Studies:   I have personally reviewed pertinent reports

## 2018-08-29 NOTE — ASSESSMENT & PLAN NOTE
Low-calorie diet, patient did lose around 40 lb over the last few hospital stays    Encouraged him to continue losing weight and have a healthy lifestyle and dietary modifications recommended

## 2018-08-29 NOTE — DISCHARGE SUMMARY
Discharge summary:    Date of admission:   August 13, 2018  Date of discharge:     August 29, 2018    Condition treated on this admission:   Exposed right Achilles tendon    Secondary diagnosis:   1  Disruption right Achilles tendon  2  GERD  3  Morbid obesity  4  CPAP dependent with sleep apnea  5  Asthma  6  Hyperlipidemia    Consultations:  1  Infectious disease  2  Internal medicine    Operative procedures:  1  Right anterior lateral thigh free  flap to cover right Achilles tendon, by Dr Alcantara, August 13, 2018  2   local distal based posterior tibial artery peninsular flap coverage of Achilles tendon with split-thickness skin graft by Dr Alcantara, August 23rd, 2018''    History and physical:   The patient in April of this year disrupted his right Achilles tendon with repair being done  There wound healing issues with exposed tendon  Attempts to cover this with a free gracilis muscle flap was done on July 30th of this year  At that time all deep veins were found to have significant disease with no outflow  Distal superficial vein was used for outflow for the flap but this failed  He still needed coverage of the Achilles tendon and another free flap was indicated  Other pertinent history is as above  Pertinent physical examination is that he is somewhat overweight  Achilles tendon was exposed an area about 4 x 6 cm of overlying the right Achilles tendon region  By audible Doppler he had good anterior tibial and posterior tibial pedal pulses  Hospital course: The patient was taken to the operating room on the date of admission with a right anterior lateral thigh free flap transferred  Again even more proximal deep veins were not satisfactory and the greater saphenous vein was used for outflow  The next day the venous congestion occurred and the flap was re-explored  Arterial inflow was maintained but no venous outflow was possible     Medicinal leeching was then done in the ICU for the next 10 days  Flap obviously became totally necrotic  He returned to the operating room on the 23rd of August, but granulations now covered mostly Achilles tendon with the flap and served as a dressing  Is a small 2 x 3 cm area still required flap coverage  This was H amberly with a distal based flap over the posterior tibial artery  The skin graft was placed on the flap donor site and remaining granulations on the Achilles tendon  Dressings at the time of discharge shows that the apex of the flap has 1-2 cm necrotic tissue, but that portion covering Achilles tendon is doing well  The skin graft is taken about 80% at this time  He is getting about in a scooter  Disposition: Home    Condition discharge:   Achilles tendon is now covered  He is not to walk on his right foot until the dressings were changed on the 4th of September in our office  Infectious disease service had had him on Bactrim and Levaquin  He did not want pain medication from me

## 2018-08-30 NOTE — CASE MANAGEMENT
Notification of Discharge  This is a Notification of Discharge from our facility [address]  Please be advised that this patient has been discharge from our facility  Below you will find the admission and discharge date and time including the patients disposition  PRESENTATION DATE: 8/13/2018  5:55 AM  IP ADMISSION DATE: 8/13/18 1849  DISCHARGE DATE: 8/29/2018 12:33 PM  DISPOSITION: Home with [address] [place] [address] in the [facility] by [facility] Utilization Review Department  Phone: [phone]; Fax [phone]  ATTENTION: The Network Utilization Review Department is now centralized for our 9 Facilities  Make a note that we have a new phone and fax numbers for our Department  Please call with any questions or concerns to [phone] and carefully follow the prompts so that you are directed to the right person  All voicemails are confidential  Fax any determinations, approvals, denials, and requests for initial or continue stay review clinical to [phone]  Due to HIGH CALL volume, it would be easier if you could please send faxed requests to expedite your requests and in part, help us provide discharge notifications faster

## 2018-09-13 RX ORDER — ALBUTEROL SULFATE 90 UG/1
2 AEROSOL, METERED RESPIRATORY (INHALATION) EVERY 4 HOURS
COMMUNITY
Start: 2018-08-31 | End: 2019-08-31

## 2018-09-13 RX ORDER — ALBUTEROL SULFATE 2.5 MG/3ML
2.5 SOLUTION RESPIRATORY (INHALATION) EVERY 4 HOURS
COMMUNITY
Start: 2018-08-31 | End: 2019-08-31

## 2018-09-18 ENCOUNTER — OFFICE VISIT (OUTPATIENT)
Dept: INFECTIOUS DISEASES | Facility: CLINIC | Age: 58
End: 2018-09-18
Payer: COMMERCIAL

## 2018-09-18 VITALS — BODY MASS INDEX: 44.58 KG/M2 | HEIGHT: 69 IN | TEMPERATURE: 99.1 F | WEIGHT: 301 LBS | RESPIRATION RATE: 16 BRPM

## 2018-09-18 DIAGNOSIS — S96.901D OPEN WOUND OF RIGHT FOOT WITH TENDON INVOLVEMENT, SUBSEQUENT ENCOUNTER: Chronic | ICD-10-CM

## 2018-09-18 DIAGNOSIS — N17.9 AKI (ACUTE KIDNEY INJURY) (HCC): ICD-10-CM

## 2018-09-18 DIAGNOSIS — S91.301D OPEN WOUND OF RIGHT FOOT, SUBSEQUENT ENCOUNTER: Primary | ICD-10-CM

## 2018-09-18 DIAGNOSIS — L08.9 INFECTION OF RIGHT FOOT: ICD-10-CM

## 2018-09-18 DIAGNOSIS — E66.01 MORBID OBESITY WITH BMI OF 45.0-49.9, ADULT (HCC): ICD-10-CM

## 2018-09-18 DIAGNOSIS — S91.301D OPEN WOUND OF RIGHT FOOT WITH TENDON INVOLVEMENT, SUBSEQUENT ENCOUNTER: Chronic | ICD-10-CM

## 2018-09-18 PROCEDURE — 99213 OFFICE O/P EST LOW 20 MIN: CPT | Performed by: PHYSICIAN ASSISTANT

## 2018-09-18 RX ORDER — TESTOSTERONE CYPIONATE 200 MG/ML
INJECTION INTRAMUSCULAR
Refills: 5 | COMMUNITY
Start: 2018-08-29

## 2018-09-18 RX ORDER — COMPR.STOCKING,KNEE,LONG,LARGE
EACH MISCELLANEOUS
Refills: 0 | COMMUNITY
Start: 2018-09-06

## 2018-09-18 RX ORDER — AZELASTINE HYDROCHLORIDE 137 UG/1
1 SPRAY, METERED NASAL
COMMUNITY
Start: 2018-09-18 | End: 2019-09-18

## 2018-09-18 NOTE — ASSESSMENT & PLAN NOTE
Patient s/p 10 day treatment with po doxycycline for  chronic right leg/heel nonhealing wound s/p ruptured tendon repair  He most recently grew MRSA and Achromobacter  He now has a new area of erythema  Need to r/o cellulitis/wound infection vs deeper infection  Patient is to f/u with his surgeon later today  We will await surgical plan, then decide if any further antibiotic treatment is necessary

## 2018-09-18 NOTE — PROGRESS NOTES
Assessment/Plan:    Open wound of right foot with tendon involvement  Patient s/p 10 day treatment with po doxycycline for  chronic right leg/heel nonhealing wound s/p ruptured tendon repair  He most recently grew MRSA and Achromobacter  He now has a new area of erythema  Need to r/o cellulitis/wound infection vs deeper infection  Patient is to f/u with his surgeon later today  We will await surgical plan, then decide if any further antibiotic treatment is necessary  Diagnoses and all orders for this visit:    Open wound of right foot, subsequent encounter    LUIS CARLOS (acute kidney injury) ([facility] [address] )    Morbid obesity with BMI of 45 0-49 9, adult ([facility] [address] )    Infection of right foot    Open wound of right foot with tendon involvement, subsequent encounter    Other orders  -     albuterol (PROVENTIL HFA,VENTOLIN HFA) 90 mcg/act inhaler; Inhale 2 puffs every 4 (four) hours  -     albuterol (2 5 mg/3 mL) 0 083 % nebulizer solution; Inhale 2 5 mg every 4 (four) hours  -     fluticasone-salmeterol (ADVAIR DISKUS) 250-50 mcg/dose inhaler; Inhale 1 puff  -     Azelastine HCl 137 MCG/SPRAY SOLN; 1 spray into each nostril  -     Bismuth Tribromoph-Petrolatum (XEROFORM PETROLAT PATCH 4"X4") PADS; USING FOR WOUND CARE  -     testosterone cypionate (DEPO-TESTOSTERONE) 200 mg/mL SOLN; INJECT 1 MILLILITER BY INTRAMUSCULAR ROUTE EVERY 2 WEEKS          Subjective:      Patient ID: David Simons is a 63 y o  male  HPI  64 y/o male presents for office f/u today regarding chronic right leg/heel nonhealing wound  The patient had tendon rupture s/p repair 4/2018  Since this time he has been suffering from nonhealing wound  He did have an MRI in July which was negative for osteomyelitis  He is most recently s/p treatment for MRSA/Achromobacter infection with po doxycycline  He finished his course of doxycycline on 9/10/18  He denies any fever or chills  He denies any diarrhea or rash      The following portions of the patient's history were reviewed and updated as appropriate: allergies, current medications, past family history, past medical history, past social history, past surgical history and problem list     Review of Systems   Constitutional: Negative for chills and fever  HENT: Negative for mouth sores  Respiratory: Negative for cough and shortness of breath  Cardiovascular: Positive for leg swelling  Negative for chest pain and palpitations  Gastrointestinal: Negative for abdominal pain, diarrhea, nausea and vomiting  Genitourinary: Negative for difficulty urinating, dysuria and frequency  Musculoskeletal: Negative for arthralgias, back pain, joint swelling and myalgias  Skin: Positive for wound  Negative for rash  Allergic/Immunologic: Negative for immunocompromised state  Neurological: Negative for headaches  Psychiatric/Behavioral: Negative for behavioral problems and confusion  Objective:      Temp 99 1 °F (37 3 °C)   Resp 16   Ht 5' 9" (1 753 m)   Wt (!) 137 kg (301 lb)   BMI 44 45 kg/m²          Physical Exam   Constitutional: He is oriented to person, place, and time  He appears well-developed and well-nourished  No distress  HENT:   Head: Normocephalic and atraumatic  Nose: Nose normal    Mouth/Throat: Oropharynx is clear and moist  No oropharyngeal exudate  Eyes: Conjunctivae and EOM are normal  Pupils are equal, round, and reactive to light  Right eye exhibits no discharge  Left eye exhibits no discharge  No scleral icterus  Neck: Normal range of motion  Neck supple  Cardiovascular: Normal rate, regular rhythm and normal heart sounds  Pulmonary/Chest: Effort normal and breath sounds normal  No respiratory distress  He has no wheezes  He has no rales  He exhibits no tenderness  Abdominal: Soft  Bowel sounds are normal  He exhibits no distension  There is no tenderness  Musculoskeletal:   RLE wound present    There is some erythema of the superior portion of incision  There appears to be some purulent drainage  The lower part of the wound has some necrotic areas present  Neurological: He is alert and oriented to person, place, and time  Skin: Skin is warm and dry  No rash noted  He is not diaphoretic  No erythema  Psychiatric: He has a normal mood and affect   His behavior is normal        Labs:   none

## 2019-10-07 ENCOUNTER — HOSPITAL ENCOUNTER (INPATIENT)
Facility: HOSPITAL | Age: 59
LOS: 5 days | Discharge: HOME | DRG: 920 | End: 2019-10-12
Attending: FAMILY MEDICINE | Admitting: INTERNAL MEDICINE
Payer: COMMERCIAL

## 2019-10-07 DIAGNOSIS — I10 ESSENTIAL HYPERTENSION: Chronic | ICD-10-CM

## 2019-10-07 DIAGNOSIS — S91.001S ANKLE WOUND, RIGHT, SEQUELA: ICD-10-CM

## 2019-10-07 DIAGNOSIS — G47.33 OSA ON CPAP: Chronic | ICD-10-CM

## 2019-10-07 DIAGNOSIS — Z99.89 OSA ON CPAP: Chronic | ICD-10-CM

## 2019-10-07 DIAGNOSIS — S91.301A: Primary | Chronic | ICD-10-CM

## 2019-10-07 DIAGNOSIS — S96.901A: Primary | Chronic | ICD-10-CM

## 2019-10-07 DIAGNOSIS — K21.9 GERD (GASTROESOPHAGEAL REFLUX DISEASE): Chronic | ICD-10-CM

## 2019-10-07 LAB
PLATELET # BLD AUTO: 263 THOUSANDS/UL (ref 150–450)
TSH SERPL DL<=0.05 MIU/L-ACNC: 5.43 UIU/ML (ref 0.47–4.68)

## 2019-10-07 PROCEDURE — 87070 CULTURE OTHR SPECIMN AEROBIC: CPT | Performed by: INTERNAL MEDICINE

## 2019-10-07 PROCEDURE — 84443 ASSAY THYROID STIM HORMONE: CPT | Performed by: INTERNAL MEDICINE

## 2019-10-07 PROCEDURE — 87040 BLOOD CULTURE FOR BACTERIA: CPT | Performed by: INTERNAL MEDICINE

## 2019-10-07 PROCEDURE — 87205 SMEAR GRAM STAIN: CPT | Performed by: INTERNAL MEDICINE

## 2019-10-07 PROCEDURE — 85049 AUTOMATED PLATELET COUNT: CPT | Performed by: INTERNAL MEDICINE

## 2019-10-07 PROCEDURE — 99222 1ST HOSP IP/OBS MODERATE 55: CPT | Performed by: INTERNAL MEDICINE

## 2019-10-07 PROCEDURE — 87147 CULTURE TYPE IMMUNOLOGIC: CPT | Performed by: INTERNAL MEDICINE

## 2019-10-07 RX ORDER — PREGABALIN 50 MG/1
50 CAPSULE ORAL 3 TIMES DAILY
COMMUNITY

## 2019-10-07 RX ORDER — GABAPENTIN 300 MG/1
300 CAPSULE ORAL 2 TIMES DAILY
Status: DISCONTINUED | OUTPATIENT
Start: 2019-10-07 | End: 2019-10-07

## 2019-10-07 RX ORDER — FAMOTIDINE 20 MG/1
20 TABLET, FILM COATED ORAL 2 TIMES DAILY
Status: DISCONTINUED | OUTPATIENT
Start: 2019-10-07 | End: 2019-10-07

## 2019-10-07 RX ORDER — MAGNESIUM HYDROXIDE/ALUMINUM HYDROXICE/SIMETHICONE 120; 1200; 1200 MG/30ML; MG/30ML; MG/30ML
30 SUSPENSION ORAL EVERY 4 HOURS PRN
Status: DISCONTINUED | OUTPATIENT
Start: 2019-10-07 | End: 2019-10-12 | Stop reason: HOSPADM

## 2019-10-07 RX ORDER — TRAMADOL HYDROCHLORIDE 50 MG/1
50 TABLET ORAL EVERY 6 HOURS PRN
Status: DISCONTINUED | OUTPATIENT
Start: 2019-10-07 | End: 2019-10-12 | Stop reason: HOSPADM

## 2019-10-07 RX ORDER — NIFEDIPINE 30 MG/1
60 TABLET, EXTENDED RELEASE ORAL DAILY
Status: DISCONTINUED | OUTPATIENT
Start: 2019-10-07 | End: 2019-10-12 | Stop reason: HOSPADM

## 2019-10-07 RX ORDER — HYDROCHLOROTHIAZIDE 12.5 MG/1
12.5 TABLET ORAL DAILY
Status: DISCONTINUED | OUTPATIENT
Start: 2019-10-08 | End: 2019-10-08

## 2019-10-07 RX ORDER — FAMOTIDINE 20 MG/1
20 TABLET, FILM COATED ORAL DAILY
Status: DISCONTINUED | OUTPATIENT
Start: 2019-10-08 | End: 2019-10-12 | Stop reason: HOSPADM

## 2019-10-07 RX ORDER — PREGABALIN 25 MG/1
50 CAPSULE ORAL 3 TIMES DAILY
Status: DISCONTINUED | OUTPATIENT
Start: 2019-10-07 | End: 2019-10-12 | Stop reason: HOSPADM

## 2019-10-07 RX ORDER — POLYETHYLENE GLYCOL 3350 17 G/17G
17 POWDER, FOR SOLUTION ORAL DAILY
Status: DISCONTINUED | OUTPATIENT
Start: 2019-10-08 | End: 2019-10-12 | Stop reason: HOSPADM

## 2019-10-07 RX ORDER — BISACODYL 10 MG
10 SUPPOSITORY, RECTAL RECTAL DAILY PRN
Status: DISCONTINUED | OUTPATIENT
Start: 2019-10-07 | End: 2019-10-12 | Stop reason: HOSPADM

## 2019-10-07 RX ORDER — ACETAMINOPHEN 325 MG/1
650 TABLET ORAL 3 TIMES DAILY
Status: DISCONTINUED | OUTPATIENT
Start: 2019-10-07 | End: 2019-10-12 | Stop reason: HOSPADM

## 2019-10-07 RX ORDER — ATORVASTATIN CALCIUM 40 MG/1
40 TABLET, FILM COATED ORAL
Status: DISCONTINUED | OUTPATIENT
Start: 2019-10-07 | End: 2019-10-12 | Stop reason: HOSPADM

## 2019-10-07 RX ORDER — ASPIRIN 325 MG
325 TABLET ORAL 2 TIMES DAILY
Status: DISCONTINUED | OUTPATIENT
Start: 2019-10-07 | End: 2019-10-08

## 2019-10-07 RX ORDER — AMOXICILLIN 250 MG
2 CAPSULE ORAL DAILY
Status: DISCONTINUED | OUTPATIENT
Start: 2019-10-08 | End: 2019-10-12 | Stop reason: HOSPADM

## 2019-10-07 RX ADMIN — PREGABALIN 50 MG: 25 CAPSULE ORAL at 20:02

## 2019-10-07 RX ADMIN — TRAMADOL HYDROCHLORIDE 50 MG: 50 TABLET, FILM COATED ORAL at 21:46

## 2019-10-07 RX ADMIN — NIFEDIPINE 60 MG: 30 TABLET, FILM COATED, EXTENDED RELEASE ORAL at 20:02

## 2019-10-07 RX ADMIN — VANCOMYCIN HYDROCHLORIDE 2000 MG: 5 INJECTION, POWDER, LYOPHILIZED, FOR SOLUTION INTRAVENOUS at 20:19

## 2019-10-07 RX ADMIN — ACETAMINOPHEN 650 MG: 325 TABLET ORAL at 20:02

## 2019-10-07 RX ADMIN — FAMOTIDINE 20 MG: 20 TABLET ORAL at 20:02

## 2019-10-07 RX ADMIN — PIPERACILLIN AND TAZOBACTAM 3.38 G: 3; .375 INJECTION, POWDER, LYOPHILIZED, FOR SOLUTION INTRAVENOUS; PARENTERAL at 19:40

## 2019-10-07 NOTE — ASSESSMENT & PLAN NOTE
Morbid obesity the to high calorie intake  Low-carbohydrate diet  May consult dietitian for weight reduction diet

## 2019-10-07 NOTE — ASSESSMENT & PLAN NOTE
Patient with multiple surgery on right foot due to right acutely tender rupture has multiple plastic surgery by plastic surgeon again it failed so patient was admitted for further IV antibiotic and reassessment by plastic surgeon further intervention  IV antibiotic covering Gram-positive negative  Blood culture  Wound culture  Consult ID  Consult plastic surgeon  Local treatment for plastic surgeon  Present time will start Vanco and Zosyn after drying cultures wait for ID reassessment in the morning

## 2019-10-07 NOTE — ASSESSMENT & PLAN NOTE
Essential hypertension  BUN creatinine normal noted at 1 time had a elevated BUN creatinine  Patient take diuretic lisinopril  If needed will add beta-blocker

## 2019-10-07 NOTE — H&P
H&P- Geovani Boudreaux 1960, 63 y o  male MRN: [id number]    Unit/Bed#: 7T [facility] 707-01 Encounter: [id number]    Primary Care Provider: Megan Hooker MD   Date and time admitted to hospital: 10/7/2019  4:46 PM        Mild intermittent asthma without complication  Assessment & Plan  Mild intermittent asthma  Continue Advair  Not in acute exacerbation at present time in remission    Morbid obesity with BMI of 45 0-49 9, adult ([facility] [address] )  Assessment & Plan  Morbid obesity the to high calorie intake  Low-carbohydrate diet  May consult dietitian for weight reduction diet    KIRIT on CPAP  Assessment & Plan  Patient with obstructive sleep apnea did not bring his CPAP  Os some family member to bring  Check respiratory if he knows the number can't DUs CPAP here    GERD (gastroesophageal reflux disease)  Assessment & Plan  History of GERD  Continue PPI    Essential hypertension  Assessment & Plan  Essential hypertension  BUN creatinine normal noted at 1 time had a elevated BUN creatinine  Patient take diuretic lisinopril  If needed will add beta-blocker          HLD (hyperlipidemia)  Assessment & Plan  History of hyperlipidemia  Continue Lipitor     * Open wound of right foot with tendon involvement  Assessment & Plan  Patient with multiple surgery on right foot due to right acutely tender rupture has multiple plastic surgery by plastic surgeon again it failed so patient was admitted for further IV antibiotic and reassessment by plastic surgeon further intervention  IV antibiotic covering Gram-positive negative  Blood culture  Wound culture  Consult ID  Consult plastic surgeon  Local treatment for plastic surgeon  Present time will start Vanco and Zosyn after drying cultures wait for ID reassessment in the morning                        VTE Prophylaxis: Enoxaparin (Lovenox)  / sequential compression device   Code Status:  Full code  POLST: There is no POLST form on file for this patient (pre-hospital)  Discussion with family:  Patient    Anticipated Length of Stay:  Patient will be admitted on an Inpatient basis with an anticipated length of stay of  more than 2 midnight 2 midnights  Justification for Hospital Stay:  For wound care    Total Time for Visit, including Counseling / Coordination of Care: 45 minutes  Greater than 50% of this total time spent on direct patient counseling and coordination of care  Chief Complaint:   Failed right at [facility] tendon plastic surgery is skin graft failed now open wound most probably infected    History of Present Illness:    Boo Berrios is a 63 y o  male who presents with right leg pain with infected wound patient had multiple skin graft and right leg pain had acutely tendon rupture multiple surgery done failed is skin graft now has a large open wound patient admitted with the leg pain will consult plastic surgeon for further intervention also consult ID IV antibiotic  Review of Systems:    Review of Systems   Constitutional: Positive for activity change  Negative for appetite change, chills, fatigue and fever  HENT: Negative for hearing loss, sore throat and trouble swallowing  Eyes: Negative for photophobia, discharge and visual disturbance  Respiratory: Negative for chest tightness and shortness of breath  Cardiovascular: Positive for leg swelling  Negative for chest pain and palpitations  Gastrointestinal: Negative for abdominal pain, blood in stool and vomiting  Endocrine: Negative for polydipsia and polyuria  Genitourinary: Positive for frequency  Negative for difficulty urinating, dysuria, flank pain and hematuria  Musculoskeletal: Positive for arthralgias, gait problem, joint swelling and myalgias  Negative for back pain  Skin: Positive for wound  Negative for rash  Allergic/Immunologic: Negative for environmental allergies and food allergies  Neurological: Negative for dizziness, seizures, syncope and headaches     Hematological: Does not bruise/bleed easily  Psychiatric/Behavioral: Negative for behavioral problems  All other systems reviewed and are negative  Past Medical and Surgical History:     Past Medical History:   Diagnosis Date    Asthma     CPAP (continuous positive airway pressure) dependence     Diverticulosis     GERD (gastroesophageal reflux disease)     Hyperlipidemia     Morbid obesity ([facility] [address] )     Sleep apnea     Ventricular tachycardia (HCC)     1 episode       Past Surgical History:   Procedure Laterality Date    CARPAL TUNNEL RELEASE      COLONOSCOPY  2007    FLAP LOCAL EXTREMITY Right 8/14/2018    Procedure: ASPIRATION FAILING FREE FLAP RIGHT LEG;  Surgeon: Meeta Givens MD;  Location: [address] [place] [address] OR;  Service: Plastics    FREE FLAP GRAFT Right 8/13/2018    Procedure: TRANSFER RIGHT THIGH FREE FLAP TO RIGHT HEEL;  Surgeon: Meeta Givens MD;  Location: [address] [place] [address] OR;  Service: Plastics    HERNIA REPAIR      abdominal x 2    POLYPECTOMY      hyperplastic    HI ADJ TISS XFER SCALP,EXTREM 10 1-30 SQCM Right 8/23/2018    Procedure: LOCAL FLAP POSS STSG ACHILLES TENDON;  Surgeon: Meeta Givens MD;  Location: [address] [place] [address] OR;  Service: Plastics    HI COLONOSCOPY FLX DX W/COLLJ [address] [facility] [address] WHEN PFRMD N/A 11/27/2017    Procedure: COLONOSCOPY;  Surgeon: Dorothy Irwin MD;  Location: [facility];   Service: Gastroenterology    HI DEBRIDEMENT, SKIN, SUB-Q TISSUE,=<20 SQ CM Right 7/18/2018    Procedure: DEBRIDEMENT ANKLE WOUND;  Surgeon: Meeta Givens MD;  Location: [address] [place] [address] OR;  Service: Plastics    HI DEBRIDEMENT, SKIN, SUB-Q TISSUE,=<20 SQ CM Right 7/23/2018    Procedure: DEBRIDEMENT RIGHT ANKLE WOUND;  Surgeon: Meeta Givens MD;  Location: [address] [place] [address] OR;  Service: Plastics    HI FREE MUSC-SKIN FLAP [address] ANAST Right 7/30/2018    Procedure: COVERAGE HEEL WITH GRACILIS MUSCLE FLAP/SKINGRAFT ;  Surgeon: Meeta Givens MD;  Location: [address] [place] [address];  Service: Plastics    [address] [facility] Right 4/23/2018    Procedure: REPAIR TENDON ACHILLES  FHL TRANSFER;  Surgeon: Ruchi Nowak DPM;  Location: [facility];  Service: Podiatry    TRIGGER FINGER RELEASE      thumb       Meds/Allergies:    Prior to Admission medications    Medication Sig Start Date End Date Taking?  Authorizing Provider   acetaminophen (TYLENOL) 325 mg tablet Take 2 tablets (650 mg total) by mouth 3 (three) times a day 8/29/18   April Lopez MD   aluminum-magnesium hydroxide-simethicone (MYLANTA) 200-200-20 mg/5 mL suspension Take 30 mL by mouth every 4 (four) hours as needed for indigestion or heartburn  Patient not taking: Reported on 9/18/2018 8/29/18   April Lopez MD   aspirin 325 mg tablet Take 1 tablet (325 mg total) by mouth 2 (two) times a day  Patient not taking: Reported on 9/18/2018 8/5/18   Jaleel Rocha MD   bisacodyl (DULCOLAX) 10 mg suppository Insert 1 suppository (10 mg total) into the rectum daily as needed for constipation  Patient not taking: Reported on 9/18/2018 8/29/18   April Lopez MD   Bismuth Tribromoph-Petrolatum (XEROFORM PETROLAT PATCH 4"X4") PADS USING FOR WOUND CARE 9/6/18   Historical Provider, MD   famotidine (PEPCID) 20 mg tablet Take 1 tablet (20 mg total) by mouth 2 (two) times a day 8/29/18   April Lopez MD   fluticasone-salmeterol (ADVAIR DISKUS) 250-50 mcg/dose inhaler Inhale 1 puff 8/31/18 8/31/19  Historical Provider, MD   polyethylene glycol (MIRALAX) 17 g packet Take 17 g by mouth daily  Patient not taking: Reported on 9/18/2018 8/30/18   April Lopez MD   ranitidine (ZANTAC) 75 MG tablet Take 150 mg by mouth 2 (two) times a day      Historical Provider, MD   senna-docusate sodium (SENOKOT-S) 8 6-50 mg per tablet Take 2 tablets by mouth daily  Patient not taking: Reported on 9/18/2018 8/29/18   April Lopez MD   simvastatin (ZOCOR) 40 mg tablet Take 40 mg by mouth every other day      Historical Provider, MD   testosterone cypionate (DEPO-TESTOSTERONE) 200 mg/mL SOLN INJECT 1 MILLILITER BY INTRAMUSCULAR ROUTE EVERY 2 WEEKS 8/29/18   Historical Provider, MD     I have reviewed home medications with patient personally  Allergies: Allergies   Allergen Reactions    Latex Itching    Nsaids GI Intolerance     gastritis       Social History:     Marital Status: /Civil Union   Occupation:  Physician  Patient Pre-hospital Living Situation:  With Family  Patient Pre-hospital Level of Mobility:  Limited  Patient Pre-hospital Diet Restrictions:  None  Substance Use History:   Social History     Substance and Sexual Activity   Alcohol Use Yes    Comment: 7 per week     Social History     Tobacco Use   Smoking Status Never Smoker   Smokeless Tobacco Never Used     Social History     Substance and Sexual Activity   Drug Use No       Family History:    non-contributory    Physical Exam:     Vitals:   Blood Pressure: (!) 187/107 (10/07/19 1653)  Pulse: 63 (10/07/19 1653)  Temperature: 97 9 °F (36 6 °C) (10/07/19 1653)  Temp Source: Temporal (10/07/19 1653)  Respirations: 20 (10/07/19 1653)  Height: 5' 9" (175 3 cm) (10/07/19 1653)  Weight - Scale: (!) 152 kg (335 lb 15 7 oz) (10/07/19 1653)  SpO2: 96 % (10/07/19 1653)    Physical Exam   Constitutional: He is oriented to person, place, and time  He appears well-developed and well-nourished  Morbid obese   HENT:   Head: Normocephalic and atraumatic  Right Ear: External ear normal    Left Ear: External ear normal    Mouth/Throat: Oropharynx is clear and moist    Eyes: Pupils are equal, round, and reactive to light  Conjunctivae and EOM are normal    Neck: Normal range of motion  Neck supple  Cardiovascular: Normal rate, regular rhythm, normal heart sounds and intact distal pulses  Pulmonary/Chest: Effort normal and breath sounds normal    Abdominal: Soft  Bowel sounds are normal  He exhibits no mass  There is no tenderness  There is no rebound and no guarding     Obese   Genitourinary:   Genitourinary Comments: deferred   Musculoskeletal: Normal range of motion  He exhibits edema and deformity  Right ankle large open wound mid calf to all the way on the foot patient will be followed by plastic surgeon  Yellowish discharge  Status post plastic surgery graft is almost gone skin is exposed   Neurological: He is alert and oriented to person, place, and time  He has normal reflexes  Cranial nerves 2-12 are normal   Normal neurological exam   Skin: Skin is warm  No rash noted  There is erythema  Large wound of right ankle   Psychiatric: He has a normal mood and affect  Nursing note and vitals reviewed  Additional Data:     Lab Results: I have personally reviewed pertinent reports  Imaging: I have personally reviewed pertinent reports  No orders to display       EKG, Pathology, and Other Studies Reviewed on Admission:   · EKG:  Not available    Allscripts / Epic Records Reviewed: Yes     ** Please Note: This note has been constructed using a voice recognition system   **

## 2019-10-07 NOTE — ASSESSMENT & PLAN NOTE
Patient with obstructive sleep apnea did not bring his CPAP  Os some family member to bring  Check respiratory if he knows the number can't DUs CPAP here

## 2019-10-07 NOTE — CONSULTS
Now about 1 year after ruptured Right Achilles tendon with free flap attempted soft tissue coverage  Found to have occluded deep veins resulting flap venous congestion  A distal based local rotation flap was then used to cover the Achilles tendon repair which was successful  Unfortunately the donor site skin graft for that flap never totally healed the wound, now measuring some 5 x 15 cm in total size  He has had persistent erythema of the leg implying a chronic cellulitis in spite of multiple courses of oral antibiotics  At this time he is admitted for parental antibiotics to see if this could resolve the local infection    He may ultimately need some debridement and perhaps another skin graft to this area to expedite healing, but 1st the infection has to resolve which may require long-term intravenous antibiotic therapy 1st

## 2019-10-08 ENCOUNTER — APPOINTMENT (INPATIENT)
Dept: MRI IMAGING | Facility: HOSPITAL | Age: 59
DRG: 920 | End: 2019-10-08
Payer: COMMERCIAL

## 2019-10-08 ENCOUNTER — APPOINTMENT (INPATIENT)
Dept: NON INVASIVE DIAGNOSTICS | Facility: HOSPITAL | Age: 59
DRG: 920 | End: 2019-10-08
Payer: COMMERCIAL

## 2019-10-08 LAB
ANION GAP SERPL CALCULATED.3IONS-SCNC: 8 MMOL/L (ref 5–14)
ATRIAL RATE: 52 BPM
BASOPHILS # BLD AUTO: 0.1 THOUSANDS/ΜL (ref 0–0.1)
BASOPHILS NFR BLD AUTO: 1 % (ref 0–1)
BUN SERPL-MCNC: 13 MG/DL (ref 5–25)
CALCIUM SERPL-MCNC: 8.5 MG/DL (ref 8.4–10.2)
CHLORIDE SERPL-SCNC: 104 MMOL/L (ref 97–108)
CO2 SERPL-SCNC: 23 MMOL/L (ref 22–30)
CREAT SERPL-MCNC: 0.94 MG/DL (ref 0.7–1.5)
EOSINOPHIL # BLD AUTO: 0.7 THOUSAND/ΜL (ref 0–0.4)
EOSINOPHIL NFR BLD AUTO: 9 % (ref 0–6)
ERYTHROCYTE [DISTWIDTH] IN BLOOD BY AUTOMATED COUNT: 17.1 %
GFR SERPL CREATININE-BSD FRML MDRD: 89 ML/MIN/1.73SQ M
GLUCOSE SERPL-MCNC: 95 MG/DL (ref 70–99)
HCT VFR BLD AUTO: 45.5 % (ref 41–53)
HGB BLD-MCNC: 14.9 G/DL (ref 13.5–17.5)
LYMPHOCYTES # BLD AUTO: 2.3 THOUSANDS/ΜL (ref 0.5–4)
LYMPHOCYTES NFR BLD AUTO: 28 % (ref 25–45)
MAGNESIUM SERPL-MCNC: 2.1 MG/DL (ref 1.6–2.3)
MCH RBC QN AUTO: 27.5 PG (ref 26–34)
MCHC RBC AUTO-ENTMCNC: 32.8 G/DL (ref 31–36)
MCV RBC AUTO: 84 FL (ref 80–100)
MONOCYTES # BLD AUTO: 1 THOUSAND/ΜL (ref 0.2–0.9)
MONOCYTES NFR BLD AUTO: 12 % (ref 1–10)
NEUTROPHILS # BLD AUTO: 4.2 THOUSANDS/ΜL (ref 1.8–7.8)
NEUTS SEG NFR BLD AUTO: 51 % (ref 45–65)
P AXIS: 0 DEGREES
PLATELET # BLD AUTO: 218 THOUSANDS/UL (ref 150–450)
PMV BLD AUTO: 9.2 FL (ref 8.9–12.7)
POTASSIUM SERPL-SCNC: 3.9 MMOL/L (ref 3.6–5)
PR INTERVAL: 214 MS
QRS AXIS: -7 DEGREES
QRSD INTERVAL: 116 MS
QT INTERVAL: 458 MS
QTC INTERVAL: 425 MS
RBC # BLD AUTO: 5.42 MILLION/UL (ref 4.5–5.9)
SODIUM SERPL-SCNC: 135 MMOL/L (ref 137–147)
T WAVE AXIS: 36 DEGREES
VENTRICULAR RATE: 52 BPM
WBC # BLD AUTO: 8.3 THOUSAND/UL (ref 4.5–11)

## 2019-10-08 PROCEDURE — 93005 ELECTROCARDIOGRAM TRACING: CPT

## 2019-10-08 PROCEDURE — 85025 COMPLETE CBC W/AUTO DIFF WBC: CPT | Performed by: INTERNAL MEDICINE

## 2019-10-08 PROCEDURE — 80048 BASIC METABOLIC PNL TOTAL CA: CPT | Performed by: INTERNAL MEDICINE

## 2019-10-08 PROCEDURE — 93971 EXTREMITY STUDY: CPT | Performed by: SURGERY

## 2019-10-08 PROCEDURE — 93010 ELECTROCARDIOGRAM REPORT: CPT | Performed by: INTERNAL MEDICINE

## 2019-10-08 PROCEDURE — A9577 INJ MULTIHANCE: HCPCS | Performed by: FAMILY MEDICINE

## 2019-10-08 PROCEDURE — 99254 IP/OBS CNSLTJ NEW/EST MOD 60: CPT | Performed by: INTERNAL MEDICINE

## 2019-10-08 PROCEDURE — 83735 ASSAY OF MAGNESIUM: CPT | Performed by: INTERNAL MEDICINE

## 2019-10-08 PROCEDURE — 93971 EXTREMITY STUDY: CPT

## 2019-10-08 PROCEDURE — 99232 SBSQ HOSP IP/OBS MODERATE 35: CPT | Performed by: FAMILY MEDICINE

## 2019-10-08 PROCEDURE — 73721 MRI JNT OF LWR EXTRE W/O DYE: CPT

## 2019-10-08 RX ORDER — CEFEPIME HYDROCHLORIDE 2 G/50ML
2000 INJECTION, SOLUTION INTRAVENOUS EVERY 12 HOURS
Status: DISCONTINUED | OUTPATIENT
Start: 2019-10-08 | End: 2019-10-09

## 2019-10-08 RX ORDER — IBUPROFEN 400 MG/1
400 TABLET ORAL EVERY 6 HOURS PRN
Status: DISCONTINUED | OUTPATIENT
Start: 2019-10-08 | End: 2019-10-12 | Stop reason: HOSPADM

## 2019-10-08 RX ADMIN — CEFEPIME HYDROCHLORIDE 2000 MG: 2 INJECTION, SOLUTION INTRAVENOUS at 14:03

## 2019-10-08 RX ADMIN — NIFEDIPINE 60 MG: 30 TABLET, FILM COATED, EXTENDED RELEASE ORAL at 12:27

## 2019-10-08 RX ADMIN — PREGABALIN 50 MG: 25 CAPSULE ORAL at 17:03

## 2019-10-08 RX ADMIN — ACETAMINOPHEN 650 MG: 325 TABLET ORAL at 08:14

## 2019-10-08 RX ADMIN — ENOXAPARIN SODIUM 40 MG: 40 INJECTION SUBCUTANEOUS at 12:26

## 2019-10-08 RX ADMIN — TRAMADOL HYDROCHLORIDE 50 MG: 50 TABLET, FILM COATED ORAL at 15:35

## 2019-10-08 RX ADMIN — PIPERACILLIN AND TAZOBACTAM 3.38 G: 3; .375 INJECTION, POWDER, LYOPHILIZED, FOR SOLUTION INTRAVENOUS; PARENTERAL at 08:14

## 2019-10-08 RX ADMIN — ASPIRIN 325 MG ORAL TABLET 325 MG: 325 PILL ORAL at 08:14

## 2019-10-08 RX ADMIN — ACETAMINOPHEN 650 MG: 325 TABLET ORAL at 17:04

## 2019-10-08 RX ADMIN — FAMOTIDINE 20 MG: 20 TABLET ORAL at 12:26

## 2019-10-08 RX ADMIN — VANCOMYCIN HYDROCHLORIDE 2000 MG: 5 INJECTION, POWDER, LYOPHILIZED, FOR SOLUTION INTRAVENOUS at 21:00

## 2019-10-08 RX ADMIN — PIPERACILLIN AND TAZOBACTAM 3.38 G: 3; .375 INJECTION, POWDER, LYOPHILIZED, FOR SOLUTION INTRAVENOUS; PARENTERAL at 02:05

## 2019-10-08 RX ADMIN — SENNOSIDES AND DOCUSATE SODIUM 2 TABLET: 8.6; 5 TABLET ORAL at 08:14

## 2019-10-08 RX ADMIN — PREGABALIN 50 MG: 25 CAPSULE ORAL at 21:00

## 2019-10-08 RX ADMIN — VANCOMYCIN HYDROCHLORIDE 2000 MG: 5 INJECTION, POWDER, LYOPHILIZED, FOR SOLUTION INTRAVENOUS at 09:16

## 2019-10-08 RX ADMIN — ATORVASTATIN CALCIUM 40 MG: 40 TABLET, FILM COATED ORAL at 17:04

## 2019-10-08 RX ADMIN — GADOBENATE DIMEGLUMINE 20 ML: 529 INJECTION, SOLUTION INTRAVENOUS at 16:48

## 2019-10-08 RX ADMIN — ACETAMINOPHEN 650 MG: 325 TABLET ORAL at 21:00

## 2019-10-08 RX ADMIN — PREGABALIN 50 MG: 25 CAPSULE ORAL at 08:14

## 2019-10-08 RX ADMIN — POLYETHYLENE GLYCOL 3350 17 G: 17 POWDER, FOR SOLUTION ORAL at 08:30

## 2019-10-08 NOTE — PROGRESS NOTES
Patient brought in own CPAP machine Transcend Flex brand  CPAP machine is a travel cpap machine and no settings present on machine screen  Patient appears to be comfortable on CPAP for tonight

## 2019-10-08 NOTE — ASSESSMENT & PLAN NOTE
Mild intermittent asthma  Not on advair stopped 8/2019   Not in acute exacerbation at present time in remission

## 2019-10-08 NOTE — ASSESSMENT & PLAN NOTE
Patient with multiple surgery on right foot due to right acutely tender rupture has multiple plastic surgery by plastic surgeon - with erythema and swelling - multiple abx outpatient failure- IV antibiotic covering Gram-positive negative- hx of mrsa- ID for abx decision till final cx- also discussed with ID will do mri of right ankle /lower extremity to r/o deeper infection and osteo- also will perform vde   Blood culture  Wound culture- prelim -   Consult ID- discussed  Consult plastic surgeon- Dr Amol Herrera evaluated- for debridement on 10/09   Local treatment for plastic surgeon  Discussed with patient

## 2019-10-08 NOTE — PROGRESS NOTES
Progress Note - Donna Dial 1960, 63 y o  male MRN: [id number]    Unit/Bed#: 7T [facility] 707-01 Encounter: [id number]    Primary Care Provider: Bobo Bernal MD   Date and time admitted to hospital: 10/7/2019  4:46 PM        Mild intermittent asthma without complication  Assessment & Plan  Mild intermittent asthma  Not on advair stopped 8/2019   Not in acute exacerbation at present time in remission    Morbid obesity with BMI of 45 0-49 9, adult ([facility] [address] )  Assessment & Plan  Morbid obesity the to high calorie intake  Low-carbohydrate diet  May consult dietitian for weight reduction diet    KIRIT on CPAP  Assessment & Plan  Continue CPAP    GERD (gastroesophageal reflux disease)  Assessment & Plan  History of GERD  Continue PPI    Essential hypertension  Assessment & Plan  Essential hypertension  Today bp on low side- dc hctz - continue procardia only monitor           HLD (hyperlipidemia)  Assessment & Plan  History of hyperlipidemia  Continue Lipitor     * Open wound of right foot with tendon involvement  Assessment & Plan  Patient with multiple surgery on right foot due to right acutely tender rupture has multiple plastic surgery by plastic surgeon - with erythema and swelling - multiple abx outpatient failure- IV antibiotic covering Gram-positive negative- hx of mrsa- ID for abx decision till final cx- also discussed with ID will do mri of right ankle /lower extremity to r/o deeper infection and osteo- also will perform vde   Blood culture  Wound culture- prelim -   Consult ID- discussed  Consult plastic surgeon- Dr Garner Skill evaluated- for debridement on 10/09   Local treatment for plastic surgeon  Discussed with patient        VTE Pharmacologic Prophylaxis:   Pharmacologic: Enoxaparin (Lovenox)  Mechanical VTE Prophylaxis in Place: Yes    Patient Centered Rounds: I have performed bedside rounds with nursing staff today      Discussions with Specialists or Other Care Team Provider: ID     Education and Discussions with Family / Patient: patient     Time Spent for Care: 30 minutes  More than 50% of total time spent on counseling and coordination of care as described above  Current Length of Stay: 1 day(s)    Current Patient Status: Inpatient   Certification Statement: The patient will continue to require additional inpatient hospital stay due to right lower extremity cellulitis     Discharge Plan: TBD when medically cleared    Code Status: Level 1 - Full Code      Subjective:   Patient seen and examined, right leg still swollen   No chest pain or sob pain in right leg controlled     Objective:     Vitals:   Temp (24hrs), Av 8 °F (36 6 °C), Min:97 2 °F (36 2 °C), Max:98 1 °F (36 7 °C)    Temp:  [97 2 °F (36 2 °C)-98 1 °F (36 7 °C)] 97 2 °F (36 2 °C)  HR:  [49-63] 49  Resp:  [20] 20  BP: ()/() 98/60  SpO2:  [96 %-97 %] 97 %  Body mass index is 49 62 kg/m²  Input and Output Summary (last 24 hours): Intake/Output Summary (Last 24 hours) at 10/8/2019 1038  Last data filed at 10/8/2019 0936  Gross per 24 hour   Intake 1610 ml   Output 2850 ml   Net -1240 ml       Physical Exam:     Physical Exam   Constitutional: He is oriented to person, place, and time  Morbid obesity    HENT:   Head: Normocephalic and atraumatic  Eyes: Pupils are equal, round, and reactive to light  EOM are normal    Neck: Normal range of motion  Cardiovascular: Normal rate, regular rhythm and normal heart sounds  Pulmonary/Chest: Effort normal and breath sounds normal    Abdominal: Soft  Bowel sounds are normal    Musculoskeletal: Normal range of motion  He exhibits edema (right lower leg )  Neurological: He is alert and oriented to person, place, and time  He has normal reflexes  cranial nerve 2-12 are normal   Normal neurological exam   Skin: Skin is warm  Right ankle wound- graft - warm , nottender on touch - warms around the shin    Psychiatric: He has a normal mood and affect           Additional Data: Labs:    Results from last 7 days   Lab Units 10/08/19  0548   WBC Thousand/uL 8 30   HEMOGLOBIN g/dL 14 9   HEMATOCRIT % 45 5   PLATELETS Thousands/uL 218   NEUTROS PCT % 51   LYMPHS PCT % 28   MONOS PCT % 12*   EOS PCT % 9*     Results from last 7 days   Lab Units 10/08/19  0548   SODIUM mmol/L 135*   POTASSIUM mmol/L 3 9   CHLORIDE mmol/L 104   CO2 mmol/L 23   BUN mg/dL 13   CREATININE mg/dL 0 94   ANION GAP mmol/L 8   CALCIUM mg/dL 8 5   GLUCOSE RANDOM mg/dL 95                           * I Have Reviewed All Lab Data Listed Above  * Additional Pertinent Lab Tests Reviewed:  All Labs Within Last 24 Hours Reviewed    Imaging:    Imaging Reports Reviewed Today Include: none today   Imaging Personally Reviewed by Myself Includes:      Recent Cultures (last 7 days):     Results from last 7 days   Lab Units 10/07/19  1827   GRAM STAIN RESULT  No polys seen*  4+ Gram negative rods*  4+ Gram positive cocci in pairs*       Last 24 Hours Medication List:     Current Facility-Administered Medications:  acetaminophen 650 mg Oral TID Jesus Green MD    aluminum-magnesium hydroxide-simethicone 30 mL Oral Q4H PRN Jesus Green MD    aspirin 325 mg Oral BID Jesus Green MD    atorvastatin 40 mg Oral Daily With Sarthak Collins MD    bisacodyl 10 mg Rectal Daily PRN Jesus Green MD    enoxaparin 40 mg Subcutaneous Daily Jesus Green MD    famotidine 20 mg Oral Daily Jesus Green MD    NIFEdipine 60 mg Oral Daily Jesus Green MD    piperacillin-tazobactam 3 375 g Intravenous Q6H Jesus Green MD Last Rate: Stopped (10/08/19 0845)   polyethylene glycol 17 g Oral Daily Jesus Green MD    pregabalin 50 mg Oral TID MASON Torres    senna-docusate sodium 2 tablet Oral Daily Jesus Green MD    traMADol 50 mg Oral Q6H PRN Jesus Green MD    vancomycin 2,000 mg Intravenous Q12H Jesus Green MD Last Rate: 2,000 mg (10/08/19 3086)        Today, Patient Was Seen By: Mark Bamberger Everardo Torres MD    ** Please Note: Dictation voice to text software may have been used in the creation of this document   **

## 2019-10-08 NOTE — PLAN OF CARE
Problem: Potential for Falls  Goal: Patient will remain free of falls  Description  INTERVENTIONS:  - Assess patient frequently for physical needs  -  Identify cognitive and physical deficits and behaviors that affect risk of falls    -  [place] fall precautions as indicated by assessment   - Educate patient/family on patient safety including physical limitations  - Instruct patient to call for assistance with activity based on assessment  - Modify environment to reduce risk of injury  - Consider OT/PT consult to assist with strengthening/mobility  Outcome: Progressing

## 2019-10-08 NOTE — CONSULTS
Consultation - Infectious Disease   Alicia Nix 63 y o  male MRN: [id number]  Unit/Bed#: 7T [facility] 707-01 Encounter: [id number]      IMPRESSION & RECOMMENDATIONS:   Impression/Recommendations:  1  Right lower extremity cellulitis  In the setting of nonhealing wound  Consideration that some of the chronic erythema is also related to venous stasis  Fortunately, patient is afebrile with no leukocytosis  He remains nontoxic  Given chronicity of this wound, we could be dealing with gram-positive or gram-negative organisms, or this infection could be polymicrobial   Gram stain from wound culture shows GPCs in pairs and GNRs  -continue IV vancomycin for now with dosing recommendations per pharmacy  -change Zosyn to IV cefepime  -follow-up pending wound culture results  -tentative plan for I and D noted  -close Plastic surgery follow-up ongoing  -serial leg exams  -monitor temperatures and hemodynamics    2  Chronic right lower extremity nonhealing wound  In the setting of prior right Achilles tendon repair complicated by wound infection status post free flap closure with STSG  Most recent operative cultures from August 2018 grew MRSA, Achromobacter  Consideration for more deep-seated infection  Patient has not had any recent leg imaging     -antibiotic plan as above  -follow-up MRI of the leg  -local daily wound care and dressing changes    3  Achilles tendon tear, status post repair with above complications  4  Morbid obesity  Risk factor for poor wound healing  Recommend dietary changes/weight loss  5  Probable venous stasis  Suspect this is playing a role in chronic lower extremity erythema, edema, and poor wound healing  Recommend frequent leg elevation, aggressive edema control  Antibiotics:  Vancomycin/Zosyn 2    I discussed above plan with patient, and with Dr Tu Georges from primary service  Reviewed prior medical records  Thank you for this consultation    We will follow along with you     HISTORY OF PRESENT ILLNESS:  Reason for Consult:  Right lower extremity nonhealing wound    HPI: Mercedez Mueller is a 63 y o  male with morbid obesity, right Achilles tendon tear status post repair in April 0094 complicated by wound infection and breakdown requiring multiple I and D's, free flap closure with STSG in July 2018 performed by Dr Teja Marin  Patient was again hospitalized in August 2018 for I and D which revealed necrotic gracilis muscle flap  Operative culture from that time revealed MRSA and Achromobacter  Patient completed postoperative course of oral doxycycline  Since this time, patient has been off and on oral antibiotics due to nonhealing right leg wound, including doxycycline and ciprofloxacin  He states the redness does partially improved while he is on antibiotics  Most recently, he was seen by Dr Teja Marin and there was concern for persistent erythema of the leg and chronic infection in spite of multiple courses of oral antibiotic, and the decision was made to admit for parenteral antibiotics and possible I and D  Patient denies associated fevers, chills, sweats  He has been able to go back to work and he does ambulate on the leg, although ambulation is limited secondary to severe leg pain  Of note, patient states he did have vascular study of the right lower extremity and was told he has severe venous stasis  He states the leg is always fairly swollen and seeping serous fluid  REVIEW OF SYSTEMS:  A complete system-based review of systems is otherwise negative      PAST MEDICAL HISTORY:  Past Medical History:   Diagnosis Date    Asthma     CPAP (continuous positive airway pressure) dependence     Diverticulosis     GERD (gastroesophageal reflux disease)     Hyperlipidemia     Morbid obesity ([address] )     Sleep apnea     Ventricular tachycardia ([facility] [address] )     1 episode     Past Surgical History:   Procedure Laterality Date    CARPAL TUNNEL RELEASE      COLONOSCOPY  2007    FLAP LOCAL EXTREMITY Right 8/14/2018    Procedure: ASPIRATION FAILING FREE FLAP RIGHT LEG;  Surgeon: Carolina Bustillos MD;  Location: [facility] MAIN OR;  Service: Plastics    FREE FLAP GRAFT Right 8/13/2018    Procedure: TRANSFER RIGHT THIGH FREE FLAP TO RIGHT HEEL;  Surgeon: Carolina Bustillos MD;  Location: [facility] MAIN OR;  Service: Plastics    HERNIA REPAIR      abdominal x 2    POLYPECTOMY      hyperplastic    NV ADJ TISS XFER SCALP,EXTREM 10 1-30 SQCM Right 8/23/2018    Procedure: LOCAL FLAP POSS STSG ACHILLES TENDON;  Surgeon: Carolina Bustillos MD;  Location: [facility] MAIN OR;  Service: Plastics    NV COLONOSCOPY FLX DX W/COLLJ [address] WHEN PFRMD N/A 11/27/2017    Procedure: COLONOSCOPY;  Surgeon: Luis Gatica MD;  Location: [facility]; Service: Gastroenterology    NV DEBRIDEMENT, SKIN, SUB-Q TISSUE,=<20 SQ CM Right 7/18/2018    Procedure: DEBRIDEMENT ANKLE WOUND;  Surgeon: Carolina Bustillos MD;  Location: [facility] MAIN OR;  Service: Plastics    NV DEBRIDEMENT, SKIN, SUB-Q TISSUE,=<20 SQ CM Right 7/23/2018    Procedure: DEBRIDEMENT RIGHT ANKLE WOUND;  Surgeon: Carolina Bustillos MD;  Location: [facility] MAIN OR;  Service: Plastics    NV FREE MUSC-SKIN FLAP W/MICROVASC ANAST Right 7/30/2018    Procedure: COVERAGE HEEL WITH GRACILIS MUSCLE FLAP/SKINGRAFT ;  Surgeon: Carolina Bustillos MD;  Location: [facility] MAIN OR;  Service: Plastics    [address] [place] [address] Right 4/23/2018    Procedure: REPAIR TENDON ACHILLES   FHL TRANSFER;  Surgeon: Parisa Padgett DPM;  Location: [facility];  Service: Podiatry    TRIGGER FINGER RELEASE      thumb       FAMILY HISTORY:  Non-contributory    SOCIAL HISTORY:  Social History     Substance and Sexual Activity   Alcohol Use Yes    Frequency: 2-4 times a month    Drinks per session: 1 or 2    Binge frequency: Never    Comment: 7 per week     Social History     Substance and Sexual Activity   Drug Use Never     Social History     Tobacco Use   Smoking Status Never Smoker   Smokeless Tobacco Never Used       ALLERGIES:  Allergies   Allergen Reactions    Latex Itching    Nsaids GI Intolerance     gastritis       MEDICATIONS:  All current active medications have been reviewed  PHYSICAL EXAM:  Vitals:  Temp:  [97 2 °F (36 2 °C)-98 1 °F (36 7 °C)] 97 2 °F (36 2 °C)  HR:  [49-63] 49  Resp:  [20] 20  BP: ()/() 98/60  SpO2:  [96 %-97 %] 97 %  Temp (24hrs), Av 8 °F (36 6 °C), Min:97 2 °F (36 2 °C), Max:98 1 °F (36 7 °C)  Current: Temperature: (!) 97 2 °F (36 2 °C)     Physical Exam:  General:  Well-nourished, well-developed, in no acute distress  Eyes:  Conjunctive clear with no hemorrhages or effusions  Oropharynx:  No ulcers, no lesions  Neck:  Supple, no lymphadenopathy  Lungs:  Clear to auscultation bilaterally, no accessory muscle use  Cardiac:  Regular rate and rhythm, no murmurs  Abdomen:  Soft, non-tender, non-distended, obese  Extremities:  Bilateral lower extremity venous stasis changes  Right lower extremity medial open wound with surrounding erythema, warmth  Serous drainage  No purulence  No obvious bone or tendon exposure  Skin:  No rashes, no ulcers  Neurological:  Moves all four extremities spontaneously    LABS, IMAGING, & OTHER STUDIES:  Lab Results:  I have personally reviewed pertinent labs  Results from last 7 days   Lab Units 10/08/19  0548   POTASSIUM mmol/L 3 9   CHLORIDE mmol/L 104   CO2 mmol/L 23   BUN mg/dL 13   CREATININE mg/dL 0 94   EGFR ml/min/1 73sq m 89   CALCIUM mg/dL 8 5     Results from last 7 days   Lab Units 10/08/19  0548 10/07/19  1938   WBC Thousand/uL 8 30  --    HEMOGLOBIN g/dL 14 9  --    PLATELETS Thousands/uL 218 263     Results from last 7 days   Lab Units 10/07/19  1827   GRAM STAIN RESULT  No polys seen*  4+ Gram negative rods*  4+ Gram positive cocci in pairs*         Imaging Studies:   I have personally reviewed pertinent imaging study reports and images in PACS        EKG, Pathology, and Other Studies:   I have personally reviewed pertinent reports

## 2019-10-08 NOTE — NURSING NOTE
On initial rounds patient in no apparent distress  Skin warm and dry to touch  Pleasant and cooperative  Stated pain was at level 8 to right foot  Ambulates well by self  Dressing to right foot intact  Tolerating diet  Voids adequate amount of urine  All safety maintained  Will continue to monitor

## 2019-10-08 NOTE — NURSING NOTE
Patient remain in stable condition  Stated he has a headache was medicated x 2  DR Joce Patel made aware and Motrin was ordered  All safety maintained  Will continue to monitor

## 2019-10-08 NOTE — UTILIZATION REVIEW
Initial Clinical Review    Admission: Date/Time/Statement: Inpatient Admission Orders (From admission, onward)     Ordered        10/07/19 1739  Inpatient Admission  Once                   Orders Placed This Encounter   Procedures    Inpatient Admission     Standing Status:   Standing     Number of Occurrences:   1     Order Specific Question:   Admitting Physician     Answer:   Debbie Fischer     Order Specific Question:   Level of Care     Answer:   Med Surg [16]     Order Specific Question:   Estimated length of stay     Answer:   More than 2 Midnights     Order Specific Question:   Certification     Answer:   I certify that inpatient services are medically necessary for this patient for a duration of greater than two midnights  See H&P and MD Progress Notes for additional information about the patient's course of treatment  Assessment/Plan: 62 y/o male direct admitted from Plastics presents with R leg pain with infected wound  Hx acute tendon rupture with multiple surgeries and failed skin grafts  On exam, R ankle with large open wound extending from mid calf all the way to foot with yellow drainage  Admitted as inpatient due to open wound of R foot with tendon involvement, RLE cellulitis  Continue IV antibiotics  Blood and wound cx  Consult ID and plastics  Per plastics, wound now measures 5 x 15 cm  Persistent erythema of leg implying chronic cellulitis despite multiple courses of po antibiotics  May ultimately need debridement and possible skin graft  May require long term IV antibiotic therapy  10/8 R leg with continued edema, erythema, warmth, serous drainage  For surgical debridement 10/9   10/8 ID consult:  RLE cellulitis in setting of nonhealing wound  Wound cx showing GPC in pairs and GNR  Continue IV vanco   Change zosyn to IV cefepime      ED Triage Vitals   Temperature Pulse Respirations Blood Pressure SpO2   10/07/19 1653 10/07/19 1653 10/07/19 1653 10/07/19 1653 10/07/19 1653   97 9 °F (36 6 °C) 63 20 (!) 187/107 96 %      Temp Source Heart Rate Source Patient Position - Orthostatic VS BP Location FiO2 (%)   10/07/19 1653 10/07/19 1939 10/07/19 1653 10/07/19 1653 --   Temporal Radial Sitting Left arm       Pain Score       10/07/19 1653       8        Wt Readings from Last 1 Encounters:   10/07/19 (!) 152 kg (335 lb 15 7 oz)     Additional Vital Signs:   10/08/19 1223  --  55  --  125/75  --  --   10/08/19 0741  97 2 °F (36 2 °C)  49Abnormal   20  98/60  97 %  None (Room air)   10/07/19 2325  97 9 °F (36 6 °C)  55  20  127/105Abnormal   96 %  None (Room air)   10/07/19 1939  98 1 °F (36 7 °C)  55  20  166/99  96 %  None (Room air)       Pertinent Labs/Diagnostic Test Results:   Results from last 7 days   Lab Units 10/08/19  0548 10/07/19  1938   WBC Thousand/uL 8 30  --    HEMOGLOBIN g/dL 14 9  --    HEMATOCRIT % 45 5  --    PLATELETS Thousands/uL 218 263   NEUTROS ABS Thousands/µL 4 20  --          Results from last 7 days   Lab Units 10/08/19  0548   SODIUM mmol/L 135*   POTASSIUM mmol/L 3 9   CHLORIDE mmol/L 104   CO2 mmol/L 23   ANION GAP mmol/L 8   BUN mg/dL 13   CREATININE mg/dL 0 94   EGFR ml/min/1 73sq m 89   CALCIUM mg/dL 8 5   MAGNESIUM mg/dL 2 1     Results from last 7 days   Lab Units 10/08/19  0548   GLUCOSE RANDOM mg/dL 95       Results from last 7 days   Lab Units 10/07/19  1938   TSH 3RD GENERATON uIU/mL 5 430*       Results from last 7 days   Lab Units 10/07/19  1827   GRAM STAIN RESULT  No polys seen*  4+ Gram negative rods*  4+ Gram positive cocci in pairs*   WOUND CULTURE  4+ Growth of Beta Hemolytic Streptococcus Group G*     10/8 LE venous duplex:  CONCLUSION:  RIGHT LOWER LIMB  No evidence of acute or chronic deep vein thrombosis  No evidence of superficial thrombophlebitis noted  Doppler evaluation shows a normal response to augmentation maneuvers  Popliteal, posterior tibial and anterior tibial arterial Doppler waveforms are  triphasic     LEFT LOWER LIMB LIMITED  Evaluation shows no evidence of thrombus in the common femoral vein  Doppler evaluation shows a normal response to augmentation maneuvers  TECHNICALLY CHALLENGING EVALUATION OF CALF VEINS DUE TO SWELLING AND BODY  HABITUS    MRI R ankle/heel pending    Past Medical History:   Diagnosis Date    Asthma     CPAP (continuous positive airway pressure) dependence     Diverticulosis     GERD (gastroesophageal reflux disease)     Hyperlipidemia     Morbid obesity (HCC)     Sleep apnea     Ventricular tachycardia (HCC)     1 episode     Present on Admission:   Open wound of right foot with tendon involvement   Essential hypertension   GERD (gastroesophageal reflux disease)   Mild intermittent asthma without complication   HLD (hyperlipidemia)      Admitting Diagnosis: Cellulitis of right leg [L03 115]  Injury of tendon of lower extremity, right, initial encounter [G85 236L]  Age/Sex: 63 y o  male  Admission Orders:    Current Facility-Administered Medications:  acetaminophen 650 mg Oral TID   aluminum-magnesium hydroxide-simethicone 30 mL Oral Q4H PRN   aspirin 325 mg Oral BID   atorvastatin 40 mg Oral Daily With Dinner   bisacodyl 10 mg Rectal Daily PRN   cefepime 2,000 mg Intravenous Q12H   enoxaparin 40 mg Subcutaneous Daily   famotidine 20 mg Oral Daily   NIFEdipine 60 mg Oral Daily   polyethylene glycol 17 g Oral Daily   pregabalin 50 mg Oral TID   senna-docusate sodium 2 tablet Oral Daily   traMADol 50 mg Oral Q6H PRN x1   vancomycin 2,000 mg Intravenous Q12H       IP CONSULT TO INFECTIOUS DISEASES  IP CONSULT TO PHARMACY   Continuous pulse ox  SCDs  VS  MRI R ankle/heel  CPAP  NPO after MN    Network Utilization Review Department  Phone: [phone]; Fax [phone]  [email or web address]  ATTENTION: Please call with any questions or concerns to [phone]  and carefully listen to the prompts so that you are directed to the right person     Send all requests for admission clinical reviews, approved or denied determinations and any other requests to fax [phone]   All voicemails are confidential

## 2019-10-09 LAB
BACTERIA WND AEROBE CULT: ABNORMAL
BACTERIA WND AEROBE CULT: ABNORMAL
GRAM STN SPEC: ABNORMAL

## 2019-10-09 PROCEDURE — 99233 SBSQ HOSP IP/OBS HIGH 50: CPT | Performed by: INTERNAL MEDICINE

## 2019-10-09 PROCEDURE — 99232 SBSQ HOSP IP/OBS MODERATE 35: CPT | Performed by: FAMILY MEDICINE

## 2019-10-09 RX ORDER — CEFAZOLIN SODIUM 2 G/50ML
2000 SOLUTION INTRAVENOUS EVERY 8 HOURS
Status: DISCONTINUED | OUTPATIENT
Start: 2019-10-09 | End: 2019-10-12 | Stop reason: HOSPADM

## 2019-10-09 RX ADMIN — SENNOSIDES AND DOCUSATE SODIUM 2 TABLET: 8.6; 5 TABLET ORAL at 08:32

## 2019-10-09 RX ADMIN — CEFEPIME HYDROCHLORIDE 2000 MG: 2 INJECTION, SOLUTION INTRAVENOUS at 01:57

## 2019-10-09 RX ADMIN — CEFAZOLIN SODIUM 2000 MG: 2 SOLUTION INTRAVENOUS at 21:58

## 2019-10-09 RX ADMIN — VANCOMYCIN HYDROCHLORIDE 2000 MG: 5 INJECTION, POWDER, LYOPHILIZED, FOR SOLUTION INTRAVENOUS at 08:30

## 2019-10-09 RX ADMIN — CEFAZOLIN SODIUM 2000 MG: 2 SOLUTION INTRAVENOUS at 13:28

## 2019-10-09 RX ADMIN — POLYETHYLENE GLYCOL 3350 17 G: 17 POWDER, FOR SOLUTION ORAL at 09:57

## 2019-10-09 RX ADMIN — ACETAMINOPHEN 650 MG: 325 TABLET ORAL at 21:58

## 2019-10-09 RX ADMIN — ENOXAPARIN SODIUM 40 MG: 40 INJECTION SUBCUTANEOUS at 09:57

## 2019-10-09 RX ADMIN — FAMOTIDINE 20 MG: 20 TABLET ORAL at 08:32

## 2019-10-09 RX ADMIN — ACETAMINOPHEN 650 MG: 325 TABLET ORAL at 08:32

## 2019-10-09 RX ADMIN — ATORVASTATIN CALCIUM 40 MG: 40 TABLET, FILM COATED ORAL at 16:02

## 2019-10-09 RX ADMIN — PREGABALIN 50 MG: 25 CAPSULE ORAL at 21:58

## 2019-10-09 RX ADMIN — PREGABALIN 50 MG: 25 CAPSULE ORAL at 16:02

## 2019-10-09 RX ADMIN — ACETAMINOPHEN 650 MG: 325 TABLET ORAL at 16:01

## 2019-10-09 RX ADMIN — NIFEDIPINE 60 MG: 30 TABLET, FILM COATED, EXTENDED RELEASE ORAL at 08:32

## 2019-10-09 RX ADMIN — PREGABALIN 50 MG: 25 CAPSULE ORAL at 08:32

## 2019-10-09 NOTE — SOCIAL WORK
LOS 2 GMLOS none  Patient is not bundled or a 30 day readmission    Patient admitted for open wound right foot, Plastic surgery and ID following patient, currently receiving IV ABX  CM in to see patient for assessment, patient alert, patient states he currently has 3 houses, his main house is a [address], [address], first floor living  Prior to admission patient is IADLs, states he currently takes Karate class  Patient currently uses a CPAP at  for sleep apnea  Patient resides with his wife and he does not have any children  He does not use any in home services and he has not been in a SNF in the past 60 days  Patient denies having a living will  PCP is Dr Elio Camargo and pharmacy is [facility] on [address]  Patient is employed full time, he is a oral surgeon  Patient denies hx of substance abuse or any mental health hx  Patient does drive, he will take a [employer] home when medically cleared for D/C  CM will continue to follow D/C POC

## 2019-10-09 NOTE — NURSING NOTE
On initial rounds patient in no apparent distress  Skin warm and dry to touch  Pleasant and cooperative  Dressing intact no drainage noted  Tolerating diet  Ambulates well by self but encourage to call for assistance  Remain on antibotics  All safety maintained  Will continue to monitor

## 2019-10-09 NOTE — PROGRESS NOTES
Progress Note - Maria L Rossi 1960, 63 y o  male MRN: [id number]    Unit/Bed#: 7T [facility] 707-01 Encounter: [id number]    Primary Care Provider: Carl Salmon MD   Date and time admitted to hospital: 10/7/2019  4:46 PM        Mild intermittent asthma without complication  Assessment & Plan  Mild intermittent asthma  Not on advair stopped 8/2019   Not in acute exacerbation at present time in remission    Morbid obesity with BMI of 45 0-49 9, adult ([facility] [address] )  Assessment & Plan  Morbid obesity the to high calorie intake  Low-carbohydrate diet  May consult dietitian for weight reduction diet    KIRIT on CPAP  Assessment & Plan  Continue CPAP    GERD (gastroesophageal reflux disease)  Assessment & Plan  History of GERD  Continue PPI    Essential hypertension  Assessment & Plan  Essential hypertension  bp stable today continue just procardia           HLD (hyperlipidemia)  Assessment & Plan  History of hyperlipidemia  Continue Lipitor     * Open wound of right foot with tendon involvement  Assessment & Plan  Patient with multiple surgery on right foot due to right acutely tender rupture has multiple plastic surgery by plastic surgeon - with erythema and swelling mild improvement today - multiple abx outpatient failure- IV antibiotic covering Gram-positive negative- hx of mrsa- ID for abx decision till final cx- also discussed with ID will do mri of right ankle /lower extremity to r/o deeper infection and osteo- negative - also will perform vde - negative- for now on cefepime and vanco   Blood culture neg   Wound culture- prelim - beta hemolytic strep   Consult ID- discussed  Consult plastic surgeon- Dr Norma Russ evaluated initially will reevaluate today for need for debridement   Local treatment for plastic surgeon  Discussed with patient        VTE Pharmacologic Prophylaxis:   Pharmacologic: Enoxaparin (Lovenox)  Mechanical VTE Prophylaxis in Place: [address]    Patient Centered Rounds: I have performed bedside rounds with nursing staff today  Discussions with Specialists or Other Care Team Provider: nursing     Education and Discussions with Family / Patient: patient    Time Spent for Care: 30 minutes  More than 50% of total time spent on counseling and coordination of care as described above  Current Length of Stay: 2 day(s)    Current Patient Status: Inpatient   Certification Statement: The patient will continue to require additional inpatient hospital stay due to infected right lower extremity     Discharge Plan: TBD pending medical progress     Code Status: Level 1 - Full Code      Subjective:   Patient seen and examined states feels better today - swelling is down pain is better and and leg more softer    Objective:     Vitals:   Temp (24hrs), Av 2 °F (36 8 °C), Min:97 9 °F (36 6 °C), Max:98 4 °F (36 9 °C)    Temp:  [97 9 °F (36 6 °C)-98 4 °F (36 9 °C)] 98 4 °F (36 9 °C)  HR:  [55-70] 62  Resp:  [18-20] 20  BP: (125-137)/(69-90) 137/90  SpO2:  [96 %-97 %] 96 %  Body mass index is 49 62 kg/m²  Input and Output Summary (last 24 hours): Intake/Output Summary (Last 24 hours) at 10/9/2019 0925  Last data filed at 10/8/2019 2235  Gross per 24 hour   Intake 1000 ml   Output 1900 ml   Net -900 ml       Physical Exam:     Physical Exam   Constitutional: He is oriented to person, place, and time  He appears well-developed and well-nourished  HENT:   Head: Normocephalic and atraumatic  Eyes: Pupils are equal, round, and reactive to light  EOM are normal    Neck: Normal range of motion  Cardiovascular: Normal rate, regular rhythm and normal heart sounds  Pulmonary/Chest: Effort normal and breath sounds normal    Abdominal: Soft  Bowel sounds are normal    Musculoskeletal: Normal range of motion  He exhibits edema (right)  Neurological: He is alert and oriented to person, place, and time  He has normal reflexes  cranial nerve 2-12 are normal   Normal neurological exam   Skin: Skin is warm   There is erythema (right lower extremity - malleolar wound - )  Psychiatric: He has a normal mood and affect  Additional Data:     Labs:    Results from last 7 days   Lab Units 10/08/19  0548   WBC Thousand/uL 8 30   HEMOGLOBIN g/dL 14 9   HEMATOCRIT % 45 5   PLATELETS Thousands/uL 218   NEUTROS PCT % 51   LYMPHS PCT % 28   MONOS PCT % 12*   EOS PCT % 9*     Results from last 7 days   Lab Units 10/08/19  0548   SODIUM mmol/L 135*   POTASSIUM mmol/L 3 9   CHLORIDE mmol/L 104   CO2 mmol/L 23   BUN mg/dL 13   CREATININE mg/dL 0 94   ANION GAP mmol/L 8   CALCIUM mg/dL 8 5   GLUCOSE RANDOM mg/dL 95                           * I Have Reviewed All Lab Data Listed Above  * Additional Pertinent Lab Tests Reviewed: [address] Admission Reviewed    Imaging:    Imaging Reports Reviewed Today Include: mri   Imaging Personally Reviewed by Myself Includes:      Recent Cultures (last 7 days):     Results from last 7 days   Lab Units 10/07/19  1937 10/07/19  1827   BLOOD CULTURE  No Growth at 24 hrs    No Growth at 24 hrs   --    GRAM STAIN RESULT   --  No polys seen*  4+ Gram negative rods*  4+ Gram positive cocci in pairs*   WOUND CULTURE   --  4+ Growth of Beta Hemolytic Streptococcus Group G*  2+ Growth of        Last 24 Hours Medication List:     Current Facility-Administered Medications:  acetaminophen 650 mg Oral TID Nelida Crabtree MD    aluminum-magnesium hydroxide-simethicone 30 mL Oral Q4H PRN Nelida Crabtree MD    atorvastatin 40 mg Oral Daily With Maia Velazco MD    bisacodyl 10 mg Rectal Daily PRN Nelida Crabtree MD    cefepime 2,000 mg Intravenous Q12H Savanna Copeland DO Last Rate: 2,000 mg (10/09/19 0157)   enoxaparin 40 mg Subcutaneous Daily Nelida Crabtree MD    famotidine 20 mg Oral Daily Nelida Crabtree MD    ibuprofen 400 mg Oral Q6H PRN Mendez Robbins MD    NIFEdipine 60 mg Oral Daily Nelida Crabtree MD    polyethylene glycol 17 g Oral Daily Nelida Crabtree MD    pregabalin 50 mg Oral TID MASON Wise    senna-docusate sodium 2 tablet Oral Daily Yady Guardado MD    traMADol 50 mg Oral Q6H PRN Yady Guardado MD    vancomycin 2,000 mg Intravenous Q12H Yady Guardado MD Last Rate: 2,000 mg (10/09/19 0830)        Today, Patient Was Seen By: Vinny Dorantes MD    ** Please Note: Dictation voice to text software may have been used in the creation of this document   **

## 2019-10-09 NOTE — NURSING NOTE
Received pt in PM  On assessment AAOx4, pleasant, reports minimal pain in RLE  Pt aware of NPO status at midnight  Pt states he has been dressing his wound as he would at home  Kerlex applied to site, and dressing is C/D/I  Will continue to monitor, call bell within reach

## 2019-10-09 NOTE — ASSESSMENT & PLAN NOTE
Patient with multiple surgery on right foot due to right acutely tender rupture has multiple plastic surgery by plastic surgeon - with erythema and swelling mild improvement today - multiple abx outpatient failure- IV antibiotic covering Gram-positive negative- hx of mrsa- ID for abx decision till final cx- also discussed with ID will do mri of right ankle /lower extremity to r/o deeper infection and osteo- negative - also will perform vde - negative- for now on cefepime and vanco   Blood culture neg   Wound culture- prelim - beta hemolytic strep   Consult ID- discussed  Consult plastic surgeon- Dr Michelle Strickland evaluated initially will reevaluate today for need for debridement   Local treatment for plastic surgeon  Discussed with patient

## 2019-10-09 NOTE — PLAN OF CARE
Problem: Potential for Falls  Goal: Patient will remain free of falls  Description  INTERVENTIONS:  - Assess patient frequently for physical needs  -  Identify cognitive and physical deficits and behaviors that affect risk of falls    -  [place] fall precautions as indicated by assessment   - Educate patient/family on patient safety including physical limitations  - Instruct patient to call for assistance with activity based on assessment  - Modify environment to reduce risk of injury  - Consider OT/PT consult to assist with strengthening/mobility  Outcome: Progressing

## 2019-10-09 NOTE — PROGRESS NOTES
Progress Note - Infectious Disease   Subha Oar 63 y o  male MRN: [id number]  Unit/Bed#: 7T [facility] 707-01 Encounter: [id number]      IMPRESSION & RECOMMENDATIONS:   Impression/Recommendations:  1  Right lower extremity cellulitis  In the setting of nonhealing wound  Consideration that some of the chronic erythema is also related to venous stasis  Fortunately, patient is afebrile with no leukocytosis  He remains nontoxic  MRI with no evidence of osteomyelitis or deeper infection  Given chronicity of this wound, we could be dealing with gram-positive or gram-negative organisms, or this infection could be polymicrobial   Wound culture now reveals GGS  No growth of Staph aureus  Patient is agreeable to changing antibiotics to IV cefazolin and monitoring closely for ongoing improvement      -change antibiotics to IV cefazolin 2 g q 8 hours  -close Plastic surgery follow-up ongoing  -serial leg exams  -monitor temperatures and hemodynamics     2  Chronic right lower extremity nonhealing wound  In the setting of prior right Achilles tendon repair complicated by wound infection status post free flap closure with STSG  Most recent operative cultures from August 2018 grew MRSA, Achromobacter  Consideration for more deep-seated infection  MRI with no evidence of osteomyelitis or abscess  Edema within the flexor hallucis longus muscle      -antibiotic plan as above  -local daily wound care and dressing changes  -close plastic surgery follow-up ongoing     3  Achilles tendon tear, status post repair with above complications      4  Morbid obesity  Risk factor for poor wound healing  Recommend dietary changes/weight loss      5  Probable venous stasis  Suspect this is playing a role in chronic lower extremity erythema, edema, and poor wound healing    Recommend frequent leg elevation, aggressive edema control      Antibiotics:  Vancomycin/Zosyn 3     I discussed above plan with patient, and with Dr Edy Minor from primary service and with Dr Kory Chance from Plastic surgery  Subjective:  Patient feels that the redness, swelling and pain of the lower extremity is much improved today  Denies fevers, chills, diarrhea  Objective:  Vitals:  Temp:  [97 9 °F (36 6 °C)-98 4 °F (36 9 °C)] 98 4 °F (36 9 °C)  HR:  [55-70] 62  Resp:  [18-20] 20  BP: (125-137)/(69-90) 137/90  SpO2:  [96 %-97 %] 96 %  Temp (24hrs), Av 2 °F (36 8 °C), Min:97 9 °F (36 6 °C), Max:98 4 °F (36 9 °C)  Current: Temperature: 98 4 °F (36 9 °C)    Physical Exam:   General:  Well-nourished, well-developed, in no acute distress  Eyes:  Conjunctive clear with no hemorrhages or effusions  Oropharynx:  No ulcers, no lesions  Neck:  Supple, no lymphadenopathy  Lungs:  Clear to auscultation bilaterally, no accessory muscle use  Cardiac:  Regular rate and rhythm, no murmurs  Abdomen:  Soft, non-tender, non-distented  Extremities:  Right lower extremity medial open wound with surrounding erythema, warmth slowly improving  Serous drainage  No purulence  No bone or tendon exposure  Skin:  No rashes  Neurological:  Moves all four extremities spontaneously, sensation grossly intact    Lab Results:  I have personally reviewed pertinent labs  Results from last 7 days   Lab Units 10/08/19  0548   POTASSIUM mmol/L 3 9   CHLORIDE mmol/L 104   CO2 mmol/L 23   BUN mg/dL 13   CREATININE mg/dL 0 94   EGFR ml/min/1 73sq m 89   CALCIUM mg/dL 8 5     Results from last 7 days   Lab Units 10/08/19  0548 10/07/19  1938   WBC Thousand/uL 8 30  --    HEMOGLOBIN g/dL 14 9  --    PLATELETS Thousands/uL 218 263     Results from last 7 days   Lab Units 10/07/19  1937 10/07/19  1827   BLOOD CULTURE  No Growth at 24 hrs    No Growth at 24 hrs   --    GRAM STAIN RESULT   --  No polys seen*  4+ Gram negative rods*  4+ Gram positive cocci in pairs*   WOUND CULTURE   --  4+ Growth of Beta Hemolytic Streptococcus Group G*  2+ Growth of        Imaging Studies:   I have personally reviewed pertinent imaging study reports and images in PACS  MRI of the lower extremity showed soft tissue ulcer at the medial distal lower leg without acute osseous abnormality  Diffuse soft tissue edema  Postsurgical changes from acute least tendon repair  EKG, Pathology, and Other Studies:   I have personally reviewed pertinent reports

## 2019-10-09 NOTE — CONSULTS
Vancomycin therapy has been discontinued after three days  Thank you for this consult  Pharmacy will sign-off now

## 2019-10-10 PROCEDURE — 99232 SBSQ HOSP IP/OBS MODERATE 35: CPT | Performed by: FAMILY MEDICINE

## 2019-10-10 PROCEDURE — 99233 SBSQ HOSP IP/OBS HIGH 50: CPT | Performed by: INTERNAL MEDICINE

## 2019-10-10 RX ADMIN — CEFAZOLIN SODIUM 2000 MG: 2 SOLUTION INTRAVENOUS at 12:49

## 2019-10-10 RX ADMIN — FAMOTIDINE 20 MG: 20 TABLET ORAL at 08:07

## 2019-10-10 RX ADMIN — PREGABALIN 50 MG: 25 CAPSULE ORAL at 08:08

## 2019-10-10 RX ADMIN — ACETAMINOPHEN 650 MG: 325 TABLET ORAL at 08:07

## 2019-10-10 RX ADMIN — ACETAMINOPHEN 650 MG: 325 TABLET ORAL at 17:23

## 2019-10-10 RX ADMIN — NIFEDIPINE 60 MG: 30 TABLET, FILM COATED, EXTENDED RELEASE ORAL at 08:07

## 2019-10-10 RX ADMIN — POLYETHYLENE GLYCOL 3350 17 G: 17 POWDER, FOR SOLUTION ORAL at 08:08

## 2019-10-10 RX ADMIN — SENNOSIDES AND DOCUSATE SODIUM 2 TABLET: 8.6; 5 TABLET ORAL at 08:08

## 2019-10-10 RX ADMIN — ATORVASTATIN CALCIUM 40 MG: 40 TABLET, FILM COATED ORAL at 17:24

## 2019-10-10 RX ADMIN — ENOXAPARIN SODIUM 40 MG: 40 INJECTION SUBCUTANEOUS at 08:08

## 2019-10-10 RX ADMIN — CEFAZOLIN SODIUM 2000 MG: 2 SOLUTION INTRAVENOUS at 06:02

## 2019-10-10 RX ADMIN — ACETAMINOPHEN 650 MG: 325 TABLET ORAL at 20:25

## 2019-10-10 RX ADMIN — CEFAZOLIN SODIUM 2000 MG: 2 SOLUTION INTRAVENOUS at 20:25

## 2019-10-10 NOTE — ASSESSMENT & PLAN NOTE
Patient with multiple surgery on right foot due to right acutely tender rupture has multiple plastic surgery by plastic surgeon - with erythema and swelling mild improvement today - multiple abx outpatient failure- IV antibiotic covering Gram-positive negative- hx of mrsa- wound cx- strep switched to ancef to continue another 24 hrs - leg improving - mri and vde neg- no plan for OR by Dr Solange Remy this admission - if continues to improve will be switched over to keflex on discharge anticipate 10/12 Blood culture neg   Wound culture- prelim - beta hemolytic strep   Consult ID- discussed  Consult plastic surgeon- Dr Solange Remy evaluated initially will reevaluate today for need for debridement   Local treatment for plastic surgeon  Discussed with patient

## 2019-10-10 NOTE — PROGRESS NOTES
Erythema right leg much improved from admission but still present from knee to ankle  Swelling is markedly reduced with the leg about half the size on admission  He would probably benefit from removal of biofilm once the erythema is gone and then another attempt at skin grafting the open wounds to hopefully hasten healing  It discharge this weekend we will see him in the office next week to confirm our next plans which hopefully would be to begin wound treatment in the operating room under anesthesia the next Wednesday as an outpatient

## 2019-10-10 NOTE — NURSING NOTE
On initial rounds patient in no apparent distress  Skin warm and dry to touch  Pleasant and cooperative  Stated pain at level 2 to right foot  Ambulates well by self encourage to call for assistance  Voids adequate amount of urine  Tolerating diet  Dressing to right leg intact with small amount of drainage  All safety maintained  Will continue to monitor

## 2019-10-10 NOTE — PLAN OF CARE
Problem: Potential for Falls  Goal: Patient will remain free of falls  Description  INTERVENTIONS:  - Assess patient frequently for physical needs  -  Identify cognitive and physical deficits and behaviors that affect risk of falls    -  [place] fall precautions as indicated by assessment   - Educate patient/family on patient safety including physical limitations  - Instruct patient to call for assistance with activity based on assessment  - Modify environment to reduce risk of injury  - Consider OT/PT consult to assist with strengthening/mobility  Outcome: Progressing     Problem: DISCHARGE PLANNING - CARE MANAGEMENT  Goal: Discharge to post-acute care or home with appropriate resources  Description  INTERVENTIONS:  - Conduct assessment to determine patient/family and health care team treatment goals, and need for post-acute services based on payer coverage, community resources, and patient preferences, and barriers to discharge  - Address psychosocial, clinical, and financial barriers to discharge as identified in assessment in conjunction with the patient/family and health care team  - Arrange appropriate level of post-acute services according to patients   needs and preference and payer coverage in collaboration with the physician and health care team  - Communicate with and update the patient/family, physician, and health care team regarding progress on the discharge plan  - Arrange appropriate transportation to post-acute venues  Outcome: Progressing

## 2019-10-10 NOTE — PROGRESS NOTES
Progress Note - Infectious Disease   Maria L Rossi 63 y o  male MRN: [id number]  Unit/Bed#: 7T [facility] 707-01 Encounter: [id number]      IMPRESSION & RECOMMENDATIONS:   Impression/Recommendations:  1  Right lower extremity cellulitis   In the setting of nonhealing wound   Consideration that some of the chronic erythema is also related to venous stasis   Fortunately, patient is afebrile with no leukocytosis   He remains nontoxic    MRI with no evidence of osteomyelitis or deeper infection  Given chronicity of this wound, we could be dealing with gram-positive or gram-negative organisms, or this infection could be polymicrobial   Wound culture now reveals GGS  No growth of Staph aureus  cellulitis continues to improve after transition to IV cefazolin      -continue high-dose IV cefazolin 2 g q 8 hours  -close Plastic surgery follow-up ongoing  -serial leg exams  -monitor temperatures and hemodynamics  -if continues to improve, plan to transition to high-dose oral Keflex 500 mg q 6 hours to complete 10 day course of total antibiotics, through 10/16      2  Chronic right lower extremity nonhealing wound   In the setting of prior right Achilles tendon repair complicated by wound infection status post free flap closure with STSG   Most recent operative cultures from August 2018 grew MRSA, Achromobacter   Consideration for more deep-seated infection  MRI with no evidence of osteomyelitis or abscess  Edema within the flexor hallucis longus muscle      -antibiotic plan as above  -local daily wound care and dressing changes  -close plastic surgery follow-up ongoing     3  Achilles tendon tear, status post repair with above complications      4   Morbid obesity   Risk factor for poor wound healing   Recommend dietary changes/weight loss      5  Probable venous stasis   Suspect this is playing a role in chronic lower extremity erythema, edema, and poor wound healing   Recommend frequent leg elevation, aggressive edema control      Antibiotics:  Antibiotic 4  Cefazolin 2     I discussed above plan with patient, and with Dr Moon from primary service  Subjective:  Patient does feel that the redness, swelling, itchiness of the right leg has continued to improve  Denies fevers, chills, diarrhea  Objective:  Vitals:  Temp:  [97 1 °F (36 2 °C)-97 7 °F (36 5 °C)] 97 7 °F (36 5 °C)  HR:  [67-75] 67  Resp:  [18-20] 20  BP: (124-155)/() 124/87  SpO2:  [96 %-98 %] 96 %  Temp (24hrs), Av 3 °F (36 3 °C), Min:97 1 °F (36 2 °C), Max:97 7 °F (36 5 °C)  Current: Temperature: 97 7 °F (36 5 °C)    Physical Exam:   General:  Well-nourished, well-developed, in no acute distress  Eyes:  Conjunctive clear with no hemorrhages or effusions  Oropharynx:  No ulcers, no lesions  Neck:  Supple, no lymphadenopathy  Lungs:  Clear to auscultation bilaterally, no accessory muscle use  Cardiac:  Regular rate and rhythm, no murmurs  Abdomen:  Soft, non-tender, non-distented  Extremities:  Right lower extremity medial open wound with surrounding erythema, warmth continues to improve  Ongoing serous drainage  No purulence  No bone or tendon exposure  Skin:  No rashes  Neurological:  Moves all four extremities spontaneously, sensation grossly intact    Lab Results:  I have personally reviewed pertinent labs  Results from last 7 days   Lab Units 10/08/19  0548   POTASSIUM mmol/L 3 9   CHLORIDE mmol/L 104   CO2 mmol/L 23   BUN mg/dL 13   CREATININE mg/dL 0 94   EGFR ml/min/1 73sq m 89   CALCIUM mg/dL 8 5     Results from last 7 days   Lab Units 10/08/19  0548 10/07/19  1938   WBC Thousand/uL 8 30  --    HEMOGLOBIN g/dL 14 9  --    PLATELETS Thousands/uL 218 263     Results from last 7 days   Lab Units 10/07/19  1937 10/07/19  1827   BLOOD CULTURE  No Growth at 48 hrs    No Growth at 48 hrs   --    GRAM STAIN RESULT   --  No polys seen*  4+ Gram negative rods*  4+ Gram positive cocci in pairs*   WOUND CULTURE   --  4+ Growth of Beta Hemolytic Streptococcus Group G*  2+ Growth of        Imaging Studies:   I have personally reviewed pertinent imaging study reports and images in PACS  EKG, Pathology, and Other Studies:   I have personally reviewed pertinent reports

## 2019-10-10 NOTE — RESPIRATORY THERAPY NOTE
Pt  stated he does not need help with his CPAP, mentioned he would let nurse know if RRT was needed  Pt was olay when assed earlier in the shift   02 was in the 90's on RA

## 2019-10-10 NOTE — NURSING NOTE
Received pt in PM  On assessment AAOx4, lung sounds clear/diminished  +2 B/L lower extremity edema noted  Moderate amount of serous drainage noted on right lower extremity dressing which pt wants changed at a later time  Pt OOB in chair  Will continue to monitor, call bell within reach

## 2019-10-10 NOTE — PROGRESS NOTES
Progress Note - Murray Corey 1960, 63 y o  male MRN: [id number]    Unit/Bed#: 7T [facility] 707-01 Encounter: [id number]    Primary Care Provider: Zita Mitchell MD   Date and time admitted to hospital: 10/7/2019  4:46 PM        Mild intermittent asthma without complication  Assessment & Plan  Mild intermittent asthma  Not on advair stopped 8/2019   Not in acute exacerbation at present time in remission    Morbid obesity with BMI of 45 0-49 9, adult ([facility] [address] )  Assessment & Plan  Morbid obesity the to high calorie intake  Low-carbohydrate diet  May consult dietitian for weight reduction diet    KIRIT on CPAP  Assessment & Plan  Continue CPAP    GERD (gastroesophageal reflux disease)  Assessment & Plan  History of GERD  Continue PPI    Essential hypertension  Assessment & Plan  Essential hypertension  bp stable today continue just procardia           HLD (hyperlipidemia)  Assessment & Plan  History of hyperlipidemia  Continue Lipitor     * Open wound of right foot with tendon involvement  Assessment & Plan  Patient with multiple surgery on right foot due to right acutely tender rupture has multiple plastic surgery by plastic surgeon - with erythema and swelling mild improvement today - multiple abx outpatient failure- IV antibiotic covering Gram-positive negative- hx of mrsa- wound cx- strep switched to ancef to continue another 24 hrs - leg improving - mri and vde neg- no plan for OR by Dr Kory Chance this admission - if continues to improve will be switched over to keflex on discharge anticipate 10/12 Blood culture neg   Wound culture- prelim - beta hemolytic strep   Consult ID- discussed  Consult plastic surgeon- Dr Kory Chance evaluated initially will reevaluate today for need for debridement   Local treatment for plastic surgeon  Discussed with patient      VTE Pharmacologic Prophylaxis:   Pharmacologic: Enoxaparin (Lovenox)  Mechanical VTE Prophylaxis in Place: [address]     Patient Centered Rounds: I have performed bedside rounds with nursing staff today  Discussions with Specialists or Other Care Team Provider: ID/Dr [facility]     Education and Discussions with Family / Patient: patient     Time Spent for Care: 30 minutes  More than 50% of total time spent on counseling and coordination of care as described above  Current Length of Stay: 3 day(s)    Current Patient Status: Inpatient   Certification Statement: The patient will continue to require additional inpatient hospital stay due to iv abx     Discharge Plan: anticipate home on 10/12    Code Status: Level 1 - Full Code      Subjective:   Patient seen and examined, no chest pain or sob  Leg pain resolved  Objective:     Vitals:   Temp (24hrs), Av 3 °F (36 3 °C), Min:97 1 °F (36 2 °C), Max:97 7 °F (36 5 °C)    Temp:  [97 1 °F (36 2 °C)-97 7 °F (36 5 °C)] 97 7 °F (36 5 °C)  HR:  [67-75] 67  Resp:  [18-20] 20  BP: (124-155)/() 124/87  SpO2:  [96 %-98 %] 96 %  Body mass index is 49 62 kg/m²  Input and Output Summary (last 24 hours): Intake/Output Summary (Last 24 hours) at 10/10/2019 1143  Last data filed at 10/9/2019 1700  Gross per 24 hour   Intake 480 ml   Output 300 ml   Net 180 ml       Physical Exam:     Physical Exam   Constitutional: He is oriented to person, place, and time  He appears well-developed and well-nourished  HENT:   Head: Normocephalic and atraumatic  Eyes: Pupils are equal, round, and reactive to light  EOM are normal    Neck: Normal range of motion  Cardiovascular: Normal rate, regular rhythm and normal heart sounds  Pulmonary/Chest: Effort normal and breath sounds normal    Abdominal: Soft  Bowel sounds are normal    Musculoskeletal: Normal range of motion  He exhibits edema (although improved and also erythema improved as well  )  Neurological: He is alert and oriented to person, place, and time  He has normal reflexes  cranial nerve 2-12 are normal   Normal neurological exam   Skin: Skin is warm     Psychiatric: He has a normal mood and affect  Additional Data:     Labs:    Results from last 7 days   Lab Units 10/08/19  0548   WBC Thousand/uL 8 30   HEMOGLOBIN g/dL 14 9   HEMATOCRIT % 45 5   PLATELETS Thousands/uL 218   NEUTROS PCT % 51   LYMPHS PCT % 28   MONOS PCT % 12*   EOS PCT % 9*     Results from last 7 days   Lab Units 10/08/19  0548   SODIUM mmol/L 135*   POTASSIUM mmol/L 3 9   CHLORIDE mmol/L 104   CO2 mmol/L 23   BUN mg/dL 13   CREATININE mg/dL 0 94   ANION GAP mmol/L 8   CALCIUM mg/dL 8 5   GLUCOSE RANDOM mg/dL 95                           * I Have Reviewed All Lab Data Listed Above  * Additional Pertinent Lab Tests Reviewed: [address] Admission Reviewed    Imaging:    Imaging Reports Reviewed Today Include: none today   Imaging Personally Reviewed by Myself Includes:      Recent Cultures (last 7 days):     Results from last 7 days   Lab Units 10/07/19  1937 10/07/19  1827   BLOOD CULTURE  No Growth at 48 hrs    No Growth at 48 hrs   --    GRAM STAIN RESULT   --  No polys seen*  4+ Gram negative rods*  4+ Gram positive cocci in pairs*   WOUND CULTURE   --  4+ Growth of Beta Hemolytic Streptococcus Group G*  2+ Growth of        Last 24 Hours Medication List:     Current Facility-Administered Medications:  acetaminophen 650 mg Oral TID Will Gonzalez MD    aluminum-magnesium hydroxide-simethicone 30 mL Oral Q4H PRN Will Gonzalez MD    atorvastatin 40 mg Oral Daily With Christine Espinoza MD    bisacodyl 10 mg Rectal Daily PRN Will Gonzalez MD    cefazolin 2,000 mg Intravenous Q8H Savanna Copeland DO Last Rate: 2,000 mg (10/10/19 0602)   enoxaparin 40 mg Subcutaneous Daily Will Gonzalez MD    famotidine 20 mg Oral Daily Will Gonzalez MD    ibuprofen 400 mg Oral Q6H PRN Rupal Ferguson MD    NIFEdipine 60 mg Oral Daily Will Gonzalez MD    polyethylene glycol 17 g Oral Daily Will Gonzalez MD    pregabalin 50 mg Oral TID MASON Lopez    senna-docusate sodium 2 tablet Oral Daily Lazara Palmer MD    traMADol 50 mg Oral Q6H PRN Lazara Palmer MD         Today, Patient Was Seen By: Rosales Kat MD    ** Please Note: Dictation voice to text software may have been used in the creation of this document   **

## 2019-10-11 PROCEDURE — 99233 SBSQ HOSP IP/OBS HIGH 50: CPT | Performed by: INTERNAL MEDICINE

## 2019-10-11 PROCEDURE — 99232 SBSQ HOSP IP/OBS MODERATE 35: CPT | Performed by: FAMILY MEDICINE

## 2019-10-11 RX ORDER — FUROSEMIDE 20 MG/1
20 TABLET ORAL DAILY
Status: DISCONTINUED | OUTPATIENT
Start: 2019-10-11 | End: 2019-10-12 | Stop reason: HOSPADM

## 2019-10-11 RX ADMIN — FUROSEMIDE 20 MG: 20 TABLET ORAL at 11:11

## 2019-10-11 RX ADMIN — CEFAZOLIN SODIUM 2000 MG: 2 SOLUTION INTRAVENOUS at 12:36

## 2019-10-11 RX ADMIN — POLYETHYLENE GLYCOL 3350 17 G: 17 POWDER, FOR SOLUTION ORAL at 08:12

## 2019-10-11 RX ADMIN — ACETAMINOPHEN 650 MG: 325 TABLET ORAL at 21:25

## 2019-10-11 RX ADMIN — ACETAMINOPHEN 650 MG: 325 TABLET ORAL at 08:12

## 2019-10-11 RX ADMIN — PREGABALIN 50 MG: 25 CAPSULE ORAL at 21:25

## 2019-10-11 RX ADMIN — PREGABALIN 50 MG: 25 CAPSULE ORAL at 16:35

## 2019-10-11 RX ADMIN — ACETAMINOPHEN 650 MG: 325 TABLET ORAL at 16:35

## 2019-10-11 RX ADMIN — FAMOTIDINE 20 MG: 20 TABLET ORAL at 08:12

## 2019-10-11 RX ADMIN — CEFAZOLIN SODIUM 2000 MG: 2 SOLUTION INTRAVENOUS at 21:25

## 2019-10-11 RX ADMIN — ATORVASTATIN CALCIUM 40 MG: 40 TABLET, FILM COATED ORAL at 16:35

## 2019-10-11 RX ADMIN — ENOXAPARIN SODIUM 40 MG: 40 INJECTION SUBCUTANEOUS at 08:12

## 2019-10-11 RX ADMIN — CEFAZOLIN SODIUM 2000 MG: 2 SOLUTION INTRAVENOUS at 04:58

## 2019-10-11 RX ADMIN — SENNOSIDES AND DOCUSATE SODIUM 2 TABLET: 8.6; 5 TABLET ORAL at 08:12

## 2019-10-11 RX ADMIN — NIFEDIPINE 60 MG: 30 TABLET, FILM COATED, EXTENDED RELEASE ORAL at 08:12

## 2019-10-11 NOTE — NURSING NOTE
assisted patient with dressing change this am after showering  Wound draining  Antibiotic silver impregnated dressing placed in the wound per patient's request  " The doctor is the one that told me to do this and that it was ok to apply Vaseline on the skin around the wound because of the itching  No orders stating any of this therefore patient applied these  I helped apply the new dressing  New linen also placed on bed

## 2019-10-11 NOTE — ASSESSMENT & PLAN NOTE
Patient with multiple surgery on right foot due to right acutely tender rupture has multiple plastic surgery by plastic surgeon - with erythema and swelling mild improvement today - multiple abx outpatient failure- IV antibiotic covering Gram-positive negative- hx of mrsa- wound cx- strep switched to ancef to continue another 24 hrs - leg improving - mri and vde neg- no plan for OR by Dr Everardo Mercado this admission - if continues to improve will be switched over to keflex on discharge anticipate 10/12 Blood culture neg - today mostly increase in edema but not cellulitis and had discussion with patient and ID will continue same planning- he does have venous stasis - I dced hctz2 days ago due to low bp - his BP has been stable - will actually add lasix 20 mg po daily- to help with edema      Wound culture- prelim - beta hemolytic strep   Consult ID- discussed  Consult plastic surgeon- Dr Everardo Mercado evaluated initially will reevaluate today for need for debridement   Local treatment for plastic surgeon  Discussed with patient

## 2019-10-11 NOTE — NURSING NOTE
Dressing changed on right leg and encouraged to keep leg elevated after the dressing completed  Patient agreed  Non pitting edema of the right leg now 4/4  Skin is tight and very hard to touch

## 2019-10-11 NOTE — NURSING NOTE
Patient is alert and oriented x4 with stable vitals  Through out the day will elevate leg however the right leg is swollen, warm and red with itching  Other assessments are unchanged  Will monitor

## 2019-10-11 NOTE — NURSING NOTE
Received pt in PM  On assessment AAOx4, reports pain rated 2/10  Wound dressing is dry and intact with old serous drainage noted  VSS  Will continue to monitor, call bell within reach

## 2019-10-11 NOTE — SOCIAL WORK
LOS 4 GMLOS none    Patient continues IV ABX for RLE cellulitis, plan is to change patient to PO ABX per ID note  Dr Jermaine Salgado- Plastic surgery following patient regarding OR wound treatment plans for next week as outpatient

## 2019-10-11 NOTE — PROGRESS NOTES
Progress Note - Infectious Disease   Pam Alcala 63 y o  male MRN: [id number]  Unit/Bed#: 7T [facility] 707-01 Encounter: [id number]      IMPRESSION & RECOMMENDATIONS:   Impression/Recommendations:  1  Right lower extremity cellulitis   In the setting of nonhealing wound   I suspect some of this chronic erythema, edema is secondary to venous stasis  Patient has remained afebrile with no leukocytosis and nontoxic in appearance  MRI with no evidence of osteomyelitis or deeper infection   Given chronicity of this wound, we could be dealing with gram-positive or gram-negative organisms, or this infection could be polymicrobial   Wound culture now reveals GGS   No growth of Staph aureus  Cellulitis overall has improved on IV cefazolin  There is still marked edema, which I suspect is related to venous stasis  Erythema is well demarcated and has not spread suggesting cellulitis is not worsening     -continue high-dose IV cefazolin 2 g q 8 hours  -if continues to improve, plan to transition to high-dose oral Keflex 500 mg q 6 hours to complete 10 day course of total antibiotics, through 10/16   -outpatient ID follow-up  -recommend re-initiation of diuretic  -close Plastic surgery follow-up ongoing  -serial leg exams  -monitor temperatures and hemodynamics    2  Chronic right lower extremity nonhealing wound   In the setting of prior right Achilles tendon repair complicated by wound infection status post free flap closure with STSG   Most recent operative cultures from August 2018 grew MRSA, Achromobacter   Consideration for more deep-seated infection   MRI with no evidence of osteomyelitis or abscess   Edema within the flexor hallucis longus muscle      -antibiotic plan as above  -local daily wound care and dressing changes  -close plastic surgery follow-up ongoing     3  Achilles tendon tear, status post repair with above complications      4   Morbid obesity   Risk factor for poor wound healing   Recommend dietary changes/weight loss      5  Probable venous stasis   Suspect this is playing a role in chronic lower extremity erythema, edema, and poor wound healing   Recommend frequent leg elevation, aggressive edema control      Antibiotics:  Antibiotic 5  Cefazolin 3     I discussed above plan with patient, and with Dr Moon from primary service  If patient is still in hospital, will plan to see again on 10/14  Please call if any new questions in the interim  Subjective:  Patient states he woke up this morning and noticed his leg was more swollen and red  The pain is not worse  Denies fevers, chills, sweats  Denies drainage or malodor from the leg  Objective:  Vitals:  Temp:  [98 1 °F (36 7 °C)-98 6 °F (37 °C)] 98 1 °F (36 7 °C)  HR:  [68-90] 68  Resp:  [18-21] 21  BP: (133-146)/(76-96) 133/76  SpO2:  [97 %-98 %] 97 %  Temp (24hrs), Av 4 °F (36 9 °C), Min:98 1 °F (36 7 °C), Max:98 6 °F (37 °C)  Current: Temperature: 98 1 °F (36 7 °C)    Physical Exam:   General:  Well-nourished, well-developed, in no acute distress  Eyes:  Conjunctive clear with no hemorrhages or effusions  Oropharynx:  No ulcers, no lesions  Neck:  Supple, no lymphadenopathy  Lungs:  Clear to auscultation bilaterally, no accessory muscle use  Cardiac:  Regular rate and rhythm, no murmurs  Abdomen:  Soft, non-tender, non-distented  Extremities:  Bilateral lower extremity edema, R>L  Right lower extremity edema, erythema  Erythema has not spread and is well demarcated at the ankle  Skin:  Right lower extremity medial open wound  Ongoing serous drainage  No purulence  No bone or tendon exposure pain  Neurological:  Moves all four extremities spontaneously    Lab Results:  I have personally reviewed pertinent labs    Results from last 7 days   Lab Units 10/08/19  0548   POTASSIUM mmol/L 3 9   CHLORIDE mmol/L 104   CO2 mmol/L 23   BUN mg/dL 13   CREATININE mg/dL 0 94   EGFR ml/min/1 73sq m 89   CALCIUM mg/dL 8 5     Results from last 7 days   Lab Units 10/08/19  0548 10/07/19  1938   WBC Thousand/uL 8 30  --    HEMOGLOBIN g/dL 14 9  --    PLATELETS Thousands/uL 218 263     Results from last 7 days   Lab Units 10/07/19  1937 10/07/19  1827   BLOOD CULTURE  No Growth at 72 hrs  No Growth at 72 hrs   --    GRAM STAIN RESULT   --  No polys seen*  4+ Gram negative rods*  4+ Gram positive cocci in pairs*   WOUND CULTURE   --  4+ Growth of Beta Hemolytic Streptococcus Group G*  2+ Growth of        Imaging Studies:   I have personally reviewed pertinent imaging study reports and images in PACS  EKG, Pathology, and Other Studies:   I have personally reviewed pertinent reports

## 2019-10-11 NOTE — PROGRESS NOTES
Progress Note - Yojana Nascimento 1960, 63 y o  male MRN: [id number]    Unit/Bed#: 7T [facility] 707-01 Encounter: [id number]    Primary Care Provider: Nneka Michael MD   Date and time admitted to hospital: 10/7/2019  4:46 PM        Mild intermittent asthma without complication  Assessment & Plan  Mild intermittent asthma  Not on advair stopped 8/2019   Not in acute exacerbation at present time in remission    Morbid obesity with BMI of 45 0-49 9, adult [facility])  Assessment & Plan  Morbid obesity the to high calorie intake  Low-carbohydrate diet  May consult dietitian for weight reduction diet    KIRIT on CPAP  Assessment & Plan  Continue CPAP    GERD (gastroesophageal reflux disease)  Assessment & Plan  History of GERD  Continue PPI    Essential hypertension  Assessment & Plan  Essential hypertension  bp stable today continue just procardia           HLD (hyperlipidemia)  Assessment & Plan  History of hyperlipidemia  Continue Lipitor     * Open wound of right foot with tendon involvement  Assessment & Plan  Patient with multiple surgery on right foot due to right acutely tender rupture has multiple plastic surgery by plastic surgeon - with erythema and swelling mild improvement today - multiple abx outpatient failure- IV antibiotic covering Gram-positive negative- hx of mrsa- wound cx- strep switched to ancef to continue another 24 hrs - leg improving - mri and vde neg- no plan for OR by Dr Melba Cui this admission - if continues to improve will be switched over to keflex on discharge anticipate 10/12 Blood culture neg - today mostly increase in edema but not cellulitis and had discussion with patient and ID will continue same planning- he does have venous stasis - I dced hctz2 days ago due to low bp - his BP has been stable - will actually add lasix 20 mg po daily- to help with edema      Wound culture- prelim - beta hemolytic strep   Consult ID- discussed  Consult plastic surgeon- Dr Melba Cui evaluated initially will reevaluate today for need for debridement   Local treatment for plastic surgeon  Discussed with patient      VTE Pharmacologic Prophylaxis:   Pharmacologic: Enoxaparin (Lovenox)  Mechanical VTE Prophylaxis in Place: Roshni Sánchez 26     Patient Centered Rounds: I have performed bedside rounds with nursing staff today  Discussions with Specialists or Other Care Team Provider: ID    Education and Discussions with Family / Patient: patient    Time Spent for Care: 30 minutes  More than 50% of total time spent on counseling and coordination of care as described above  Current Length of Stay: 4 day(s)    Current Patient Status: Inpatient   Certification Statement: The patient will continue to require additional inpatient hospital stay due to right lower extremity more edema    Discharge Plan: pending progress anticipate 10/12    Code Status: Level 1 - Full Code      Subjective:   Patient seen and examined, states seems right leg worse - more swelling- he thinks also more redness- pain not worse  Objective:     Vitals:   Temp (24hrs), Av 4 °F (36 9 °C), Min:98 1 °F (36 7 °C), Max:98 6 °F (37 °C)    Temp:  [98 1 °F (36 7 °C)-98 6 °F (37 °C)] 98 1 °F (36 7 °C)  HR:  [68-90] 68  Resp:  [18-21] 21  BP: (133-146)/(76-96) 133/76  SpO2:  [97 %-98 %] 97 %  Body mass index is 49 62 kg/m²  Input and Output Summary (last 24 hours): Intake/Output Summary (Last 24 hours) at 10/11/2019 1114  Last data filed at 10/11/2019 0830  Gross per 24 hour   Intake 960 ml   Output 500 ml   Net 460 ml       Physical Exam:     Physical Exam   Constitutional: He is oriented to person, place, and time  He appears well-developed and well-nourished  HENT:   Head: Normocephalic and atraumatic  Eyes: Pupils are equal, round, and reactive to light  EOM are normal    Neck: Normal range of motion  Cardiovascular: Normal rate, regular rhythm and normal heart sounds     Pulmonary/Chest: Effort normal and breath sounds normal  Abdominal: Soft  Bowel sounds are normal    Musculoskeletal: Normal range of motion  He exhibits edema (right lower etxtremity worse from yesterday )  He exhibits no tenderness  Neurological: He is alert and oriented to person, place, and time  He has normal reflexes  cranial nerve 2-12 are normal   Normal neurological exam   Skin: Skin is warm  There is erythema (right lower extremity but not worse)  Psychiatric: He has a normal mood and affect  Additional Data:     Labs:    Results from last 7 days   Lab Units 10/08/19  0548   WBC Thousand/uL 8 30   HEMOGLOBIN g/dL 14 9   HEMATOCRIT % 45 5   PLATELETS Thousands/uL 218   NEUTROS PCT % 51   LYMPHS PCT % 28   MONOS PCT % 12*   EOS PCT % 9*     Results from last 7 days   Lab Units 10/08/19  0548   SODIUM mmol/L 135*   POTASSIUM mmol/L 3 9   CHLORIDE mmol/L 104   CO2 mmol/L 23   BUN mg/dL 13   CREATININE mg/dL 0 94   ANION GAP mmol/L 8   CALCIUM mg/dL 8 5   GLUCOSE RANDOM mg/dL 95                           * I Have Reviewed All Lab Data Listed Above  * Additional Pertinent Lab Tests Reviewed: [place] 66 Admission Reviewed    Imaging:    Imaging Reports Reviewed Today Include: none today  Imaging Personally Reviewed by Myself Includes:      Recent Cultures (last 7 days):     Results from last 7 days   Lab Units 10/07/19  1937 10/07/19  1827   BLOOD CULTURE  No Growth at 72 hrs    No Growth at 72 hrs   --    GRAM STAIN RESULT   --  No polys seen*  4+ Gram negative rods*  4+ Gram positive cocci in pairs*   WOUND CULTURE   --  4+ Growth of Beta Hemolytic Streptococcus Group G*  2+ Growth of        Last 24 Hours Medication List:     Current Facility-Administered Medications:  acetaminophen 650 mg Oral TID Freida Chan MD    aluminum-magnesium hydroxide-simethicone 30 mL Oral Q4H PRN Freida Chan MD    atorvastatin 40 mg Oral Daily With Kandis Escobar MD    bisacodyl 10 mg Rectal Daily PRN Freida Chan MD cefazolin 2,000 mg Intravenous Q8H Savanna Aldea, DO Last Rate: 2,000 mg (10/11/19 0458)   enoxaparin 40 mg Subcutaneous Daily Nelida Crabtree MD    famotidine 20 mg Oral Daily Nelida Crabtree MD    furosemide 20 mg Oral Daily Mendez Robbins MD    ibuprofen 400 mg Oral Q6H PRN Mendez Robbins MD    NIFEdipine 60 mg Oral Daily Nelida Crabtree MD    polyethylene glycol 17 g Oral Daily Nelida Crabtree MD    pregabalin 50 mg Oral TID MASON Brewer    senna-docusate sodium 2 tablet Oral Daily Nelida Crabtree MD    traMADol 50 mg Oral Q6H PRN Nelida Crabtree MD         Today, Patient Was Seen By: Mendez Robbins MD    ** Please Note: Dictation voice to text software may have been used in the creation of this document   **

## 2019-10-11 NOTE — PLAN OF CARE
Problem: Potential for Falls  Goal: Patient will remain free of falls  Description  INTERVENTIONS:  - Assess patient frequently for physical needs  -  Identify cognitive and physical deficits and behaviors that affect risk of falls    -  [place] fall precautions as indicated by assessment   - Educate patient/family on patient safety including physical limitations  - Instruct patient to call for assistance with activity based on assessment  - Modify environment to reduce risk of injury  - Consider OT/PT consult to assist with strengthening/mobility  Outcome: Progressing     Problem: DISCHARGE PLANNING - CARE MANAGEMENT  Goal: Discharge to post-acute care or home with appropriate resources  Description  INTERVENTIONS:  - Conduct assessment to determine patient/family and health care team treatment goals, and need for post-acute services based on payer coverage, community resources, and patient preferences, and barriers to discharge  - Address psychosocial, clinical, and financial barriers to discharge as identified in assessment in conjunction with the patient/family and health care team  - Arrange appropriate level of post-acute services according to patients   needs and preference and payer coverage in collaboration with the physician and health care team  - Communicate with and update the patient/family, physician, and health care team regarding progress on the discharge plan  - Arrange appropriate transportation to post-acute venues  Outcome: Progressing

## 2019-10-12 VITALS
WEIGHT: 315 LBS | BODY MASS INDEX: 46.65 KG/M2 | HEIGHT: 69 IN | TEMPERATURE: 98.3 F | HEART RATE: 69 BPM | DIASTOLIC BLOOD PRESSURE: 73 MMHG | SYSTOLIC BLOOD PRESSURE: 139 MMHG | RESPIRATION RATE: 20 BRPM | OXYGEN SATURATION: 92 %

## 2019-10-12 LAB
ANION GAP SERPL CALCULATED.3IONS-SCNC: 6 MMOL/L (ref 5–14)
BACTERIA BLD CULT: NORMAL
BACTERIA BLD CULT: NORMAL
BUN SERPL-MCNC: 19 MG/DL (ref 5–25)
CALCIUM SERPL-MCNC: 8.7 MG/DL (ref 8.4–10.2)
CHLORIDE SERPL-SCNC: 102 MMOL/L (ref 97–108)
CO2 SERPL-SCNC: 29 MMOL/L (ref 22–30)
CREAT SERPL-MCNC: 0.97 MG/DL (ref 0.7–1.5)
GFR SERPL CREATININE-BSD FRML MDRD: 86 ML/MIN/1.73SQ M
GLUCOSE SERPL-MCNC: 94 MG/DL (ref 70–99)
POTASSIUM SERPL-SCNC: 4.2 MMOL/L (ref 3.6–5)
SODIUM SERPL-SCNC: 137 MMOL/L (ref 137–147)

## 2019-10-12 PROCEDURE — 99239 HOSP IP/OBS DSCHRG MGMT >30: CPT | Performed by: FAMILY MEDICINE

## 2019-10-12 PROCEDURE — 80048 BASIC METABOLIC PNL TOTAL CA: CPT | Performed by: FAMILY MEDICINE

## 2019-10-12 RX ORDER — NIFEDIPINE 60 MG/1
60 TABLET, FILM COATED, EXTENDED RELEASE ORAL DAILY
Qty: 30 TABLET | Refills: 0 | Status: SHIPPED | OUTPATIENT
Start: 2019-10-12 | End: 2019-10-23 | Stop reason: ALTCHOICE

## 2019-10-12 RX ORDER — FUROSEMIDE 20 MG/1
20 TABLET ORAL DAILY
Qty: 30 TABLET | Refills: 0 | Status: SHIPPED | OUTPATIENT
Start: 2019-10-12

## 2019-10-12 RX ORDER — CEPHALEXIN 500 MG/1
500 CAPSULE ORAL EVERY 6 HOURS SCHEDULED
Qty: 16 CAPSULE | Refills: 0 | Status: SHIPPED | OUTPATIENT
Start: 2019-10-12 | End: 2019-10-16

## 2019-10-12 RX ADMIN — FAMOTIDINE 20 MG: 20 TABLET ORAL at 09:45

## 2019-10-12 RX ADMIN — PREGABALIN 50 MG: 25 CAPSULE ORAL at 09:44

## 2019-10-12 RX ADMIN — FUROSEMIDE 20 MG: 20 TABLET ORAL at 09:45

## 2019-10-12 RX ADMIN — NIFEDIPINE 60 MG: 30 TABLET, FILM COATED, EXTENDED RELEASE ORAL at 09:44

## 2019-10-12 RX ADMIN — SENNOSIDES AND DOCUSATE SODIUM 2 TABLET: 8.6; 5 TABLET ORAL at 09:44

## 2019-10-12 RX ADMIN — TRAMADOL HYDROCHLORIDE 50 MG: 50 TABLET, FILM COATED ORAL at 09:50

## 2019-10-12 RX ADMIN — POLYETHYLENE GLYCOL 3350 17 G: 17 POWDER, FOR SOLUTION ORAL at 09:45

## 2019-10-12 RX ADMIN — CEFAZOLIN SODIUM 2000 MG: 2 SOLUTION INTRAVENOUS at 05:55

## 2019-10-12 NOTE — DISCHARGE INSTRUCTIONS
CPAP   WHAT YOU NEED TO KNOW:   What is CPAP? Continuous positive airway pressure (CPAP) is a treatment that uses air pressure to keep your airways open while you sleep  People who have breathing problems, such as sleep apnea, can benefit from CPAP treatment  How is CPAP treatment given? CPAP treatment is given through a machine that has a mask, tube, and motor  The motor sends air through the tube and into your mask  This mild air pressure prevents your airway from collapsing or becoming blocked  Use your CPAP machine whenever you sleep, even when you nap  You may need to use a CPAP machine for the rest of your life  What are the benefits of CPAP? · Improves quality of sleep     · Relieves daytime sleepiness     · Reduces snoring     · Reduces the risk of health problems caused by sleep apnea  What problems may occur with CPAP? It takes time to adjust to CPAP treatment  You may not be able to wear the mask all night right away  At first, try to use your CPAP machine for a few hours every night  Then slowly increase the length of time you use your machine  If problems continue, contact your healthcare provider  Below are some solutions to common problems:  · Your CPAP mask feels uncomfortable or irritates your skin  You may need a mask that is a different size, shape, or material  A mask with fewer straps may irritate your skin less  You may also need to use a special moisturizer made for CPAP machine users  · You have dry mouth or nasal irritation  These problems can be caused by the CPAP machine, a leaking mask, or breathing through your mouth  Some machines come with a heated humidifier to help relieve these symptoms  A chin strap to help keep your mouth closed or a different type of mask can help dry mouth  Use a saline nasal spray at bedtime to help relieve nasal irritation  · You have difficulty adjusting to the air pressure  You may feel like it is hard to exhale or like you are choking   You may burp or have stomach bloating because the air pressure is causing you to swallow air  You may need to adjust the air pressure on your machine or choose a different type of CPAP machine  When should I contact my healthcare provider? · You gain or lose weight  · You continue to feel very sleepy during the day, even after you wear your CPAP device as directed  · Your CPAP is causing problems that do not improve  · You have questions or concerns about your condition, care, or equipment  CARE AGREEMENT:   You have the right to help plan your care  Learn about your health condition and how it may be treated  Discuss treatment options with your caregivers to decide what care you want to receive  You always have the right to refuse treatment  The above information is an  only  It is not intended as medical advice for individual conditions or treatments  Talk to your doctor, nurse or pharmacist before following any medical regimen to see if it is safe and effective for you  © 2017 [facility] Information is for End User's use only and may not be sold, redistributed or otherwise used for commercial purposes  All illustrations and images included in CareNotes® are the copyrighted property of A D A M [employer] Inc  or Anibal Hunt

## 2019-10-12 NOTE — DISCHARGE SUMMARY
Discharge- Toby Case 1960, 63 y o  male MRN: [id number]    Unit/Bed#: 7T [facility] 707-01 Encounter: [id number]    Primary Care Provider: Zachary Alexandra MD   Date and time admitted to hospital: 10/7/2019  4:46 PM        Mild intermittent asthma without complication  Assessment & Plan  Mild intermittent asthma  Not on advair stopped 8/2019   Not in acute exacerbation at present time in remission    Morbid obesity with BMI of 45 0-49 9, adult [facility])  Assessment & Plan  Morbid obesity the to high calorie intake  Low-carbohydrate diet  May consult dietitian for weight reduction diet    KIRIT on CPAP  Assessment & Plan  Continue CPAP    GERD (gastroesophageal reflux disease)  Assessment & Plan  History of GERD  Continue PPI    Essential hypertension  Assessment & Plan  Essential hypertension  Stable continue Lasix and Procardia          HLD (hyperlipidemia)  Assessment & Plan  History of hyperlipidemia  Continue Zocor as an outpatient    * Open wound of right foot with tendon involvement  Assessment & Plan  Patient with multiple surgery on right foot due to right acutely tender rupture has multiple plastic surgery by plastic surgeon - with erythema and swelling mild improvement today - multiple abx outpatient failure- IV antibiotic covering Gram-positive negative- hx of mrsa- wound cx- strep switched to ancef to continue another 24 hrs - leg improving - mri and vde neg- no plan for OR by Dr Divya Sy this admission - if continues to improve will be switched over to keflex on discharge anticipate 10/12 Blood culture neg - today mostly increase in edema but not cellulitis and had discussion with patient and ID will continue same planning- he does have venous stasis - I dced hctz2 days ago due to low bp - his BP has been stable - will actually add lasix 20 mg po daily- to help with edema   Improved continue Lasix 20 mg p   O  Daily and Keflex till 10 16 follow up with ID as scheduled and after finishing antibiotics with Dr Leonela Weldon for a debridement as an outpatient  Wound culture- prelim - beta hemolytic strep   Consult ID- discussed  Consult plastic surgeon- Dr Neida Zarate evaluated initially will reevaluate today for need for debridement   Local treatment for plastic surgeon  Discussed with patient  Patient medically clear to be discharged home          Discharging Physician / Practitioner: Sawyer Serrano MD  PCP: Megan Hooker MD  Admission Date:   Admission Orders (From admission, onward)     Ordered        10/07/19 1739  Inpatient Admission  Once                   Discharge Date: 10/12/19    Resolved Problems  Date Reviewed: 10/12/2019    None          Consultations During Hospital Stay:  · Infectious disease  · Plastic surgery    Procedures Performed:   · None    Significant Findings / Test Results:   · Venous duplex is negative  · MRI of the right ankle-1  Soft tissue ulcer at the medial distal lower leg without acute osseous abnormality  2  Diffuse nonspecific soft tissue edema  3  Nonspecific edema within the flexor hallucis longus muscle belly Postsurgical changes of Achilles tendon repair and flexor hallucis longus tendon transfe    Incidental Findings:   · none     Test Results Pending at Discharge (will require follow up):   · none     Outpatient Tests Requested:  · none    Complications:  none    Reason for Admission:  Right lower extremity cellulitis with an open wound    Hospital Course:     Geovani Boudreaux is a 63 y o  male patient who originally presented to the hospital on 10/7/2019 due to right lower extremity cellulitis with a history of a graft and open wound failing outpatient antibiotics  He was admitted started on Zosyn and Vanco with consultation with Infectious Disease his wound culture grew out streptococcal and he was switched to Ancef high dose    Evaluated by Plastic surgery no need for any debridement while inpatient he will follow up outpatient after treatment of antibiotics and plan for the debridement  Patient's lower extremity erythema improved slowly on the he did develop some swelling most likely secondary to venous stasis I did discontinue his hydrochlorothiazide at he has a episode of low blood pressure to continue Procardia with the blood pressure stabilized  I did at Lasix 20 mg p  O  Daily with good urine output and slight improvement with the edema  Patient felt he felt better with venous duplex negative MRI negative for any deeper infection and he is medically clear to be discharged home on finishing his Keflex on October 16th  Follow up with primary care Plastic surgery and ID as scheduled  Please see above list of diagnoses and related plan for additional information  Condition at Discharge: stable     Discharge Day Visit / Exam:     Subjective:  Patient seen and examined denies any chest pain or shortness of breath feels his leg is better  Vitals: Blood Pressure: 139/73 (10/12/19 0731)  Pulse: 69 (10/12/19 0731)  Temperature: 98 3 °F (36 8 °C) (10/12/19 0731)  Temp Source: Temporal (10/12/19 0731)  Respirations: 20 (10/12/19 0731)  Height: 5' 9" (175 3 cm) (10/07/19 1653)  Weight - Scale: (!) 152 kg (335 lb 15 7 oz) (10/07/19 1653)  SpO2: 92 % (10/12/19 0731)  Exam:   Physical Exam   Constitutional: He is oriented to person, place, and time  He appears well-developed and well-nourished  HENT:   Head: Normocephalic and atraumatic  Eyes: Pupils are equal, round, and reactive to light  EOM are normal    Neck: Normal range of motion  Cardiovascular: Normal rate, regular rhythm and normal heart sounds  Pulmonary/Chest: Effort normal and breath sounds normal    Abdominal: Soft  Bowel sounds are normal    Musculoskeletal: Normal range of motion  He exhibits edema (Right lower extremity and there is erythema but it is much improved)  Neurological: He is alert and oriented to person, place, and time  He has normal reflexes     cranial nerve 2-12 are normal   Normal neurological exam   Skin: Skin is warm  Psychiatric: He has a normal mood and affect  Discussion with Family:  Patient    Discharge instructions/Information to patient and family:   See after visit summary for information provided to patient and family  Provisions for Follow-Up Care:  See after visit summary for information related to follow-up care and any pertinent home health orders  Disposition:     Home    For Discharges to [facility] SNF:   · Not Applicable to this Patient - Not Applicable to this Patient    Planned Readmission: no     Discharge Statement:  I spent >35 minutes discharging the patient  This time was spent on the day of discharge  I had direct contact with the patient on the day of discharge  Greater than 50% of the total time was spent examining patient, answering all patient questions, arranging and discussing plan of care with patient as well as directly providing post-discharge instructions  Additional time then spent on discharge activities  Discharge Medications:  See after visit summary for reconciled discharge medications provided to patient and family        ** Please Note: This note has been constructed using a voice recognition system **

## 2019-10-12 NOTE — NURSING NOTE
Patient is A+Ox3, VSS   C/O 3/10 R foot pain, requested tramadol for ride home  Patient tolerated breakfast and showered  Omar Best changed dressing to R leg  Patient is packing up stuff awaiting discharge ride

## 2019-10-12 NOTE — ASSESSMENT & PLAN NOTE
Patient with multiple surgery on right foot due to right acutely tender rupture has multiple plastic surgery by plastic surgeon - with erythema and swelling mild improvement today - multiple abx outpatient failure- IV antibiotic covering Gram-positive negative- hx of mrsa- wound cx- strep switched to ancef to continue another 24 hrs - leg improving - mri and vde neg- no plan for OR by Dr Ольга Fry this admission - if continues to improve will be switched over to keflex on discharge anticipate 10/12 Blood culture neg - today mostly increase in edema but not cellulitis and had discussion with patient and ID will continue same planning- he does have venous stasis - I dced hctz2 days ago due to low bp - his BP has been stable - will actually add lasix 20 mg po daily- to help with edema   Improved continue Lasix 20 mg p  O  Daily and Keflex till 10 16 follow up with ID as scheduled and after finishing antibiotics with Dr Ana María Carpio for a debridement as an outpatient      Wound culture- prelim - beta hemolytic strep   Consult ID- discussed  Consult plastic surgeon- Dr Ольга Fry evaluated initially will reevaluate today for need for debridement   Local treatment for plastic surgeon  Discussed with patient  Patient medically clear to be discharged home

## 2019-10-12 NOTE — NURSING NOTE
Patient is discharged to home, IV removed  RLE dressing changed by PA with [facility]  Patient given discharge instructions and Rx's with stated understanding  Patient left unit in stable condition with belongings via w/c accompanied by PCA

## 2019-10-23 ENCOUNTER — OFFICE VISIT (OUTPATIENT)
Dept: INFECTIOUS DISEASES | Facility: CLINIC | Age: 59
End: 2019-10-23
Payer: COMMERCIAL

## 2019-10-23 ENCOUNTER — APPOINTMENT (OUTPATIENT)
Dept: LAB | Facility: HOSPITAL | Age: 59
End: 2019-10-23
Payer: COMMERCIAL

## 2019-10-23 ENCOUNTER — DOCUMENTATION (OUTPATIENT)
Dept: INFECTIOUS DISEASES | Facility: CLINIC | Age: 59
End: 2019-10-23

## 2019-10-23 VITALS
HEIGHT: 68 IN | SYSTOLIC BLOOD PRESSURE: 118 MMHG | TEMPERATURE: 98.7 F | WEIGHT: 315 LBS | DIASTOLIC BLOOD PRESSURE: 68 MMHG | BODY MASS INDEX: 47.74 KG/M2

## 2019-10-23 DIAGNOSIS — E66.01 MORBID OBESITY WITH BMI OF 45.0-49.9, ADULT (HCC): ICD-10-CM

## 2019-10-23 DIAGNOSIS — L08.9 INFECTION OF RIGHT FOOT: ICD-10-CM

## 2019-10-23 DIAGNOSIS — N17.9 AKI (ACUTE KIDNEY INJURY) (HCC): ICD-10-CM

## 2019-10-23 DIAGNOSIS — L08.9 INFECTION OF RIGHT FOOT: Primary | ICD-10-CM

## 2019-10-23 DIAGNOSIS — L03.115 CELLULITIS OF RIGHT LOWER EXTREMITY WITHOUT FOOT: ICD-10-CM

## 2019-10-23 DIAGNOSIS — S91.301D OPEN WOUND OF RIGHT FOOT, SUBSEQUENT ENCOUNTER: ICD-10-CM

## 2019-10-23 LAB
BASOPHILS # BLD AUTO: 0.07 THOUSANDS/ΜL (ref 0–0.1)
BASOPHILS NFR BLD AUTO: 1 % (ref 0–1)
CREAT SERPL-MCNC: 0.94 MG/DL (ref 0.6–1.3)
EOSINOPHIL # BLD AUTO: 0.59 THOUSAND/ΜL (ref 0–0.61)
EOSINOPHIL NFR BLD AUTO: 6 % (ref 0–6)
ERYTHROCYTE [DISTWIDTH] IN BLOOD BY AUTOMATED COUNT: 17.4 % (ref 11.6–15.1)
GFR SERPL CREATININE-BSD FRML MDRD: 89 ML/MIN/1.73SQ M
HCT VFR BLD AUTO: 49.8 % (ref 36.5–49.3)
HGB BLD-MCNC: 15.7 G/DL (ref 12–17)
IMM GRANULOCYTES # BLD AUTO: 0.03 THOUSAND/UL (ref 0–0.2)
IMM GRANULOCYTES NFR BLD AUTO: 0 % (ref 0–2)
LYMPHOCYTES # BLD AUTO: 2.08 THOUSANDS/ΜL (ref 0.6–4.47)
LYMPHOCYTES NFR BLD AUTO: 20 % (ref 14–44)
MCH RBC QN AUTO: 27.1 PG (ref 26.8–34.3)
MCHC RBC AUTO-ENTMCNC: 31.5 G/DL (ref 31.4–37.4)
MCV RBC AUTO: 86 FL (ref 82–98)
MONOCYTES # BLD AUTO: 1.07 THOUSAND/ΜL (ref 0.17–1.22)
MONOCYTES NFR BLD AUTO: 10 % (ref 4–12)
NEUTROPHILS # BLD AUTO: 6.42 THOUSANDS/ΜL (ref 1.85–7.62)
NEUTS SEG NFR BLD AUTO: 63 % (ref 43–75)
NRBC BLD AUTO-RTO: 0 /100 WBCS
PLATELET # BLD AUTO: 261 THOUSANDS/UL (ref 149–390)
PMV BLD AUTO: 10.4 FL (ref 8.9–12.7)
RBC # BLD AUTO: 5.8 MILLION/UL (ref 3.88–5.62)
WBC # BLD AUTO: 10.26 THOUSAND/UL (ref 4.31–10.16)

## 2019-10-23 PROCEDURE — 82565 ASSAY OF CREATININE: CPT

## 2019-10-23 PROCEDURE — 85025 COMPLETE CBC W/AUTO DIFF WBC: CPT

## 2019-10-23 PROCEDURE — 36415 COLL VENOUS BLD VENIPUNCTURE: CPT

## 2019-10-23 PROCEDURE — 99214 OFFICE O/P EST MOD 30 MIN: CPT | Performed by: PHYSICIAN ASSISTANT

## 2019-10-23 NOTE — ASSESSMENT & PLAN NOTE
Patient was violaceous erythema of the right lower extremity, well demarcated  Upon review of patient's hospital records he did have an MRI recently which did not demonstrate deeper infection  Pictures were taken today and should with Dr Amaya Coyne believes the patient's legs were stable from the time of his discharge  The patient believes that his erythema is slightly worse, he has increased pain and some increased odor to his drainage  He was recently treated with a 5 day course of IV antibiotics followed by p o  Keflex and he states he felt he had some improvement in the hospital   In my opinion, he appears to have chronic vascular changes to the lower extremity  He denies any systemic signs of illness such as fever or chills  Patient may have improved during the hospital due to better edema control and elevation of the leg  Dr Camryn Juarez recently spoke to [facility] and states he would like to graft patient's chronic wound but is currently unable to do so secondary to the infection  Due to this, we will give the patient a 2 week trial of cefazolin 2 g IV every 8 hours  He will have an IR consult for PICC placement  We will set up home IV antibiotics with [facility]  Patient will have CBC and creatinine done today and weekly while on IV antibiotics  He will return to our office in follow-up in 2 weeks

## 2019-10-23 NOTE — PROGRESS NOTES
Assessment/Plan:    Cellulitis of right lower extremity without foot  Patient was violaceous erythema of the right lower extremity, well demarcated  Upon review of patient's hospital records he did have an MRI recently which did not demonstrate deeper infection  Pictures were taken today and should with Dr England Apt believes the patient's legs were stable from the time of his discharge  The patient believes that his erythema is slightly worse, he has increased pain and some increased odor to his drainage  He was recently treated with a 5 day course of IV antibiotics followed by p o  Keflex and he states he felt he had some improvement in the hospital   In my opinion, he appears to have chronic vascular changes to the lower extremity  He denies any systemic signs of illness such as fever or chills  Patient may have improved during the hospital due to better edema control and elevation of the leg  Dr Ayala Neither recently spoke to [facility] and states he would like to graft patient's chronic wound but is currently unable to do so secondary to the infection  Due to this, we will give the patient a 2 week trial of cefazolin 2 g IV every 8 hours  He will have an IR consult for PICC placement  We will set up home IV antibiotics with [facility]  Patient will have CBC and creatinine done today and weekly while on IV antibiotics  He will return to our office in follow-up in 2 weeks  Diagnoses and all orders for this visit:    Infection of right foot  -     Cancel: IR consult; Future  -     X-ray chest 1 view portable; Future  -     Creatinine, serum; Future  -     CBC and differential; Future  -     IR PICC line; Future    Open wound of right foot, subsequent encounter    LUIS CARLOS (acute kidney injury) ([facility] )    Morbid obesity with BMI of 45 0-49 9, adult ([facility] )    Cellulitis of right lower extremity without foot          Subjective:      Patient ID: Monserrat Smith is a 63 y o  male      HPI  69-year-old male presents for office follow-up today regarding right lower extremity cellulitis  Patient has extensive history of a chronic right lower extremity nonhealing wound in the setting of prior right Achilles tendon repair complicated by wound infection status post free flap closure with STSG  In August of 2018 patient did grow MRSA and achromobacter  He also has remote history of Pseudomonas  On this most recent hospital stay he grew GGS  Patient was initially started on Vanco Zosyn and eventually narrowed to cefazolin  He completed a total of 5 days of IV antibiotics and was then switched over to p o  Keflex to complete a total of 10 days  Patient now presents to the office off antibiotics since 10/16/2019  Patient reports some improvement of erythema while in the hospital   He has remained on Lasix and has had some improvement in swelling  He now states he believes he has had increased erythema, pain, and new foul-smelling drainage  He denies any fevers chills  He has no systemic complaints  Patient did undergo MRI while in the hospital which did not show evidence of underlying infection  Dr Angela Ontiveros did speak with Dr Zulay Torres and stated that he would like to graft the patient but is unable to due to his current infectious state  The following portions of the patient's history were reviewed and updated as appropriate: allergies, current medications, past family history, past medical history, past social history, past surgical history and problem list     Review of Systems   Constitutional: Negative for chills and fever  HENT: Negative for mouth sores  Respiratory: Negative for cough and shortness of breath  Cardiovascular: Positive for leg swelling  Negative for chest pain and palpitations  Gastrointestinal: Negative for abdominal pain, diarrhea, nausea and vomiting  Genitourinary: Negative for difficulty urinating, dysuria and frequency     Musculoskeletal: Negative for arthralgias, back pain, joint swelling and myalgias  Skin: Positive for wound  Negative for rash  Allergic/Immunologic: Negative for immunocompromised state  Neurological: Negative for headaches  Psychiatric/Behavioral: Negative for behavioral problems and confusion  Objective:      /68   Temp 98 7 °F (37 1 °C)   Ht 5' 8" (1 727 m)   Wt (!) 151 kg (332 lb)   BMI 50 48 kg/m²          Physical Exam   Constitutional: He is oriented to person, place, and time  He appears well-developed and well-nourished  No distress  HENT:   Head: Normocephalic and atraumatic  Right Ear: External ear normal    Left Ear: External ear normal    Nose: Nose normal    Mouth/Throat: Oropharynx is clear and moist  No oropharyngeal exudate  Eyes: Conjunctivae are normal  Right eye exhibits no discharge  Left eye exhibits no discharge  No scleral icterus  Neck: Normal range of motion  Neck supple  Cardiovascular: Normal rate and regular rhythm  Pulmonary/Chest: Effort normal and breath sounds normal  No stridor  No respiratory distress  He has no wheezes  He exhibits no tenderness  Abdominal: Soft  Bowel sounds are normal  He exhibits no distension  There is no tenderness  Musculoskeletal:   Right lower extremity with edema and chronic appearing violaceous erythema  Appears more vascular in nature  It is well demarcated  Chronic wound of the inner ankle with some yellow slough    Neurological: He is alert and oriented to person, place, and time  Skin: No rash noted  He is not diaphoretic  Psychiatric: He has a normal mood and affect  His behavior is normal    Nursing note and vitals reviewed        Labs  Creatinine 10/12/2019  0 97

## 2019-10-23 NOTE — PROGRESS NOTES
Picc consent sent to central faxing team to attach to pt's chart  Pt is set up to have picc line inserted tomorrow at 8am at [address]  Spoke to Loni Azul at [facility] who is working on setting pt up with Home Health-will either be [employer] or [facility]  Pt ok to receive first dose at home with home care as he has gotten cefazolin previously in the hospital     Loni Azul will inform us of final outcome and we/she will call the pt tomorrow with more information on start date of IV abx and [facility] agency accepting him  Faxed orders to Loni Azul for antibiotics and weekly labs

## 2019-10-24 ENCOUNTER — HOSPITAL ENCOUNTER (OUTPATIENT)
Dept: RADIOLOGY | Facility: HOSPITAL | Age: 59
Discharge: HOME | End: 2019-10-24
Payer: COMMERCIAL

## 2019-10-24 ENCOUNTER — TELEPHONE (OUTPATIENT)
Dept: INTERVENTIONAL RADIOLOGY/VASCULAR | Facility: HOSPITAL | Age: 59
End: 2019-10-24

## 2019-10-24 ENCOUNTER — HOSPITAL ENCOUNTER (OUTPATIENT)
Dept: INTERVENTIONAL RADIOLOGY/VASCULAR | Facility: HOSPITAL | Age: 59
Discharge: HOME | End: 2019-10-24
Payer: COMMERCIAL

## 2019-10-24 DIAGNOSIS — L08.9 INFECTION OF RIGHT FOOT: ICD-10-CM

## 2019-10-24 DIAGNOSIS — Z95.828 S/P PICC CENTRAL LINE PLACEMENT: ICD-10-CM

## 2019-10-24 PROCEDURE — 36569 INSJ PICC 5 YR+ W/O IMAGING: CPT

## 2019-10-24 NOTE — PROGRESS NOTES
Spoke to Vanessa from [facility] and pt is set up with Manjinder/ [facility]  First visit will be for today 10/24 @ 4:00 pm w/ LEN  Will call pt once he is out of IR appt to reiterate [facility] appt  Medication will be delivered later this morning to pt from [facility]

## 2019-10-24 NOTE — DISCHARGE INSTRUCTIONS
Peripherally Inserted Central Catheter     WHAT YOU NEED TO KNOW:   A PICC is an IV placed into a large blood vessel near your heart  It is usually inserted through a blood vessel in your arm  Your PICC may have multiple ports  Ports are tubes where you can inject medicine  A PICC can stay in place for several weeks or months  You may need a PICC to get nutrition, medicine, or fluids  Blood samples can be removed from your PICC and sent to the lab for tests  DISCHARGE INSTRUCTIONS:    [facility] and [facility] patients,    Contact Interventional Radiology at [phone] PATIENTS: Contact Interventional Radiology at [phone]   [facility] PATIENTS: Contact Interventional Radiology at [phone] if:  · Blood soaks through your bandage  · Your arm or leg feels warm, tender, and painful  It may look swollen and red  · You have trouble moving your arm  · Your catheter falls out  · You have a fever or swelling, redness, pain, or pus where the catheter was inserted  · Persistent nausea or vomiting  · You cannot flush your catheter, or you feel pain when you flush your catheter  · You see a hole or crack in the tubing of your catheter  · You see fluid leaking from the insertion site  · You run out of supplies to care for your catheter  · You have questions or concerns about your condition or care

## 2019-10-29 ENCOUNTER — DOCUMENTATION (OUTPATIENT)
Dept: INFECTIOUS DISEASES | Facility: CLINIC | Age: 59
End: 2019-10-29

## 2019-10-29 ENCOUNTER — TELEPHONE (OUTPATIENT)
Dept: INFECTIOUS DISEASES | Facility: CLINIC | Age: 59
End: 2019-10-29

## 2019-10-29 NOTE — PROGRESS NOTES
IV antibiotics to be changed from cefazolin to vancomycin/cefepime  Pt has received both of these medications in the past without issues  Orders called and faxed to [facility]  Pt will have first doses tonight, with CBCD, creat, vanco trough to be drawn on Thursday morning 10/31 with the 4th dose  Attempted to reach pt to let him know [facility] would contact him regarding delivery but pt's phone went right to VM  Will try again to reach pt to discuss

## 2019-10-29 NOTE — TELEPHONE ENCOUNTER
Attempted to call patient  No answer and voice mailbox full  Patient return call right away  We discussed his most recent culture results now growing MRSA and Pseudomonas  We will adjust his antibiotics to vancomycin and cefepime  We will notify [facility] and have this all set up    Patient to keep appointment next week with Dr Bowen Sender

## 2019-10-30 ENCOUNTER — TELEPHONE (OUTPATIENT)
Dept: INFECTIOUS DISEASES | Facility: CLINIC | Age: 59
End: 2019-10-30

## 2019-10-30 ENCOUNTER — TELEPHONE (OUTPATIENT)
Dept: OTHER | Facility: HOSPITAL | Age: 59
End: 2019-10-30

## 2019-10-30 NOTE — TELEPHONE ENCOUNTER
Attempted to reach patient again  VM full, no answer  Pt needs to be aware that he should not start infusions until tomorrow morning so that the timing of the vanco trough with the 4th dose will be Fri evening during scheduled [facility] visit

## 2019-10-30 NOTE — TELEPHONE ENCOUNTER
I called patient today to further discuss his IV antibiotics  Patient had recent outpatient wound culture obtained by PCP which revealed MRSA and Pseudomonas  These organisms may be colonizers in the setting of chronic open wound  However, patient states the leg has been more notably swollen, erythematous  He is not having any fevers, chills or other systemic complaints  We decided to proceed with a trial of IV vancomycin and cefepime, tentatively for 2 weeks, depending on how the leg responds  In the interim, patient will also be following with Dr Maggy Solis for possible repeat wound debridement as well as ultimate graft placement  Patient understands that a course of IV antibiotic alone will not solve this chronic issue  He also accepts the risks involved with IV antibiotics including development of side effects, MDR infection, C difficile colitis, PICC line infection

## 2019-10-30 NOTE — TELEPHONE ENCOUNTER
Spoke with Rachid Schwartz at [place] at [facility]  [facility] will make delivery of vancomycin and cefepime today  Pt will be home in the early afternoon  If he is not home when the delivery comes, there will be a representative at his house who he has given permission to sign for med acceptance  A will make a visit tomorrow morning, at which time he will receive teaching and begin the first dose of vanco and cefepime as well as have his PICC dressing changed  His doses going forward will be at 7am and 7pm daily  Vancomycin trough will be due on Friday, Nov 1 at 6:30 p m  Theron Turk confirmed that [facility] is able to accommodate an evening visit at that time  Weekly lab orders for CBCD, creat, vanco trough given to Theron Turk  Pt's vanco/cefepime course will be 14 days, thru 11/13/19, and has f/u scheduled with 11/8/19 with Dr Snow Nagy     I tried to reach patient to discuss all of this  His voicemail is full and I was not able to speak with him  Will try again

## 2019-10-31 DIAGNOSIS — L08.9 INFECTION OF RIGHT FOOT: Primary | ICD-10-CM

## 2019-11-01 ENCOUNTER — LAB REQUISITION (OUTPATIENT)
Dept: LAB | Facility: HOSPITAL | Age: 59
End: 2019-11-01
Payer: COMMERCIAL

## 2019-11-01 DIAGNOSIS — T81.33XA DISRUPTION OF TRAUMATIC INJURY WOUND REPAIR, INITIAL ENCOUNTER: ICD-10-CM

## 2019-11-01 DIAGNOSIS — T81.49XA INFECTION FOLLOWING A PROCEDURE, OTHER SURGICAL SITE, INITIAL ENCOUNTER: ICD-10-CM

## 2019-11-01 LAB
BASOPHILS # BLD AUTO: 0.06 THOUSANDS/ΜL (ref 0–0.1)
BASOPHILS NFR BLD AUTO: 1 % (ref 0–1)
EOSINOPHIL # BLD AUTO: 0.52 THOUSAND/ΜL (ref 0–0.61)
EOSINOPHIL NFR BLD AUTO: 6 % (ref 0–6)
ERYTHROCYTE [DISTWIDTH] IN BLOOD BY AUTOMATED COUNT: 16.7 % (ref 11.6–15.1)
HCT VFR BLD AUTO: 48.8 % (ref 36.5–49.3)
HGB BLD-MCNC: 15.2 G/DL (ref 12–17)
IMM GRANULOCYTES # BLD AUTO: 0.02 THOUSAND/UL (ref 0–0.2)
IMM GRANULOCYTES NFR BLD AUTO: 0 % (ref 0–2)
LYMPHOCYTES # BLD AUTO: 1.92 THOUSANDS/ΜL (ref 0.6–4.47)
LYMPHOCYTES NFR BLD AUTO: 21 % (ref 14–44)
MCH RBC QN AUTO: 27.2 PG (ref 26.8–34.3)
MCHC RBC AUTO-ENTMCNC: 31.1 G/DL (ref 31.4–37.4)
MCV RBC AUTO: 87 FL (ref 82–98)
MONOCYTES # BLD AUTO: 0.88 THOUSAND/ΜL (ref 0.17–1.22)
MONOCYTES NFR BLD AUTO: 10 % (ref 4–12)
NEUTROPHILS # BLD AUTO: 5.87 THOUSANDS/ΜL (ref 1.85–7.62)
NEUTS SEG NFR BLD AUTO: 62 % (ref 43–75)
NRBC BLD AUTO-RTO: 0 /100 WBCS
PLATELET # BLD AUTO: 263 THOUSANDS/UL (ref 149–390)
PMV BLD AUTO: 10 FL (ref 8.9–12.7)
RBC # BLD AUTO: 5.59 MILLION/UL (ref 3.88–5.62)
VANCOMYCIN TROUGH SERPL-MCNC: 6.7 UG/ML (ref 10–20)
WBC # BLD AUTO: 9.27 THOUSAND/UL (ref 4.31–10.16)

## 2019-11-01 PROCEDURE — 85025 COMPLETE CBC W/AUTO DIFF WBC: CPT | Performed by: INTERNAL MEDICINE

## 2019-11-01 PROCEDURE — 82565 ASSAY OF CREATININE: CPT | Performed by: INTERNAL MEDICINE

## 2019-11-01 PROCEDURE — 80202 ASSAY OF VANCOMYCIN: CPT | Performed by: INTERNAL MEDICINE

## 2019-11-02 LAB
CREAT SERPL-MCNC: 0.96 MG/DL (ref 0.6–1.3)
GFR SERPL CREATININE-BSD FRML MDRD: 87 ML/MIN/1.73SQ M

## 2019-11-04 ENCOUNTER — TELEPHONE (OUTPATIENT)
Dept: INFECTIOUS DISEASES | Facility: CLINIC | Age: 59
End: 2019-11-04

## 2019-11-04 NOTE — TELEPHONE ENCOUNTER
Received a low vanco trough on pt of 6 7 and creat is  96, current dose - vanco 1000mg Q 12H and cefepime 2000mg Q 12H  Per Dr Marii Claire: increase dose to 1,500mg of vanco Q 12H  Repeat trough before 4th dose - 11/6 PM     Spoke to and faxed orders to both [facility] and [facility]  Called pt and lmom with detailed instructions  Asked that he cb with any questions  Let him know that [facility] should be in contact with him soon to arrange delivery of new doses

## 2019-11-07 ENCOUNTER — TELEPHONE (OUTPATIENT)
Dept: INFECTIOUS DISEASES | Facility: CLINIC | Age: 59
End: 2019-11-07

## 2019-11-07 ENCOUNTER — HOSPITAL ENCOUNTER (EMERGENCY)
Facility: HOSPITAL | Age: 59
Discharge: HOME | End: 2019-11-08
Attending: EMERGENCY MEDICINE
Payer: COMMERCIAL

## 2019-11-07 VITALS
SYSTOLIC BLOOD PRESSURE: 143 MMHG | HEART RATE: 73 BPM | TEMPERATURE: 97.5 F | OXYGEN SATURATION: 97 % | RESPIRATION RATE: 18 BRPM | BODY MASS INDEX: 49.95 KG/M2 | WEIGHT: 315 LBS | DIASTOLIC BLOOD PRESSURE: 63 MMHG

## 2019-11-07 DIAGNOSIS — Z91.19 NONCOMPLIANCE BY REFUSING SERVICE: Primary | ICD-10-CM

## 2019-11-07 DIAGNOSIS — R19.7 DIARRHEA OF PRESUMED INFECTIOUS ORIGIN: Primary | ICD-10-CM

## 2019-11-07 DIAGNOSIS — R19.7 DIARRHEA: ICD-10-CM

## 2019-11-07 DIAGNOSIS — L08.9 WOUND INFECTION: ICD-10-CM

## 2019-11-07 DIAGNOSIS — T14.8XXA WOUND INFECTION: ICD-10-CM

## 2019-11-07 DIAGNOSIS — R10.9 ABDOMINAL PAIN: Primary | ICD-10-CM

## 2019-11-07 LAB
ALBUMIN SERPL BCP-MCNC: 3.3 G/DL (ref 3.5–5)
ALP SERPL-CCNC: 68 U/L (ref 46–116)
ALT SERPL W P-5'-P-CCNC: 23 U/L (ref 12–78)
ANION GAP SERPL CALCULATED.3IONS-SCNC: 8 MMOL/L (ref 4–13)
AST SERPL W P-5'-P-CCNC: 29 U/L (ref 5–45)
BASOPHILS # BLD AUTO: 0.06 THOUSANDS/ΜL (ref 0–0.1)
BASOPHILS NFR BLD AUTO: 1 % (ref 0–1)
BILIRUB SERPL-MCNC: 0.6 MG/DL (ref 0.2–1)
BUN SERPL-MCNC: 16 MG/DL (ref 5–25)
CALCIUM SERPL-MCNC: 8.9 MG/DL (ref 8.3–10.1)
CHLORIDE SERPL-SCNC: 102 MMOL/L (ref 100–108)
CO2 SERPL-SCNC: 27 MMOL/L (ref 21–32)
CREAT SERPL-MCNC: 1.14 MG/DL (ref 0.6–1.3)
EOSINOPHIL # BLD AUTO: 0.82 THOUSAND/ΜL (ref 0–0.61)
EOSINOPHIL NFR BLD AUTO: 9 % (ref 0–6)
ERYTHROCYTE [DISTWIDTH] IN BLOOD BY AUTOMATED COUNT: 16.9 % (ref 11.6–15.1)
GFR SERPL CREATININE-BSD FRML MDRD: 70 ML/MIN/1.73SQ M
GLUCOSE SERPL-MCNC: 109 MG/DL (ref 65–140)
HCT VFR BLD AUTO: 49.9 % (ref 36.5–49.3)
HGB BLD-MCNC: 16 G/DL (ref 12–17)
IMM GRANULOCYTES # BLD AUTO: 0.04 THOUSAND/UL (ref 0–0.2)
IMM GRANULOCYTES NFR BLD AUTO: 1 % (ref 0–2)
LACTATE SERPL-SCNC: 1 MMOL/L (ref 0.5–2)
LIPASE SERPL-CCNC: 122 U/L (ref 73–393)
LYMPHOCYTES # BLD AUTO: 1.96 THOUSANDS/ΜL (ref 0.6–4.47)
LYMPHOCYTES NFR BLD AUTO: 22 % (ref 14–44)
MCH RBC QN AUTO: 28 PG (ref 26.8–34.3)
MCHC RBC AUTO-ENTMCNC: 32.1 G/DL (ref 31.4–37.4)
MCV RBC AUTO: 87 FL (ref 82–98)
MONOCYTES # BLD AUTO: 1.05 THOUSAND/ΜL (ref 0.17–1.22)
MONOCYTES NFR BLD AUTO: 12 % (ref 4–12)
NEUTROPHILS # BLD AUTO: 4.88 THOUSANDS/ΜL (ref 1.85–7.62)
NEUTS SEG NFR BLD AUTO: 55 % (ref 43–75)
NRBC BLD AUTO-RTO: 0 /100 WBCS
PLATELET # BLD AUTO: 268 THOUSANDS/UL (ref 149–390)
PMV BLD AUTO: 9.9 FL (ref 8.9–12.7)
POTASSIUM SERPL-SCNC: 3.6 MMOL/L (ref 3.5–5.3)
PROT SERPL-MCNC: 7.9 G/DL (ref 6.4–8.2)
RBC # BLD AUTO: 5.71 MILLION/UL (ref 3.88–5.62)
SODIUM SERPL-SCNC: 137 MMOL/L (ref 136–145)
VANCOMYCIN TROUGH SERPL-MCNC: 11.4 UG/ML (ref 10–20)
WBC # BLD AUTO: 8.81 THOUSAND/UL (ref 4.31–10.16)

## 2019-11-07 PROCEDURE — 83690 ASSAY OF LIPASE: CPT | Performed by: EMERGENCY MEDICINE

## 2019-11-07 PROCEDURE — 80202 ASSAY OF VANCOMYCIN: CPT | Performed by: EMERGENCY MEDICINE

## 2019-11-07 PROCEDURE — 87040 BLOOD CULTURE FOR BACTERIA: CPT | Performed by: EMERGENCY MEDICINE

## 2019-11-07 PROCEDURE — 36415 COLL VENOUS BLD VENIPUNCTURE: CPT | Performed by: EMERGENCY MEDICINE

## 2019-11-07 PROCEDURE — 85025 COMPLETE CBC W/AUTO DIFF WBC: CPT | Performed by: EMERGENCY MEDICINE

## 2019-11-07 PROCEDURE — 83605 ASSAY OF LACTIC ACID: CPT | Performed by: EMERGENCY MEDICINE

## 2019-11-07 PROCEDURE — 96360 HYDRATION IV INFUSION INIT: CPT

## 2019-11-07 PROCEDURE — 80053 COMPREHEN METABOLIC PANEL: CPT | Performed by: EMERGENCY MEDICINE

## 2019-11-07 PROCEDURE — 99284 EMERGENCY DEPT VISIT MOD MDM: CPT | Performed by: EMERGENCY MEDICINE

## 2019-11-07 PROCEDURE — 99284 EMERGENCY DEPT VISIT MOD MDM: CPT

## 2019-11-07 RX ORDER — EZETIMIBE 10 MG/1
10 TABLET ORAL DAILY
COMMUNITY
Start: 2019-10-28

## 2019-11-07 RX ADMIN — SODIUM CHLORIDE 500 ML: 0.9 INJECTION, SOLUTION INTRAVENOUS at 22:48

## 2019-11-07 NOTE — TELEPHONE ENCOUNTER
Patient returned call  Asked patient if he was able to come to his appt, and he stated no  He believes all we are trying to do is making him look like he is a non complaint patient  I advised we are trying our best to accomodate but need him to come to his appt, patient is not willing  I advised that if he does not come we would discontinue treatment  Patient verbalized he would find another doctor and hung up

## 2019-11-07 NOTE — TELEPHONE ENCOUNTER
Notified [facility] and [facility] that we would be discontinuing abx, weekly labs, and picc line  Faxed orders to both as well

## 2019-11-07 NOTE — TELEPHONE ENCOUNTER
Attempted to call patient to discuss  Patients phone is not accepting calls at the moment and voicemail is full  Will attempt later today

## 2019-11-07 NOTE — PROGRESS NOTES
Pt called this morning c/o diarrhea up to 5x a day, some nausea, and general feeling of uncomfortableness  He reports no fever  Spoke to Dr Raegan Lai and per her orders: please order a c'diff test and have the pt obtain  In the meantime try toast, bananas, yogurt, and to continue to hydrate  If pt continues to feel lousy or diarrhea gets worse-pt should go to the er to be evaluated  Called pt back to let him know  He expressed understanding

## 2019-11-08 ENCOUNTER — TELEPHONE (OUTPATIENT)
Dept: INFECTIOUS DISEASES | Facility: CLINIC | Age: 59
End: 2019-11-08

## 2019-11-08 ENCOUNTER — TELEPHONE (OUTPATIENT)
Dept: OTHER | Facility: HOSPITAL | Age: 59
End: 2019-11-08

## 2019-11-08 DIAGNOSIS — A04.72 CLOSTRIDIUM DIFFICILE COLITIS: Primary | ICD-10-CM

## 2019-11-08 LAB — C DIFF TOX GENS STL QL NAA+PROBE: NORMAL

## 2019-11-08 PROCEDURE — 87493 C DIFF AMPLIFIED PROBE: CPT | Performed by: EMERGENCY MEDICINE

## 2019-11-08 RX ORDER — VANCOMYCIN HYDROCHLORIDE 125 MG/1
125 CAPSULE ORAL 4 TIMES DAILY
Qty: 28 CAPSULE | Refills: 0 | Status: SHIPPED | OUTPATIENT
Start: 2019-11-08 | End: 2019-11-14 | Stop reason: SDUPTHER

## 2019-11-08 NOTE — TELEPHONE ENCOUNTER
Spoke to pt to inform him that [facility] would be calling to arrange delivery of more IV abx to him  Also that he could schedule an appt with [facility] to have his picc dressing changed  Provided phone number to him for [place]  Pt expressed understanding

## 2019-11-08 NOTE — ED PROVIDER NOTES
History  Chief Complaint   Patient presents with    Abdominal Pain     Pt reports abdominal cramping and diarrhea for past 2 days, reports has been on IV abx for leg infection  Reports " I think I might C-diff"  Pt also reports right leg infection is not getting any better reports "has MRSA"   Diarrhea     Pt  Has a chronic wound on his R foot for 1 & 1/2 years  He was admitted 3 weeks ago by Dr Neida Zarate, plastics, at [address] [place] [address] for iv antibiotics  He was discharged on po keflex  He saw his family dr  And tested the wound culture which came back as MRSA and pseudomonas  Plan is PICC line iv antibiotics and a skin graft  He's been on cefepime and vanco IV for one week  Pt  Had an appt  With ID last week, with Dr Josh Hou but missed the appt  , then Dr Neida Zarate cancelled his surgery for yest  (debridement)  C/o abd  Pain and watery diarrhea for the past 2 days ago, not eating well  +nausea, no vomiting, no fevers  Prior to Admission Medications   Prescriptions Last Dose Informant Patient Reported? Taking?    Bismuth Tribromoph-Petrolatum (XEROFORM PETROLAT PATCH 4"X4") PADS   Yes Yes   Sig: USING FOR WOUND CARE   acetaminophen (TYLENOL) 325 mg tablet Not Taking at Unknown time  No No   Sig: Take 2 tablets (650 mg total) by mouth 3 (three) times a day   Patient not taking: Reported on 10/23/2019   ezetimibe (ZETIA) 10 mg tablet   Yes No   Sig: Take 10 mg by mouth daily   famotidine (PEPCID) 20 mg tablet   No Yes   Sig: Take 1 tablet (20 mg total) by mouth 2 (two) times a day   fluticasone-salmeterol (ADVAIR DISKUS) 250-50 mcg/dose inhaler   Yes No   Sig: Inhale 1 puff   furosemide (LASIX) 20 mg tablet   No Yes   Sig: Take 1 tablet (20 mg total) by mouth daily   pregabalin (LYRICA) 50 mg capsule   Yes Yes   Sig: Take 50 mg by mouth 3 (three) times a day As needed   simvastatin (ZOCOR) 40 mg tablet 11/6/2019 at Unknown time Self Yes Yes   Sig: Take 40 mg by mouth every other day testosterone cypionate (DEPO-TESTOSTERONE) 200 mg/mL SOLN   Yes Yes   Sig: INJECT 1 MILLILITER BY INTRAMUSCULAR ROUTE EVERY 2 WEEKS      Facility-Administered Medications: None       Past Medical History:   Diagnosis Date    Asthma     CPAP (continuous positive airway pressure) dependence     Diverticulosis     GERD (gastroesophageal reflux disease)     Hyperlipidemia     Morbid obesity ([address] )     Open wound of right ankle     Sleep apnea     Ventricular tachycardia (HCC)     1 episode       Past Surgical History:   Procedure Laterality Date    CARPAL TUNNEL RELEASE      COLONOSCOPY  2007    FLAP LOCAL EXTREMITY Right 8/14/2018    Procedure: ASPIRATION FAILING FREE FLAP RIGHT LEG;  Surgeon: Gm Gimenez MD;  Location: [address] [place] [address];  Service: Plastics    FREE FLAP GRAFT Right 8/13/2018    Procedure: TRANSFER RIGHT THIGH FREE FLAP TO RIGHT HEEL;  Surgeon: Gm Gimenez MD;  Location: [address] [place] [address];  Service: Plastics    HERNIA REPAIR      abdominal x 2    IR PICC LINE  10/24/2019    POLYPECTOMY      hyperplastic    DE ADJ TISS XFER SCALP,EXTREM 10 1-30 SQCM Right 8/23/2018    Procedure: LOCAL FLAP POSS STSG ACHILLES TENDON;  Surgeon: Gm Gimenez MD;  Location: [address] [place] [address];  Service: Plastics    DE COLONOSCOPY FLX DX W/COLLJ [place] 1978 PFRMD N/A 11/27/2017    Procedure: COLONOSCOPY;  Surgeon: Huseyin Welch MD;  [facility]: [facility];   Service: Gastroenterology    DE DEBRIDEMENT, SKIN, SUB-Q TISSUE,=<20 SQ CM Right 7/18/2018    Procedure: DEBRIDEMENT ANKLE WOUND;  Surgeon: Gm Gimenez MD;  Location: [address] [place] [address] OR;  Service: Plastics    DE DEBRIDEMENT, SKIN, SUB-Q TISSUE,=<20 SQ CM Right 7/23/2018    Procedure: DEBRIDEMENT RIGHT ANKLE WOUND;  Surgeon: Gm Gimenez MD;  Location: [address] [place] [address] OR;  Service: Plastics    DE FREE MUSC-SKIN FLAP W/MICROVASC ANAST Right 7/30/2018    Procedure: COVERAGE HEEL WITH GRACILIS MUSCLE FLAP/SKINGRAFT ;  Surgeon: Gm Gimenez MD;  Location: [address] [place] [address] MAIN OR;  Service: Plastics    MD REPAIR ACHILLES TENDON,SECONDARY Right 4/23/2018    Procedure: REPAIR TENDON ACHILLES  FHL TRANSFER;  Surgeon: Norman Bates DPM;  [facility];  Service: Podiatry    TRIGGER FINGER RELEASE      thumb       Family History   Problem Relation Age of Onset    Cancer Mother     Hypertension Mother     Diabetes Father      I have reviewed and agree with the history as documented  Social History     Tobacco Use    Smoking status: Never Smoker    Smokeless tobacco: Never Used   Substance Use Topics    Alcohol use: Yes     Frequency: 2-4 times a month     Drinks per session: 1 or 2     Binge frequency: Never     Comment: rarely    Drug use: Never        Review of Systems    Physical Exam  Physical Exam   Constitutional: He is oriented to person, place, and time  He appears well-developed and well-nourished  HENT:   Head: Normocephalic and atraumatic  Neck: Normal range of motion  Neck supple  Pulmonary/Chest: Effort normal    Musculoskeletal: Normal range of motion  Neurological: He is alert and oriented to person, place, and time  Skin:   R medial ankle with chronic wound /granulation tissue present with surrounding erythema, no definite drainage  Nursing note and vitals reviewed        Vital Signs  ED Triage Vitals   Temperature Pulse Respirations Blood Pressure SpO2   11/07/19 2120 11/07/19 2124 11/07/19 2124 11/07/19 2124 11/07/19 2124   97 5 °F (36 4 °C) 83 18 158/90 98 %      Temp Source Heart Rate Source Patient Position - Orthostatic VS BP Location FiO2 (%)   11/07/19 2120 11/07/19 2124 11/07/19 2124 11/07/19 2124 --   Temporal Monitor Sitting Right arm       Pain Score       11/07/19 2124       4           Vitals:    11/07/19 2124 11/07/19 2312   BP: 158/90 143/63   Pulse: 83 73   Patient Position - Orthostatic VS: Sitting Lying         Visual Acuity      ED Medications  Medications   sodium chloride 0 9 % bolus 500 mL (0 mL Intravenous Stopped 11/8/19 0004)       Diagnostic Studies  Results Reviewed     Procedure Component Value Units Date/Time    Blood culture #1 [[id number]] Collected:  11/07/19 2237    Lab Status:  Final result Specimen:  Blood from Arm, Left Updated:  11/13/19 0902     Blood Culture No Growth After 5 Days  Blood culture #2 [[id number]] Collected:  11/07/19 2246    Lab Status:  Final result Specimen:  Blood from Hand, Right Updated:  11/13/19 0902     Blood Culture No Growth After 5 Days  Clostridium difficile toxin by PCR [[id number]]  (Normal) Collected:  11/08/19 0011    Lab Status:  Final result Specimen:  Stool from Per Rectum Updated:  11/08/19 1329     C difficile toxin by PCR NEGATIVE for C difficle toxin by PCR       Lipase [[id number]]  (Normal) Collected:  11/07/19 2240    Lab Status:  Final result Specimen:  Blood from Arm, Right Updated:  11/07/19 2329     Lipase 122 u/L     Comprehensive metabolic panel [[id number]]  (Abnormal) Collected:  11/07/19 2240    Lab Status:  Final result Specimen:  Blood from Arm, Right Updated:  11/07/19 2329     Sodium 137 mmol/L      Potassium 3 6 mmol/L      Chloride 102 mmol/L      CO2 27 mmol/L      ANION GAP 8 mmol/L      BUN 16 mg/dL      Creatinine 1 14 mg/dL      Glucose 109 mg/dL      Calcium 8 9 mg/dL      AST 29 U/L      ALT 23 U/L      Alkaline Phosphatase 68 U/L      Total Protein 7 9 g/dL      Albumin 3 3 g/dL      Total Bilirubin 0 60 mg/dL      eGFR 70 ml/min/1 73sq m     Narrative:       [place] guidelines for Chronic Kidney Disease (CKD):     Stage 1 with normal or high GFR (GFR > 90 mL/min/1 73 square meters)    Stage 2 Mild CKD (GFR = 60-89 mL/min/1 73 square meters)    Stage 3A Moderate CKD (GFR = 45-59 mL/min/1 73 square meters)    Stage 3B Moderate CKD (GFR = 30-44 mL/min/1 73 square meters)    Stage 4 Severe CKD (GFR = 15-29 mL/min/1 73 square meters)    Stage 5 End Stage CKD (GFR <15 mL/min/1 73 square meters)  Note: GFR calculation is accurate only with a steady state creatinine    Lactic acid, plasma [[id number]]  (Normal) Collected:  11/07/19 2240    Lab Status:  Final result Specimen:  Blood from Arm, Right Updated:  11/07/19 2324     LACTIC ACID 1 0 mmol/L     Narrative:       Result may be elevated if tourniquet was used during collection  Vancomycin, trough [[id number]]  (Normal) Collected:  11/07/19 2240    Lab Status:  Final result Specimen:  Blood from Arm, Right Updated:  11/07/19 2321     Vancomycin Tr 11 4 ug/mL     Narrative:       Verify that this specimen was collected immediately prior to drug administration  Reference range and result flagging reflect proper specimen collection protocol  CBC and differential [[id number]]  (Abnormal) Collected:  11/07/19 2240    Lab Status:  Final result Specimen:  Blood from Arm, Right Updated:  11/07/19 2255     WBC 8 81 Thousand/uL      RBC 5 71 Million/uL      Hemoglobin 16 0 g/dL      Hematocrit 49 9 %      MCV 87 fL      MCH 28 0 pg      MCHC 32 1 g/dL      RDW 16 9 %      MPV 9 9 fL      Platelets 184 Thousands/uL      nRBC 0 /100 WBCs      Neutrophils Relative 55 %      Immat GRANS % 1 %      Lymphocytes Relative 22 %      Monocytes Relative 12 %      Eosinophils Relative 9 %      Basophils Relative 1 %      Neutrophils Absolute 4 88 Thousands/µL      Immature Grans Absolute 0 04 Thousand/uL      Lymphocytes Absolute 1 96 Thousands/µL      Monocytes Absolute 1 05 Thousand/µL      Eosinophils Absolute 0 82 Thousand/µL      Basophils Absolute 0 06 Thousands/µL                  No orders to display              Procedures  Procedures       ED Course                               MDM  Number of Diagnoses or Management Options  Abdominal pain:   Diarrhea:   Wound infection:      Amount and/or Complexity of Data Reviewed  Clinical lab tests: ordered and reviewed    Risk of Complications, Morbidity, and/or Mortality  Presenting problems: moderate  General comments: Pt   Is very concerned that he has been discharged from ID practice and says that he has talked to his  and they said that they can't leave him with no follow up  He doesn't want to be admitted- he just wants follow up to be arranged with ID     10:20pm - I spoke to Dr Marium Watkins, ID dr  On call for Dr Gary Obrien, who recommended that the pt  Call the office in the am or page Dr Gary Obrien at [facility] tomorrow morning  She said that they don't discharge pt  From their care and will send a message to their office to arrange follow up tomorrow  Disposition  Final diagnoses:   Abdominal pain   Diarrhea   Wound infection     Time reflects when diagnosis was documented in both MDM as applicable and the Disposition within this note     Time User Action Codes Description Comment    11/7/2019 11:53 PM Lopez-Rodriguez, Terrie Boas R Add [R10 9] Abdominal pain     11/7/2019 11:53 PM Lopez-Rodriguez, Terrie Boas R Add [R19 7] Diarrhea     11/7/2019 11:54 PM Lopez-Rodriguez, Terrie Boas R Add Clarinda Garfinkel  8XXA,  L08 9] Wound infection       ED Disposition     ED Disposition Condition Date/Time Comment    Discharge Stable Thu Nov 7, 2019 11:52 PM [address] [place] [address] Dr OSBORN discharge to home/self care  Follow-up Information     Follow up With Specialties Details Why Contact Info    Maia Ballesteros MD [facility]    [address]      Calvin Sanchez DO Infectious Diseases   [address] [place] [address]  [phone]      [facility] Infectious Disease Internal Medicine   [address] [place] [address]  [phone]            Discharge Medication List as of 11/7/2019 11:58 PM      CONTINUE these medications which have NOT CHANGED    Details   acetaminophen (TYLENOL) 325 mg tablet Take 2 tablets (650 mg total) by mouth 3 (three) times a day, Starting Wed 8/29/2018, No Print      Bismuth Tribromoph-Petrolatum (XEROFORM PETROLAT PATCH 4"X4") PADS USING FOR WOUND CARE, Historical Med      ezetimibe (ZETIA) 10 mg tablet Take 10 mg by mouth daily, Starting Mon 10/28/2019, Historical Med      famotidine (PEPCID) 20 mg tablet Take 1 tablet (20 mg total) by mouth 2 (two) times a day, Starting Wed 8/29/2018, Normal      fluticasone-salmeterol (ADVAIR DISKUS) 250-50 mcg/dose inhaler Inhale 1 puff, Starting Fri 8/31/2018, Until Sat 8/31/2019, Historical Med      furosemide (LASIX) 20 mg tablet Take 1 tablet (20 mg total) by mouth daily, Starting Sat 10/12/2019, Print      pregabalin (LYRICA) 50 mg capsule Take 50 mg by mouth 3 (three) times a day As needed, Historical Med      simvastatin (ZOCOR) 40 mg tablet Take 40 mg by mouth every other day  , Historical Med      testosterone cypionate (DEPO-TESTOSTERONE) 200 mg/mL SOLN INJECT 1 MILLILITER BY INTRAMUSCULAR ROUTE EVERY 2 WEEKS, Historical Med           No discharge procedures on file      ED Provider  Electronically Signed by           Meli Garcia MD  11/21/19 5517

## 2019-11-08 NOTE — TELEPHONE ENCOUNTER
[facility] and spoke to Brayden Scott to inform that we would be continuing pt's vanco and cefepime thru 11/13 for now  Faxed orders to [facility]  Did not set pt back up with Luciansilvia Ortiz as we will send pt to the Infusion center for picc dressing changes, and labs do not need to be repeated before his follow up on 11/13   -Dr Elvin Perez ok with pt's latest vanco trough of 11 4 and wants to keep dose the same at 1500mg Q 12H  Dr Elvin Perez will input orders into [facility] for Appt request and picc dressing change for [facility]  Called pt to inform of above plan-but went right to voicemail and mailbox full   -Will continue to try and reach pt

## 2019-11-08 NOTE — TELEPHONE ENCOUNTER
Attempted to call pt per Dr Elane Carrel  She spoke to this pt over the phone and discussed follow up in the office next Wednesday at 3 PM  I need to confirm that this time works for him  Also, he will be taking PO Vanco as he is having frequent loose stools  He does have a CDIFF test in and it is pending at this time  We have sent that to his pharmacy on file  Left message for pt to call back to discuss the above plan

## 2019-11-08 NOTE — TELEPHONE ENCOUNTER
I called and spoke with patient earlier today  He went to ER last evening with complaint of generalized ill feeling, watery diarrhea  Denies fevers, chills, sweats  He also noted that his PICC line is out a little further  No other issues with the PICC line  No redness, drainage or pain at the PICC line site  He is due to have his dressing changed  Today, patient states he does feel a little better  He followed up with Dr Raymondo Bernheim earlier this week who felt that his leg was much improved after transition to IV vancomycin and cefepime  The patient also feels that his leg is much less swollen, red, painful  He was supposed to have a washout this week, but it has tentatively been postponed until 2 weeks from now  Patient had a follow-up appointment scheduled for today at our office which he did not make  I discussed with him the importance of appropriate followup with our office while he remains on IV antibiotics, as well as compliance with weekly labs, vancomycin levels  Our office will reschedule his appointment for next Wednesday with me  Labs reviewed from ER visit on 11/7  Normal WBC count, C diff PCR was negative  Vancomycin trough was 11 4  Blood cultures were drawn and are pending  As stool for C diff PCR is negative, I recommend decreasing oral vancomycin to q 12 hours dosing for prophylaxis, as patient states he does have a history of C diff several years ago  We will plan to continue vancomycin and cefepime for now and follow up in office next week  Patient is agreeable to this plan

## 2019-11-08 NOTE — TELEPHONE ENCOUNTER
Received a call from this patient this morning as he had gone to the ED yesterday and wanted to update us as he had spoken with us yesterday and we advised him that if he would not follow through with instructions, we could not continue to prescribe IV antibiotics for him  His vanco trough was 11 and he states his picc line is hanging out  I discussed with him that he could come into the office for all of those answers and we would be happy to help  Pt states that it is not necessary and ID on call last night expressed that we would not be DCing his vancomycin  Pt states that he is still our patient but needs answers  I advised this patient that if he needs answers and if his health is his top priority, then he needs to make the time to come into his regularly scheduled doctors visit to discuss current progress as well as what's to continue and when the abx will be stopped  Pt continues to state that this is not an option and that he is only calling to update me as we are still providing him care but that he will be going to [address] [place] [address] ED today as his picc dressing has not been changed in nine days  I informed pt that if he would like, I would be able to have my  him back  I explained to him that when we accept responsibility of a patient's care, there's a mutual agreement that labs/nurse visits be kept up on and that he maintain all f/u appts  He told me it was not necessary to have a  him back  PT is just letting us know that he is going to the ED today to address his PICC dressing    I routed this note to Dr Art Yang, Dr Solange Reid, and our manager Yelena Yuen

## 2019-11-08 NOTE — TELEPHONE ENCOUNTER
Pt confirms f/u appt next week and rx sent to his [facility] as well as instructions  We will call him back once we receive results to discuss whether to continue vancomycin at that time  PT may be a couple minutes late to his appt Wednesday, I confirmed this would be ok

## 2019-11-13 ENCOUNTER — OFFICE VISIT (OUTPATIENT)
Dept: INFECTIOUS DISEASES | Facility: CLINIC | Age: 59
End: 2019-11-13
Payer: COMMERCIAL

## 2019-11-13 VITALS
HEIGHT: 68 IN | SYSTOLIC BLOOD PRESSURE: 150 MMHG | TEMPERATURE: 99 F | HEART RATE: 104 BPM | BODY MASS INDEX: 49.44 KG/M2 | DIASTOLIC BLOOD PRESSURE: 72 MMHG

## 2019-11-13 DIAGNOSIS — Z86.19 HISTORY OF CLOSTRIDIOIDES DIFFICILE COLITIS: ICD-10-CM

## 2019-11-13 DIAGNOSIS — L03.115 CELLULITIS OF RIGHT LOWER EXTREMITY WITHOUT FOOT: Primary | ICD-10-CM

## 2019-11-13 DIAGNOSIS — S91.301D OPEN WOUND OF RIGHT FOOT, SUBSEQUENT ENCOUNTER: ICD-10-CM

## 2019-11-13 DIAGNOSIS — E66.01 MORBID OBESITY WITH BMI OF 45.0-49.9, ADULT (HCC): ICD-10-CM

## 2019-11-13 LAB
BACTERIA BLD CULT: NORMAL
BACTERIA BLD CULT: NORMAL

## 2019-11-13 PROCEDURE — 99215 OFFICE O/P EST HI 40 MIN: CPT | Performed by: INTERNAL MEDICINE

## 2019-11-13 RX ORDER — CEFEPIME HYDROCHLORIDE 2 G/50ML
2000 INJECTION, SOLUTION INTRAVENOUS EVERY 12 HOURS
COMMUNITY
End: 2019-12-03 | Stop reason: ALTCHOICE

## 2019-11-13 RX ORDER — VANCOMYCIN HCL IN WATER 1.25G/12.5
1500 VIAL (ML) INTRAVENOUS EVERY 12 HOURS SCHEDULED
COMMUNITY
End: 2019-12-03 | Stop reason: ALTCHOICE

## 2019-11-13 NOTE — PROGRESS NOTES
Progress Note - Infectious Disease   Taylor Martin 63 y o  male MRN: [id number]  Unit/Bed#:  Encounter: [id number]      IMPRESSION & RECOMMENDATIONS:   Impression/Recommendations:  1  Right lower extremity cellulitis   In the setting of nonhealing wound  Recent wound cultures were positive for MRSA and Pseudomonas  Recent inpatient MRI showed no evidence of osteomyelitis or deeper infection  Patient is currently on IV vancomycin and cefepime with improvement in edema, drainage, overall pain  No associated fevers  I continue to suspect this chronic erythema and edema is largely related to chronic venous stasis changes rather than ongoing infection  I have already discussed case with his plastic surgeon, Dr Diana Cole, who is planning wound debridement with skin grafting in the near future  As there has been improvement noted in the leg after initiation of IV antibiotics, will continue for now pending upcoming surgery  Patient understands risks of PICC line and continuing IV antibiotics including development of PICC infection, MDR infections, C diff, antibiotic side effects/toxicities     -continue IV vancomycin for now  Most recent level was at goal   Repeat level at the end of this week  -continue IV cefepime  -continue to check weekly CBC with diff, BMP while on IV antibiotic  -follow-up with plastic surgery next week with plan for wound debridement and grafting soon  Will follow up recommendations by Plastic surgery  -recommend frequent leg elevation     2  Chronic right lower extremity nonhealing wound   In the setting of prior right Achilles tendon repair complicated by wound infection status post free flap closure with STSG   Most recent operative cultures from August 2018 grew MRSA, Achromobacter   Consideration for more deep-seated infection   MRI with no evidence of osteomyelitis or abscess   Edema within the flexor hallucis longus muscle    Most recent wound culture showed Pseudomonas, MRSA      -antibiotic plan as above  -local daily wound care and dressing changes  -plastic surgery follow-up next week     3  Achilles tendon tear, status post repair with above complications      4  Morbid obesity   Risk factor for poor wound healing   Recommend dietary changes/weight loss      5  Probable venous stasis   Suspect this is playing a role in chronic lower extremity erythema, edema, and poor wound healing   Recommend frequent leg elevation, aggressive edema control  6  Bactrim allergy  Patient states he is intolerant of Bactrim so will try to avoid  7  History of C diff colitis  Most recent stool for C diff PCR was negative  Patient denies diarrhea  Continue with oral vancomycin 125 mg q 12 hours for prophylaxis  I discussed above plan in detail with patient  Follow-up in about 2-3 weeks  Subjective:  Patient is here for follow-up for management chronic right lower extremity nonhealing wound  Currently on vancomycin and cefepime based most recent cultures 10/24/2019 which were positive for MRSA and Pseudomonas  Patient follows with plastic surgeon Dr Jermaine Salgado with upcoming planned for wound debridement followed by graft  Patient states his leg continues to improve with less redness, swelling, pain, drainage  Denies fevers, chills, sweats or other systemic complaints  He overall is feeling much better after initiation of vancomycin and cefepime  His diarrhea has improved  Denies any issues with the PICC line      Personally reviewed and updated as appropriate:  Past medical history, past social history, past surgical history, medications, allergies, problem list     Objective:  Vitals:  [unfilled]  [email or web address] com: Temperature: 99 °F (37 2 °C)    Physical Exam:   General:  No acute distress  Eyes:  Conjunctive clear with no hemorrhages or effusions  Oropharynx:  No ulcers, no lesions  Neck:  Supple, no lymphadenopathy  Lungs:  Clear to auscultation bilaterally, no accessory muscle use  Cardiac:  Regular rate and rhythm, no murmurs  Abdomen:  Soft, non-tender, non-distented, obese  Extremities:  Right lower extremity edema, circumferential erythema which is mildly tender and warm to palpation  Chronic medial open wound with granulation, no active drainage or malodor  Skin:  No rashes, no ulcers  Neurological:  Moves all four extremities spontaneously    Lab Results:  I have personally reviewed pertinent labs  Results from last 7 days   Lab Units 11/07/19  2240   POTASSIUM mmol/L 3 6   CHLORIDE mmol/L 102   CO2 mmol/L 27   BUN mg/dL 16   CREATININE mg/dL 1 14   EGFR ml/min/1 73sq m 70   CALCIUM mg/dL 8 9   AST U/L 29   ALT U/L 23   ALK PHOS U/L 68     Results from last 7 days   Lab Units 11/07/19  2240   WBC Thousand/uL 8 81   HEMOGLOBIN g/dL 16 0   PLATELETS Thousands/uL 268     Results from last 7 days   Lab Units 11/08/19  0011 11/07/19  2246 11/07/19  2237   BLOOD CULTURE   --  No Growth After 5 Days  No Growth After 5 Days  C DIFF TOXIN B  NEGATIVE for C difficle toxin by PCR    --   --        Imaging Studies:   I have personally reviewed pertinent imaging study reports and images in PACS  EKG, Pathology, and Other Studies:   I have personally reviewed pertinent reports

## 2019-11-14 DIAGNOSIS — A04.72 CLOSTRIDIUM DIFFICILE COLITIS: ICD-10-CM

## 2019-11-14 RX ORDER — VANCOMYCIN HYDROCHLORIDE 125 MG/1
125 CAPSULE ORAL 2 TIMES DAILY
Qty: 28 CAPSULE | Refills: 0 | Status: SHIPPED | OUTPATIENT
Start: 2019-11-16 | End: 2019-11-30

## 2019-11-14 NOTE — TELEPHONE ENCOUNTER
Mina Iraheta notified [facility] yesterday of pt's IV abx extension thru 11/27  Will extend pt's PO vanco prophylaxis thru 11/30  Rx sent to Dr Migue Quigley in basket for her to sign and send to [facility] for the pt  Orders entered into [facility] for Appt request, picc care, and weekly cbcd, bmp, and vanco trough  Will d/c picc on 11/28  Will call pt and let him know about the refill of vanco sent to pharmacy and to call and make an appt with the [address] [place] [address] for picc dressing changes and labs  Picc to be d/c'd on 11/28

## 2019-11-16 ENCOUNTER — APPOINTMENT (OUTPATIENT)
Dept: LAB | Facility: MEDICAL CENTER | Age: 59
End: 2019-11-16
Payer: COMMERCIAL

## 2019-11-16 LAB
BASOPHILS # BLD AUTO: 0.09 THOUSANDS/ΜL (ref 0–0.1)
BASOPHILS NFR BLD AUTO: 1 % (ref 0–1)
C DIFF TOX GENS STL QL NAA+PROBE: NORMAL
CREAT SERPL-MCNC: 1.04 MG/DL (ref 0.6–1.3)
EOSINOPHIL # BLD AUTO: 0.82 THOUSAND/ΜL (ref 0–0.61)
EOSINOPHIL NFR BLD AUTO: 10 % (ref 0–6)
ERYTHROCYTE [DISTWIDTH] IN BLOOD BY AUTOMATED COUNT: 17.5 % (ref 11.6–15.1)
GFR SERPL CREATININE-BSD FRML MDRD: 79 ML/MIN/1.73SQ M
HCT VFR BLD AUTO: 48.4 % (ref 36.5–49.3)
HGB BLD-MCNC: 15.1 G/DL (ref 12–17)
IMM GRANULOCYTES # BLD AUTO: 0.03 THOUSAND/UL (ref 0–0.2)
IMM GRANULOCYTES NFR BLD AUTO: 0 % (ref 0–2)
LYMPHOCYTES # BLD AUTO: 1.71 THOUSANDS/ΜL (ref 0.6–4.47)
LYMPHOCYTES NFR BLD AUTO: 21 % (ref 14–44)
MCH RBC QN AUTO: 27.5 PG (ref 26.8–34.3)
MCHC RBC AUTO-ENTMCNC: 31.2 G/DL (ref 31.4–37.4)
MCV RBC AUTO: 88 FL (ref 82–98)
MONOCYTES # BLD AUTO: 0.88 THOUSAND/ΜL (ref 0.17–1.22)
MONOCYTES NFR BLD AUTO: 11 % (ref 4–12)
NEUTROPHILS # BLD AUTO: 4.69 THOUSANDS/ΜL (ref 1.85–7.62)
NEUTS SEG NFR BLD AUTO: 57 % (ref 43–75)
NRBC BLD AUTO-RTO: 0 /100 WBCS
PLATELET # BLD AUTO: 231 THOUSANDS/UL (ref 149–390)
PMV BLD AUTO: 10.5 FL (ref 8.9–12.7)
RBC # BLD AUTO: 5.49 MILLION/UL (ref 3.88–5.62)
VANCOMYCIN TROUGH SERPL-MCNC: 15.4 UG/ML (ref 10–20)
WBC # BLD AUTO: 8.22 THOUSAND/UL (ref 4.31–10.16)

## 2019-11-16 PROCEDURE — 82565 ASSAY OF CREATININE: CPT

## 2019-11-16 PROCEDURE — 87493 C DIFF AMPLIFIED PROBE: CPT

## 2019-11-16 PROCEDURE — 80202 ASSAY OF VANCOMYCIN: CPT

## 2019-11-16 PROCEDURE — 85025 COMPLETE CBC W/AUTO DIFF WBC: CPT

## 2019-11-16 PROCEDURE — 36415 COLL VENOUS BLD VENIPUNCTURE: CPT

## 2019-11-17 ENCOUNTER — HOSPITAL ENCOUNTER (EMERGENCY)
Facility: HOSPITAL | Age: 59
Discharge: HOME | End: 2019-11-17
Attending: EMERGENCY MEDICINE | Admitting: EMERGENCY MEDICINE
Payer: COMMERCIAL

## 2019-11-17 ENCOUNTER — HOSPITAL ENCOUNTER (EMERGENCY)
Facility: HOSPITAL | Age: 59
Discharge: HOME | End: 2019-11-18
Attending: EMERGENCY MEDICINE | Admitting: EMERGENCY MEDICINE
Payer: COMMERCIAL

## 2019-11-17 VITALS
DIASTOLIC BLOOD PRESSURE: 95 MMHG | HEART RATE: 77 BPM | BODY MASS INDEX: 50.78 KG/M2 | OXYGEN SATURATION: 97 % | SYSTOLIC BLOOD PRESSURE: 157 MMHG | TEMPERATURE: 97.2 F | WEIGHT: 315 LBS | RESPIRATION RATE: 18 BRPM

## 2019-11-17 DIAGNOSIS — L03.115 CELLULITIS OF RIGHT LOWER EXTREMITY: Primary | ICD-10-CM

## 2019-11-17 DIAGNOSIS — T82.898A: Primary | ICD-10-CM

## 2019-11-17 PROCEDURE — 99283 EMERGENCY DEPT VISIT LOW MDM: CPT

## 2019-11-17 PROCEDURE — 99282 EMERGENCY DEPT VISIT SF MDM: CPT | Performed by: EMERGENCY MEDICINE

## 2019-11-17 PROCEDURE — 99285 EMERGENCY DEPT VISIT HI MDM: CPT | Performed by: EMERGENCY MEDICINE

## 2019-11-17 PROCEDURE — 96365 THER/PROPH/DIAG IV INF INIT: CPT

## 2019-11-17 PROCEDURE — 96367 TX/PROPH/DG ADDL SEQ IV INF: CPT

## 2019-11-17 RX ADMIN — VANCOMYCIN HYDROCHLORIDE 1500 MG: 10 INJECTION, POWDER, LYOPHILIZED, FOR SOLUTION INTRAVENOUS at 23:16

## 2019-11-17 RX ADMIN — CEFEPIME HYDROCHLORIDE 2000 MG: 2 INJECTION, POWDER, FOR SOLUTION INTRAVENOUS at 22:25

## 2019-11-17 NOTE — ED PROVIDER NOTES
History  Chief Complaint   Patient presents with    PICC Line Problem     pt states that he thinks his picc line is blocked  Having issues with PICC line, feels like it is moist and leaking and blocked  Area on tegaderm obviously open and PICC not covered  Went to [facility] earlier to have it redressed "and they wouldn't touch it"  History provided by:  Patient   used: No    Medical Problem   Location:  LUE  Severity:  Mild  Onset quality:  Gradual  Timing:  Constant  Progression:  Unchanged  Chronicity:  New  Associated symptoms: no abdominal pain, no chest pain, no congestion, no diarrhea, no fever, no headaches, no nausea, no rash, no shortness of breath, no sore throat, no vomiting and no wheezing        Prior to Admission Medications   Prescriptions Last Dose Informant Patient Reported? Taking?    Bismuth Tribromoph-Petrolatum (XEROFORM PETROLAT PATCH 4"X4") PADS   Yes No   Sig: USING FOR WOUND CARE   Vancomycin HCl 1500 MG/15ML SOLN   Yes No   Sig: Infuse 1,500 mg into a venous catheter every 12 (twelve) hours   acetaminophen (TYLENOL) 325 mg tablet   No No   Sig: Take 2 tablets (650 mg total) by mouth 3 (three) times a day   Patient not taking: Reported on 10/23/2019   cefepime (MAXIPIME) 2000 mg IVPB   Yes No   Sig: Infuse 2,000 mg into a venous catheter every 12 (twelve) hours   ezetimibe (ZETIA) 10 mg tablet   Yes No   Sig: Take 10 mg by mouth daily   famotidine (PEPCID) 20 mg tablet   No No   Sig: Take 1 tablet (20 mg total) by mouth 2 (two) times a day   fluticasone-salmeterol (ADVAIR DISKUS) 250-50 mcg/dose inhaler   Yes No   Sig: Inhale 1 puff   furosemide (LASIX) 20 mg tablet   No No   Sig: Take 1 tablet (20 mg total) by mouth daily   pregabalin (LYRICA) 50 mg capsule   Yes No   Sig: Take 50 mg by mouth 3 (three) times a day As needed   simvastatin (ZOCOR) 40 mg tablet  Self Yes No   Sig: Take 40 mg by mouth every other day     testosterone cypionate (DEPO-TESTOSTERONE) 200 mg/mL SOLN   Yes No   Sig: INJECT 1 MILLILITER BY INTRAMUSCULAR ROUTE EVERY 2 WEEKS   vancomycin (VANCOCIN) 125 MG capsule   No No   Sig: Take 1 capsule (125 mg total) by mouth 2 (two) times a day for 14 days      Facility-Administered Medications: None       Past Medical History:   Diagnosis Date    Asthma     CPAP (continuous positive airway pressure) dependence     Diverticulosis     GERD (gastroesophageal reflux disease)     Hyperlipidemia     Morbid obesity ([facility] [address] )     Sleep apnea     Ventricular tachycardia ([facility] )     1 episode       Past Surgical History:   Procedure Laterality Date    CARPAL TUNNEL RELEASE      COLONOSCOPY  2007    FLAP LOCAL EXTREMITY Right 8/14/2018    Procedure: ASPIRATION FAILING FREE FLAP RIGHT LEG;  Surgeon: Dameon Alcantar MD;  Location: [address] [place] [address];  Service: Plastics    FREE FLAP GRAFT Right 8/13/2018    Procedure: TRANSFER RIGHT THIGH FREE FLAP TO RIGHT HEEL;  Surgeon: Dameon Alcantar MD;  Location: [address] [place] [address];  Service: Plastics    HERNIA REPAIR      abdominal x 2    POLYPECTOMY      hyperplastic    NV ADJ TISS XFER SCALP,EXTREM 10 1-30 SQCM Right 8/23/2018    Procedure: LOCAL FLAP POSS STSG ACHILLES TENDON;  Surgeon: Dameon Alcantar MD;  Location: [address] [place] [address];  Service: Plastics    NV COLONOSCOPY FLX DX W/COLLJ [address] [facility] [address] WHEN PFRMD N/A 11/27/2017    Procedure: COLONOSCOPY;  Surgeon: Letty Alexandre MD;  Location: [facility];   Service: Gastroenterology    NV DEBRIDEMENT, SKIN, SUB-Q TISSUE,=<20 SQ CM Right 7/18/2018    Procedure: DEBRIDEMENT ANKLE WOUND;  Surgeon: Dameon Alcantar MD;  Location: [address] [place] [address];  Service: Plastics    NV DEBRIDEMENT, SKIN, SUB-Q TISSUE,=<20 SQ CM Right 7/23/2018    Procedure: DEBRIDEMENT RIGHT ANKLE WOUND;  Surgeon: Dameon Alcantar MD;  Location: [address] [place] [address];  Service: Plastics    [address] ANAST Right 7/30/2018    Procedure: COVERAGE HEEL WITH GRACILIS MUSCLE FLAP/SKINGRAFT ;  Surgeon: Michele Field MD;  Location: [address] [place] [address] OR;  Service: Plastics    [id number] [facility] 4/23/2018    Procedure: REPAIR TENDON ACHILLES  FHL TRANSFER;  Surgeon: Norman Bates DPM;  Location: [facility];  Service: Podiatry    TRIGGER FINGER RELEASE      thumb       Family History   Problem Relation Age of Onset    Cancer Mother     Hypertension Mother     Diabetes Father      I have reviewed and agree with the history as documented  Social History     Tobacco Use    Smoking status: Never Smoker    Smokeless tobacco: Never Used   Substance Use Topics    Alcohol use: Yes     Frequency: 2-4 times a month     Drinks per session: 1 or 2     Binge frequency: Never     Comment: rarely    Drug use: Never        Review of Systems   Constitutional: Negative for chills and fever  HENT: Negative for congestion and sore throat  Eyes: Negative for visual disturbance  Respiratory: Negative for shortness of breath and wheezing  Cardiovascular: Negative for chest pain and palpitations  Gastrointestinal: Negative for abdominal pain, diarrhea, nausea and vomiting  Genitourinary: Negative for dysuria  Musculoskeletal: Negative for neck pain and neck stiffness  Skin: Negative for pallor and rash  LUE PICC line   Neurological: Negative for headaches  Psychiatric/Behavioral: Negative for confusion  All other systems reviewed and are negative  Physical Exam  Physical Exam   Constitutional: He is oriented to person, place, and time  He appears well-developed and well-nourished  No distress  HENT:   Head: Normocephalic and atraumatic  Neck: Neck supple  Cardiovascular: Normal rate  Pulmonary/Chest: Effort normal    Musculoskeletal: Normal range of motion  He exhibits no edema or tenderness  LUE PICC line - broken and when flushed, leaking   Neurological: He is alert and oriented to person, place, and time  Skin: Skin is warm  No rash noted  No pallor     LE wound dressed - NV intact distally   Psychiatric: He has a normal mood and affect  His behavior is normal    Nursing note and vitals reviewed  Vital Signs  ED Triage Vitals [11/17/19 0131]   Temperature Pulse Respirations Blood Pressure SpO2   (!) 97 2 °F (36 2 °C) 77 18 157/95 97 %      Temp Source Heart Rate Source Patient Position - Orthostatic VS BP Location FiO2 (%)   Tympanic Monitor Sitting Left arm --      Pain Score       No Pain           Vitals:    11/17/19 0131   BP: 157/95   Pulse: 77   Patient Position - Orthostatic VS: Sitting         Visual Acuity      ED Medications  Medications - No data to display    Diagnostic Studies  Results Reviewed     None                 No orders to display              Procedures  Procedures       ED Course  ED Course as of Nov 17 0212   Mindy John Nov 17, 2019   0129 Pt seen and examined  Having issues with PICC line, feels like it is moist and leaking and blocked  Area on tegaderm obviously open and PICC not covered  See pic for details  RN to try to flush and redress if able  0336 RN attempted to flush PICC line and it is clearly broken and leaking  Will need to be pulled  Pt requests PIV be placed and he go home with it  Discussed that the [address] [facility] [address] [place] is that we can not place PIV and send him home  Pt states he feels this happened because he isn't getting heparin flushes (no longer performed routinely)  He is clearly upset  Given option to keep here and have PICC placed in am, refuses and states he will go home and place own PIV and administer meds and talk with his doctor and get PICC placement scheduled  0200 Pt plans on calling [facility] tomorrow about need for further abx, if needed will get PICC line placement rescheduled                                     MDM    Disposition  Final diagnoses:   Occluded PICC line ([facility] [address] ) - broken     Time reflects when diagnosis was documented in both MDM as applicable and the Disposition within this note Time User Action Codes Description Comment    11/17/2019  1:50 AM Lourdes BONILLA Add [T32 861I] Occluded PICC line ([address] )     11/17/2019  1:51 AM LILLIANA Looney/ [address] Occluded PICC line [facility]) broken      ED Disposition     ED Disposition Condition Date/Time Comment    Discharge Stable Sun Nov 17, 2019  1:50 AM Chloe Nix discharge to home/self care  Follow-up Information     Follow up With Specialties Details Why Contact Info    Sheri Cummings MD [facility] Medicine Schedule an appointment as soon as possible for a visit  to discuss PICC line replacement Mac    [address]            Discharge Medication List as of 11/17/2019  1:52 AM      CONTINUE these medications which have NOT CHANGED    Details   acetaminophen (TYLENOL) 325 mg tablet Take 2 tablets (650 mg total) by mouth 3 (three) times a day, Starting Wed 8/29/2018, No Print      Bismuth Tribromoph-Petrolatum (XEROFORM PETROLAT PATCH 4"X4") PADS USING FOR WOUND CARE, Historical Med      cefepime (MAXIPIME) 2000 mg IVPB Infuse 2,000 mg into a venous catheter every 12 (twelve) hours, Historical Med      ezetimibe (ZETIA) 10 mg tablet Take 10 mg by mouth daily, Starting Mon 10/28/2019, Historical Med      famotidine (PEPCID) 20 mg tablet Take 1 tablet (20 mg total) by mouth 2 (two) times a day, Starting Wed 8/29/2018, Normal      fluticasone-salmeterol (ADVAIR DISKUS) 250-50 mcg/dose inhaler Inhale 1 puff, Starting Fri 8/31/2018, Until Sat 8/31/2019, Historical Med      furosemide (LASIX) 20 mg tablet Take 1 tablet (20 mg total) by mouth daily, Starting Sat 10/12/2019, Print      pregabalin (LYRICA) 50 mg capsule Take 50 mg by mouth 3 (three) times a day As needed, Historical Med      simvastatin (ZOCOR) 40 mg tablet Take 40 mg by mouth every other day  , Historical Med      testosterone cypionate (DEPO-TESTOSTERONE) 200 mg/mL SOLN INJECT 1 MILLILITER BY INTRAMUSCULAR ROUTE EVERY 2 WEEKS, Historical Med      vancomycin (VANCOCIN) 125 MG capsule Take 1 capsule (125 mg total) by mouth 2 (two) times a day for 14 days, Starting Sat 11/16/2019, Until Sat 11/30/2019, Normal      Vancomycin HCl 1500 MG/15ML SOLN Infuse 1,500 mg into a venous catheter every 12 (twelve) hours, Historical Med           No discharge procedures on file      ED Provider  Electronically Signed by           Paulie Burch DO  11/17/19 4282

## 2019-11-18 ENCOUNTER — TELEPHONE (OUTPATIENT)
Dept: INFECTIOUS DISEASES | Facility: CLINIC | Age: 59
End: 2019-11-18

## 2019-11-18 VITALS
SYSTOLIC BLOOD PRESSURE: 165 MMHG | HEART RATE: 78 BPM | DIASTOLIC BLOOD PRESSURE: 93 MMHG | RESPIRATION RATE: 18 BRPM | WEIGHT: 315 LBS | BODY MASS INDEX: 48.57 KG/M2 | TEMPERATURE: 98.9 F | OXYGEN SATURATION: 98 %

## 2019-11-18 PROCEDURE — 96366 THER/PROPH/DIAG IV INF ADDON: CPT

## 2019-11-18 NOTE — TELEPHONE ENCOUNTER
Per Dr Sena Rowland, plan is to hold abx  If patient is to get admitted for debridement with Dr Sudeep Butcher on 11/20 then we can formally reassess  Maryana Paige from [place] was advised and medication is on hold  Called and left patient vm to call back

## 2019-11-18 NOTE — TELEPHONE ENCOUNTER
Received a call from this patient regarding his picc  It became blocked this past weekend and he tried to unclog it himself and was unable to successfully unclog it  He went to the ED to have his abx administered and they offered him a new picc in the am and so he declined and went home  He states that he will be having his "partner" insert a peripheral IV so he can get his abx in the mean time however will need a new picc placed and would like it to be arranged while he's in for a debridement with Dr Jermaine Salgado 11/20 and would like it placed while he's in  I will contact Dr Wilda Dacosta with this information and see what/if we can arrange

## 2019-11-18 NOTE — TELEPHONE ENCOUNTER
Received a call from this patient regarding our voicemail  I have made him aware of Dr Lauro Essex plan to hold his medication at this time until he can be formally reassessed  Pt seems hesitant but does verbalize understanding of our order

## 2019-11-18 NOTE — ED ATTENDING ATTESTATION
11/17/2019  I, Leodan Bledsoe MD, saw and evaluated the patient  I have discussed the patient with the resident/non-physician practitioner and agree with the resident's/non-physician practitioner's findings, Plan of Care, and MDM as documented in the resident's/non-physician practitioner's note, except where noted  All available labs and Radiology studies were reviewed  I was present for key portions of any procedure(s) performed by the resident/non-physician practitioner and I was immediately available to provide assistance  At this point I agree with the current assessment done in the Emergency Department  I have conducted an independent evaluation of this patient a history and physical is as follows:   Pt has infected ankle Pt Picc line not functioning Missed his am antibiotics Would like to have peripheral line with antibiotics placed will have picc line replaced tomorrow   PE: are of infection in ankle skin MDM: will give pm dose of antibiotics  ED Course         Critical Care Time  Procedures

## 2019-11-19 ENCOUNTER — ANESTHESIA EVENT (OUTPATIENT)
Dept: PERIOP | Facility: HOSPITAL | Age: 59
End: 2019-11-19
Payer: COMMERCIAL

## 2019-11-19 NOTE — PRE-PROCEDURE INSTRUCTIONS
Pre-Surgery Instructions:   Medication Instructions    famotidine (PEPCID) 20 mg tablet Instructed patient per Anesthesia Guidelines   fluticasone-salmeterol (ADVAIR DISKUS) 250-50 mcg/dose inhaler Instructed patient per Anesthesia Guidelines   pregabalin (LYRICA) 50 mg capsule Instructed patient per Anesthesia Guidelines  Pre-op Showering Instructions for Surgery Patients    Before your operation, you play an important role in decreasing your risk for infection by washing with special antiseptic soap  This is an effective way to reduce bacteria on the skin which may help to prevent infections at the surgical site  Please read the following directions in advance  1  In the week before your operation, purchase a 4 ounce bottle of antiseptic soap containing chlorhexidine gluconate (CHG)  4%  Some brand names include: Aplicare®, Endure, and Hibiclens®  The cost is usually less than $5 00   For your convenience, the [employer] carries the soap   It may also be available at your doctors office or pre-admission testing center, and at most retail pharmacies   If you are allergic or sensitive to soaps containing CHG, please let your doctor know so another antiseptic can be suggested   CHG antiseptic soap is for external use only  2   The day before your operation, follow these instructions carefully to get ready   Please clean linens (sheets) on your bed; you should sleep on clean sheets after your evening shower   Get clean towels and washcloth ready - you need enough for 2 showers   Set aside clean underwear, pajamas, and clothing to wear after the showers     Reminders:   DO NOT use any other soap or body rinse on your skin during or after the antiseptic showers   DO NOT use lotion, powder, deodorant, or perfume/aftershave of any kind on your skin after your antiseptic shower   DO NOT shave any body parts in the 24 hours/day before your operation   DO NOT get the antiseptic soap in your eyes, ears, nose, mouth, or vaginal area    3  You will need to shower the night before AND the morning of your surgery  Shower 1:   The first evening before the operation, take the first shower   First, shampoo your hair with regular shampoo and rinse it completely before you use the antiseptic soap  After washing and rinsing your hair, rinse your body   Next, use a clean washcloth to apply the antiseptic soap and wash your body from the neck down to your toes using ½ bottle of the antiseptic soap   You will use the other ½ bottle for the second shower   Clean the area where your incision will be; lather this area well for about 2 minutes   If you are having head or neck surgery, wash areas with the antiseptic soap   Rinse yourself completely with running water   Use a clean towel to dry off   Wear clean underwear and clothing/pajamas  Shower 2   The morning of your operation, take the second shower following the same steps as Shower 1 using the second ½ of the bottle of antiseptic soap   Use clean cloths and towels to wash and dry yourself   Wear clean underwear and clothing

## 2019-11-20 ENCOUNTER — HOSPITAL ENCOUNTER (OUTPATIENT)
Facility: HOSPITAL | Age: 59
Setting detail: OUTPATIENT SURGERY
Discharge: HOME | End: 2019-11-20
Attending: PLASTIC SURGERY | Admitting: PLASTIC SURGERY
Payer: COMMERCIAL

## 2019-11-20 ENCOUNTER — ANESTHESIA (OUTPATIENT)
Dept: PERIOP | Facility: HOSPITAL | Age: 59
End: 2019-11-20
Payer: COMMERCIAL

## 2019-11-20 VITALS
RESPIRATION RATE: 16 BRPM | HEIGHT: 68 IN | SYSTOLIC BLOOD PRESSURE: 123 MMHG | DIASTOLIC BLOOD PRESSURE: 66 MMHG | HEART RATE: 51 BPM | WEIGHT: 315 LBS | OXYGEN SATURATION: 95 % | TEMPERATURE: 96.3 F | BODY MASS INDEX: 47.74 KG/M2

## 2019-11-20 PROBLEM — L98.9 DISORDER OF THE SKIN AND SUBCUTANEOUS TISSUE, UNSPECIFIED: Status: ACTIVE | Noted: 2019-11-20

## 2019-11-20 RX ORDER — ONDANSETRON 4 MG/1
4 TABLET, ORALLY DISINTEGRATING ORAL EVERY 6 HOURS PRN
Status: DISCONTINUED | OUTPATIENT
Start: 2019-11-20 | End: 2019-11-20 | Stop reason: HOSPADM

## 2019-11-20 RX ORDER — MAGNESIUM HYDROXIDE 1200 MG/15ML
LIQUID ORAL AS NEEDED
Status: DISCONTINUED | OUTPATIENT
Start: 2019-11-20 | End: 2019-11-20 | Stop reason: HOSPADM

## 2019-11-20 RX ORDER — FENTANYL CITRATE 50 UG/ML
INJECTION, SOLUTION INTRAMUSCULAR; INTRAVENOUS AS NEEDED
Status: DISCONTINUED | OUTPATIENT
Start: 2019-11-20 | End: 2019-11-20 | Stop reason: SURG

## 2019-11-20 RX ORDER — PROPOFOL 10 MG/ML
INJECTION, EMULSION INTRAVENOUS CONTINUOUS PRN
Status: DISCONTINUED | OUTPATIENT
Start: 2019-11-20 | End: 2019-11-20 | Stop reason: SURG

## 2019-11-20 RX ORDER — MIDAZOLAM HYDROCHLORIDE 2 MG/2ML
INJECTION, SOLUTION INTRAMUSCULAR; INTRAVENOUS AS NEEDED
Status: DISCONTINUED | OUTPATIENT
Start: 2019-11-20 | End: 2019-11-20 | Stop reason: SURG

## 2019-11-20 RX ORDER — HYDROCODONE BITARTRATE AND ACETAMINOPHEN 5; 325 MG/1; MG/1
1 TABLET ORAL EVERY 6 HOURS PRN
Status: DISCONTINUED | OUTPATIENT
Start: 2019-11-20 | End: 2019-11-20 | Stop reason: HOSPADM

## 2019-11-20 RX ORDER — SODIUM CHLORIDE, SODIUM LACTATE, POTASSIUM CHLORIDE, CALCIUM CHLORIDE 600; 310; 30; 20 MG/100ML; MG/100ML; MG/100ML; MG/100ML
125 INJECTION, SOLUTION INTRAVENOUS CONTINUOUS
Status: DISCONTINUED | OUTPATIENT
Start: 2019-11-20 | End: 2019-11-20 | Stop reason: HOSPADM

## 2019-11-20 RX ORDER — LIDOCAINE HYDROCHLORIDE 10 MG/ML
INJECTION, SOLUTION INFILTRATION; PERINEURAL AS NEEDED
Status: DISCONTINUED | OUTPATIENT
Start: 2019-11-20 | End: 2019-11-20 | Stop reason: SURG

## 2019-11-20 RX ORDER — VANCOMYCIN HYDROCHLORIDE 1 G/200ML
1000 INJECTION, SOLUTION INTRAVENOUS ONCE
Status: DISCONTINUED | OUTPATIENT
Start: 2019-11-20 | End: 2019-11-20

## 2019-11-20 RX ORDER — MINERAL OIL
OIL (ML) MISCELLANEOUS AS NEEDED
Status: DISCONTINUED | OUTPATIENT
Start: 2019-11-20 | End: 2019-11-20 | Stop reason: HOSPADM

## 2019-11-20 RX ORDER — LIDOCAINE HYDROCHLORIDE AND EPINEPHRINE 5; 5 MG/ML; UG/ML
INJECTION, SOLUTION INFILTRATION; PERINEURAL AS NEEDED
Status: DISCONTINUED | OUTPATIENT
Start: 2019-11-20 | End: 2019-11-20 | Stop reason: HOSPADM

## 2019-11-20 RX ORDER — CEFEPIME HYDROCHLORIDE 2 G/50ML
2000 INJECTION, SOLUTION INTRAVENOUS ONCE
Status: COMPLETED | OUTPATIENT
Start: 2019-11-20 | End: 2019-11-20

## 2019-11-20 RX ADMIN — MIDAZOLAM HYDROCHLORIDE 1 MG: 1 INJECTION, SOLUTION INTRAMUSCULAR; INTRAVENOUS at 14:19

## 2019-11-20 RX ADMIN — MIDAZOLAM HYDROCHLORIDE 2 MG: 1 INJECTION, SOLUTION INTRAMUSCULAR; INTRAVENOUS at 14:04

## 2019-11-20 RX ADMIN — SODIUM CHLORIDE, SODIUM LACTATE, POTASSIUM CHLORIDE, AND CALCIUM CHLORIDE: .6; .31; .03; .02 INJECTION, SOLUTION INTRAVENOUS at 14:00

## 2019-11-20 RX ADMIN — PROPOFOL 100 MCG/KG/MIN: 10 INJECTION, EMULSION INTRAVENOUS at 14:06

## 2019-11-20 RX ADMIN — CEFEPIME HYDROCHLORIDE 2000 MG: 2 INJECTION, SOLUTION INTRAVENOUS at 14:05

## 2019-11-20 RX ADMIN — VANCOMYCIN HYDROCHLORIDE 1500 MG: 1 INJECTION, POWDER, LYOPHILIZED, FOR SOLUTION INTRAVENOUS at 14:18

## 2019-11-20 RX ADMIN — MIDAZOLAM HYDROCHLORIDE 1 MG: 1 INJECTION, SOLUTION INTRAMUSCULAR; INTRAVENOUS at 14:16

## 2019-11-20 RX ADMIN — LIDOCAINE HYDROCHLORIDE 50 MG: 10 INJECTION, SOLUTION INFILTRATION; PERINEURAL at 14:06

## 2019-11-20 RX ADMIN — FENTANYL CITRATE 100 MCG: 50 INJECTION INTRAMUSCULAR; INTRAVENOUS at 14:04

## 2019-11-20 NOTE — NURSING NOTE
No distress  Tolerated food and drink  IV was removed  Discharge instructions were given  Left via wheelchair to lobby with staff member

## 2019-11-20 NOTE — NURSING NOTE
Returned from PACU at 1546  Alert and oriented  Denies pain  Dressing right thigh dry and intact  Dressing and Ace wrap dry and intact to right lower leg  Toes with positive sensation and movement  Respirations easy and non labored  IV fluids continue  Call bell in reach

## 2019-11-20 NOTE — INTERVAL H&P NOTE
H&P reviewed  After examining the patient I find no changes in the patients condition since the H&P had been written      Vitals:    11/20/19 1259   BP: 164/86   Pulse: 65   Resp: 20   Temp: 98 1 °F (36 7 °C)   SpO2: 95%

## 2019-11-20 NOTE — OP NOTE
OPERATIVE REPORT  PATIENT NAME: Sagar Gomez    :  1960  MRN: [id number]  Pt Location: [facility] OR ROOM 10    SURGERY DATE: 2019    Surgeon(s) and Role:     * Enrico Leung MD - Primary     * Jayesh Meyer - Assisting    Preop Diagnosis:  Chronic medial right lower leg and ankle wounds    Operative Procedure(s) (LRB):  SKIN GRAFT  RIGHT LEG (Right) and ankle    Operative history:  Patient has had multiple flaps used to cover a ruptured right Achilles tendon repair  That has done well  Unfortunately the area around the flap covering the repair initially after skin grafting remains open the chronic scarred wound  This was debrided yesterday now showing good granulation tissue throughout the wound of the distal medial right leg measuring about 4 x 15 cm  Also posterior to the flap in involving the ankle area is a granulating wound measuring 8 x 5 cm in size  A split-thickness skin graft placed at this time both these areas knowing full well the erythema the patient's leg may represent ongoing infection although he has infectious disease doctors  treatment for him for an infected process  Time will tell with this is correct and whether not the skin graft will take a surgery infected wound it will quickly dissolve  Operative procedure:  Patient taken operating placed supine the operating table  He was prepped and draped in usual fashion  Anesthesia was general supplemented xylocaine 1% with epinephrine the wounds of the right lower leg and ankle were washed with saline  Kylah dermatome was used to harvest a 13 1000 in split the skin graft from the medial right thigh  The graft was perforated 321  It was then placed on the open wounds the distal medial right leg as well as the more posterior medial ankle region using staples for fixation  A bulky wrap was applied      Remaining skin was returned to the center of the donor site of the right thigh where 2 stapled into position with attempt to hasten healing donor the donor site  Light dressings were placed on The right thigh  The patient tolerated procedure well 10 taken to recovery in good condition        SIGNATURE: Luci Monk MD  DATE: November 20, 2019  TIME: 6:31 PM

## 2019-11-20 NOTE — ANESTHESIA PREPROCEDURE EVALUATION
Review of Systems/Medical History  Patient summary reviewed  Chart reviewed      Cardiovascular  Hyperlipidemia, Hypertension , Dysrhythmias (H/O) , ventricular tachycardia,    Pulmonary  Asthma , Sleep apnea CPAP,        GI/Hepatic    GERD ,             Endo/Other    Obesity  super morbid obesity   GYN       Hematology  Anemia ,     Musculoskeletal  Negative musculoskeletal ROS        Neurology  Negative neurology ROS      Psychology         [address]  Priyanka Cevallos [address]  [phone]     Transthoracic Echocardiogram  2D, M-mode, Doppler, and Color Doppler     Study date:  01-Aug-2018     Patient: Leno Bird  MR number: [id number]  Account number: [de-identified]  : 1960  Age: 63 years  Gender: Male  Status: Inpatient  Location: [facility]  Height: 68 in  Weight: 319 4 lb  BP: 113/ 65 mmHg     Indications: Tachycardia, PVC's     Diagnoses: I47 2 - Ventricular tachycardia     Sonographer:  Eriberto Martinez  Primary Physician:  Allison Sr MD  Referring Physician:  Violette Taylor MD  Group:  [facility]  Interpreting Physician:  Karina Noel MD     IMPRESSIONS:  Technically difficult study due to body habitus, limiting adequate evaluation      Mild concentric left ventricular hypertrophy, normal left ventricular systolic function, EF probably 60% or greater  Indeterminate diastolic function  No other significant chamber hypertrophy or enlargement  Trace mitral and tricuspid valve regurgitation on limited evaluation  No obvious pulmonary hypertension  No pericardial effusion      No previous echocardiogram is available for comparison      SUMMARY     LEFT VENTRICLE:  Left ventricular cavity is not well visualized  Mild concentric left ventricular hypertrophy, probably normal left ventricular systolic function and wall motion  Ejection fraction is probably 60% or greater   Indeterminate diastolic  function due to poor Doppler signals      RIGHT VENTRICLE:  Normal right ventricle size and visually normal right ventricular systolic function      LEFT ATRIUM:  Normal left atrial cavity size  Intra-atrial septum was not well visualized      RIGHT ATRIUM:  Normal right atrial size on limited visualization      MITRAL VALVE:  Normal appearing mitral valve leaflets on limited visualization  Trace mitral valve regurgitation      AORTIC VALVE:  No significant aortic valve stenosis or regurgitation      TRICUSPID VALVE:  Tricuspid valve was not well visualized  No significant tricuspid valve regurgitation noted      PULMONIC VALVE:  Pulmonic valve was not well visualized  No pulmonic valve regurgitation noted      AORTA:  Aortic root and proximal ascending aorta are normal in size on 2D imaging      IVC, HEPATIC VEINS:  Inferior vena cava is normal in size and shows appropriate respiratory phasic changes in diameter      PERICARDIUM:  No significant pericardial effusion      HISTORY: PRIOR HISTORY: Risk factors: hypertension, hypercholesterolemia, and morbid obesity      PROCEDURE: The study was performed in the [facility]   This was a routine study  Technically very difficult study due to body habitus and poor echo penetration  Poor endocardial border visualization despite use of definity contrast   For valvular visualization and Doppler evaluation  The study is very limited  The transthoracic approach was used  The study included complete 2D imaging, M-mode, complete spectral Doppler, and color Doppler  The heart rate was 78 bpm, at  the start of the study  Images were obtained from the parasternal, apical, subcostal, and suprasternal notch acoustic windows  Intravenous contrast was administered to opacify the left ventricle  Intravenous contrast (Definity solution  [1 3 ml Definity/8 7ml normal saline solution], 2 ml) was administered  Echocardiographic views were limited due to decreased penetration   This was a technically difficult study      LEFT VENTRICLE: Left ventricular cavity is not well visualized  Mild concentric left ventricular hypertrophy, probably normal left ventricular systolic function and wall motion  Ejection fraction is probably 60% or greater  Indeterminate  diastolic function due to poor Doppler signals      RIGHT VENTRICLE: Normal right ventricle size and visually normal right ventricular systolic function      LEFT ATRIUM: Normal left atrial cavity size  Intra-atrial septum was not well visualized      RIGHT ATRIUM: Normal right atrial size on limited visualization      MITRAL VALVE: Normal appearing mitral valve leaflets on limited visualization  Trace mitral valve regurgitation      AORTIC VALVE: No significant aortic valve stenosis or regurgitation      TRICUSPID VALVE: Tricuspid valve was not well visualized  No significant tricuspid valve regurgitation noted      PULMONIC VALVE: Pulmonic valve was not well visualized   No pulmonic valve regurgitation noted      PERICARDIUM: No significant pericardial effusion      AORTA: Aortic root and proximal ascending aorta are normal in size on 2D imaging      SYSTEMIC VEINS: IVC: Inferior vena cava is normal in size and shows appropriate respiratory phasic changes in diameter      SYSTEM MEASUREMENT TABLES     2D  %FS: 30 83 %  Ao Diam: 3 15 cm  EDV(Teich): 107 08 ml  EF(Teich): 58 35 %  ESV(Teich): 44 6 ml  IVSd: 1 25 cm  LA Diam: 4 86 cm  LVEDV MOD A4C: 69 15 ml  LVEF MOD A4C: 72 64 %  LVESV MOD A4C: 18 92 ml  LVIDd: 4 79 cm  LVIDs: 3 31 cm  LVLd A4C: 7 54 cm  LVLs A4C: 6 2 cm  LVPWd: 1 33 cm  SV MOD A4C: 50 23 ml  SV(Teich): 62 48 ml     CW  AV Vmax: 1 68 m/s  AV maxP 3 mmHg     MM  TAPSE: 2 71 cm     PW  E': 0 08 m/s  E/E': 7 98  LVOT Vmax: 0 99 m/s  LVOT maxPG: 3 95 mmHg  MV A Naga: 0 77 m/s  MV Dec Madison: 2 37 m/s2  MV DecT: 270 17 ms  MV E Naga: 0 64 m/s  MV E/A Ratio: 0 83  MV PHT: 78 35 ms  MVA By PHT: 2 81 cm2     [employer] Accredited Echocardiography Laboratory     Prepared and electronically signed by     Siri Wiggins MD  Signed 01-Aug-2018 19:58:03  Physical Exam    Airway    Mallampati score: II  TM Distance: >3 FB  Neck ROM: full     Dental       Cardiovascular  Cardiovascular exam normal    Pulmonary  Pulmonary exam normal     Other Findings        Anesthesia Plan  ASA Score- 3     Anesthesia Type- IV sedation with anesthesia with ASA Monitors  Additional Monitors:   Airway Plan:         Plan Factors-    Induction-     Postoperative Plan-     Informed Consent- Anesthetic plan and risks discussed with patient  I personally reviewed this patient with the CRNA  Discussed and agreed on the Anesthesia Plan with the CRNA  Manjula Jean

## 2019-11-21 ENCOUNTER — TELEPHONE (OUTPATIENT)
Dept: INFECTIOUS DISEASES | Facility: CLINIC | Age: 59
End: 2019-11-21

## 2019-11-21 NOTE — TELEPHONE ENCOUNTER
Received a call from pt regarding his inpatient appt yesterday  He had expected that we would be consulted to see him however we were not, and they [address] him  He would like to have a better idea of the plan  I took his call back information and provided it to Dr Red Faulkner to call him to discuss

## 2019-11-22 ENCOUNTER — TELEPHONE (OUTPATIENT)
Dept: INFECTIOUS DISEASES | Facility: CLINIC | Age: 59
End: 2019-11-22

## 2019-11-22 NOTE — TELEPHONE ENCOUNTER
Contacted pt to confirm which pharmacy he would like us to call in him new abx to  Pt requested [facility] [address]  Per Dr Lian Jenkins, called in Cipro and Linezolid x 2 weeks for pt to   Pt aware it was called in and will pick it up today

## 2019-12-03 ENCOUNTER — OFFICE VISIT (OUTPATIENT)
Dept: INFECTIOUS DISEASES | Facility: CLINIC | Age: 59
End: 2019-12-03
Payer: COMMERCIAL

## 2019-12-03 VITALS — DIASTOLIC BLOOD PRESSURE: 72 MMHG | HEART RATE: 65 BPM | TEMPERATURE: 98.1 F | SYSTOLIC BLOOD PRESSURE: 130 MMHG

## 2019-12-03 DIAGNOSIS — S91.301D OPEN WOUND OF RIGHT FOOT, SUBSEQUENT ENCOUNTER: ICD-10-CM

## 2019-12-03 DIAGNOSIS — E66.01 MORBID OBESITY WITH BMI OF 45.0-49.9, ADULT (HCC): Chronic | ICD-10-CM

## 2019-12-03 DIAGNOSIS — Z86.19 HISTORY OF CLOSTRIDIOIDES DIFFICILE COLITIS: Primary | ICD-10-CM

## 2019-12-03 DIAGNOSIS — L03.115 CELLULITIS OF RIGHT LOWER EXTREMITY WITHOUT FOOT: ICD-10-CM

## 2019-12-03 DIAGNOSIS — L03.115 CELLULITIS OF RIGHT LOWER EXTREMITY WITHOUT FOOT: Primary | ICD-10-CM

## 2019-12-03 PROCEDURE — 99214 OFFICE O/P EST MOD 30 MIN: CPT | Performed by: INTERNAL MEDICINE

## 2019-12-03 RX ORDER — VANCOMYCIN HYDROCHLORIDE 125 MG/1
125 CAPSULE ORAL 2 TIMES DAILY
Qty: 28 CAPSULE | Refills: 0 | Status: SHIPPED | OUTPATIENT
Start: 2019-12-03 | End: 2019-12-17

## 2019-12-03 RX ORDER — CIPROFLOXACIN 750 MG/1
TABLET, FILM COATED ORAL
Refills: 0 | COMMUNITY
Start: 2019-11-22 | End: 2020-02-26 | Stop reason: ALTCHOICE

## 2019-12-03 RX ORDER — LINEZOLID 600 MG/1
TABLET, FILM COATED ORAL
Refills: 0 | COMMUNITY
Start: 2019-11-29 | End: 2020-02-26 | Stop reason: ALTCHOICE

## 2019-12-03 NOTE — PATIENT INSTRUCTIONS
Please have labs drawn 12/6  Finish out Cipro and zyvox  Continue taking vancomycin until three days after oral abx completion  Follow up January 7th with Dr Josh Hou

## 2019-12-03 NOTE — PROGRESS NOTES
Progress Note - Infectious Disease   Yoandy Edward 63 y o  male MRN: [id number]  Unit/Bed#:  Encounter: [id number]      IMPRESSION & RECOMMENDATIONS:   Impression/Recommendations:  1  Right lower extremity erythema   In the setting of nonhealing wound  Recent outpatient wound culture was positive for MRSA and Pseudomonas  Recent inpatient MRI showed no evidence of osteomyelitis or deeper infection  Patient was receiving IV vancomycin/cefepime which were ultimately held as patient lost PICC line access  He has now undergone skin grafting on 11/20/2019  Currently on postoperative course of ciprofloxacin and linezolid  Erythema is persistent which I suspect is secondary to venous stasis xerosis  Minimal warmth, no active drainage  No clinical evidence of active cellulitis  Patient understands potential risks of prolonged course of antibiotics  -complete 14 day postoperative course of oral ciprofloxacin on 12/07  -complete 14 day postoperative course of oral linezolid on 12/13  -check CBC with diff, BMP later this week  -close follow-up with Dr Mj Miller of surgery  -follow-up with us in 3-4 weeks for re-evaluation     2  Chronic right lower extremity nonhealing wound   In the setting of prior right Achilles tendon repair complicated by wound infection status post free flap closure with STSG   Most recent operative cultures from August 2018 grew MRSA, Achromobacter   Consideration for more deep-seated infection  However, MRI with no evidence of osteomyelitis or abscess   Edema within the flexor hallucis longus muscle  Most recent wound culture showed Pseudomonas, MRSA  Now status post skin graft on 11/20/2019      -antibiotic plan as above  -local daily wound care and dressing changes  -plastic surgery follow-up      3  Achilles tendon tear, status post repair with above complications      4   Morbid obesity   Risk factor for poor wound healing   Recommend dietary changes/weight loss      5  Venous stasis   Suspect this is playing a role in chronic lower extremity erythema, edema, and poor wound healing   Recommend frequent leg elevation, aggressive edema control      6  Bactrim allergy  Patient states he is intolerant of Bactrim so will try to avoid      7  History of C diff colitis  Most recent stool for C diff PCR was negative  Patient denies diarrhea  Continue with oral vancomycin 125 mg q 12 hours for prophylaxis      I discussed above plan in detail with patient  Follow-up with us in about 3-4 weeks  Subjective:  Patient is here for follow-up for management of chronic right lower extremity nonhealing wound  He recently was on outpatient vancomycin and cefepime, based on results from outpatient culture from 10/24/2019 which grew MRSA and Pseudomonas, in anticipation of skin grafting  During treatment course, patient's PICC line became blocked and he tried to unclog it himself but was unsuccessful  He presented to urgent care where the PICC line was removed  Patient was offered a new PICC line in the morning but he declined and went home  He reportedly attempted to place a peripheral IV at home  IV antibiotics were held  On 11/20/2019, patient underwent wound debridement and placement of skin graft to the right leg wound  Postoperatively, he was started on oral ciprofloxacin and linezolid with plan for 2 week postoperative course  He is here today for follow-up  He states he has been on the oral ciprofloxacin for about 10 days now  He only started linezolid about 4 days ago  Overall, he thinks the wound is much improved with much less pain, swelling, drainage  He denies any systemic complaints including fevers, chills, nausea, diarrhea      Objective:  Vitals:  [unfilled]  [email or web address]: Temperature: 98 1 °F (36 7 °C)    Physical Exam:   General:  Well-nourished, well-developed, in no acute distress  Eyes:  Conjunctive clear with no hemorrhages or effusions  Oropharynx:  No ulcers, no lesions  Neck:  Supple, no lymphadenopathy  Lungs:  Clear to auscultation bilaterally, no accessory muscle use  Cardiac:  Regular rate and rhythm, no murmurs  Abdomen:  Soft, non-tender, non-distented, obese  Extremities:  Mild right lower extremity edema  Skin:  No rashes, right lower extremity medial open wound looks decreased in size with granulation tissue  No active drainage or malodor  Neurological:  Moves all four extremities spontaneously    Lab Results:  I have personally reviewed pertinent labs  Imaging Studies:   I have personally reviewed pertinent imaging study reports and images in PACS  EKG, Pathology, and Other Studies:   I have personally reviewed pertinent reports  Operative report from 11/20/2019 personally reviewed in detail

## 2019-12-07 ENCOUNTER — APPOINTMENT (OUTPATIENT)
Dept: LAB | Facility: HOSPITAL | Age: 59
End: 2019-12-07
Attending: INTERNAL MEDICINE
Payer: COMMERCIAL

## 2019-12-07 DIAGNOSIS — L03.115 CELLULITIS OF RIGHT LOWER EXTREMITY WITHOUT FOOT: ICD-10-CM

## 2019-12-07 LAB
ANION GAP SERPL CALCULATED.3IONS-SCNC: 5 MMOL/L (ref 4–13)
BASOPHILS # BLD AUTO: 0.08 THOUSANDS/ΜL (ref 0–0.1)
BASOPHILS NFR BLD AUTO: 1 % (ref 0–1)
BUN SERPL-MCNC: 13 MG/DL (ref 5–25)
CALCIUM SERPL-MCNC: 8.6 MG/DL (ref 8.3–10.1)
CHLORIDE SERPL-SCNC: 103 MMOL/L (ref 100–108)
CO2 SERPL-SCNC: 28 MMOL/L (ref 21–32)
CREAT SERPL-MCNC: 1.09 MG/DL (ref 0.6–1.3)
EOSINOPHIL # BLD AUTO: 0.85 THOUSAND/ΜL (ref 0–0.61)
EOSINOPHIL NFR BLD AUTO: 9 % (ref 0–6)
ERYTHROCYTE [DISTWIDTH] IN BLOOD BY AUTOMATED COUNT: 17.1 % (ref 11.6–15.1)
GFR SERPL CREATININE-BSD FRML MDRD: 74 ML/MIN/1.73SQ M
GLUCOSE SERPL-MCNC: 76 MG/DL (ref 65–140)
HCT VFR BLD AUTO: 49.5 % (ref 36.5–49.3)
HGB BLD-MCNC: 16 G/DL (ref 12–17)
IMM GRANULOCYTES # BLD AUTO: 0.05 THOUSAND/UL (ref 0–0.2)
IMM GRANULOCYTES NFR BLD AUTO: 1 % (ref 0–2)
LYMPHOCYTES # BLD AUTO: 1.61 THOUSANDS/ΜL (ref 0.6–4.47)
LYMPHOCYTES NFR BLD AUTO: 17 % (ref 14–44)
MCH RBC QN AUTO: 28.5 PG (ref 26.8–34.3)
MCHC RBC AUTO-ENTMCNC: 32.3 G/DL (ref 31.4–37.4)
MCV RBC AUTO: 88 FL (ref 82–98)
MONOCYTES # BLD AUTO: 0.91 THOUSAND/ΜL (ref 0.17–1.22)
MONOCYTES NFR BLD AUTO: 10 % (ref 4–12)
NEUTROPHILS # BLD AUTO: 5.88 THOUSANDS/ΜL (ref 1.85–7.62)
NEUTS SEG NFR BLD AUTO: 62 % (ref 43–75)
NRBC BLD AUTO-RTO: 0 /100 WBCS
PLATELET # BLD AUTO: 263 THOUSANDS/UL (ref 149–390)
PMV BLD AUTO: 9.6 FL (ref 8.9–12.7)
POTASSIUM SERPL-SCNC: 3.9 MMOL/L (ref 3.5–5.3)
RBC # BLD AUTO: 5.61 MILLION/UL (ref 3.88–5.62)
SODIUM SERPL-SCNC: 136 MMOL/L (ref 136–145)
WBC # BLD AUTO: 9.38 THOUSAND/UL (ref 4.31–10.16)

## 2019-12-07 PROCEDURE — 36415 COLL VENOUS BLD VENIPUNCTURE: CPT

## 2019-12-07 PROCEDURE — 85025 COMPLETE CBC W/AUTO DIFF WBC: CPT

## 2019-12-07 PROCEDURE — 80048 BASIC METABOLIC PNL TOTAL CA: CPT

## 2020-01-22 ENCOUNTER — OFFICE VISIT (OUTPATIENT)
Dept: INFECTIOUS DISEASES | Facility: CLINIC | Age: 60
End: 2020-01-22
Payer: COMMERCIAL

## 2020-01-22 VITALS — SYSTOLIC BLOOD PRESSURE: 140 MMHG | TEMPERATURE: 98.2 F | DIASTOLIC BLOOD PRESSURE: 70 MMHG | HEART RATE: 80 BPM

## 2020-01-22 DIAGNOSIS — L03.115 CELLULITIS OF RIGHT LOWER EXTREMITY WITHOUT FOOT: Primary | ICD-10-CM

## 2020-01-22 PROCEDURE — 99214 OFFICE O/P EST MOD 30 MIN: CPT | Performed by: INTERNAL MEDICINE

## 2020-01-22 RX ORDER — LISINOPRIL 40 MG/1
40 TABLET ORAL DAILY
COMMUNITY

## 2020-01-22 RX ORDER — BISOPROLOL FUMARATE AND HYDROCHLOROTHIAZIDE 10; 6.25 MG/1; MG/1
2 TABLET ORAL DAILY
COMMUNITY

## 2020-01-22 RX ORDER — LINEZOLID 600 MG/1
600 TABLET, FILM COATED ORAL EVERY 12 HOURS SCHEDULED
Qty: 28 TABLET | Refills: 0 | Status: SHIPPED | OUTPATIENT
Start: 2020-01-22 | End: 2020-02-05

## 2020-01-22 NOTE — PROGRESS NOTES
Progress Note - Infectious Disease   Blanche Meng 63 y o  male MRN: [id number]  Unit/Bed#:  Encounter: [id number]      IMPRESSION & RECOMMENDATIONS:   Impression/Recommendations:  1  Persistent right lower extremity erythema/edema   In the setting of nonhealing wound   Prior inpatient MRI showed no evidence of osteomyelitis or deeper infection  Prior outpatient wound culture showed MRSA and Pseudomonas, which is likely indicative of colonization of chronically open wound  Patient did receive course of IV vancomycin/cefepime in November with some improvement noted  Most recently he underwent skin grafting on 11/20/2019 and completed a postoperative course of ciprofloxacin/linezolid  Patient is now here with concern for worsening erythema despite recent course of ciprofloxacin/clindamycin prescribed by Dr Jermaine Salgado  Consider persistent erythema secondary to venous stasis  Minimal warmth and no purulent drainage  Also consider chronic underlying graft infection  Discussed various options with patient  Will trial short course of linezolid alone and assess for improvement      -start oral Linezolid with plan for 14 day course, tentatively through 2/5   -follow-up in office to reassess for improvement in exam findings  -close follow-up with Dr Jermaine Salgado of surgery     2  Chronic right lower extremity nonhealing wound   In the setting of prior right Achilles tendon repair complicated by wound infection status post free flap closure with STSG   Most recent operative cultures from August 2018 grew MRSA, Achromobacter   Consideration for more deep-seated infection  However, MRI with no evidence of osteomyelitis or abscess   Edema within the flexor hallucis longus muscle   Most recent wound culture showed Pseudomonas, MRSA  Now status post skin graft on 11/20/2019      -antibiotic plan as above  -local daily wound care and dressing changes  -plastic surgery follow-up      3   Achilles tendon tear, status post repair with above complications      4  Morbid obesity   Risk factor for poor wound healing   Recommend dietary changes/weight loss      5  Chronic lower extremity venous stasis   Suspect this is playing a role in chronic lower extremity erythema, edema, and poor wound healing   Recommend frequent leg elevation, aggressive edema control  Strongly recommend weight loss and low salt diet  Subjective:  Patient is here for regular follow-up regarding management of chronic right lower extremity nonhealing wound  He recently underwent wound debridement and placement of skin graft to right leg wound on 11/20/2019 performed by Dr Neida Zarate  He completed a short postoperative course of ciprofloxacin and linezolid  Prior to surgery, he did receive several weeks of IV vancomycin and cefepime via PICC line at home, but he lost PICC access and IV antibiotics were held  Postoperatively, patient states his leg looked much better with much less redness, pain and swelling for several weeks  Over the past few weeks, leg has been progressively becoming more swollen, red and painful  He was seen by Dr Neida Zarate a few weeks ago and started on ciprofloxacin and clindamycin with no improvement noted  He states the leg is draining more with foul-smelling drainage  Also notes slowly progressive erythema up the leg  Denies fevers, chills, sweats or other systemic complaints  He does admit to gaining weight recently and has not been compliant with a low-salt diet      Personally reviewed and updated as appropriate:  Past medical history, past surgical history, social history, current medications, allergies, problem list     Objective:  Vitals:  [unfilled]  [email or web address]: Temperature: 98 2 °F (36 8 °C)    Physical Exam:   General:  Well-nourished, well-developed, in no acute distress  Eyes:  Conjunctive clear with no hemorrhages or effusions  Oropharynx:  No ulcers, no lesions  Neck:  Supple, no lymphadenopathy  Lungs: Clear to auscultation bilaterally, no accessory muscle use  Cardiac:  Regular rate and rhythm, no murmurs  Abdomen:  Soft, non-tender, non-distented, obese  Extremities:  Bilateral lower extremity edema, R>L  Skin:  No rashes, right lower extremity open wound smaller in size with surrounding erythema  Serous weeping but no purulent drainage  Significant right lower extremity pitting edema with venous stasis changes  Neurological:  Moves all four extremities spontaneously    Lab Results:  I have personally reviewed pertinent labs  Imaging Studies:   I have personally reviewed pertinent imaging study reports and images in PACS  EKG, Pathology, and Other Studies:   I have personally reviewed pertinent reports

## 2020-01-31 ENCOUNTER — TELEPHONE (OUTPATIENT)
Dept: INFECTIOUS DISEASES | Facility: CLINIC | Age: 60
End: 2020-01-31

## 2020-01-31 NOTE — TELEPHONE ENCOUNTER
Patient called to inform us that he has been doing well on the Linezolid and does not have any drainage from his leg  However, he states that he start having some itchiness on his arms and face with some slight redness, but overall he is feeling ok  Patient says he knows if it starts to get worse he will stop the medication and go to an urgent care or ER  Let Fior Redd know

## 2020-02-03 ENCOUNTER — TELEPHONE (OUTPATIENT)
Dept: OTHER | Facility: OTHER | Age: 60
End: 2020-02-03

## 2020-02-04 NOTE — TELEPHONE ENCOUNTER
Patient wanted to inform that he believes the Linezolid is causing his rashes, so he has stopped this medication, but will call the office to discuss

## 2020-02-26 ENCOUNTER — OFFICE VISIT (OUTPATIENT)
Dept: INFECTIOUS DISEASES | Facility: CLINIC | Age: 60
End: 2020-02-26
Payer: COMMERCIAL

## 2020-02-26 VITALS
BODY MASS INDEX: 46.65 KG/M2 | DIASTOLIC BLOOD PRESSURE: 72 MMHG | WEIGHT: 315 LBS | SYSTOLIC BLOOD PRESSURE: 128 MMHG | TEMPERATURE: 98.8 F | HEART RATE: 73 BPM | HEIGHT: 69 IN

## 2020-02-26 DIAGNOSIS — L03.115 CELLULITIS OF RIGHT LOWER EXTREMITY WITHOUT FOOT: Primary | ICD-10-CM

## 2020-02-26 DIAGNOSIS — I87.8 VENOUS STASIS: ICD-10-CM

## 2020-02-26 PROCEDURE — 99214 OFFICE O/P EST MOD 30 MIN: CPT | Performed by: INTERNAL MEDICINE

## 2020-02-26 RX ORDER — CEFADROXIL 500 MG/1
500 CAPSULE ORAL EVERY 12 HOURS SCHEDULED
Qty: 20 CAPSULE | Refills: 0 | Status: SHIPPED | OUTPATIENT
Start: 2020-02-26 | End: 2020-03-07

## 2020-02-26 RX ORDER — CLOTRIMAZOLE AND BETAMETHASONE DIPROPIONATE 10; .64 MG/G; MG/G
CREAM TOPICAL 2 TIMES DAILY
Qty: 45 G | Refills: 2 | Status: SHIPPED | OUTPATIENT
Start: 2020-02-26 | End: 2020-04-18

## 2020-02-26 NOTE — PROGRESS NOTES
Follow-Up Note - Infectious Disease   Yanelis Escobra 63 y o  male MRN: [id number]      Impression/Recommendations:  1  Recurrent right lower extremity cellulitis  In the setting of # 2/3  History of polymicrobial wound cultures  Most recently grew Group G Strep 10/2019 which is most likely pathogen  On today's exam most of erythema seems consistent with venous stasis dermatitis and possible fungal superinfection  Cannot exclude persistent bacterial infection  Patient somewhat insistent on antibiotics  Rec:  · Duricef x10 days  · Trial topical Lotrisone  · If truly develops recurrent bouts of cellulitis responsive to [address], consider chronic PCN suppressive therapy  · RTO in 2 weeks for reassessment    2  Chronic R>L lower extremity venous stasis with dermatitis  In setting of extensive surgical history as below  Consider superficial fungal infection in setting of chronically moist wound dressings  Rec:  · Continue ACE compression wraps  · Leg elevation challenging as 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  · Consider referral to lymphedema specialist  · Trial topical lotrisone as above    3  Chronic RLE nonhealing wound  In setting of prior right Achilles tendon repair complicated by wound infection status post free flap closure, STSG  Had persistent wound nonhealing  MRI October 2019 negative for abscess or osteomyelitis  Status post second STSG 11/209  At this point nonhealing wound has appearance consistent with venous stasis ulcer  Rec:  · Referral to wound management center for second opinion at patient's request    4  MO  Exacerbator of above  Needs weight loss  Subjective:  Patient is here for follow-up of recurrent right lower extremity cellulitis  Chart reviewed in detail and case discussed with Dr Elane Carrel  Longstanding history of nonhealing wound following Achilles repair    Wound has grown polymicrobial species in the past   Is unclear whether these have been wound colonizer is a or true pathogens  He recently received a 4 week course of IV vancomycin and cefepime  On 11/20 he underwent wound debridement and STSG  He received 14 days of postoperative linezolid and ciprofloxacin through mid December  He was seen in our office in mid January for concerns of increased erythema  He was given 14 days of linezolid  He developed a rash attributed to the linezolid and this was stopped after approximately 10 days of treatment  He received 5 days of prednisone  He presents today with increased pain, swelling, and redness of his leg  He states he was seen by Dr Gross Even who had concerns that the leg is infected and that this is impairing wound healing  He also admits that his leg is extremely itchy and he is noted to have excoriations over that area  Upon further questioning he states that his wound dressings are often wet from chronic drainage  Denies fevers, chills, or sweats  Denies nausea, vomiting, or diarrhea  The following portions of the patient's history were reviewed and updated as appropriate: allergies, current medications, past medical history, past social history, past surgical history and problem list     Objective:  Vitals:  /72   Pulse 73   Temp 98 8 °F (37 1 °C)   Ht 5' 9" (1 753 m)   Wt (!) 155 kg (341 lb 3 2 oz)   BMI 50 39 kg/m²     Physical Exam:   General:  No acute distress  Eyes:  Normal lids and conjunctivae  ENT:  Normal external ears and nose  Neck:  Neck symmetric with midline trachea  Pulmonary:  Normal respiratory effort without accessory muscle use  Cardiovascular:  Regular rate and rhythm  Gastrointestinal:  No tenderness or distention  Musculoskeletal:  No digital clubbing or cyanosis  Skin:  No visible rashes;  No palpable nodules  Neurologic:  Sensation grossly intact to light touch  Psychiatric:  Alert and oriented; Normal mood  Right lower extremity:  Brawny nonpitting edema right lower extremity with faint purplish red discoloration  Leg is cool to touch  There is are satellite lesions noted at the border and there is extensive superficial excoriation over the anterior calf  Wound bed is 50% granular, 50% fibrinous slough  There is no malodor or purulence  Clear serous drainage noted from posterior aspect of calf during exam          Lab Results:  I have personally reviewed pertinent labs  Imaging Studies:   I have personally reviewed pertinent imaging study reports and images in PACS  EKG, Pathology, and Other Studies:   I have personally reviewed pertinent reports

## 2020-03-03 ENCOUNTER — TELEPHONE (OUTPATIENT)
Dept: INFECTIOUS DISEASES | Facility: CLINIC | Age: 60
End: 2020-03-03

## 2020-03-03 NOTE — TELEPHONE ENCOUNTER
Pt called to report that the Lotrisone cream is really helping  He noticed almost immediate improvement  Will notify Dr Ying Crum

## 2020-03-13 ENCOUNTER — TELEPHONE (OUTPATIENT)
Dept: INFECTIOUS DISEASES | Facility: CLINIC | Age: 60
End: 2020-03-13

## 2020-03-13 NOTE — TELEPHONE ENCOUNTER
Voicemail #1- left message for patient to call back to reschedule his Monday 16th appt with Dr Antonio Mcdaniel  Voicemail #2- left message advising patient we had moved his appt to 3/30 at 815a with Dr Antonio Mcdaniel  Re requested a call back to confirm he had received both messages

## 2020-03-30 ENCOUNTER — TELEMEDICINE (OUTPATIENT)
Dept: INFECTIOUS DISEASES | Facility: CLINIC | Age: 60
End: 2020-03-30
Payer: COMMERCIAL

## 2020-03-30 DIAGNOSIS — E66.01 MORBID OBESITY WITH BMI OF 45.0-49.9, ADULT (HCC): Chronic | ICD-10-CM

## 2020-03-30 DIAGNOSIS — L03.115 CELLULITIS OF RIGHT LOWER EXTREMITY WITHOUT FOOT: Primary | ICD-10-CM

## 2020-03-30 DIAGNOSIS — Z86.19 HISTORY OF CLOSTRIDIOIDES DIFFICILE COLITIS: ICD-10-CM

## 2020-03-30 DIAGNOSIS — S91.301D OPEN WOUND OF RIGHT FOOT, SUBSEQUENT ENCOUNTER: ICD-10-CM

## 2020-03-30 PROCEDURE — 99213 OFFICE O/P EST LOW 20 MIN: CPT | Performed by: INTERNAL MEDICINE

## 2020-03-30 NOTE — PROGRESS NOTES
Virtual Regular Visit  1  Recurrent right lower extremity cellulitis-much improved with treatment course with cefadroxil and Lotrisone cream   Told the patient to continue the topical anti fungal as needed moving forward  2  Chronic right greater than left lower extremity venous stasis with dermatitis-stable as per the patient  Edema control to continue     3  Chronic right lower extremity nonhealing wound-according to the patient is finally scabbed over and looks better  Follow-up with plastic surgery  4  MO     5  History C difficile colitis-minimal loose stool this time  Will monitor for now if the symptoms worse, would check stool for C diff      Patient will follow-up in the office p r n  Reason for visit is cellulitis    Encounter provider Daniele Ventura MD    Provider located at [address] [place] [id number] [phone]      Recent Visits  No visits were found meeting these conditions  Showing recent visits within past 7 days and meeting all other requirements     Today's Visits  Date Type Provider Dept   03/30/20 Telemedicine Daniele Ventura MD  [facility]   Showing today's visits and meeting all other requirements     Future Appointments  No visits were found meeting these conditions  Showing future appointments within next 150 days and meeting all other requirements        The patient was identified by name and date of birth  Jonah Blum was informed that this is a telemedicine visit and that the visit is being conducted through telephone  The patient and myself participated  He acknowledged consent and understanding of privacy and security of the video platform  The patient has agreed to participate and understands they can discontinue the visit at any time  Patient is aware this is a billable service       Subjective  Jonah Blum is a 63 y o  male here for follow-up of recurrent right lower extremity cellulitis associated with lower extremity venous stasis with dermatitis and chronic right lower extremity nonhealing wound  He was seen about a month ago at that time treated with a course of cefadroxil and a trial of topical Lotrisone  Past Medical History:   Diagnosis Date    Asthma     CPAP (continuous positive airway pressure) dependence     Diverticulosis     GERD (gastroesophageal reflux disease)     Hyperlipidemia     Morbid obesity ([address] )     Open wound of right ankle     Sleep apnea     Ventricular tachycardia (HCC)     1 episode       Past Surgical History:   Procedure Laterality Date    CARPAL TUNNEL RELEASE      COLONOSCOPY  2007    FLAP LOCAL EXTREMITY Right 8/14/2018    Procedure: ASPIRATION FAILING FREE FLAP RIGHT LEG;  Surgeon: Alexa Jean MD;  Location: [address] [place] [address] OR;  Service: Plastics    FREE FLAP GRAFT Right 8/13/2018    Procedure: TRANSFER RIGHT THIGH FREE FLAP TO RIGHT HEEL;  Surgeon: lAexa Jean MD;  Location: [address] [place] [address];  Service: Plastics    HERNIA REPAIR      abdominal x 2    IR PICC LINE  10/24/2019    POLYPECTOMY      hyperplastic    IN ADJ TISS XFER SCALP,EXTREM 10 1-30 SQCM Right 8/23/2018    Procedure: LOCAL FLAP POSS STSG ACHILLES TENDON;  Surgeon: Alexa Jean MD;  Location: [address] [place] [address] OR;  Service: Plastics    [facility] WHEN PFRMD N/A 11/27/2017    Procedure: COLONOSCOPY;  Surgeon: Sheryle Harbour, MD;  Location: [facility];   Service: Gastroenterology    IN DEBRIDEMENT, SKIN, SUB-Q TISSUE,=<20 SQ CM Right 7/18/2018    Procedure: DEBRIDEMENT ANKLE WOUND;  Surgeon: Alexa Jean MD;  Location: [address] [place] [address] OR;  Service: Plastics    IN DEBRIDEMENT, SKIN, SUB-Q TISSUE,=<20 SQ CM Right 7/23/2018    Procedure: DEBRIDEMENT RIGHT ANKLE WOUND;  Surgeon: Alexa Jean MD;  Location: [address] [place] [address] OR;  Service: Plastics    IN DEBRIDEMENT, SKIN, SUB-Q TISSUE,=<20 SQ CM Right 11/20/2019    Procedure: SKIN GRAFT RIGHT LEG;  Surgeon: Enrico Leung MD;  Location: [facility] OR;  Service: Plastics    CO FREE MUSC-SKIN FLAP W/MICROVASC ANAST Right 7/30/2018    Procedure: COVERAGE HEEL WITH GRACILIS MUSCLE FLAP/SKINGRAFT ;  Surgeon: Enrico Leung MD;  Location: [facility] OR;  Service: Plastics    [id number] [facility] Right 4/23/2018    Procedure: REPAIR TENDON ACHILLES  FHL TRANSFER;  Surgeon: Ismael Rojas DPM;  Location: [facility];  Service: Podiatry    TRIGGER FINGER RELEASE      thumb       Current Outpatient Medications   Medication Sig Dispense Refill    acetaminophen (TYLENOL) 325 mg tablet Take 2 tablets (650 mg total) by mouth 3 (three) times a day 30 tablet 0    Bismuth Tribromoph-Petrolatum (XEROFORM PETROLAT PATCH 4"X4") PADS USING FOR WOUND CARE  0    bisoprolol-hydrochlorothiazide (ZIAC) 10-6 25 MG per tablet Take 2 tablets by mouth daily      clotrimazole-betamethasone (LOTRISONE) 1-0 05 % cream Apply topically 2 (two) times a day 45 g 2    ezetimibe (ZETIA) 10 mg tablet Take 10 mg by mouth daily      famotidine (PEPCID) 20 mg tablet Take 1 tablet (20 mg total) by mouth 2 (two) times a day 60 tablet 0    fluticasone-salmeterol (ADVAIR DISKUS) 250-50 mcg/dose inhaler Inhale 1 puff      furosemide (LASIX) 20 mg tablet Take 1 tablet (20 mg total) by mouth daily 30 tablet 0    lisinopril (ZESTRIL) 40 mg tablet Take 40 mg by mouth daily      pregabalin (LYRICA) 50 mg capsule Take 50 mg by mouth 3 (three) times a day As needed      simvastatin (ZOCOR) 40 mg tablet Take 40 mg by mouth every other day        testosterone cypionate (DEPO-TESTOSTERONE) 200 mg/mL SOLN INJECT 1 MILLILITER BY INTRAMUSCULAR ROUTE EVERY 2 WEEKS  5     No current facility-administered medications for this visit           Allergies   Allergen Reactions    Bactrim [Sulfamethoxazole-Trimethoprim]      High kidney levels    Latex Itching    Medical Tape        Review of Systems   All other systems reviewed and are negative  Physical Exam         I spent 22 minutes with the patient during this visit

## 2020-04-18 DIAGNOSIS — I87.8 VENOUS STASIS: ICD-10-CM

## 2020-04-18 RX ORDER — CLOTRIMAZOLE AND BETAMETHASONE DIPROPIONATE 10; .64 MG/G; MG/G
CREAM TOPICAL
Qty: 45 G | Refills: 2 | Status: SHIPPED | OUTPATIENT
Start: 2020-04-18 | End: 2020-09-08

## 2020-07-28 NOTE — ASSESSMENT & PLAN NOTE
Low-cholesterol diet  Continue pravastatin every other day as recommended by patient's family doctor and Tato Polanco is held for now MISTY

## 2020-09-05 DIAGNOSIS — I87.8 VENOUS STASIS: ICD-10-CM

## 2020-09-08 RX ORDER — CLOTRIMAZOLE AND BETAMETHASONE DIPROPIONATE 10; .64 MG/G; MG/G
CREAM TOPICAL
Qty: 45 G | Refills: 2 | Status: SHIPPED | OUTPATIENT
Start: 2020-09-08 | End: 2020-12-23

## 2020-12-22 DIAGNOSIS — I87.8 VENOUS STASIS: ICD-10-CM

## 2020-12-23 RX ORDER — CLOTRIMAZOLE AND BETAMETHASONE DIPROPIONATE 10; .64 MG/G; MG/G
CREAM TOPICAL
Qty: 45 G | Refills: 2 | Status: SHIPPED | OUTPATIENT
Start: 2020-12-23

## 2020-12-29 ENCOUNTER — IMMUNIZATIONS (OUTPATIENT)
Dept: FAMILY MEDICINE CLINIC | Facility: HOSPITAL | Age: 60
End: 2020-12-29
Payer: COMMERCIAL

## 2020-12-29 DIAGNOSIS — Z23 ENCOUNTER FOR IMMUNIZATION: ICD-10-CM

## 2020-12-29 PROCEDURE — 0011A SARS-COV-2 / COVID-19 MRNA VACCINE (MODERNA) 100 MCG: CPT

## 2020-12-29 PROCEDURE — 91301 SARS-COV-2 / COVID-19 MRNA VACCINE (MODERNA) 100 MCG: CPT

## 2021-01-26 ENCOUNTER — IMMUNIZATIONS (OUTPATIENT)
Dept: FAMILY MEDICINE CLINIC | Facility: HOSPITAL | Age: 61
End: 2021-01-26

## 2021-01-26 DIAGNOSIS — Z23 ENCOUNTER FOR IMMUNIZATION: Primary | ICD-10-CM

## 2021-01-26 PROCEDURE — 91301 SARS-COV-2 / COVID-19 MRNA VACCINE (MODERNA) 100 MCG: CPT

## 2021-01-26 PROCEDURE — 0012A SARS-COV-2 / COVID-19 MRNA VACCINE (MODERNA) 100 MCG: CPT

## 2022-08-10 NOTE — TELEPHONE ENCOUNTER
Pt called this morning again to let us know that he would not be able to make it to his appt tomorrow due to his schedule  [facility] also called letting us know that pt did not answer the door or phone last night when they came out to redraw his vanco trough level  Currently the the pt is dosing at 8:30 am and 8:30 pm, which is not what we advised him to do - mainly because [facility] would have a hard time getting nursing out to him for lab draws and dressing changes  Went back and forth multiple times this morning between pt and [facility] trying to accommodate pt  He keeps saying that this is all our faults-even though multiple people have tried to accommodate him and reach him  Called pt back and he is unwilling to have [facility] come out tomorrow morning at 8am for the redraw and dressing change  He doesn't seem to understand that [facility] can not work around "HIS" schedule  Pt stated that we are making it seem like is non compliant  He said he would look for another doctor and hung up on me  Spoke to Dr Rupa Benites and at this point- if pt can not follow up for appt and not have the proper bw drawn at the right time-we are going to discontinue the antibiotics  Will have our [address] call him, and then send proper orders to discontinue treatment  normal (ped)...

## 2023-01-31 ENCOUNTER — APPOINTMENT (OUTPATIENT)
Dept: PREADMISSION TESTING | Facility: HOSPITAL | Age: 63
End: 2023-01-31

## 2023-02-01 RX ORDER — FLUTICASONE PROPIONATE 50 MCG
SPRAY, SUSPENSION (ML) NASAL
COMMUNITY
Start: 2023-01-11

## 2023-02-01 RX ORDER — PYRAZINAMIDE 500 MG/1
1 TABLET ORAL EVERY 4 HOURS PRN
COMMUNITY
Start: 2023-01-12

## 2023-02-01 RX ORDER — CLOPIDOGREL BISULFATE 75 MG/1
1 TABLET ORAL DAILY
COMMUNITY
Start: 2022-11-16

## 2023-02-01 RX ORDER — PANTOPRAZOLE SODIUM 40 MG/1
1 TABLET, DELAYED RELEASE ORAL
COMMUNITY
Start: 2022-04-05 | End: 2023-04-05

## 2023-02-01 RX ORDER — LINEZOLID 600 MG/1
600 TABLET, FILM COATED ORAL 2 TIMES DAILY
COMMUNITY
Start: 2023-01-12

## 2023-02-01 RX ORDER — AMLODIPINE BESYLATE 10 MG/1
10 TABLET ORAL DAILY
COMMUNITY

## 2023-02-01 RX ORDER — ROSUVASTATIN CALCIUM 20 MG/1
20 TABLET, COATED ORAL DAILY
COMMUNITY

## 2023-02-01 NOTE — PRE-PROCEDURE INSTRUCTIONS
Pre-Surgery Instructions:   Medication Instructions   • acetaminophen (TYLENOL) 325 mg tablet Uses PRN- OK to take day of surgery   • amLODIPine (NORVASC) 10 mg tablet Take day of surgery  • clopidogrel (PLAVIX) 75 mg tablet Instructions provided by MD   • clotrimazole-betamethasone (LOTRISONE) 1-0 05 % cream Hold day of surgery  • ezetimibe (ZETIA) 10 mg tablet Take day of surgery  • famotidine (PEPCID) 20 mg tablet Take day of surgery  • fluticasone (FLONASE) 50 mcg/act nasal spray Take day of surgery  • furosemide (LASIX) 20 mg tablet Hold day of surgery  • linezolid (ZYVOX) 600 mg tablet will finish before surgery   • mupirocin (BACTROBAN) 2 % ointment Hold day of surgery  • pantoprazole (PROTONIX) 40 mg tablet Take day of surgery  • rosuvastatin (CRESTOR) 20 MG tablet Take day of surgery  • testosterone cypionate (DEPO-TESTOSTERONE) 200 mg/mL SOLN next injection after surgery   Preop bathing and medication instructions reviewed with pt via telephone call  Pt verbalizes understanding  Pt to call PCP and ask about Plavix hold instructions  Instructed pt no alcohol, drugs or smoking 24 hrs prior to surgery  No vitamins or supplements (not ordered by a physician) for 7 days prior to surgery  No NSAIDS 5-7 days prior to surgery  Tylenol is OK to use  NPO after midnight the night prior to surgery  The hospital will call the pt with time and instructions one business day prior to surgery  Pt verbalizes understanding and all questions/concerns addressed

## 2023-02-02 ENCOUNTER — ANESTHESIA EVENT (OUTPATIENT)
Dept: PERIOP | Facility: HOSPITAL | Age: 63
End: 2023-02-02

## 2023-02-10 ENCOUNTER — PREP FOR PROCEDURE (OUTPATIENT)
Dept: OTHER | Facility: HOSPITAL | Age: 63
End: 2023-02-10

## 2023-02-13 ENCOUNTER — ANESTHESIA EVENT (OUTPATIENT)
Dept: PERIOP | Facility: HOSPITAL | Age: 63
End: 2023-02-13
Payer: COMMERCIAL

## 2023-02-13 ENCOUNTER — HOSPITAL ENCOUNTER (OUTPATIENT)
Facility: HOSPITAL | Age: 63
Setting detail: OUTPATIENT SURGERY
Discharge: HOME | End: 2023-02-13
Attending: PLASTIC SURGERY | Admitting: PLASTIC SURGERY

## 2023-02-13 ENCOUNTER — ANESTHESIA (OUTPATIENT)
Dept: PERIOP | Facility: HOSPITAL | Age: 63
End: 2023-02-13

## 2023-02-13 VITALS
SYSTOLIC BLOOD PRESSURE: 125 MMHG | HEART RATE: 70 BPM | TEMPERATURE: 97.3 F | RESPIRATION RATE: 18 BRPM | DIASTOLIC BLOOD PRESSURE: 83 MMHG | BODY MASS INDEX: 46.65 KG/M2 | OXYGEN SATURATION: 95 % | WEIGHT: 315 LBS | HEIGHT: 69 IN

## 2023-02-13 DIAGNOSIS — L53.9 ERYTHEMATOUS CONDITION, UNSPECIFIED: ICD-10-CM

## 2023-02-13 DIAGNOSIS — I87.313 CHRONIC VENOUS HYPERTENSION (IDIOPATHIC) WITH ULCER OF BILATERAL LOWER EXTREMITY (CODE) (HCC): ICD-10-CM

## 2023-02-13 DIAGNOSIS — L97.319: ICD-10-CM

## 2023-02-13 DIAGNOSIS — M25.571 PAIN IN RIGHT ANKLE AND JOINTS OF RIGHT FOOT: ICD-10-CM

## 2023-02-13 DIAGNOSIS — I87.2 VENOUS INSUFFICIENCY (CHRONIC) (PERIPHERAL): ICD-10-CM

## 2023-02-13 DIAGNOSIS — T81.40XA INFECTION FOLLOWING A PROCEDURE, UNSPECIFIED, INITIAL ENCOUNTER: ICD-10-CM

## 2023-02-13 RX ORDER — IBUPROFEN 600 MG/1
600 TABLET ORAL EVERY 6 HOURS PRN
Status: DISCONTINUED | OUTPATIENT
Start: 2023-02-13 | End: 2023-02-13 | Stop reason: HOSPADM

## 2023-02-13 RX ORDER — ONDANSETRON 4 MG/1
4 TABLET, ORALLY DISINTEGRATING ORAL EVERY 6 HOURS PRN
Status: DISCONTINUED | OUTPATIENT
Start: 2023-02-13 | End: 2023-02-13 | Stop reason: HOSPADM

## 2023-02-13 RX ORDER — ONDANSETRON 2 MG/ML
4 INJECTION INTRAMUSCULAR; INTRAVENOUS ONCE AS NEEDED
Status: DISCONTINUED | OUTPATIENT
Start: 2023-02-13 | End: 2023-02-13 | Stop reason: HOSPADM

## 2023-02-13 RX ORDER — MIDAZOLAM HYDROCHLORIDE 2 MG/2ML
INJECTION, SOLUTION INTRAMUSCULAR; INTRAVENOUS AS NEEDED
Status: DISCONTINUED | OUTPATIENT
Start: 2023-02-13 | End: 2023-02-13

## 2023-02-13 RX ORDER — VANCOMYCIN HYDROCHLORIDE 1 G/200ML
INJECTION, SOLUTION INTRAVENOUS AS NEEDED
Status: DISCONTINUED | OUTPATIENT
Start: 2023-02-13 | End: 2023-02-13

## 2023-02-13 RX ORDER — LIDOCAINE HYDROCHLORIDE 10 MG/ML
INJECTION, SOLUTION EPIDURAL; INFILTRATION; INTRACAUDAL; PERINEURAL AS NEEDED
Status: DISCONTINUED | OUTPATIENT
Start: 2023-02-13 | End: 2023-02-13

## 2023-02-13 RX ORDER — SODIUM CHLORIDE, SODIUM LACTATE, POTASSIUM CHLORIDE, CALCIUM CHLORIDE 600; 310; 30; 20 MG/100ML; MG/100ML; MG/100ML; MG/100ML
INJECTION, SOLUTION INTRAVENOUS CONTINUOUS PRN
Status: DISCONTINUED | OUTPATIENT
Start: 2023-02-13 | End: 2023-02-13

## 2023-02-13 RX ORDER — SODIUM CHLORIDE, SODIUM LACTATE, POTASSIUM CHLORIDE, CALCIUM CHLORIDE 600; 310; 30; 20 MG/100ML; MG/100ML; MG/100ML; MG/100ML
100 INJECTION, SOLUTION INTRAVENOUS CONTINUOUS
Status: DISCONTINUED | OUTPATIENT
Start: 2023-02-13 | End: 2023-02-13 | Stop reason: HOSPADM

## 2023-02-13 RX ORDER — FENTANYL CITRATE 50 UG/ML
INJECTION, SOLUTION INTRAMUSCULAR; INTRAVENOUS AS NEEDED
Status: DISCONTINUED | OUTPATIENT
Start: 2023-02-13 | End: 2023-02-13

## 2023-02-13 RX ORDER — PROPOFOL 10 MG/ML
INJECTION, EMULSION INTRAVENOUS CONTINUOUS PRN
Status: DISCONTINUED | OUTPATIENT
Start: 2023-02-13 | End: 2023-02-13

## 2023-02-13 RX ADMIN — MIDAZOLAM 2 MG: 1 INJECTION INTRAMUSCULAR; INTRAVENOUS at 07:56

## 2023-02-13 RX ADMIN — VANCOMYCIN HYDROCHLORIDE 1500 MG: 1 INJECTION, SOLUTION INTRAVENOUS at 08:00

## 2023-02-13 RX ADMIN — PROPOFOL 100 MCG/KG/MIN: 10 INJECTION, EMULSION INTRAVENOUS at 07:57

## 2023-02-13 RX ADMIN — FENTANYL CITRATE 100 MCG: 50 INJECTION, SOLUTION INTRAMUSCULAR; INTRAVENOUS at 07:56

## 2023-02-13 RX ADMIN — SODIUM CHLORIDE 3 G: 900 INJECTION INTRAVENOUS at 07:50

## 2023-02-13 RX ADMIN — LIDOCAINE HYDROCHLORIDE 50 MG: 10 INJECTION, SOLUTION EPIDURAL; INFILTRATION; INTRACAUDAL; PERINEURAL at 08:00

## 2023-02-13 RX ADMIN — SODIUM CHLORIDE, SODIUM LACTATE, POTASSIUM CHLORIDE, AND CALCIUM CHLORIDE: .6; .31; .03; .02 INJECTION, SOLUTION INTRAVENOUS at 06:29

## 2023-02-13 NOTE — OP NOTE
OPERATIVE REPORT  PATIENT NAME: Kalani Burks    :  1960  MRN: [id number]  Pt Location:  OR ROOM 11    SURGERY DATE: 2023    Surgeon(s) and Role:     * Tiffanie Wall MD - Primary    Preop and postoperative diagnosis: Right ankle wound    Operative Procedure(s):  Right - DEBRIDEMENT OF ANKLE    Operative history: The patient has had a long course after attempted coverage of a ruptured right Achilles tendon requiring ultimately a distal based peninsular fasciocutaneous rotation flap for coverage  However the skin graft of the donor site of the flap is never really healed in spite of several episodes of skin grafting  He is left with a little bit of erythema in this area yet  Anterior to the flap is a 1 x 5 cm open area and posterior to it a 1 x 3 cm open area with poor granulations  At this time these areas were curetted to remove superficial debris in preparation for skin grafting later this week  Operative procedure: Patient was taken to the operating room placed supine the operating table  He was prepped and draped in usual fashion  Anesthesia was General supplemented with intravenous sedation  The granulations of the right ankle were then debrided with a curette as described above  1/4% acetic acid moist dressings were applied with a bulky wrap  The patient tolerated the procedure well was then taken to the recovery in good condition      Specimen(s):  ID Type Source Tests Collected by Time Destination   1 : productsof debridement see comment Tissue Leg, Right TISSUE EXAM Tiffanie Wall MD 2023 [id number]              SIGNATURE: Tiffanie Wall MD  DATE: 2023  TIME: 8:21 AM

## 2023-02-13 NOTE — ANESTHESIA POSTPROCEDURE EVALUATION
Post-Op Assessment Note    CV Status:  Stable  Pain Score: 0    Pain management: adequate     Mental Status:  Alert and awake   Hydration Status:  Stable   PONV Controlled:  None   Airway Patency:  Patent      Post Op Vitals Reviewed: Yes      Staff: CRNA         No notable events documented      /84 (02/13/23 0815)    Temp 98 4 °F (36 9 °C) (02/13/23 0815)    Pulse 79 (02/13/23 0815)   Resp 20 (02/13/23 0815)    SpO2 92 % (02/13/23 0815)

## 2023-02-13 NOTE — INTERVAL H&P NOTE
H&P reviewed  After examining the patient I find no changes in the patients condition since the H&P had been written      Vitals:    02/13/23 0612   BP: 135/75   Pulse: 87   Resp: 18   Temp: 98 °F (36 7 °C)   SpO2: 94%

## 2023-02-13 NOTE — ANESTHESIA PREPROCEDURE EVALUATION
Procedure:  DEBRIDEMENT OF ANKLE (Right: Ankle)    Relevant Problems   CARDIO   (+) Essential hypertension   (+) HLD (hyperlipidemia)   (+) PVC (premature ventricular contraction)      GI/HEPATIC   (+) GERD (gastroesophageal reflux disease)      /RENAL   (+) LUIS CARLOS (acute kidney injury) (HCC)      NEURO/PSYCH   (+) History of Clostridioides difficile colitis      PULMONARY   (+) Mild intermittent asthma without complication   (+) KIRIT on CPAP      BMI 46    ECHO 5/3/21:;  This result has an attachment that is not available  Normal LV size and function  Likely normal wall motion abnormality   Mild to moderate LVH on Definity contrast images  Measurements on   parasternal views likely inaccurate   No significant valvular stenosis or regurgitation   Normal RA pressures     Left Ventricle   Left ventricle is normal in size  There is mild to moderate hypertrophy  Systolic function is normal with an ejection fraction of 60-65%  Right Ventricle   Right ventricle cavity is normal  Systolic function is normal      Left Atrium   Left atrium was not well visualized  Right Atrium   Right atrium was not well visualized  IVC/SVC   The inferior vena cava demonstrates a diameter of <=21 mm and collapses >50%; therefore, the right atrial pressure is estimated at 0-5 mmHg  Mitral Valve   There is no regurgitation or stenosis  Tricuspid Valve   There is no regurgitation or stenosis  Aortic Valve   There is no regurgitation or stenosis  Pulmonic Valve   There is no regurgitation or stenosis  Study Details   A complete 2D echocardiogram was performed  Color flow, Pulse Wave and Continuous Wave Doppler was performed and analyzed  Definity contrast was used during the study  Cath 9/24/20:    Addendum by Francesca Brandon MD on 09/25/2020  2:47 PM EDT   · Coronary artery disease   · 100% stenosis of OM 1   This is the culprit vessel   · 40% stenosis in midportion of LAD   · 40% stenosis of mid RCA   · Normal left ventricular size with normal to mildly diminished systolic   function, ejection fraction estimated at 50 to 55%   · Moderate to severely elevated left ventricular end-diastolic pressure       Coronary Findings Diagnostic Dominance: Right   Left Main: The vessel is moderate in size and is angiographically normal    Left Anterior Descending: The vessel is large  Prox LAD lesion is 40% stenosed  Not the culprit lesion  Lesion length: discrete (<10mm)  The lesion is located at the bifurcation and discrete  First Diagonal Branch: 1st Diag lesion is 50% stenosed  Not the culprit lesion  Lesion length: discrete (<10mm)  The lesion is discrete  Left Circumflex: The vessel is moderate in size and is angiographically normal  First Obtuse Marginal Branch: The vessel is small  1st Mrg lesion is 100% stenosed  Culprit lesion  SONI flow is 0  Right Coronary Artery: The vessel is large  The vessel was dominant  Prox RCA lesion is 40% stenosed  Not the culprit lesion  Lesion length: discrete (<10mm)  The lesion is discrete  Intervention   No interventions have been documented  Left Ventricle   The left ventricular size is normal  There is mild left ventricular systolic dysfunction  The ejection fraction is 50-55% by visual estimate  Access   Right radial artery access  The puncture site was infiltrated with a local anesthetic  The vessel was accessed using the modified Seldinger technique and a wire was threaded into the vessel  A 6 Fr Introducer was advanced over the wire into the vessel  Hemostasis   The sheath was removed  Hemostasis was achieved using a radial compression system  Baseline Hemodynamics   Systemic arterial pressure is hypertensive  There is mild systemic arterial hypertension  Left ventricular end diastolic pressure is moderately to severely elevated       Wall Motion   The posterior, mid anterior, mid intravascular septum, basilar anterior, basilar posterior, basilar inferior and apical anterior segments are normal  The mid inferior and apical inferior segments are hypokinetic  Physical Exam    Airway    Mallampati score: II  TM Distance: >3 FB  Neck ROM: full     Dental   No notable dental hx     Cardiovascular      Pulmonary      Other Findings        Anesthesia Plan  ASA Score- 3     Anesthesia Type- IV sedation with anesthesia with ASA Monitors  Additional Monitors:   Airway Plan:           Plan Factors-Exercise tolerance (METS): <4 METS  Chart reviewed  EKG reviewed  Imaging results reviewed  Existing labs reviewed  Patient summary reviewed  Patient is not a current smoker  Patient did not smoke on day of surgery  Induction- intravenous  Postoperative Plan- Plan for postoperative opioid use  Informed Consent- Anesthetic plan and risks discussed with patient  I personally reviewed this patient with the CRNA  Discussed and agreed on the Anesthesia Plan with the CRNA  Tata Lopez

## 2023-02-13 NOTE — DISCHARGE INSTR - AVS FIRST PAGE
1   Keep right foot elevated as much as possible  2  Starting tonight twice a day change right ankle dressings  Apply 1/4% acetic acid moist dressings  3  Take oral antibiotics  4    Return to operating room in 2 days for possible skin graft if not more debridement

## 2023-02-14 RX ORDER — AMPICILLIN AND SULBACTAM 2; 1 G/1; G/1
3 INJECTION, POWDER, FOR SOLUTION INTRAMUSCULAR; INTRAVENOUS ONCE
Status: CANCELLED | OUTPATIENT
Start: 2023-02-15

## 2023-02-15 ENCOUNTER — ANESTHESIA (OUTPATIENT)
Dept: PERIOP | Facility: HOSPITAL | Age: 63
End: 2023-02-15
Payer: COMMERCIAL

## 2023-02-15 ENCOUNTER — HOSPITAL ENCOUNTER (OUTPATIENT)
Facility: HOSPITAL | Age: 63
Setting detail: OUTPATIENT SURGERY
Discharge: HOME | End: 2023-02-15
Attending: PLASTIC SURGERY | Admitting: PLASTIC SURGERY

## 2023-02-15 VITALS
HEIGHT: 69 IN | SYSTOLIC BLOOD PRESSURE: 123 MMHG | HEART RATE: 70 BPM | OXYGEN SATURATION: 94 % | RESPIRATION RATE: 18 BRPM | WEIGHT: 315 LBS | BODY MASS INDEX: 46.65 KG/M2 | TEMPERATURE: 96.4 F | DIASTOLIC BLOOD PRESSURE: 79 MMHG

## 2023-02-15 DIAGNOSIS — L98.9 DISORDER OF THE SKIN AND SUBCUTANEOUS TISSUE, UNSPECIFIED: Primary | ICD-10-CM

## 2023-02-15 DIAGNOSIS — R52 PAIN: ICD-10-CM

## 2023-02-15 PROBLEM — I21.9 MI (MYOCARDIAL INFARCTION) (HCC): Status: ACTIVE | Noted: 2023-02-15

## 2023-02-15 RX ORDER — MAGNESIUM HYDROXIDE 1200 MG/15ML
LIQUID ORAL AS NEEDED
Status: DISCONTINUED | OUTPATIENT
Start: 2023-02-15 | End: 2023-02-15 | Stop reason: HOSPADM

## 2023-02-15 RX ORDER — LIDOCAINE HYDROCHLORIDE 10 MG/ML
INJECTION, SOLUTION EPIDURAL; INFILTRATION; INTRACAUDAL; PERINEURAL AS NEEDED
Status: DISCONTINUED | OUTPATIENT
Start: 2023-02-15 | End: 2023-02-15

## 2023-02-15 RX ORDER — PROPOFOL 10 MG/ML
INJECTION, EMULSION INTRAVENOUS AS NEEDED
Status: DISCONTINUED | OUTPATIENT
Start: 2023-02-15 | End: 2023-02-15

## 2023-02-15 RX ORDER — ONDANSETRON 4 MG/1
4 TABLET, ORALLY DISINTEGRATING ORAL EVERY 6 HOURS PRN
Status: DISCONTINUED | OUTPATIENT
Start: 2023-02-15 | End: 2023-02-15 | Stop reason: HOSPADM

## 2023-02-15 RX ORDER — MIDAZOLAM HYDROCHLORIDE 2 MG/2ML
INJECTION, SOLUTION INTRAMUSCULAR; INTRAVENOUS AS NEEDED
Status: DISCONTINUED | OUTPATIENT
Start: 2023-02-15 | End: 2023-02-15

## 2023-02-15 RX ORDER — EPHEDRINE SULFATE 50 MG/ML
INJECTION INTRAVENOUS AS NEEDED
Status: DISCONTINUED | OUTPATIENT
Start: 2023-02-15 | End: 2023-02-15

## 2023-02-15 RX ORDER — SUCCINYLCHOLINE/SOD CL,ISO/PF 100 MG/5ML
SYRINGE (ML) INTRAVENOUS AS NEEDED
Status: DISCONTINUED | OUTPATIENT
Start: 2023-02-15 | End: 2023-02-15

## 2023-02-15 RX ORDER — DIPHENHYDRAMINE HYDROCHLORIDE 50 MG/ML
12.5 INJECTION INTRAMUSCULAR; INTRAVENOUS ONCE AS NEEDED
Status: DISCONTINUED | OUTPATIENT
Start: 2023-02-15 | End: 2023-02-15 | Stop reason: HOSPADM

## 2023-02-15 RX ORDER — DEXAMETHASONE SODIUM PHOSPHATE 10 MG/ML
INJECTION, SOLUTION INTRAMUSCULAR; INTRAVENOUS AS NEEDED
Status: DISCONTINUED | OUTPATIENT
Start: 2023-02-15 | End: 2023-02-15

## 2023-02-15 RX ORDER — SODIUM CHLORIDE, SODIUM LACTATE, POTASSIUM CHLORIDE, CALCIUM CHLORIDE 600; 310; 30; 20 MG/100ML; MG/100ML; MG/100ML; MG/100ML
125 INJECTION, SOLUTION INTRAVENOUS CONTINUOUS
Status: DISCONTINUED | OUTPATIENT
Start: 2023-02-15 | End: 2023-02-15 | Stop reason: HOSPADM

## 2023-02-15 RX ORDER — FENTANYL CITRATE/PF 50 MCG/ML
25 SYRINGE (ML) INJECTION
Status: DISCONTINUED | OUTPATIENT
Start: 2023-02-15 | End: 2023-02-15 | Stop reason: HOSPADM

## 2023-02-15 RX ORDER — MINERAL OIL
OIL (ML) MISCELLANEOUS AS NEEDED
Status: DISCONTINUED | OUTPATIENT
Start: 2023-02-15 | End: 2023-02-15 | Stop reason: HOSPADM

## 2023-02-15 RX ORDER — HYDROMORPHONE HCL IN WATER/PF 6 MG/30 ML
0.2 PATIENT CONTROLLED ANALGESIA SYRINGE INTRAVENOUS
Status: DISCONTINUED | OUTPATIENT
Start: 2023-02-15 | End: 2023-02-15 | Stop reason: HOSPADM

## 2023-02-15 RX ORDER — ROCURONIUM BROMIDE 10 MG/ML
INJECTION, SOLUTION INTRAVENOUS AS NEEDED
Status: DISCONTINUED | OUTPATIENT
Start: 2023-02-15 | End: 2023-02-15

## 2023-02-15 RX ORDER — FENTANYL CITRATE 50 UG/ML
INJECTION, SOLUTION INTRAMUSCULAR; INTRAVENOUS AS NEEDED
Status: DISCONTINUED | OUTPATIENT
Start: 2023-02-15 | End: 2023-02-15

## 2023-02-15 RX ORDER — OXYCODONE HYDROCHLORIDE AND ACETAMINOPHEN 5; 325 MG/1; MG/1
1 TABLET ORAL EVERY 4 HOURS PRN
Qty: 20 TABLET | Refills: 0 | Status: SHIPPED | OUTPATIENT
Start: 2023-02-15 | End: 2023-02-25

## 2023-02-15 RX ORDER — ONDANSETRON 2 MG/ML
INJECTION INTRAMUSCULAR; INTRAVENOUS AS NEEDED
Status: DISCONTINUED | OUTPATIENT
Start: 2023-02-15 | End: 2023-02-15

## 2023-02-15 RX ORDER — OXYCODONE HYDROCHLORIDE AND ACETAMINOPHEN 5; 325 MG/1; MG/1
1 TABLET ORAL EVERY 4 HOURS PRN
Status: DISCONTINUED | OUTPATIENT
Start: 2023-02-15 | End: 2023-02-15 | Stop reason: HOSPADM

## 2023-02-15 RX ADMIN — PROPOFOL 50 MG: 10 INJECTION, EMULSION INTRAVENOUS at 13:17

## 2023-02-15 RX ADMIN — SODIUM CHLORIDE, SODIUM LACTATE, POTASSIUM CHLORIDE, AND CALCIUM CHLORIDE: .6; .31; .03; .02 INJECTION, SOLUTION INTRAVENOUS at 11:41

## 2023-02-15 RX ADMIN — ROCURONIUM BROMIDE 10 MG: 10 INJECTION, SOLUTION INTRAVENOUS at 11:58

## 2023-02-15 RX ADMIN — DEXAMETHASONE SODIUM PHOSPHATE 10 MG: 10 INJECTION, SOLUTION INTRAMUSCULAR; INTRAVENOUS at 11:58

## 2023-02-15 RX ADMIN — EPHEDRINE SULFATE 15 MG: 50 INJECTION INTRAVENOUS at 12:37

## 2023-02-15 RX ADMIN — VANCOMYCIN HYDROCHLORIDE 1500 MG: 1 INJECTION, POWDER, LYOPHILIZED, FOR SOLUTION INTRAVENOUS at 11:21

## 2023-02-15 RX ADMIN — Medication 200 MG: at 11:58

## 2023-02-15 RX ADMIN — LIDOCAINE HYDROCHLORIDE 50 MG: 10 INJECTION, SOLUTION EPIDURAL; INFILTRATION; INTRACAUDAL; PERINEURAL at 11:58

## 2023-02-15 RX ADMIN — MIDAZOLAM HYDROCHLORIDE 2 MG: 2 INJECTION, SOLUTION INTRAMUSCULAR; INTRAVENOUS at 11:54

## 2023-02-15 RX ADMIN — ROCURONIUM BROMIDE 20 MG: 10 INJECTION, SOLUTION INTRAVENOUS at 12:13

## 2023-02-15 RX ADMIN — EPHEDRINE SULFATE 10 MG: 50 INJECTION INTRAVENOUS at 12:05

## 2023-02-15 RX ADMIN — FENTANYL CITRATE 25 MCG: 50 INJECTION, SOLUTION INTRAMUSCULAR; INTRAVENOUS at 13:21

## 2023-02-15 RX ADMIN — PROPOFOL 50 MG: 10 INJECTION, EMULSION INTRAVENOUS at 13:05

## 2023-02-15 RX ADMIN — FENTANYL CITRATE 100 MCG: 50 INJECTION, SOLUTION INTRAMUSCULAR; INTRAVENOUS at 11:57

## 2023-02-15 RX ADMIN — EPHEDRINE SULFATE 5 MG: 50 INJECTION INTRAVENOUS at 12:10

## 2023-02-15 RX ADMIN — ONDANSETRON 4 MG: 2 INJECTION INTRAMUSCULAR; INTRAVENOUS at 13:02

## 2023-02-15 RX ADMIN — VANCOMYCIN HYDROCHLORIDE 1500 MG: 1 INJECTION, POWDER, LYOPHILIZED, FOR SOLUTION INTRAVENOUS at 11:38

## 2023-02-15 RX ADMIN — PROPOFOL 300 MG: 10 INJECTION, EMULSION INTRAVENOUS at 11:58

## 2023-02-15 RX ADMIN — SODIUM CHLORIDE 3 G: 900 INJECTION INTRAVENOUS at 12:45

## 2023-02-15 RX ADMIN — FENTANYL CITRATE 25 MCG: 50 INJECTION, SOLUTION INTRAMUSCULAR; INTRAVENOUS at 13:36

## 2023-02-15 RX ADMIN — EPHEDRINE SULFATE 10 MG: 50 INJECTION INTRAVENOUS at 12:19

## 2023-02-15 RX ADMIN — SODIUM CHLORIDE, SODIUM LACTATE, POTASSIUM CHLORIDE, AND CALCIUM CHLORIDE: .6; .31; .03; .02 INJECTION, SOLUTION INTRAVENOUS at 13:25

## 2023-02-15 NOTE — INTERVAL H&P NOTE
H&P reviewed  After examining the patient I find no changes in the patients condition since the H&P had been written      Vitals:    02/15/23 0843   BP: 137/81   Pulse: 95   Resp: 18   Temp: 98 °F (36 7 °C)   SpO2: 95%

## 2023-02-15 NOTE — NURSING NOTE
Pt is awake,alert,tolerated diet, OOB with assistance, voided  Written and verbal instructions given to pt, who verbalizes an understanding  The wound vac is intact

## 2023-02-15 NOTE — ANESTHESIA PREPROCEDURE EVALUATION
Procedure:  SKIN GRAFT TO ANKLE (Right: Ankle)    Relevant Problems   CARDIO   (+) Essential hypertension   (+) HLD (hyperlipidemia)   (+) MI (myocardial infarction) ([facility])   (+) PVC (premature ventricular contraction)      GI/HEPATIC   (+) GERD (gastroesophageal reflux disease)      /RENAL   (+) LUIS CARLOS (acute kidney injury) (HCC)      NEURO/PSYCH   (+) History of Clostridioides difficile colitis      PULMONARY   (+) Mild intermittent asthma without complication   (+) KIRIT on CPAP      Digestive   (+) Slow transit constipation      Musculoskeletal and Integument   (+) Disorder of the skin and subcutaneous tissue, unspecified   (+) Open wound of right foot with tendon involvement      Other   (+) Acute hyponatremia   (+) Ankle wound, right, sequela   (+) Cellulitis of right lower extremity without foot   (+) Morbid obesity with BMI of 45 0-49 9, adult ([facility])   (+) Open wound of right foot        Physical Exam    Airway    Mallampati score: II  TM Distance: >3 FB  Neck ROM: full     Dental   No notable dental hx     Cardiovascular  Cardiovascular exam normal    Pulmonary  Pulmonary exam normal     Other Findings        Anesthesia Plan  ASA Score- 3     Anesthesia Type- IV sedation with anesthesia with ASA Monitors  Additional Monitors:   Airway Plan:           Plan Factors-Exercise tolerance (METS): <4 METS  Chart reviewed  Existing labs reviewed  Patient is not a current smoker  Patient not instructed to abstain from smoking on day of procedure  Patient did not smoke on day of surgery  Induction- intravenous  Postoperative Plan-     Informed Consent- Anesthetic plan and risks discussed with patient  I personally reviewed this patient with the CRNA  Discussed and agreed on the Anesthesia Plan with the CRNA  Tata Lopez

## 2023-02-15 NOTE — DISCHARGE INSTR - AVS FIRST PAGE
1   Return my office in 2 days to change VAC  2  Make sure you take charging cable home with you today and bring with you to the office on the next appointment  3    Keep right leg elevated is much as possible

## 2023-02-15 NOTE — OP NOTE
OPERATIVE REPORT  PATIENT NAME: Grupo Cleaning    :  1960  MRN: [id number]  Pt Location:  OR ROOM 08    SURGERY DATE: 2/15/2023    Surgeon(s) and Role:     * Vazquez Castillo MD - Primary    Preop and postoperative diagnosis: Right ankle wound    Operative Procedure(s):  Right - SKIN GRAFT TO ANKLE  WOUND VAC APPLICATION    Operative history: The patient had had a distal based peninsular flap rotated to cover a right Achilles tendon repair  However the skin graft on the donor site never really healed in spite of even prior repeated skin graft to this area  He is left with a granulating wound that been debrided 2 days previously anterior to the flap measuring about 2 x 8 cm and posterior a small open area about 2 x 4 cm  At this time a split-thickness skin graft was made to cover both areas  The VAC was then applied onto this to see if this would facilitate healing  Operative procedure: The patient was taken to the operating placed supine the operative table  He was prepped and draped in usual fashion  Anesthesia was General via endotracheal tube  A padded electric dermatome was used to harvest a 13 1007 split-thickness skin graft from the right anterior thigh  Some of the graft was meshed 1-1/2-1  This was then placed on the 2 open areas about the flap of the right ankle using staples for fixation  Some of the extra skin was meshed 3-1 and applied to the donor site of the right anterior thigh were also stapled into position with intent to hasten healing of that area  The VAC was then applied to the right ankle as the dressing  Light dressings were placed on the right thigh  The patient tolerated the procedure well taken to recovery in good condition              SIGNATURE: Vazquez Castillo MD  DATE: February 15, 2023  TIME: 4:09 PM

## 2023-02-15 NOTE — ANESTHESIA POSTPROCEDURE EVALUATION
Post-Op Assessment Note    CV Status:  Stable  Pain Score: 0    Pain management: adequate     Mental Status:  Alert and awake   Hydration Status:  Euvolemic   PONV Controlled:  None   Airway Patency:  Patent      Post Op Vitals Reviewed: Yes      Staff: CRNA         No notable events documented      /80 (02/15/23 1335)    Temp (!) 97 4 °F (36 3 °C) (02/15/23 1335)    Pulse 69 (02/15/23 1335)   Resp 20 (02/15/23 1335)    SpO2 98 % (02/15/23 1335)

## 2023-10-06 NOTE — NURSING NOTE
Please see the attached for additional resources   Pt is alert and oriented  Appears depressed "I had a bed day"  States no appetite and c/o mild nausea not relieved by zofran  Dry crackers provided with oral meds  Moves nonsurgical extremities well  C/o pain to right lower leg  refuses pain control at this time  Refuses to have right leg elevated  Heart tones distant  Pulses palpable  Right lower leg is edematous  Lungs decreased but clear with no distress  Abdomen is large and soft with hyperactive bowel sounds  C/o bloating and constipation  No BM  Voiding clear light lilliam urine  PICC intact with poor flow to medial and proximal catheters  Dressing to right upper thigh is clean and dry  Incision to upper thigh is healing  No drainage or open areas noted to site  Right lower leg is wrapped in ACE

## 2023-10-10 NOTE — ED PROVIDER NOTES
History  Chief Complaint   Patient presents with    PICC Line Problem     Was at [facility] yesterday, picc line removed, was clogged then removed  Is on vanco and cefepime for 1 5 year infection of leg, Missed one dose of abx      52-year-old male with a significant history of complicated repair of right Achilles tendon tear with infection for the last several months overlying the skin presenting to the emergency department requesting IV antibiotics  Patient reports that yesterday his PICC line was malfunctioning and leaking and had to be removed at [facility]   He was told he would be able to get the PICC line replaced today at this campus though was unable to when he called  He states for the last 2 weeks he has been on 1 5 g of vancomycin q 12 hours as well as 2 g of cefepime q 12 hours  He had a vanc trough done yesterday that was normal   He missed 1 dose already this morning and is concerned about missing 2nd dose and would like to have the medication provided via a peripheral line this evening  He will continue to follow up to get the PICC line replaced during the week  Prior to Admission Medications   Prescriptions Last Dose Informant Patient Reported? Taking?    Bismuth Tribromoph-Petrolatum (XEROFORM PETROLAT PATCH 4"X4") PADS   Yes No   Sig: USING FOR WOUND CARE   Vancomycin HCl 1500 MG/15ML SOLN   Yes No   Sig: Infuse 1,500 mg into a venous catheter every 12 (twelve) hours   acetaminophen (TYLENOL) 325 mg tablet   No No   Sig: Take 2 tablets (650 mg total) by mouth 3 (three) times a day   Patient not taking: Reported on 10/23/2019   cefepime (MAXIPIME) 2000 mg IVPB   Yes No   Sig: Infuse 2,000 mg into a venous catheter every 12 (twelve) hours   ezetimibe (ZETIA) 10 mg tablet   Yes No   Sig: Take 10 mg by mouth daily   famotidine (PEPCID) 20 mg tablet   No No   Sig: Take 1 tablet (20 mg total) by mouth 2 (two) times a day   fluticasone-salmeterol (ADVAIR DISKUS) 250-50 Spiritual Care Visit    Clinical Encounter Type  Visited With: Patient  Routine Visit: Follow-up  Continue Visiting: Yes         Values/Beliefs  Spiritual Requests During Hospitalization: [place] today  Orlando Chakraborty                                          mcg/dose inhaler   Yes No   Sig: Inhale 1 puff   furosemide (LASIX) 20 mg tablet   No No   Sig: Take 1 tablet (20 mg total) by mouth daily   pregabalin (LYRICA) 50 mg capsule   Yes No   Sig: Take 50 mg by mouth 3 (three) times a day As needed   simvastatin (ZOCOR) 40 mg tablet  Self Yes No   Sig: Take 40 mg by mouth every other day     testosterone cypionate (DEPO-TESTOSTERONE) 200 mg/mL SOLN   Yes No   Sig: INJECT 1 MILLILITER BY INTRAMUSCULAR ROUTE EVERY 2 WEEKS   vancomycin (VANCOCIN) 125 MG capsule   No No   Sig: Take 1 capsule (125 mg total) by mouth 2 (two) times a day for 14 days      Facility-Administered Medications: None       Past Medical History:   Diagnosis Date    Asthma     CPAP (continuous positive airway pressure) dependence     Diverticulosis     GERD (gastroesophageal reflux disease)     Hyperlipidemia     Morbid obesity (HCC)     Sleep apnea     Ventricular tachycardia (HCC)     1 episode       Past Surgical History:   Procedure Laterality Date    CARPAL TUNNEL RELEASE      COLONOSCOPY  2007    FLAP LOCAL EXTREMITY Right 8/14/2018    Procedure: ASPIRATION FAILING FREE FLAP RIGHT LEG;  Surgeon: Radha Stack MD;  Location: [facility] MAIN OR;  Service: Plastics    FREE FLAP GRAFT Right 8/13/2018    Procedure: TRANSFER RIGHT THIGH FREE FLAP TO RIGHT HEEL;  Surgeon: Radha Stack MD;  Location: [facility] MAIN OR;  Service: Plastics    HERNIA REPAIR      abdominal x 2    POLYPECTOMY      hyperplastic    MS ADJ TISS XFER SCALP,EXTREM 10 1-30 SQCM Right 8/23/2018    Procedure: LOCAL FLAP POSS STSG ACHILLES TENDON;  Surgeon: Radha Stack MD;  Location: [facility] MAIN OR;  Service: Plastics    MS COLONOSCOPY FLX DX W/COLLJ [address] WHEN PFRMD N/A 11/27/2017    Procedure: COLONOSCOPY;  Surgeon: Ray Salazar MD;  Location: [facility];   Service: Gastroenterology    MS DEBRIDEMENT, SKIN, SUB-Q TISSUE,=<20 SQ CM Right 7/18/2018    Procedure: DEBRIDEMENT ANKLE WOUND;  Surgeon: Radha Stack MD; Location:  MAIN OR;  Service: Plastics    UT DEBRIDEMENT, SKIN, SUB-Q TISSUE,=<20 SQ CM Right 7/23/2018    Procedure: DEBRIDEMENT RIGHT ANKLE WOUND;  Surgeon: Nick Llanes MD;  Location: [address] [place] [address] OR;  Service: Plastics    UT FREE MUSC-SKIN FLAP W/MICROVASC ANAST Right 7/30/2018    Procedure: COVERAGE HEEL WITH GRACILIS MUSCLE FLAP/SKINGRAFT ;  Surgeon: Nick Llanes MD;  Location: [address] [place] [address] OR;  Service: Plastics    [id number] [facility] Right 4/23/2018    Procedure: REPAIR TENDON ACHILLES  FHL TRANSFER;  Surgeon: Alan Kidd DPM;  Location: [facility];  Service: Podiatry    TRIGGER FINGER RELEASE      thumb       Family History   Problem Relation Age of Onset    Cancer Mother     Hypertension Mother     Diabetes Father      I have reviewed and agree with the history as documented  Social History     Tobacco Use    Smoking status: Never Smoker    Smokeless tobacco: Never Used   Substance Use Topics    Alcohol use: Yes     Frequency: 2-4 times a month     Drinks per session: 1 or 2     Binge frequency: Never     Comment: rarely    Drug use: Never        Review of Systems   Constitutional: Negative for chills and fever  HENT: Negative for congestion and sore throat  Eyes: Negative for visual disturbance  Respiratory: Negative for cough and shortness of breath  Cardiovascular: Positive for leg swelling  Negative for chest pain and palpitations  Gastrointestinal: Negative for abdominal pain, diarrhea, nausea and vomiting  Genitourinary: Negative for difficulty urinating and dysuria  Musculoskeletal: Negative for myalgias  Skin: Positive for rash and wound  Neurological: Negative for weakness, light-headedness, numbness and headaches         Physical Exam  ED Triage Vitals [11/17/19 2117]   Temperature Pulse Respirations Blood Pressure SpO2   98 9 °F (37 2 °C) 84 16 (!) 206/102 98 %      Temp src Heart Rate Source Patient Position - Orthostatic VS BP Location FiO2 (%)   -- -- -- -- --      Pain Score       2             Orthostatic Vital Signs  Vitals:    11/17/19 2117 11/18/19 0050   BP: (!) 206/102 165/93   Pulse: 84 78       Physical Exam   Constitutional: He is oriented to person, place, and time  He appears well-developed and well-nourished  No distress  HENT:   Head: Normocephalic and atraumatic  Mouth/Throat: Oropharynx is clear and moist    Eyes: Pupils are equal, round, and reactive to light  EOM are normal    Neck: Normal range of motion  Neck supple  Cardiovascular: Normal rate, regular rhythm, normal heart sounds and intact distal pulses  Pulmonary/Chest: Effort normal and breath sounds normal    Abdominal: Soft  Bowel sounds are normal  There is no tenderness  Musculoskeletal: Normal range of motion  He exhibits edema (Right lower extremity) and tenderness (Right lower extremity)  Neurological: He is alert and oriented to person, place, and time  No cranial nerve deficit or sensory deficit  Skin: Skin is warm and dry  Capillary refill takes less than 2 seconds  There is erythema  Erythema, tenderness, and warmth around a chronic wound on the right lower leg  Granulation tissue present within the wound  No tracking on the leg  The foot is normal distally  Nursing note and vitals reviewed  ED Medications  Medications   cefepime (MAXIPIME) 2 g/50 mL dextrose IVPB (0 mg Intravenous Stopped 11/17/19 2315)   vancomycin (VANCOCIN) 1,500 mg in sodium chloride 0 9 % 250 mL IVPB (0 mg Intravenous Stopped 11/18/19 0049)       Diagnostic Studies  Results Reviewed     None                 No orders to display         Procedures  Procedures        ED Course                               MDM  Number of Diagnoses or Management Options  Cellulitis of right lower extremity:   Diagnosis management comments: 50-year-old male who is relatively well appearing with a chronic soft tissue infection of the right lower extremity    No signs of systemic illness today  He did miss 1 dose of his antibiotics at home earlier today  Will give a single dose of IV cefepime and vancomycin the emergency department he will follow up tomorrow  Disposition  Final diagnoses:   Cellulitis of right lower extremity     Time reflects when diagnosis was documented in both MDM as applicable and the Disposition within this note     Time User Action Codes Description Comment    11/17/2019 11:48 PM Sarah Jarquin Add [P15 [id number]] Cellulitis of right lower extremity       ED Disposition     ED Disposition Condition Date/Time Comment    Discharge Stable Sun Nov 17, 2019 11:47 PM Xin Carr discharge to home/self care  Follow-up Information     Follow up With Specialties Details Why Contact Info    Minh Calderón MD Family Medicine Schedule an appointment as soon as possible for a visit   [address]            Discharge Medication List as of 11/17/2019 11:48 PM      CONTINUE these medications which have NOT CHANGED    Details   acetaminophen (TYLENOL) 325 mg tablet Take 2 tablets (650 mg total) by mouth 3 (three) times a day, Starting Wed 8/29/2018, No Print      Bismuth Tribromoph-Petrolatum (XEROFORM PETROLAT PATCH 4"X4") PADS USING FOR WOUND CARE, Historical Med      cefepime (MAXIPIME) 2000 mg IVPB Infuse 2,000 mg into a venous catheter every 12 (twelve) hours, Historical Med      ezetimibe (ZETIA) 10 mg tablet Take 10 mg by mouth daily, Starting Mon 10/28/2019, Historical Med      famotidine (PEPCID) 20 mg tablet Take 1 tablet (20 mg total) by mouth 2 (two) times a day, Starting Wed 8/29/2018, Normal      fluticasone-salmeterol (ADVAIR DISKUS) 250-50 mcg/dose inhaler Inhale 1 puff, Starting Fri 8/31/2018, Until Sat 8/31/2019, Historical Med      furosemide (LASIX) 20 mg tablet Take 1 tablet (20 mg total) by mouth daily, Starting Sat 10/12/2019, Print      pregabalin (LYRICA) 50 mg capsule Take 50 mg by mouth 3 (three) times a day As needed, Historical Med      simvastatin (ZOCOR) 40 mg tablet Take 40 mg by mouth every other day  , Historical Med      testosterone cypionate (DEPO-TESTOSTERONE) 200 mg/mL SOLN INJECT 1 MILLILITER BY INTRAMUSCULAR ROUTE EVERY 2 WEEKS, Historical Med      vancomycin (VANCOCIN) 125 MG capsule Take 1 capsule (125 mg total) by mouth 2 (two) times a day for 14 days, Starting Sat 11/16/2019, Until Sat 11/30/2019, Normal      Vancomycin HCl 1500 MG/15ML SOLN Infuse 1,500 mg into a venous catheter every 12 (twelve) hours, Historical Med           No discharge procedures on file  ED Provider  Attending physically available and evaluated Duncan Vivek [employer] managed the patient along with the ED Attending      Electronically Signed by         Bakari Allen MD  11/18/19 2987

## 2024-05-07 LAB — HBA1C MFR BLD HPLC: 6 %

## 2024-05-31 ENCOUNTER — OFFICE VISIT (OUTPATIENT)
Dept: UROLOGY | Facility: MEDICAL CENTER | Age: 64
End: 2024-05-31
Payer: COMMERCIAL

## 2024-05-31 VITALS
WEIGHT: 304 LBS | HEART RATE: 70 BPM | HEIGHT: 69 IN | SYSTOLIC BLOOD PRESSURE: 140 MMHG | DIASTOLIC BLOOD PRESSURE: 78 MMHG | BODY MASS INDEX: 45.03 KG/M2 | OXYGEN SATURATION: 95 %

## 2024-05-31 DIAGNOSIS — N52.1 ERECTILE DYSFUNCTION DUE TO DISEASES CLASSIFIED ELSEWHERE: ICD-10-CM

## 2024-05-31 DIAGNOSIS — R97.20 ELEVATED PSA: Primary | ICD-10-CM

## 2024-05-31 DIAGNOSIS — N40.1 BENIGN PROSTATIC HYPERPLASIA WITH URINARY OBSTRUCTION: ICD-10-CM

## 2024-05-31 DIAGNOSIS — E29.1 HYPOGONADISM IN MALE: ICD-10-CM

## 2024-05-31 DIAGNOSIS — E66.01 MORBID OBESITY WITH BMI OF 45.0-49.9, ADULT (HCC): Chronic | ICD-10-CM

## 2024-05-31 DIAGNOSIS — N13.8 BENIGN PROSTATIC HYPERPLASIA WITH URINARY OBSTRUCTION: ICD-10-CM

## 2024-05-31 LAB
SL AMB  POCT GLUCOSE, UA: ABNORMAL
SL AMB LEUKOCYTE ESTERASE,UA: ABNORMAL
SL AMB POCT BILIRUBIN,UA: ABNORMAL
SL AMB POCT BLOOD,UA: ABNORMAL
SL AMB POCT CLARITY,UA: CLEAR
SL AMB POCT COLOR,UA: YELLOW
SL AMB POCT KETONES,UA: ABNORMAL
SL AMB POCT NITRITE,UA: ABNORMAL
SL AMB POCT PH,UA: 5.5
SL AMB POCT SPECIFIC GRAVITY,UA: 1.03
SL AMB POCT URINE PROTEIN: 30
SL AMB POCT UROBILINOGEN: 0.2

## 2024-05-31 PROCEDURE — 81003 URINALYSIS AUTO W/O SCOPE: CPT | Performed by: UROLOGY

## 2024-05-31 PROCEDURE — 99204 OFFICE O/P NEW MOD 45 MIN: CPT | Performed by: UROLOGY

## 2024-05-31 RX ORDER — ALFUZOSIN HYDROCHLORIDE 10 MG/1
10 TABLET, EXTENDED RELEASE ORAL DAILY
Qty: 90 TABLET | Refills: 3 | Status: SHIPPED | OUTPATIENT
Start: 2024-05-31

## 2024-05-31 RX ORDER — LANSOPRAZOLE 30 MG/1
1 CAPSULE, DELAYED RELEASE ORAL DAILY
COMMUNITY

## 2024-05-31 RX ORDER — ALPROSTADIL 40 UG/ML
INJECTION, POWDER, LYOPHILIZED, FOR SOLUTION INTRACAVERNOUS
COMMUNITY
Start: 2024-04-05

## 2024-05-31 NOTE — ASSESSMENT & PLAN NOTE
AUA symptom score is 22.  He is not happy with his voiding pattern.  Options were discussed and I did recommend a trial of Uroxatrol.  Use and side effects were discussed.

## 2024-05-31 NOTE — ASSESSMENT & PLAN NOTE
PSA is elevated at 17.0.  Approximate 1-1/2 years ago the PSA was 14.2.  I discussed the causes of PSA elevation with the patient.  Risk of prostate cancer discussed.  I recommended proceeding with a multiparametric MRI at this time.

## 2024-05-31 NOTE — PROGRESS NOTES
Ambulatory Visit  Name: Peewee Nix      : 1960      MRN: [id number]  Encounter Provider: Juan Carlos Burroughs MD  Encounter Date: 2024   Encounter department: [facility] UROLOGY [place]    Assessment & Plan   1. Elevated PSA  Assessment & Plan:  PSA is elevated at 17.0.  Approximate 1-1/2 years ago the PSA was 14.2.  I discussed the causes of PSA elevation with the patient.  Risk of prostate cancer discussed.  I recommended proceeding with a multiparametric MRI at this time.  Orders:  -     POCT urine dip auto non-scope  -     MRI prostate multiparametric wo w contrast; Future; Expected date: 2024  2. Benign prostatic hyperplasia with urinary obstruction  Assessment & Plan:  AUA symptom score is 22.  He is not happy with his voiding pattern.  Options were discussed and I did recommend a trial of Uroxatrol.  Use and side effects were discussed.  Orders:  -     alfuzosin (UROXATRAL) 10 mg 24 hr tablet; Take 1 tablet (10 mg total) by mouth daily  3. Hypogonadism in male  Assessment & Plan:  The patient has a long history of hypogonadism.  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  He has been on testosterone replacement therapy for a long time.  CBC reveals erythrocytosis.  The testosterone level may well be contributing to his elevated PSA.  I recommended he seek consultation with endocrinology prior to continuing testosterone replacement therapy.  Orders:  -     Ambulatory Referral to Endocrinology; Future  4. Erectile dysfunction due to diseases classified elsewhere  Assessment & Plan:  Likely multifactorial secondary to gonad is him, atherosclerotic vascular disease.  Will defer further therapy at this time pending above workup.  5. Morbid obesity with BMI of 45.0-49.9, adult (HCC)      History of Present Illness     Peewee Nix is a 63 y.o. male who presents for elevation of his PSA. PSA noted to be 17.00  on recent blood work.  He notes he voids with a slow stream and is  "intermittent.  He gets up twice at night to urinate.  He has postvoid dribbling at times.  He denies recent gross hematuria or symptoms of infection.  He does feel some perineal discomfort.  There is no fever.  No recent urinary tract infection.  He did have a hematuria workup in the past which included cystoscopy.  He has a long history of hypogonadism and has been on testosterone replacement therapy until recently.  He is seen today in consultation regarding his elevated PSA and his lower urinary tract symptoms.    Review of Systems   Constitutional: Negative.  Negative for chills, diaphoresis, fatigue and fever.   HENT: Negative.     Eyes: Negative.    Respiratory: Negative.     Cardiovascular: Negative.    Endocrine: Negative.    Genitourinary:         See HPI   Musculoskeletal: Negative.    Skin: Negative.    Allergic/Immunologic: Negative.    Neurological: Negative.    Hematological: Negative.    Psychiatric/Behavioral: Negative.         AUA SYMPTOM SCORE      Flowsheet Row Most Recent Value   AUA SYMPTOM SCORE    How often have you had a sensation of not emptying your bladder completely after you finished urinating? 2   How often have you had to urinate again less than two hours after you finished urinating? 5   How often have you found you stopped and started again several times when you urinate? 2   How often have you found it difficult to postpone urination? 4   How often have you had a weak urinary stream? 4   How often have you had to push or strain to begin urination? 3   How many times did you most typically get up to urinate from the time you went to bed at night until the time you got up in the morning? 2   Quality of Life: If you were to spend the rest of your life with your urinary condition just the way it is now, how would you feel about that? 5   AUA SYMPTOM SCORE 22          Objective     /78 (BP Location: Left arm, Patient Position: Sitting, Cuff Size: Large)   Pulse 70   Ht 5' 9\" " "(1.753 m)   Wt (!) 138 kg (304 lb)   SpO2 95%   BMI 44.89 kg/m²   Physical Exam  Vitals reviewed.   Constitutional:       General: He is not in acute distress.     Appearance: Normal appearance. He is well-developed. He is obese. He is not ill-appearing, toxic-appearing or diaphoretic.   HENT:      Head: Normocephalic and atraumatic.   Eyes:      General: No scleral icterus.     Conjunctiva/sclera: Conjunctivae normal.   Cardiovascular:      Rate and Rhythm: Normal rate.   Pulmonary:      Effort: Pulmonary effort is normal.   Abdominal:      General: Bowel sounds are normal. There is no distension.      Palpations: Abdomen is soft. There is no mass.      Tenderness: There is no abdominal tenderness. There is no right CVA tenderness, left CVA tenderness, guarding or rebound.   Genitourinary:     Penis: Normal. No phimosis or hypospadias.       Testes:         Right: Mass not present.         Left: Mass not present.      Rectum: Normal.      Comments: Atrophic testes    Prostate moderately enlarged and palpably benign. No nodule appreciated.  Musculoskeletal:         General: Normal range of motion.      Cervical back: Neck supple.   Skin:     General: Skin is warm and dry.   Neurological:      General: No focal deficit present.      Mental Status: He is alert and oriented to person, place, and time.   Psychiatric:         Mood and Affect: Mood normal.         Behavior: Behavior normal.         Thought Content: Thought content normal.         Judgment: Judgment normal.       Results  No results found for: \"PSA\"  Lab Results   Component Value Date    CALCIUM 8.9 06/14/2023    K 4.2 06/14/2023    CO2 23 06/14/2023     06/14/2023    BUN 10 06/14/2023    CREATININE 1.02 06/14/2023     Lab Results   Component Value Date    WBC 9.38 12/07/2019    HGB 16.0 12/07/2019    HCT 49.5 (H) 12/07/2019    MCV 88 12/07/2019     12/07/2019       Office Urine Dip  Recent Results (from the past 1 hour(s))   POCT urine dip " auto non-scope    Collection Time: 05/31/24  1:37 PM   Result Value Ref Range     COLOR,UA yellow     CLARITY,UA clear     SPECIFIC GRAVITY,UA 1.030      PH,UA 5.5     LEUKOCYTE ESTERASE,UA neg     NITRITE,UA neg     GLUCOSE, UA neg     KETONES,UA trace     BILIRUBIN,UA small     BLOOD,UA trace     POCT URINE PROTEIN 30     SL AMB POCT UROBILINOGEN 0.2    ]    Administrative Statements

## 2024-05-31 NOTE — ASSESSMENT & PLAN NOTE
Likely multifactorial secondary to gonad is him, atherosclerotic vascular disease.  Will defer further therapy at this time pending above workup.

## 2024-05-31 NOTE — ASSESSMENT & PLAN NOTE
The patient has a long history of hypogonadism.  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  He has been on testosterone replacement therapy for a long time.  CBC reveals erythrocytosis.  The testosterone level may well be contributing to his elevated PSA.  I recommended he seek consultation with endocrinology prior to continuing testosterone replacement therapy.

## 2024-06-05 ENCOUNTER — TELEPHONE (OUTPATIENT)
Age: 64
End: 2024-06-05

## 2024-06-05 NOTE — TELEPHONE ENCOUNTER
Viridiana from [facility] called stated that provider can call [facility] to expedite the auth for MRI     Pt is very upset that his MRI was supposed to be today and prior auth was not done on time.     MRI is scheduled on 6/12    [facility]# [phone]

## 2024-06-05 NOTE — TELEPHONE ENCOUNTER
Patient calling in stating he went to get MRI today and MRI tech told patient MRI was never fully approved. Please review and call patient. Patient was rescheduled.    CB: [phone]

## 2024-06-06 NOTE — TELEPHONE ENCOUNTER
Patient called because he'd received a call earlier today.     Due to him not being reached by phone, the call was placed to his spouse.    Pt would like a returned call to know what the decision was regarding the MRI for 6/12.    Call back [phone]

## 2024-06-07 ENCOUNTER — HOSPITAL ENCOUNTER (OUTPATIENT)
Facility: MEDICAL CENTER | Age: 64
Discharge: HOME | End: 2024-06-07
Attending: UROLOGY
Payer: COMMERCIAL

## 2024-06-07 DIAGNOSIS — R97.20 ELEVATED PSA: ICD-10-CM

## 2024-06-07 PROCEDURE — A9585 GADOBUTROL INJECTION: HCPCS | Performed by: UROLOGY

## 2024-06-07 PROCEDURE — 72197 MRI PELVIS W/O & W/DYE: CPT

## 2024-06-07 PROCEDURE — 76377 3D RENDER W/INTRP POSTPROCES: CPT

## 2024-06-07 RX ORDER — GADOBUTROL 604.72 MG/ML
13 INJECTION INTRAVENOUS
Status: COMPLETED | OUTPATIENT
Start: 2024-06-07 | End: 2024-06-07

## 2024-06-07 RX ADMIN — GADOBUTROL 13 ML: 604.72 INJECTION INTRAVENOUS at 19:38

## 2024-06-07 NOTE — TELEPHONE ENCOUNTER
Patient called back during his break to see if there was an update on the prior auth.     Pt stated that he will reach out to his insurance company to check on the status.    Please contact pt with any updates.    Call back [phone]

## 2024-06-07 NOTE — TELEPHONE ENCOUNTER
Gabriela From [facility] called stated that MRI has been approved   Auth# [id number]    Call back ref# [id number]  # [phone]

## 2024-06-11 NOTE — TELEPHONE ENCOUNTER
Patient calling for MRI results, explained it may take 72 hours for response from provider and message was sent yesterday.

## 2024-06-13 NOTE — TELEPHONE ENCOUNTER
Patient calling for results    He is off today and hoping he can get a call back today with MRI results and the next step     #[phone]

## 2024-06-14 ENCOUNTER — NURSE TRIAGE (OUTPATIENT)
Dept: OTHER | Facility: OTHER | Age: 64
End: 2024-06-14

## 2024-06-14 NOTE — TELEPHONE ENCOUNTER
Patient's wife, Sayra calling as patient is very concerned with results of MRI and has not heard from anyone regarding results, states that we promised we would call Friday and he is going out of his mind, and this is very unprofessional. I explained we did try to contact patient Wednesday and a message was left. Unfortunately the provider did not review results for us to relay. Patient is looking for an overview and next steps.   Please leave a detailed message on phone: [phone]   Patient works as oral surgeon from 7AM-7PM.

## 2024-06-15 ENCOUNTER — NURSE TRIAGE (OUTPATIENT)
Dept: OTHER | Facility: OTHER | Age: 64
End: 2024-06-15

## 2024-06-15 NOTE — TELEPHONE ENCOUNTER
"Regarding: Question & concern regarding MRI result  ----- Message from Maksim BONILLA sent at 6/15/2024 12:27 AM EDT -----  \" I have been calling all week to get my MRI Results, I called everyday except today, I was told I would get a call back to discuss my results. There are Positive Findings with 2 Tumors and no one has ever called me back, and now it's Father's day weekend and I have to deal with this.\"    "

## 2024-06-15 NOTE — TELEPHONE ENCOUNTER
"Regarding: MRI Results  ----- Message from Niru HENRY sent at 6/14/2024  9:55 PM EDT -----  \" I have been calling all week to get my MRI Results, I called everyday except today, I was told I would get a call back to discuss my results. There are Positive Findings with 2 Tumors and no one has ever called me back, and now it's Father's day weekend and I have to deal with this.\"    "

## 2024-06-15 NOTE — TELEPHONE ENCOUNTER
"Reason for Disposition  • [1] Caller requesting NON-URGENT health information AND [2] PCP's office is the best resource    Answer Assessment - Initial Assessment Questions  1. REASON FOR CALL or QUESTION: \"What is your reason for calling today?\" or \"How can I best help you?\" or \"What question do you have that I can help answer?\"     Patient upset and states urology office was disrespectful by their behavior, after he was following protocol of calling back. States both wife and himself had similar experiences. States he was informed to not call office, and to wait for call, however never received call. Patient will like urgent callback to discuss results.    Protocols used: Information Only Call - No Triage-ADULT-    "

## 2024-06-17 ENCOUNTER — PREP FOR PROCEDURE (OUTPATIENT)
Dept: UROLOGY | Facility: MEDICAL CENTER | Age: 64
End: 2024-06-17

## 2024-06-17 ENCOUNTER — TELEPHONE (OUTPATIENT)
Dept: OTHER | Facility: HOSPITAL | Age: 64
End: 2024-06-17

## 2024-06-17 DIAGNOSIS — R97.20 ELEVATED PSA: Primary | ICD-10-CM

## 2024-06-17 NOTE — TELEPHONE ENCOUNTER
I spoke to the patient regarding his multiparametric MRI and the finding of a PI-RADS 4 and PI-RADS 3 lesions.  There was no evidence of metastatic disease.  We discussed the risk of prostate cancer with PI-RADS 4 lesions.  His PSA and PSA density are high.  I did recommend he proceed with transperineal fusion guided biopsies.  The procedure was described and risks of infection and urinary retention discussed.  He will be contacted to schedule the fusion biopsy.

## 2024-06-17 NOTE — TELEPHONE ENCOUNTER
MRI results as follows:    small/average prostate size (28g)  lesion/nodule- yes  PIRADS 4 (high risk)    Interpretation: all in all suspicious and worthy of biopsy    Pt is good candidate for transperineal MRI fusion biopsy prostate. I recommend this as next step

## 2024-06-20 ENCOUNTER — PATIENT MESSAGE (OUTPATIENT)
Dept: UROLOGY | Facility: MEDICAL CENTER | Age: 64
End: 2024-06-20

## 2024-06-26 ENCOUNTER — PATIENT MESSAGE (OUTPATIENT)
Dept: UROLOGY | Facility: MEDICAL CENTER | Age: 64
End: 2024-06-26

## 2024-07-03 ENCOUNTER — TELEPHONE (OUTPATIENT)
Dept: UROLOGY | Facility: MEDICAL CENTER | Age: 64
End: 2024-07-03

## 2024-07-03 RX ORDER — CIPROFLOXACIN 500 MG/1
TABLET, FILM COATED ORAL
COMMUNITY
Start: 2024-07-01

## 2024-07-03 NOTE — TELEPHONE ENCOUNTER
Telephone call.  Patient read the MRI report, he was afraid he had prostate cancer.    I explained that the category 4 lesion suggest he needs a biopsy, but we do not have any diagnosis of cancer at this time.    He has a biopsy scheduled, further discussion at that time.

## 2024-07-03 NOTE — PRE-PROCEDURE INSTRUCTIONS
Pre-Surgery Instructions:   Medication Instructions    alfuzosin (UROXATRAL) 10 mg 24 hr tablet Take night before surgery    amLODIPine (NORVASC) 10 mg tablet Take day of surgery.    clopidogrel (PLAVIX) 75 mg tablet Instructions provided by MD- form faxed to PCP office.    Edex 40 MCG injection Hold day of surgery.    ezetimibe (ZETIA) 10 mg tablet Take night before surgery    fluticasone (FLONASE) 50 mcg/act nasal spray Take night before surgery    furosemide (LASIX) 20 mg tablet Hold day of surgery.    lansoprazole (PREVACID) 30 mg capsule Take night before surgery    rosuvastatin (CRESTOR) 20 MG tablet Take night before surgery     Medication instructions for day surgery reviewed. Please use only a sip of water to take your instructed medications. Avoid all over the counter vitamins, supplements and NSAIDS for one week prior to surgery per anesthesia guidelines. Tylenol is ok to take as needed.     You will receive a call one business day prior to surgery with an arrival time and hospital directions. If your surgery is scheduled on a Monday, the hospital will be calling you on the Friday prior to your surgery. If you have not heard from anyone by 8pm, please call the hospital supervisor through the hospital  at [phone]. ([place] [phone] or [place] [phone]).     Do not eat or drink anything after midnight the night before your surgery, including candy, mints, lifesavers, or chewing gum. Do not drink alcohol 24hrs before your surgery. Try not to smoke at least 24hrs before your surgery.       Follow the pre surgery showering instructions as listed in the “My Surgical Experience Booklet” or otherwise provided by your surgeon's office. Do not use a blade to shave the surgical area 1 week before surgery. It is okay to use a clean electric clippers up to 24 hours before surgery. Do not apply any lotions, creams, including makeup, cologne, deodorant, or perfumes after showering on the day of  your surgery. Do not use dry shampoo, hair spray, hair gel, or any type of hair products.     No contact lenses, eye make-up, or artificial eyelashes. Remove nail polish, including gel polish, and any artificial, gel, or acrylic nails if possible. Remove all jewelry including rings and body piercing jewelry.     Wear causal clothing that is easy to take on and off. Consider your type of surgery.    Keep any valuables, jewelry, piercings at home. Please bring any specially ordered equipment (sling, braces) if indicated.    Arrange for a responsible person to drive you to and from the hospital on the day of your surgery. Please confirm the visitor policy for the day of your procedure when you receive your phone call with an arrival time.     Call the surgeon's office with any new illnesses, exposures, or additional questions prior to surgery.    Please reference your “My Surgical Experience Booklet” for additional information to prepare for your upcoming surgery.

## 2024-07-03 NOTE — TELEPHONE ENCOUNTER
MD Joie Segundo RN; Bernie Lee; NADEEM Cano,  I have never met this patient.  He was put on my schedule for MRI guided biopsy.    I'm currently in the OR at the moment.  I did not personally schedule his biopsy or have any discussion with the patient.  I can try to call him later or perhaps one of the PA's could assist while I am in the OR.          Previous Messages       ----- Message -----  From: Joie Booth RN  Sent: 7/3/2024   1:02 PM EDT  To: Bernie Lee; Kali Razo MD  Subject: Dung Gibbs, here is a summarization of what we discussed on the phone. Thank you for the help.    Patient expressed a lot of concern and frustration during phone assessment regarding lack of communication from urology office. He stated he found out through my chart about MRI result showing possible prostate cancer. He denies receiving instructions for prostate biopsy and stated he does not even know where the surgery is scheduled to be done. In addition to patient verbally upset and tearful on the phone, the following also needs to be addressed.    Patient reports pseudomonas infection in leg. Results can be reviewed in care everywhere. Patients leg is currently wrapped and started on cipro 7/1/24. Pt states his leg is red and odor present.    Lab work was done at [facility] and not in epic at this time. Patient stated on phone the urine culture is positive for bacteria.    Patient does not want to delay surgery, but would very much appreciate a phone call today or Friday from provider. Please inform PAT in this thread if procedure is ok to proceed as scheduled on 7/11. Thank you

## 2024-07-05 NOTE — TELEPHONE ENCOUNTER
PAT manager called urology director to report patient upset with how he/his wife was spoken to in regards to biopsy scheduling and questions on 7/3/24.    Called and spoke with patient at this time. I apologized this occurred. He reports this whole process with [facility] has not been great, from the MRI auth, not being called in a timely fashion for the MRI results and now scheduling/questions on biopsy. He states he doesn't even want to come into the office because he's upset/embarrassed and mad. Patient states he may be pursuing final tx elsewhere. Advised we would be able to transfer records, etc if needed. I again apologized and advised this is not how we strive to care for patients.     He then asked for someone to review his urine results and have answers if he is ok to have biopsy done as scheduled with leg infection. Advised this was sent to provider but no response yet. Reviewed I have a provider in office, can confer with her and call him right back so he has answers before the weekend. He would be amenable to this.     Spoke with CHAD CUEVAS. Urine culture scanned in was reviewed, susceptible to cipro which patient is currently taking until 7/9. No need for more abx. Reviewed [facility] wound care note from today. Cipro also tx for leg infection. She advises this should not preclude biopsy from occurring as scheduled.     Called and spoke with patient again. Relayed info from provider in regards to urine culture, abx and leg infection. This did ease his mind a bit.  He verbalized understanding and had no further questions.

## 2024-07-09 ENCOUNTER — ANESTHESIA EVENT (OUTPATIENT)
Dept: PERIOP | Facility: HOSPITAL | Age: 64
End: 2024-07-09
Payer: COMMERCIAL

## 2024-07-09 RX ORDER — MEPERIDINE HYDROCHLORIDE 25 MG/ML
12.5 INJECTION INTRAMUSCULAR; INTRAVENOUS; SUBCUTANEOUS
Status: CANCELLED | OUTPATIENT
Start: 2024-07-09

## 2024-07-09 RX ORDER — ONDANSETRON 2 MG/ML
4 INJECTION INTRAMUSCULAR; INTRAVENOUS ONCE AS NEEDED
Status: CANCELLED | OUTPATIENT
Start: 2024-07-09

## 2024-07-09 RX ORDER — FENTANYL CITRATE/PF 50 MCG/ML
25 SYRINGE (ML) INJECTION
Status: CANCELLED | OUTPATIENT
Start: 2024-07-09

## 2024-07-10 PROCEDURE — NC001 PR NO CHARGE: Performed by: UROLOGY

## 2024-07-10 NOTE — ANESTHESIA PREPROCEDURE EVALUATION
Procedure:  TRANSPERINEAL MRI FUSION BX PROSTATE (Perineum)    Relevant Problems   ANESTHESIA (within normal limits)      CARDIO   (+) Essential hypertension   (+) HLD (hyperlipidemia)   (+) MI (myocardial infarction) ([facility])   (+) PVC (premature ventricular contraction)      GI/HEPATIC   (+) GERD (gastroesophageal reflux disease)      /RENAL   (+) LUIS CARLOS (acute kidney injury) ([facility])   (+) Benign prostatic hyperplasia with urinary obstruction      PULMONARY  Severe KIRIT, has had emergent reintubations in PACU in the past   (+) Mild intermittent asthma without complication   (+) KIRIT on CPAP      Other   (+) Morbid obesity with BMI of 45.0-49.9, adult ([facility])        Physical Exam    Airway    Mallampati score: III  TM Distance: >3 FB  Neck ROM: full     Dental    implants    Cardiovascular  Rhythm: regular, Rate: normal, Cardiovascular exam normal    Pulmonary  Pulmonary exam normal Breath sounds clear to auscultation    Other Findings        Anesthesia Plan  ASA Score- 3     Anesthesia Type- general with ASA Monitors.         Additional Monitors:     Airway Plan: LMA.           Plan Factors-Exercise tolerance (METS): >4 METS.    Chart reviewed. EKG reviewed.  Existing labs reviewed. Patient summary reviewed.    Patient is not a current smoker.              Induction- intravenous.    Postoperative Plan-         Informed Consent- Anesthetic plan and risks discussed with patient.

## 2024-07-10 NOTE — H&P (VIEW-ONLY)
H&P Exam - Urology   Peewee Nix 64 y.o. male MRN: [id number]  Unit/Bed#:  Encounter: [id number]    Assessment & Plan     Assessment:  Elevated PSA to 17 with PI-RADS 4 lesion at anterior right peripheral zone at apex and PI-RADS 3 lesion at anterior and posterior transition zone at the base  Plan:  MRI guided transperineal prostate biopsy     History of Present Illness   HPI:  Peewee Nix is a 64 y.o. male who presents with elevated PSA to 17 and PI-RADS 4 lesion on MRI prostate    Patient of Dr Burroughs    Review of Systems   All other systems reviewed and are negative.      Historical Information   Past Medical History:   Diagnosis Date    Asthma     Breathing difficulty     CPAP (continuous positive airway pressure) dependence     Diverticulosis     GERD (gastroesophageal reflux disease)     H/O blood clots     Hyperlipidemia     Kidney stone     Morbid obesity (HCC)     Myocardial infarction (HCC)     Open wound of right ankle     Sleep apnea     Ventricular tachycardia (HCC)     1 episode     Past Surgical History:   Procedure Laterality Date    CARPAL TUNNEL RELEASE      COLONOSCOPY  2007    FLAP LOCAL EXTREMITY Right 8/14/2018    Procedure: ASPIRATION FAILING FREE FLAP RIGHT LEG;  Surgeon: Manav Alcantara MD;  Location:  MAIN OR;  Service: Plastics    FREE FLAP GRAFT Right 8/13/2018    Procedure: TRANSFER RIGHT THIGH FREE FLAP TO RIGHT HEEL;  Surgeon: Manav Alcantara MD;  Location:  MAIN OR;  Service: Plastics    HERNIA REPAIR      abdominal x 2    IR PICC LINE  10/24/2019    POLYPECTOMY      hyperplastic    NE ADJT/REARRGMT SCALP/ARM/LEG 10.1-30.0 SQ CM Right 8/23/2018    Procedure: LOCAL FLAP POSS STSG ACHILLES TENDON;  Surgeon: Manav Alcantara MD;  Location:  MAIN OR;  Service: Plastics    NE COLONOSCOPY FLX DX W/COLLJ SPEC WHEN PFRMD N/A 11/27/2017    Procedure: COLONOSCOPY;  Surgeon: Leif Vu MD;  [facility];  Service: Gastroenterology    NE DEBRIDEMENT  SUBCUTANEOUS TISSUE 1ST 20 SQ CM/< Right 7/18/2018    Procedure: DEBRIDEMENT ANKLE WOUND;  Surgeon: Manav Alcantara MD;  Location:  MAIN OR;  Service: Plastics    VA DEBRIDEMENT SUBCUTANEOUS TISSUE 1ST 20 SQ CM/< Right 7/23/2018    Procedure: DEBRIDEMENT RIGHT ANKLE WOUND;  Surgeon: Manav Alcantara MD;  Location:  MAIN OR;  Service: Plastics    VA DEBRIDEMENT SUBCUTANEOUS TISSUE 1ST 20 SQ CM/< Right 11/20/2019    Procedure: SKIN GRAFT  RIGHT LEG;  Surgeon: Manav Alcantara MD;  Location:  MAIN OR;  Service: Plastics    VA DEBRIDEMENT SUBCUTANEOUS TISSUE 1ST 20 SQ CM/< Right 2/13/2023    Procedure: DEBRIDEMENT OF ANKLE;  Surgeon: Manav Alcantara MD;  Location:  MAIN OR;  Service: Plastics    VA FREE MUSCLE/MYOCUTANEOUS FLAP W/MVASC ANAST Right 7/30/2018    Procedure: COVERAGE HEEL WITH GRACILIS MUSCLE FLAP/SKINGRAFT ;  Surgeon: Manav Alcantara MD;  Location:  MAIN OR;  Service: Plastics    VA REPAIR SECONDARY ACHILLES TENDON W/WO GRAFT Right 4/23/2018    Procedure: REPAIR TENDON ACHILLES. FHL TRANSFER;  Surgeon: Saroj Quiñones DPM;  [facility];  Service: Podiatry    VA SPLIT AGRFT T/A/L 1ST 100 CM/&/1% BDY INFT/CHLD Right 2/15/2023    Procedure: SKIN GRAFT TO ANKLE, WOUND VAC APPLICATION;  Surgeon: Manav Alcantara MD;  Location:  MAIN OR;  Service: Plastics    TRIGGER FINGER RELEASE      thumb     Social History   Social History     Substance and Sexual Activity   Alcohol Use Not Currently    Comment: rarely     Social History     Substance and Sexual Activity   Drug Use Never     Social History     Tobacco Use   Smoking Status Never   Smokeless Tobacco Never     E-Cigarette/Vaping    E-Cigarette Use Never User      E-Cigarette/Vaping Substances    Nicotine No     THC No     CBD No     Flavoring No     Other No     Unknown No      Family History:   Family History   Problem Relation Age of Onset    Cancer Mother     Hypertension Mother     Diabetes Father        Meds/Allergies   all  medications and allergies reviewed  Allergies   Allergen Reactions    Bactrim [Sulfamethoxazole-Trimethoprim]      High kidney levels    Latex Itching    Medical Tape Itching       Objective   Vitals: Weight 134 kg (295 lb).    No intake/output data recorded.    Invasive Devices       None                   Physical Exam  Vitals and nursing note reviewed.   Constitutional:       General: He is not in acute distress.     Appearance: He is well-developed. He is obese.   HENT:      Head: Normocephalic and atraumatic.   Eyes:      Conjunctiva/sclera: Conjunctivae normal.   Cardiovascular:      Rate and Rhythm: Normal rate and regular rhythm.      Heart sounds: No murmur heard.  Pulmonary:      Effort: Pulmonary effort is normal. No respiratory distress.      Breath sounds: Normal breath sounds.   Abdominal:      Palpations: Abdomen is soft.      Tenderness: There is no abdominal tenderness.   Musculoskeletal:         General: No swelling.      Cervical back: Neck supple.   Skin:     General: Skin is warm and dry.      Capillary Refill: Capillary refill takes less than 2 seconds.   Neurological:      Mental Status: He is alert.   Psychiatric:         Mood and Affect: Mood normal.         Lab Results: I have personally reviewed pertinent reports.    Imaging: I have personally reviewed pertinent reports.    EKG, Pathology, and Other Studies: I have personally reviewed pertinent reports.    VTE Prophylaxis: Sequential compression device (Venodyne)     Code Status: Prior  Advance Directive and Living Will:      Power of :    POLST:

## 2024-07-10 NOTE — H&P
H&P Exam - Urology   Peewee Nix 64 y.o. male MRN: [id number]  Unit/Bed#:  Encounter: [id number]    Assessment & Plan     Assessment:  Elevated PSA to 17 with PI-RADS 4 lesion at anterior right peripheral zone at apex and PI-RADS 3 lesion at anterior and posterior transition zone at the base  Plan:  MRI guided transperineal prostate biopsy     History of Present Illness   HPI:  Peewee Nix is a 64 y.o. male who presents with elevated PSA to 17 and PI-RADS 4 lesion on MRI prostate    Patient of Dr Burroughs    Review of Systems   All other systems reviewed and are negative.      Historical Information   Past Medical History:   Diagnosis Date    Asthma     Breathing difficulty     CPAP (continuous positive airway pressure) dependence     Diverticulosis     GERD (gastroesophageal reflux disease)     H/O blood clots     Hyperlipidemia     Kidney stone     Morbid obesity (HCC)     Myocardial infarction (HCC)     Open wound of right ankle     Sleep apnea     Ventricular tachycardia (HCC)     1 episode     Past Surgical History:   Procedure Laterality Date    CARPAL TUNNEL RELEASE      COLONOSCOPY  2007    FLAP LOCAL EXTREMITY Right 8/14/2018    Procedure: ASPIRATION FAILING FREE FLAP RIGHT LEG;  Surgeon: Manav Alcantara MD;  Location:  MAIN OR;  Service: Plastics    FREE FLAP GRAFT Right 8/13/2018    Procedure: TRANSFER RIGHT THIGH FREE FLAP TO RIGHT HEEL;  Surgeon: Manav Alcantara MD;  Location:  MAIN OR;  Service: Plastics    HERNIA REPAIR      abdominal x 2    IR PICC LINE  10/24/2019    POLYPECTOMY      hyperplastic    WA ADJT/REARRGMT SCALP/ARM/LEG 10.1-30.0 SQ CM Right 8/23/2018    Procedure: LOCAL FLAP POSS STSG ACHILLES TENDON;  Surgeon: Manav Alcantara MD;  Location:  MAIN OR;  Service: Plastics    WA COLONOSCOPY FLX DX W/COLLJ SPEC WHEN PFRMD N/A 11/27/2017    Procedure: COLONOSCOPY;  Surgeon: Leif Vu MD;  [facility];  Service: Gastroenterology    WA DEBRIDEMENT  SUBCUTANEOUS TISSUE 1ST 20 SQ CM/< Right 7/18/2018    Procedure: DEBRIDEMENT ANKLE WOUND;  Surgeon: Manav Alcantara MD;  Location:  MAIN OR;  Service: Plastics    DE DEBRIDEMENT SUBCUTANEOUS TISSUE 1ST 20 SQ CM/< Right 7/23/2018    Procedure: DEBRIDEMENT RIGHT ANKLE WOUND;  Surgeon: Manav Alcantara MD;  Location:  MAIN OR;  Service: Plastics    DE DEBRIDEMENT SUBCUTANEOUS TISSUE 1ST 20 SQ CM/< Right 11/20/2019    Procedure: SKIN GRAFT  RIGHT LEG;  Surgeon: Manav Alcantara MD;  Location:  MAIN OR;  Service: Plastics    DE DEBRIDEMENT SUBCUTANEOUS TISSUE 1ST 20 SQ CM/< Right 2/13/2023    Procedure: DEBRIDEMENT OF ANKLE;  Surgeon: Manav Alcantara MD;  Location:  MAIN OR;  Service: Plastics    DE FREE MUSCLE/MYOCUTANEOUS FLAP W/MVASC ANAST Right 7/30/2018    Procedure: COVERAGE HEEL WITH GRACILIS MUSCLE FLAP/SKINGRAFT ;  Surgeon: Manav Alcantara MD;  Location:  MAIN OR;  Service: Plastics    DE REPAIR SECONDARY ACHILLES TENDON W/WO GRAFT Right 4/23/2018    Procedure: REPAIR TENDON ACHILLES. FHL TRANSFER;  Surgeon: Saroj Quiñones DPM;  [facility];  Service: Podiatry    DE SPLIT AGRFT T/A/L 1ST 100 CM/&/1% BDY INFT/CHLD Right 2/15/2023    Procedure: SKIN GRAFT TO ANKLE, WOUND VAC APPLICATION;  Surgeon: Manav Alcantara MD;  Location:  MAIN OR;  Service: Plastics    TRIGGER FINGER RELEASE      thumb     Social History   Social History     Substance and Sexual Activity   Alcohol Use Not Currently    Comment: rarely     Social History     Substance and Sexual Activity   Drug Use Never     Social History     Tobacco Use   Smoking Status Never   Smokeless Tobacco Never     E-Cigarette/Vaping    E-Cigarette Use Never User      E-Cigarette/Vaping Substances    Nicotine No     THC No     CBD No     Flavoring No     Other No     Unknown No      Family History:   Family History   Problem Relation Age of Onset    Cancer Mother     Hypertension Mother     Diabetes Father        Meds/Allergies   all  medications and allergies reviewed  Allergies   Allergen Reactions    Bactrim [Sulfamethoxazole-Trimethoprim]      High kidney levels    Latex Itching    Medical Tape Itching       Objective   Vitals: Weight 134 kg (295 lb).    No intake/output data recorded.    Invasive Devices       None                   Physical Exam  Vitals and nursing note reviewed.   Constitutional:       General: He is not in acute distress.     Appearance: He is well-developed. He is obese.   HENT:      Head: Normocephalic and atraumatic.   Eyes:      Conjunctiva/sclera: Conjunctivae normal.   Cardiovascular:      Rate and Rhythm: Normal rate and regular rhythm.      Heart sounds: No murmur heard.  Pulmonary:      Effort: Pulmonary effort is normal. No respiratory distress.      Breath sounds: Normal breath sounds.   Abdominal:      Palpations: Abdomen is soft.      Tenderness: There is no abdominal tenderness.   Musculoskeletal:         General: No swelling.      Cervical back: Neck supple.   Skin:     General: Skin is warm and dry.      Capillary Refill: Capillary refill takes less than 2 seconds.   Neurological:      Mental Status: He is alert.   Psychiatric:         Mood and Affect: Mood normal.         Lab Results: I have personally reviewed pertinent reports.    Imaging: I have personally reviewed pertinent reports.    EKG, Pathology, and Other Studies: I have personally reviewed pertinent reports.    VTE Prophylaxis: Sequential compression device (Venodyne)     Code Status: Prior  Advance Directive and Living Will:      Power of :    POLST:

## 2024-07-11 ENCOUNTER — ANESTHESIA (OUTPATIENT)
Dept: PERIOP | Facility: HOSPITAL | Age: 64
End: 2024-07-11
Payer: COMMERCIAL

## 2024-07-11 ENCOUNTER — HOSPITAL ENCOUNTER (OUTPATIENT)
Facility: HOSPITAL | Age: 64
Setting detail: OUTPATIENT SURGERY
Discharge: HOME | End: 2024-07-11
Attending: UROLOGY | Admitting: UROLOGY
Payer: COMMERCIAL

## 2024-07-11 VITALS
SYSTOLIC BLOOD PRESSURE: 170 MMHG | HEART RATE: 86 BPM | OXYGEN SATURATION: 96 % | BODY MASS INDEX: 43.56 KG/M2 | RESPIRATION RATE: 16 BRPM | WEIGHT: 295 LBS | DIASTOLIC BLOOD PRESSURE: 76 MMHG | TEMPERATURE: 97.7 F

## 2024-07-11 RX ORDER — SODIUM CHLORIDE, SODIUM LACTATE, POTASSIUM CHLORIDE, CALCIUM CHLORIDE 600; 310; 30; 20 MG/100ML; MG/100ML; MG/100ML; MG/100ML
125 INJECTION, SOLUTION INTRAVENOUS CONTINUOUS
Status: DISCONTINUED | OUTPATIENT
Start: 2024-07-11 | End: 2024-07-11 | Stop reason: HOSPADM

## 2024-07-11 RX ORDER — ASPIRIN 81 MG/1
81 TABLET, CHEWABLE ORAL DAILY
COMMUNITY

## 2024-07-11 RX ADMIN — SODIUM CHLORIDE, SODIUM LACTATE, POTASSIUM CHLORIDE, AND CALCIUM CHLORIDE 125 ML/HR: .6; .31; .03; .02 INJECTION, SOLUTION INTRAVENOUS at 06:40

## 2024-07-12 ENCOUNTER — TELEPHONE (OUTPATIENT)
Dept: UROLOGY | Facility: CLINIC | Age: 64
End: 2024-07-12

## 2024-07-15 ENCOUNTER — ANESTHESIA EVENT (OUTPATIENT)
Dept: PERIOP | Facility: HOSPITAL | Age: 64
End: 2024-07-15
Payer: COMMERCIAL

## 2024-07-18 ENCOUNTER — HOSPITAL ENCOUNTER (OUTPATIENT)
Facility: HOSPITAL | Age: 64
Setting detail: OUTPATIENT SURGERY
Discharge: HOME | End: 2024-07-18
Attending: UROLOGY | Admitting: UROLOGY
Payer: COMMERCIAL

## 2024-07-18 ENCOUNTER — ANESTHESIA (OUTPATIENT)
Dept: PERIOP | Facility: HOSPITAL | Age: 64
End: 2024-07-18
Payer: COMMERCIAL

## 2024-07-18 VITALS
RESPIRATION RATE: 18 BRPM | SYSTOLIC BLOOD PRESSURE: 130 MMHG | TEMPERATURE: 97.2 F | WEIGHT: 298.94 LBS | BODY MASS INDEX: 44.15 KG/M2 | DIASTOLIC BLOOD PRESSURE: 70 MMHG | HEART RATE: 56 BPM | OXYGEN SATURATION: 96 %

## 2024-07-18 DIAGNOSIS — R97.20 ELEVATED PROSTATE SPECIFIC ANTIGEN (PSA): ICD-10-CM

## 2024-07-18 PROCEDURE — 88344 IMHCHEM/IMCYTCHM EA MLT ANTB: CPT | Performed by: PATHOLOGY

## 2024-07-18 PROCEDURE — 76942 ECHO GUIDE FOR BIOPSY: CPT | Performed by: UROLOGY

## 2024-07-18 PROCEDURE — G0416 PROSTATE BIOPSY, ANY MTHD: HCPCS | Performed by: PATHOLOGY

## 2024-07-18 PROCEDURE — 55700 PR PROSTATE NEEDLE BIOPSY ANY APPROACH: CPT | Performed by: UROLOGY

## 2024-07-18 RX ORDER — ONDANSETRON 2 MG/ML
4 INJECTION INTRAMUSCULAR; INTRAVENOUS ONCE AS NEEDED
Status: DISCONTINUED | OUTPATIENT
Start: 2024-07-18 | End: 2024-07-18 | Stop reason: HOSPADM

## 2024-07-18 RX ORDER — FENTANYL CITRATE/PF 50 MCG/ML
25 SYRINGE (ML) INJECTION
Status: DISCONTINUED | OUTPATIENT
Start: 2024-07-18 | End: 2024-07-18 | Stop reason: HOSPADM

## 2024-07-18 RX ORDER — MEPERIDINE HYDROCHLORIDE 25 MG/ML
12.5 INJECTION INTRAMUSCULAR; INTRAVENOUS; SUBCUTANEOUS
Status: DISCONTINUED | OUTPATIENT
Start: 2024-07-18 | End: 2024-07-18 | Stop reason: HOSPADM

## 2024-07-18 RX ORDER — OXYCODONE HYDROCHLORIDE 5 MG/1
5 TABLET ORAL EVERY 4 HOURS PRN
Status: DISCONTINUED | OUTPATIENT
Start: 2024-07-18 | End: 2024-07-18 | Stop reason: HOSPADM

## 2024-07-18 RX ORDER — SODIUM CHLORIDE, SODIUM LACTATE, POTASSIUM CHLORIDE, CALCIUM CHLORIDE 600; 310; 30; 20 MG/100ML; MG/100ML; MG/100ML; MG/100ML
125 INJECTION, SOLUTION INTRAVENOUS CONTINUOUS
Status: DISCONTINUED | OUTPATIENT
Start: 2024-07-18 | End: 2024-07-18 | Stop reason: HOSPADM

## 2024-07-18 RX ORDER — ONDANSETRON 2 MG/ML
INJECTION INTRAMUSCULAR; INTRAVENOUS AS NEEDED
Status: DISCONTINUED | OUTPATIENT
Start: 2024-07-18 | End: 2024-07-18

## 2024-07-18 RX ORDER — OXYCODONE HYDROCHLORIDE AND ACETAMINOPHEN 5; 325 MG/1; MG/1
1 TABLET ORAL EVERY 4 HOURS PRN
Status: DISCONTINUED | OUTPATIENT
Start: 2024-07-18 | End: 2024-07-18

## 2024-07-18 RX ORDER — MIDAZOLAM HYDROCHLORIDE 2 MG/2ML
INJECTION, SOLUTION INTRAMUSCULAR; INTRAVENOUS AS NEEDED
Status: DISCONTINUED | OUTPATIENT
Start: 2024-07-18 | End: 2024-07-18

## 2024-07-18 RX ORDER — EPHEDRINE SULFATE 50 MG/ML
INJECTION INTRAVENOUS AS NEEDED
Status: DISCONTINUED | OUTPATIENT
Start: 2024-07-18 | End: 2024-07-18

## 2024-07-18 RX ORDER — LIDOCAINE HYDROCHLORIDE 10 MG/ML
INJECTION, SOLUTION EPIDURAL; INFILTRATION; INTRACAUDAL; PERINEURAL AS NEEDED
Status: DISCONTINUED | OUTPATIENT
Start: 2024-07-18 | End: 2024-07-18

## 2024-07-18 RX ORDER — ACETAMINOPHEN 325 MG/1
975 TABLET ORAL ONCE
Status: DISCONTINUED | OUTPATIENT
Start: 2024-07-18 | End: 2024-07-18 | Stop reason: HOSPADM

## 2024-07-18 RX ORDER — FENTANYL CITRATE 50 UG/ML
INJECTION, SOLUTION INTRAMUSCULAR; INTRAVENOUS AS NEEDED
Status: DISCONTINUED | OUTPATIENT
Start: 2024-07-18 | End: 2024-07-18

## 2024-07-18 RX ORDER — ROCURONIUM BROMIDE 10 MG/ML
INJECTION, SOLUTION INTRAVENOUS AS NEEDED
Status: DISCONTINUED | OUTPATIENT
Start: 2024-07-18 | End: 2024-07-18

## 2024-07-18 RX ORDER — PROPOFOL 10 MG/ML
INJECTION, EMULSION INTRAVENOUS AS NEEDED
Status: DISCONTINUED | OUTPATIENT
Start: 2024-07-18 | End: 2024-07-18

## 2024-07-18 RX ADMIN — SUGAMMADEX 300 MG: 100 INJECTION, SOLUTION INTRAVENOUS at 09:33

## 2024-07-18 RX ADMIN — OXYCODONE 5 MG: 5 TABLET ORAL at 11:53

## 2024-07-18 RX ADMIN — LIDOCAINE HYDROCHLORIDE 100 MG: 10 INJECTION, SOLUTION EPIDURAL; INFILTRATION; INTRACAUDAL; PERINEURAL at 09:16

## 2024-07-18 RX ADMIN — PROPOFOL 200 MG: 10 INJECTION, EMULSION INTRAVENOUS at 09:16

## 2024-07-18 RX ADMIN — FENTANYL CITRATE 50 MCG: 50 INJECTION, SOLUTION INTRAMUSCULAR; INTRAVENOUS at 09:16

## 2024-07-18 RX ADMIN — Medication 3000 MG: at 09:06

## 2024-07-18 RX ADMIN — ONDANSETRON 4 MG: 2 INJECTION INTRAMUSCULAR; INTRAVENOUS at 09:16

## 2024-07-18 RX ADMIN — SODIUM CHLORIDE, SODIUM LACTATE, POTASSIUM CHLORIDE, AND CALCIUM CHLORIDE 125 ML/HR: .6; .31; .03; .02 INJECTION, SOLUTION INTRAVENOUS at 07:48

## 2024-07-18 RX ADMIN — MIDAZOLAM 2 MG: 1 INJECTION INTRAMUSCULAR; INTRAVENOUS at 09:11

## 2024-07-18 RX ADMIN — ROCURONIUM BROMIDE 40 MG: 50 INJECTION, SOLUTION INTRAVENOUS at 09:16

## 2024-07-18 RX ADMIN — EPHEDRINE SULFATE 5 MG: 50 INJECTION, SOLUTION INTRAVENOUS at 09:22

## 2024-07-18 NOTE — ANESTHESIA PREPROCEDURE EVALUATION
Procedure:  TRANSPERINEAL MRI FUSION BX PROSTATE (Perineum)    Relevant Problems   ANESTHESIA (within normal limits)      CARDIO   (+) Essential hypertension   (+) HLD (hyperlipidemia)   (+) MI (myocardial infarction) ([facility]CC)   (+) PVC (premature ventricular contraction)      GI/HEPATIC   (+) GERD (gastroesophageal reflux disease)      /RENAL   (+) LUIS CARLOS (acute kidney injury) (HCC)   (+) Benign prostatic hyperplasia with urinary obstruction      PULMONARY  Severe KIRIT, has had emergent reintubations in PACU in the past   (+) Mild intermittent asthma without complication   (+) KIRIT on CPAP        Physical Exam    Airway    Mallampati score: III  TM Distance: >3 FB  Neck ROM: full     Dental    implants    Cardiovascular  Rhythm: regular, Rate: normal, Cardiovascular exam normal    Pulmonary  Pulmonary exam normal Breath sounds clear to auscultation    Other Findings        Anesthesia Plan  ASA Score- 3     Anesthesia Type- general with ASA Monitors.         Additional Monitors:     Airway Plan: ETT.           Plan Factors-Exercise tolerance (METS): >4 METS.    Chart reviewed. EKG reviewed.  Existing labs reviewed. Patient summary reviewed.    Patient is not a current smoker.      Obstructive sleep apnea risk education given perioperatively.        Induction- intravenous.    Postoperative Plan- Plan for postoperative opioid use.     Perioperative Resuscitation Plan - Level 1 - Full Code.       Informed Consent- Anesthetic plan and risks discussed with patient.  I personally reviewed this patient with the CRNA. Discussed and agreed on the Anesthesia Plan with the CRNA..

## 2024-07-18 NOTE — PERIOPERATIVE NURSING NOTE
"Refused Fentanyl when offered.  Said \"I'll be able to go (urinate) once I sit on the joanne\" and agreed to go back to [facility] [place] for that.  Pt is informed that Tylenol PO is ordered for discomfort, verbalized understanding.   "

## 2024-07-18 NOTE — ANESTHESIA POSTPROCEDURE EVALUATION
Post-Op Assessment Note    CV Status:  Stable    Pain management: adequate       Mental Status:  Alert     Post Op Vitals Reviewed: Yes    No anethesia notable event occurred.    Staff: CRNA       /79   Pulse 66   Temp 98.3 °F (36.8 °C) (Temporal)   Resp 18   Wt 136 kg (298 lb 15.1 oz)   SpO2 96%   BMI 44.15 kg/m²           BP      Temp      Pulse     Resp      SpO2

## 2024-07-18 NOTE — INTERVAL H&P NOTE
H&P reviewed. After examining the patient I find no changes in the patients condition since the H&P had been written.    Vitals:    07/18/24 0752   BP: 118/79   Pulse: 66   Resp: 18   Temp: 98.3 °F (36.8 °C)   SpO2: 96%   Procedure and risks discussed. Informed consent provided.  Consent signed.

## 2024-07-18 NOTE — OP NOTE
OPERATIVE REPORT  PATIENT NAME: Peewee Nix    :  1960  MRN: [id number]  Pt Location: [facility] OR ROOM 03    SURGERY DATE: 2024    Surgeons and Role:     * Juan Carlos Burroughs MD - Primary    Preop Diagnosis:  Elevated prostate specific antigen (PSA) [R97.20]    Post-Op Diagnosis Codes:     * Elevated prostate specific antigen (PSA) [R97.20]     * Abnormal MRI, pelvis [R93.5]    Procedure(s):  TRANSPERINEAL MRI FUSION BX PROSTATE    Specimen(s):  ID Type Source Tests Collected by Time Destination   1 : MINNA #1 Tissue Prostate TISSUE EXAM Juan Carlos Burroughs MD 2024 0931    2 : MINNA #2 Tissue Prostate TISSUE EXAM Juan Carlos Burroughs MD 2024 0931    3 : LEFT POSTERIOR LATERAL Tissue Prostate TISSUE EXAM Juan Carlos Burruoghs MD 2024 0931    4 : RIGHT POSTERIOR MEDIAL Tissue Prostate TISSUE EXAM Juan Carlos Burroughs MD 2024 0931    5 : LEFT POSTERIOR MEDIAL Tissue Prostate TISSUE EXAM Juan Carlos Burroughs MD 2024 0931    6 : RIGHT POSTERIOR LATERAL Tissue Prostate TISSUE EXAM Juan Carlos Burroughs MD 2024 0931    7 : LEFT BASE Tissue Prostate TISSUE EXAM Juan Carlos Burroughs MD 2024 0932    8 : RIGHT BASE Tissue Prostate TISSUE EXAM Juan Carlos Burroughs MD 2024 0932    9 : RIGHT ANTERIOR MEDIAL Tissue Prostate TISSUE EXAM Juan Carlos Burroughs MD 2024 0938    10 : RIGHT ANTERIOR LATERAL Tissue Prostate TISSUE EXAM Juan Carlos Burroughs MD 2024 0938    11 : LEFT ANTERIOR MEDIAL Tissue Prostate TISSUE EXAM Juan Carlos Burroughs MD 2024 0938    12 : LEFT ANTERIOR LATERAL Tissue Prostate TISSUE EXAM Juan Carlos Burroughs MD 2024 0938    13 : LEFT TRANSITIONAL ZONE Tissue Prostate TISSUE EXAM Juan Carlos Burroughs MD 2024 0938    14 : RIGHT TRANSITIONAL ZONE Tissue Prostate TISSUE EXAM Juan Carlos Burroughs MD 2024 0938        Estimated Blood Loss:   Minimal    Drains:  * No LDAs found *    Anesthesia Type:   IV Sedation with Anesthesia    Operative Indications:  Elevated prostate specific antigen (PSA)  [R97.20]  Abnormal MRI prostate-PI-RADS 4 and PI-RADS 3 lesions    Operative Findings:  Successful targeting of the PI-RADS 4 and 3 lesions noted on MRI.  4 biopsies were done of each lesion.  Standard transperineal biopsies were then performed to effect saturation biopsy of the prostate.      Complications:   None    Procedure and Technique:  The patient was brought to the operating room and properly identified.  Monitored anesthesia was then administered.  He was placed in lithotomy position, padded appropriately and prepped and draped in the usual sterile fashion.  The scrotum was taped up with Ioban.  compression boots were employed.  Intravenous antibiotic was administered by anesthesia.  An appropriate timeout was performed.    The transrectal ultrasound probe was then placed into the rectum and the prostate visualized.  The UroNav was employed.  A sweep was then performed to allow for fusion of the MRI images and the live ultrasound images.  Local anesthesia to the perineum, subcutaneous tissue and levator muscles was then administered utilizing 1% Xylocaine and 0.5% Marcaine.  The precision point access needle was placed into the subcutaneous tissue and visualized under ultrasound.  The Pirads 4 and 3 lesions noted on MRI were then targeted.  4 biopsies of each lesion were performed.  The lesions were successfully targeted and tissue was adequate.  Once the abnormal lesion noted on MRI was successfully biopsied, standard transperineal biopsies were then performed to effect saturation biopsy of the prostate.  The precision point needle was moved to allow adequate sampling of the gland.  Multiple cores were taken of the anterior medial, anterior lateral, transition zone, posterior medial, posterior lateral and base of the prostate on both sides of the gland.  Approximately 29 biopsies were performed in all.  Saturation biopsy of the prostate was performed.    Once all biopsies were performed the precision  point needle was removed from the perineum.  Pressure was held on the perineum for approximately 5 minutes to help with hemostasis.  Blood loss was minimal.  The patient was awakened from anesthesia.  He was then taken to the PACU in satisfactory condition.   I was present for the entire procedure.    Patient Disposition:  PACU  and hemodynamically stable        SIGNATURE: Juan Carlos Burroughs MD  DATE: July 18, 2024  TIME: 9:56 AM

## 2024-07-23 PROCEDURE — 88344 IMHCHEM/IMCYTCHM EA MLT ANTB: CPT | Performed by: PATHOLOGY

## 2024-07-23 PROCEDURE — G0416 PROSTATE BIOPSY, ANY MTHD: HCPCS | Performed by: PATHOLOGY

## 2024-07-26 ENCOUNTER — TELEPHONE (OUTPATIENT)
Dept: UROLOGY | Facility: MEDICAL CENTER | Age: 64
End: 2024-07-26

## 2024-07-30 ENCOUNTER — DOCUMENTATION (OUTPATIENT)
Dept: HEMATOLOGY ONCOLOGY | Facility: CLINIC | Age: 64
End: 2024-07-30

## 2024-07-30 ENCOUNTER — PATIENT OUTREACH (OUTPATIENT)
Dept: HEMATOLOGY ONCOLOGY | Facility: CLINIC | Age: 64
End: 2024-07-30

## 2024-07-30 ENCOUNTER — TELEPHONE (OUTPATIENT)
Dept: UROLOGY | Facility: MEDICAL CENTER | Age: 64
End: 2024-07-30

## 2024-07-30 ENCOUNTER — OFFICE VISIT (OUTPATIENT)
Dept: UROLOGY | Facility: MEDICAL CENTER | Age: 64
End: 2024-07-30
Payer: COMMERCIAL

## 2024-07-30 VITALS
WEIGHT: 303 LBS | BODY MASS INDEX: 44.88 KG/M2 | DIASTOLIC BLOOD PRESSURE: 90 MMHG | HEART RATE: 80 BPM | OXYGEN SATURATION: 96 % | HEIGHT: 69 IN | SYSTOLIC BLOOD PRESSURE: 130 MMHG

## 2024-07-30 DIAGNOSIS — C61 PROSTATE CANCER (HCC): Primary | ICD-10-CM

## 2024-07-30 PROCEDURE — 99214 OFFICE O/P EST MOD 30 MIN: CPT | Performed by: UROLOGY

## 2024-07-30 NOTE — PATIENT INSTRUCTIONS
Patient Education     Prostate cancer   The Basics   Written by the doctors and editors at [facility]   What is prostate cancer? -- Prostate cancer happens when normal cells in the prostate gland change into abnormal cells and grow out of control. The prostate gland makes fluid that is part of semen. This gland sits below the bladder and in front of the rectum, and forms a ring around the urethra, the tube that carries urine out of the body (figure 1).  Prostate cancer occurs most often in males older than 50 years. Although prostate cancer is very common, most people do not die from it. This is because prostate cancer usually grows very slowly.  What are the symptoms of prostate cancer? -- Prostate cancer often causes no symptoms at first. But if symptoms do occur, they can include:   Needing to urinate more often than usual   A urine stream that is slower than usual  These symptoms can also be caused by conditions that are not prostate cancer. But if you have these symptoms, tell your doctor or nurse.  Is there a test for prostate cancer? -- Yes. Doctors use a blood test called a PSA test and an exam called a rectal exam to check for prostate cancer. In a rectal exam, your doctor or nurse puts a finger in your anus and up into your rectum. They press on the rectum wall to feel for abnormal areas on the prostate (figure 2).  If your doctor or nurse suspects that you have prostate cancer, they will follow up with 1 or more tests. These can include:   Biopsy - A doctor will take a small sample of tissue from your prostate. Then, another doctor will look at the sample under a microscope to see if it has cancer.   Ultrasound, MRI scan, or other imaging tests - These tests create images of the inside of the body and can show abnormal growths.  What is cancer staging? -- Cancer staging is a way in which doctors find out how far a cancer has spread.  How is prostate cancer treated? -- People with prostate cancer can often  "choose their treatment.  The main options are:   Active surveillance - If you choose this option, you will not have treatment right away. But you will have routine tests to check whether the cancer is starting to grow more quickly. If so, you can start active treatment then.   Surgery - Prostate cancer can sometimes be treated with surgery to remove the prostate gland.   Radiation therapy - Radiation kills cancer cells. Radiation can be given from a machine that moves around your body. Or a doctor might put a source of radiation directly into your prostate gland.   Hormone therapy - Male hormones in the body make prostate cancer grow. Hormone therapy reduces the levels of these hormones, which can shrink the cancer. For hormone therapy, you might take medicines. Or you might have surgery to remove your testicles. (Male hormones are made in the testicles.) This treatment is usually only for those with advanced cancer. But some people with early-stage cancer get it along with radiation or surgery.   Chemotherapy - Chemotherapy is the medical term for medicines that kill cancer cells or stop them from growing. If you have advanced prostate cancer, you might get chemotherapy if hormone therapy stops working. In some cases, chemotherapy and hormone therapy are given at the same time.  Some people, especially older males with other serious medical conditions, might choose not to do any of the above. Instead, they might choose \"watchful waiting.\" Watchful waiting is not exactly the same as active surveillance. It does not require regular testing, but involves treating symptoms when they happen.  How do I choose which treatment to have? -- You and your doctor will have to work together to choose the right treatment for you. The right treatment will depend on:   The stage of your cancer   Your age   Whether you have other health problems   How you feel about the treatment options  Always let your doctors and nurses know how " "you feel about a treatment. Any time you are offered a treatment, ask:   What are the benefits of this treatment? Is it likely to help me live longer? Will it reduce or prevent symptoms?   What are the downsides to this treatment?   Are there other options besides this treatment?   What happens if I do not have this treatment?  What happens after treatment? -- After treatment, you might keep getting checked to see if the cancer comes back or starts growing more quickly. Others choose not to be checked. Follow-up tests can include PSA tests, exams, biopsies, or imaging tests.  What happens if the cancer comes back or spreads? -- If the cancer comes back, you might have more radiation therapy, surgery, or hormone therapy. You might also have chemotherapy or immunotherapy. Immunotherapy is the medical term for medicines, including vaccines, that work with the body's infection-fighting system (the \"immune system\") to stop cancer growth.  Can prostate cancer be prevented? -- Those who are at high risk of getting prostate cancer can sometimes take a medicine to help prevent the disease. If you have a family history of prostate cancer, talk to your doctor.  All topics are updated as new evidence becomes available and our peer review process is complete.  This topic retrieved from [employer] on: Feb 26, 2024.  Topic 12589 Version 17.0  Release: 32.2.4 - C32.56  © 2024 UpToDate, Inc. and/or its affiliates. All rights reserved.  figure 1: Prostate gland     This drawing shows male internal organs and a close-up of the prostate gland.  Graphic 61246 Version 5.0  figure 2: Rectal exam     During a rectal exam, the doctor or nurse puts a finger inside your rectum and feels your prostate gland. That way, they can see how big it is and whether it has bumps or dents or anything unusual.  Graphic 49827 Version 6.0  Consumer Information Use and Disclaimer   Disclaimer: This generalized information is a limited summary of diagnosis, " treatment, and/or medication information. It is not meant to be comprehensive and should be used as a tool to help the user understand and/or assess potential diagnostic and treatment options. It does NOT include all information about conditions, treatments, medications, side effects, or risks that may apply to a specific patient. It is not intended to be medical advice or a substitute for the medical advice, diagnosis, or treatment of a health care provider based on the health care provider's examination and assessment of a patient's specific and unique circumstances. Patients must speak with a health care provider for complete information about their health, medical questions, and treatment options, including any risks or benefits regarding use of medications. This information does not endorse any treatments or medications as safe, effective, or approved for treating a specific patient. UpToDate, Inc. and its affiliates disclaim any warranty or liability relating to this information or the use thereof.The use of this information is governed by the Terms of Use, available at https://www.[employer].com/en/know/clinical-effectiveness-terms. 2024© UpToDate, Inc. and its affiliates and/or licensors. All rights reserved.  Copyright   © 2024 UpToDate, Inc. and/or its affiliates. All rights reserved.

## 2024-07-30 NOTE — TELEPHONE ENCOUNTER
PET CT was scheduled for 8/28 due to PT's work schedule. He has a consult w/ Dung on 9/25 due to requesting a Wednesday appt.

## 2024-07-30 NOTE — ASSESSMENT & PLAN NOTE
The patient's pathology report was reviewed with him.  He has grade group 1 and grade group 2 prostate cancer on biopsies.  Based on the fact that his last PSA was between 10 and 20 and that more than half the biopsy sites are involved with cancer he has  intermediate unfavorable risk prostate cancer.  This was discussed and we reviewed the [place] nomogram's and NCCN guidelines.  [place] nomogram's reveal greater than 10% risk of lymph node metastasis.  NCCN guidelines also recommend bone and soft tissue testing.  I recommended to patient that we proceed with a PSMA PET CT scan.  We will also obtain decipher testing.  The PET CT scan will be important in helping guide future treatment as for the advisability of length of hormonal therapy or even the feasibility of robotic assisted radical prostatectomy.  The patient was in favor of proceeding with a PSMA PET/CT.  I also placed a referral for a radiation oncology consultation which can be performed after the PSMA PET/CT and we will have the patient follow-up with a robotic surgeon to discuss robotic assisted radical prostatectomy.

## 2024-07-30 NOTE — PROGRESS NOTES
"Oncology Nurse Navigator  Intake received/ Chart reviewed for work up completed for radiation oncology. Patient lives in [place].       Urologist:     Manjeet / Dr. Frausto at [facility]      Imaging completed:    completed at [facility]  [] CT AP   [] Bone Scan   [x] PSMA PET 8/28/24   [x] MRI prostate    Pathology completed:  prostate at [facility]    All records needed are in patients chart. No records retrieval needed at this time.        PSA Date:        No results found for: \"PSA\"       Routing to [facility] for scheduling of:  PET/ radiation oncology consult after PET          "

## 2024-07-30 NOTE — PROGRESS NOTES
Ambulatory Visit  Name: Peewee Nix      : 1960      MRN: [id number]  Encounter Provider: Juan Carlos Burroughs MD  Encounter Date: 2024   Encounter department: [facility] UROLOGY [place]    Assessment & Plan   1. Prostate cancer (HCC)  Assessment & Plan:  The patient's pathology report was reviewed with him.  He has grade group 1 and grade group 2 prostate cancer on biopsies.  Based on the fact that his last PSA was between 10 and 20 and that more than half the biopsy sites are involved with cancer he has  intermediate unfavorable risk prostate cancer.  This was discussed and we reviewed the [place] nomogram's and NCCN guidelines.  [place] nomogram's reveal greater than 10% risk of lymph node metastasis.  NCCN guidelines also recommend bone and soft tissue testing.  I recommended to patient that we proceed with a PSMA PET CT scan.  We will also obtain decipher testing.  The PET CT scan will be important in helping guide future treatment as for the advisability of length of hormonal therapy or even the feasibility of robotic assisted radical prostatectomy.  The patient was in favor of proceeding with a PSMA PET/CT.  I also placed a referral for a radiation oncology consultation which can be performed after the PSMA PET/CT and we will have the patient follow-up with a robotic surgeon to discuss robotic assisted radical prostatectomy.  Orders:  -     NM PET CT skull base to mid thigh; Future; Expected date: 2024  -     Ambulatory Referral to Radiation Oncology; Future      History of Present Illness     Peewee Nix is a 64 y.o. male who presents for follow-up post transperineal fusion guided prostate biopsies.  He tolerated the procedure well.  He did have some initial difficulty voiding but this has resolved.  There is no fever or gross hematuria.  He returns today to discuss biopsy findings.    Review of Systems   Constitutional:  Negative for chills,  "diaphoresis, fatigue and fever.   HENT: Negative.     Eyes: Negative.    Respiratory: Negative.     Cardiovascular: Negative.    Endocrine: Negative.    Genitourinary:         See HPI   Musculoskeletal: Negative.    Skin: Negative.    Allergic/Immunologic: Negative.    Neurological: Negative.    Hematological: Negative.    Psychiatric/Behavioral: Negative.           Objective     /90 (BP Location: Left arm, Patient Position: Sitting, Cuff Size: Adult)   Pulse 80   Ht 5' 9\" (1.753 m)   Wt (!) 137 kg (303 lb)   SpO2 96%   BMI 44.75 kg/m²   Physical Exam  Vitals reviewed.   Constitutional:       General: He is not in acute distress.     Appearance: Normal appearance. He is well-developed and normal weight. He is not ill-appearing, toxic-appearing or diaphoretic.   HENT:      Head: Normocephalic and atraumatic.   Eyes:      General: No scleral icterus.     Conjunctiva/sclera: Conjunctivae normal.   Cardiovascular:      Rate and Rhythm: Normal rate.   Pulmonary:      Effort: Pulmonary effort is normal.   Abdominal:      General: Abdomen is flat. There is no distension.      Palpations: There is no mass.      Tenderness: There is no abdominal tenderness. There is no right CVA tenderness, left CVA tenderness, guarding or rebound.      Hernia: No hernia is present.   Musculoskeletal:      Cervical back: Normal range of motion and neck supple.   Skin:     General: Skin is warm and dry.   Neurological:      Mental Status: He is alert and oriented to person, place, and time.   Psychiatric:         Behavior: Behavior normal.         Thought Content: Thought content normal.         Judgment: Judgment normal.       Results  No results found for: \"PSA\"  Lab Results   Component Value Date    CALCIUM 8.9 06/14/2023    K 4.2 06/14/2023    CO2 23 06/14/2023     06/14/2023    BUN 10 06/14/2023    CREATININE 1.02 06/14/2023     Lab Results   Component Value Date    WBC 9.38 12/07/2019    HGB 16.0 12/07/2019    HCT 49.5 " (H) 12/07/2019    MCV 88 12/07/2019     12/07/2019       Office Urine Dip  No results found for this or any previous visit (from the past 1 hour(s)).]    Administrative Statements

## 2024-08-06 NOTE — PROGRESS NOTES
Outreach to pt and left voicemail introducing myself and role as Nurse Navigator to assist with coordination of cancer care, preparation for upcoming appointment, be a point of contact prior to oncology consult,  provide support and connect with available resources.  Provided contact information, reviewed upcoming appts and requested call back.    Future Appointments   Date Time Provider Department Center   8/28/2024 12:00 PM  PET 1 [facility]   9/4/2024 10:00 AM Zuly Barrios MD [facility]   9/25/2024 11:30 AM Kali Razo MD [facility]

## 2024-08-22 ENCOUNTER — LAB REQUISITION (OUTPATIENT)
Dept: LAB | Facility: HOSPITAL | Age: 64
End: 2024-08-22

## 2024-08-22 DIAGNOSIS — R97.20 ELEVATED PROSTATE SPECIFIC ANTIGEN (PSA): ICD-10-CM

## 2024-08-23 LAB — SCAN RESULT: NORMAL

## 2024-08-27 ENCOUNTER — TELEPHONE (OUTPATIENT)
Dept: RADIATION ONCOLOGY | Facility: CLINIC | Age: 64
End: 2024-08-27

## 2024-08-27 DIAGNOSIS — C61 PROSTATE CANCER (HCC): Primary | ICD-10-CM

## 2024-08-27 NOTE — TELEPHONE ENCOUNTER
Left message communication consent allows me to do so. Asked patient if he could get PSA level drawn prior to appointment on Thursday, order is in computer if using [facility]. Left office number if any questions.

## 2024-08-28 ENCOUNTER — HOSPITAL ENCOUNTER (OUTPATIENT)
Dept: NUCLEAR MEDICINE | Facility: HOSPITAL | Age: 64
Discharge: HOME | End: 2024-08-28
Attending: UROLOGY
Payer: COMMERCIAL

## 2024-08-28 DIAGNOSIS — C61 PROSTATE CANCER (HCC): ICD-10-CM

## 2024-08-28 PROCEDURE — A9595 HB PIFLUFOLASTAT F-18, DIAGNOSTIC, 1 MILLICURIE: HCPCS

## 2024-08-28 PROCEDURE — 78815 PET IMAGE W/CT SKULL-THIGH: CPT

## 2024-08-29 ENCOUNTER — CONSULT (OUTPATIENT)
Dept: RADIATION ONCOLOGY | Facility: CLINIC | Age: 64
End: 2024-08-29
Payer: COMMERCIAL

## 2024-08-29 VITALS
HEART RATE: 85 BPM | DIASTOLIC BLOOD PRESSURE: 86 MMHG | WEIGHT: 302 LBS | RESPIRATION RATE: 18 BRPM | HEIGHT: 69 IN | OXYGEN SATURATION: 96 % | SYSTOLIC BLOOD PRESSURE: 149 MMHG | BODY MASS INDEX: 44.73 KG/M2 | TEMPERATURE: 96.2 F

## 2024-08-29 DIAGNOSIS — C61 PROSTATE CANCER (HCC): ICD-10-CM

## 2024-08-29 PROCEDURE — 99205 OFFICE O/P NEW HI 60 MIN: CPT | Performed by: RADIOLOGY

## 2024-08-29 NOTE — PROGRESS NOTES
Peewee Nix 1960 is a 64 y.o. male with newly diagnosed 3+4=7 prostate cancer. He has h/o of elevated PSA. He also reports slow stream, nocturia 2x, has postvoid dribbling at times, and some urinary discomfort. Referred by .      He also h/o hyperlipidemia, PVC, hypertension, GERD, KIRIT,obesity, asthma,LUIS CARLOS, MI, and BPH.      Patient presents in consult for discussion of Radiation Therapy.         PSA   2/10/23  14.09  5/7/24     17     5/31/24   Recent PSA was 17. Recommend MRI at this time. Patient upset with voiding. Specifically with urinary obstruction. Discussed starting Uroxatrol. Patient has history of hypogonadism. Recommend seeing endocrinology, before going through testosterone replacement therapy. Reports ED.       6/7/24 MRI prostate multiparametric wo w contrast  IMPRESSION:  1. PI-RADSv2.1 Category 4 - High (clinically significant cancer is likely to be present). Possible lesions in the anterior right peripheral zone at apex (category 4), and anterior and posterior right transition zones at base (category 3).   2. No extraprostatic tumor, seminal vesicle invasion, pelvic lymphadenopathy, or pelvic osseous metastatic disease.  3. Calculated prostate volume of 28 mL.  Prostate gland boundaries and areas of concern for significant prostate cancer were segmented using 3D advanced post-processing on an independent [employer] system workstation with active physician participation. The segmentation was performed should   MR-ultrasound fusion biopsy be required.        7/18/24 Tissue Exam      A. Prostate, MINNA #1:  Acinar adenocarcinoma  - Grade group 1 ([place] score: 3 + 3 = 6)  - Involving 70%, 66% and 50% of 3 out of 3 core fragments, tumor lengths 7 mm, 5 mm and 4 mm   - Fragments of fibroadipose / fibromuscular tissue     B. Prostate, MINNA #2:  Benign prostatic hyperplasia  Corpora amylacea with focal calcification      C. Prostate, LEFT POSTERIOR LATERAL:  Acinar  adenocarcinoma  - Grade group 1 (Nolberto score: 3 + 3 = 6)  - Involving 27% and 15% of 2 out of 2 core fragments, tumor lengths 3 mm and 1 mm       D. Prostate, RIGHT POSTERIOR MEDIAL:  Acinar adenocarcinoma  - Grade group 2 (Nolberto score: 3 + 4 = 7)  - Involving 10% of 1 out of 2 core fragments, tumor length 1.5 mm  - Percentage of pattern 4: approx. 5%  - Perineural invasion identified      E. Prostate, LEFT POSTERIOR MEDIAL:  Acinar adenocarcinoma  - Grade group 1 ([place] score: 3 + 3 = 6)  - Involving 8% of 1 out of 2 core fragments, tumor length 1 mm     F. Prostate, RIGHT POSTERIOR LATERAL:  Acinar adenocarcinoma  - Grade group 1 ([place] score: 3 + 3 = 6)  - Involving 10% and <5% of 2 out of 2 core fragments, tumor lengths 1 mm and <1 mm     - Perineural invasion identified      G. Prostate, LEFT BASE:  Acinar adenocarcinoma  - Grade group 2 ([place] score: 3 + 4 = 7)  - Involving 15% of 1 out of 2 core fragments, tumor lengths 2.5 mm  - Percentage of pattern 4: approx. 10%      H. Prostate, RIGHT BASE:  Rare atypical glands, suspicious for malignancy      I. Prostate, RIGHT ANTERIOR MEDIAL:  Acinar adenocarcinoma  - Grade group 2 (Nolberto score: 3 + 4 = 7)  - Involving 75% and 66% of 2 out of 2 core fragments, tumor lengths 8 mm and 7.5 mm  - Percentage of pattern 4: approx. 45%      J. Prostate, RIGHT ANTERIOR LATERAL:  Fragments of fibromuscular tissue      K. Prostate, LEFT ANTERIOR MEDIAL:  Fragments of fibromuscular tissue       L. Prostate, LEFT ANTERIOR LATERAL:  Acinar adenocarcinoma  - Grade group 1 (Nolberto score: 3 + 3 = 6)  - Involving 20% of 1 out of 1 core fragment, tumor length 1.5 mm  - Fragment of fibromuscular tissue      M. Prostate, LEFT TRANSITIONAL ZONE:  Benign prostate tissue      N. Prostate, RIGHT TRANSITIONAL ZONE:  Rare atypical glands, suspicious for malignancy      See note      Electronically signed by Cain Keys MD on 7/23/2024 at 1451   Note     No cribriform glands, no  intraductal carcinoma identified.     If clinically indicated, the most appropriate tissue block(s) for ancillary testing are block(s): I1     Multiplex immunohistochemical stain performed with appropriate controls on blocks A1, A3, A5, C1, C2, D1, E2, F1, F2, G2, H2, I1, I2, L2 and N1 shows malignant glands with loss of basal layer cells staining for p63 and high molecular weight keratin with luminal racemase expression, supporting the diagnosis.     Multiplex immunohistochemical stain performed with appropriate controls on blocks G1 and M1 shows benign glands with basal layer cells staining for p63 and high molecular weight keratin without luminal racemase expression, supporting the diagnosis.        7/30/24 Dr. Burroughs   Discussed grade group 1 and group 2 present. His biopsy and PSA level, reviewed by [facility] guidelines. Order PSMA PET scan. Decipher testing ordered. PET CT scan. This will help with future treatment. Discussed robotic assisted radical prostatectomy. Will see radiation oncology after scan. Also will see surgeon.       8/16/24-8/19/24 admitted to [facility] with RLE DVT and PE, treated with heparin drip. D/c on Eliquis       8/22/24 Decipher test  Results High Risk 0.89        8/28/24 PET CT  IMPRESSION:  1. Foci of increased radiotracer uptake at the bilateral prostate gland likely related to the known malignancy.  2. Mild radiotracer uptake noted at a right external iliac chain lymph node and right inguinal lymph nodes, nonspecific, could be reactive but metastasis is not excluded.  3. No findings for PSMA avid soft tissue metastasis in the neck, chest or upper abdomen.  4. Increased radiotracer uptake at several left anterior mid ribs probably posttraumatic. This is noted to a lesser extent at the right anterior mid ribs.        Upcoming   9/4/24 Endo   9/25/24 Dr. Razo     Oncology History   Prostate cancer (HCC)   7/18/2024 Biopsy    A. Prostate, MINNA #1:  Acinar adenocarcinoma  - Grade group 1  ([place] score: 3 + 3 = 6)  - Involving 70%, 66% and 50% of 3 out of 3 core fragments, tumor lengths 7 mm, 5 mm and 4 mm   - Fragments of fibroadipose / fibromuscular tissue     B. Prostate, MINNA #2:  Benign prostatic hyperplasia  Corpora amylacea with focal calcification      C. Prostate, LEFT POSTERIOR LATERAL:  Acinar adenocarcinoma  - Grade group 1 ([place] score: 3 + 3 = 6)  - Involving 27% and 15% of 2 out of 2 core fragments, tumor lengths 3 mm and 1 mm       D. Prostate, RIGHT POSTERIOR MEDIAL:  Acinar adenocarcinoma  - Grade group 2 ([place] score: 3 + 4 = 7)  - Involving 10% of 1 out of 2 core fragments, tumor length 1.5 mm  - Percentage of pattern 4: approx. 5%  - Perineural invasion identified      E. Prostate, LEFT POSTERIOR MEDIAL:  Acinar adenocarcinoma  - Grade group 1 ([place] score: 3 + 3 = 6)  - Involving 8% of 1 out of 2 core fragments, tumor length 1 mm     F. Prostate, RIGHT POSTERIOR LATERAL:  Acinar adenocarcinoma  - Grade group 1 (Nolberto score: 3 + 3 = 6)  - Involving 10% and <5% of 2 out of 2 core fragments, tumor lengths 1 mm and <1 mm     - Perineural invasion identified      G. Prostate, LEFT BASE:  Acinar adenocarcinoma  - Grade group 2 (N[place]berto score: 3 + 4 = 7)  - Involving 15% of 1 out of 2 core fragments, tumor lengths 2.5 mm  - Percentage of pattern 4: approx. 10%      H. Prostate, RIGHT BASE:  Rare atypical glands, suspicious for malignancy      I. Prostate, RIGHT ANTERIOR MEDIAL:  Acinar adenocarcinoma  - Grade group 2 ([place] score: 3 + 4 = 7)  - Involving 75% and 66% of 2 out of 2 core fragments, tumor lengths 8 mm and 7.5 mm  - Percentage of pattern 4: approx. 45%      J. Prostate, RIGHT ANTERIOR LATERAL:  Fragments of fibromuscular tissue      K. Prostate, LEFT ANTERIOR MEDIAL:  Fragments of fibromuscular tissue       L. Prostate, LEFT ANTERIOR LATERAL:  Acinar adenocarcinoma  - Grade group 1 ([place] score: 3 + 3 = 6)  - Involving 20% of 1 out of 1 core fragment, tumor  length 1.5 mm  - Fragment of fibromuscular tissue      M. Prostate, LEFT TRANSITIONAL ZONE:  Benign prostate tissue      N. Prostate, RIGHT TRANSITIONAL ZONE:  Rare atypical glands, suspicious for malignancy      See note      Electronically signed by Cain Keys MD on 7/23/2024 at 1451   Note    No cribriform glands, no intraductal carcinoma identified.     If clinically indicated, the most appropriate tissue block(s) for ancillary testing are block(s): I1     Multiplex immunohistochemical stain performed with appropriate controls on blocks A1, A3, A5, C1, C2, D1, E2, F1, F2, G2, H2, I1, I2, L2 and N1 shows malignant glands with loss of basal layer cells staining for p63 and high molecular weight keratin with luminal racemase expression, supporting the diagnosis.     Multiplex immunohistochemical stain performed with appropriate controls on blocks G1 and M1 shows benign glands with basal layer cells staining for p63 and high molecular weight keratin without luminal racemase expression, supporting the diagnosis.        7/30/2024 Initial Diagnosis    Prostate cancer (HCC)         Review of Systems:  Review of Systems   Constitutional:  Positive for fatigue (d/t recent PE).   HENT:  Positive for rhinorrhea.    Eyes: Negative.    Respiratory:  Positive for shortness of breath (recent PE, mild SOB on exertion).         CPAP   Cardiovascular:  Positive for leg swelling (RLE, chronic ankle wound).   Gastrointestinal: Negative.    Endocrine: Negative.    Genitourinary: Negative.  Negative for dysuria and hematuria.        Nocturia x 1-3, stream fair   Musculoskeletal:  Positive for arthralgias (right hip).        RLE pain   Skin:  Positive for wound (chronic right ankle wound).   Allergic/Immunologic: Negative.    Neurological:  Positive for dizziness (with recent hospital admission) and numbness (right foot).   Hematological:  Bruises/bleeds easily.   Psychiatric/Behavioral:  Positive for sleep disturbance.         Clinical Trial: no    IPSS Questionnaire (AUA-7):  Over the past month…    1)  How often have you had a sensation of not emptying your bladder completely after you finish urinating?  2 - Less than half the time   2)  How often have you had to urinate again less than two hours after you finished urinating? 2 - Less than half the time   3)  How often have you found you stopped and started again several times when you urinated?  1 - Less than 1 time in 5   4) How difficult have you found it to postpone urination?  1 - Less than 1 time in 5   5) How often have you had a weak urinary stream?  2 - Less than half the time   6) How often have you had to push or strain to begin urination?  0 - Not at all   7) How many times did you most typically get up to urinate from the time you went to bed until the time you got up in the morning?  2 - 2 times   Total Score:  10       Pain assessment: 3    Prior Radiation no    Teaching NCI RT packet given    Implantable Devices (Port, pacemaker, pain stimulator) no    Hip Replacement no    Health Maintenance   Topic Date Due    Hepatitis C Screening  Never done    HIV Screening  Never done    BMI: Followup Plan  Never done    Annual Physical  Never done    Pneumococcal Vaccine: Pediatrics (0 to 5 Years) and At-Risk Patients (6 to 64 Years) (2 of 2 - PCV) 10/23/2016    Zoster Vaccine (2 of 2) 10/07/2019    RSV Vaccine Age 60+ Years (1 - 1-dose 60+ series) Never done    COVID-19 Vaccine (3 - 2023-24 season) 09/01/2023    Influenza Vaccine (1) 09/01/2024    BMI: Adult  07/30/2025    Depression Screening  08/29/2025    Colorectal Cancer Screening  11/27/2027    DTaP,Tdap,and Td Vaccines (3 - Td or Tdap) 05/16/2033    RSV Vaccine age 0-20 Months  Aged Out    HIB Vaccine  Aged Out    IPV Vaccine  Aged Out    Hepatitis A Vaccine  Aged Out    Meningococcal ACWY Vaccine  Aged Out    HPV Vaccine  Aged Out       Past Medical History:   Diagnosis Date    Asthma     Breathing difficulty      CPAP (continuous positive airway pressure) dependence     Diverticulosis     GERD (gastroesophageal reflux disease)     H/O blood clots     Hyperlipidemia     Kidney stone     Morbid obesity (HCC)     Myocardial infarction (HCC)     Open wound of right ankle     Pulmonary embolism (HCC)     Sleep apnea     Ventricular tachycardia (HCC)     1 episode       Past Surgical History:   Procedure Laterality Date    CARPAL TUNNEL RELEASE      COLONOSCOPY  2007    FLAP LOCAL EXTREMITY Right 8/14/2018    Procedure: ASPIRATION FAILING FREE FLAP RIGHT LEG;  Surgeon: Manav Alcantara MD;  Location:  MAIN OR;  Service: Plastics    FREE FLAP GRAFT Right 8/13/2018    Procedure: TRANSFER RIGHT THIGH FREE FLAP TO RIGHT HEEL;  Surgeon: Manav Alcantara MD;  Location:  MAIN OR;  Service: Plastics    HERNIA REPAIR      abdominal x 2    IR PICC LINE  10/24/2019    POLYPECTOMY      hyperplastic    IN ADJT/REARRGMT SCALP/ARM/LEG 10.1-30.0 SQ CM Right 8/23/2018    Procedure: LOCAL FLAP POSS STSG ACHILLES TENDON;  Surgeon: Manav Alcantara MD;  Location:  MAIN OR;  Service: Plastics    IN COLONOSCOPY FLX DX W/COLLJ SPEC WHEN PFRMD N/A 11/27/2017    Procedure: COLONOSCOPY;  Surgeon: Leif Vu MD;  Location: [facility];  Service: Gastroenterology    IN DEBRIDEMENT SUBCUTANEOUS TISSUE 1ST 20 SQ CM/< Right 7/18/2018    Procedure: DEBRIDEMENT ANKLE WOUND;  Surgeon: Manav Alcantara MD;  Location:  MAIN OR;  Service: Plastics    IN DEBRIDEMENT SUBCUTANEOUS TISSUE 1ST 20 SQ CM/< Right 7/23/2018    Procedure: DEBRIDEMENT RIGHT ANKLE WOUND;  Surgeon: Manav Alcantara MD;  Location:  MAIN OR;  Service: Plastics    IN DEBRIDEMENT SUBCUTANEOUS TISSUE 1ST 20 SQ CM/< Right 11/20/2019    Procedure: SKIN GRAFT  RIGHT LEG;  Surgeon: Manav Alcantara MD;  Location:  MAIN OR;  Service: Plastics    IN DEBRIDEMENT SUBCUTANEOUS TISSUE 1ST 20 SQ CM/< Right 2/13/2023    Procedure: DEBRIDEMENT OF ANKLE;  Surgeon: Manav Alcantara MD;   Location: [facility];  Service: Plastics    NE FREE MUSCLE/MYOCUTANEOUS FLAP W/MVASC ANAST Right 7/30/2018    Procedure: COVERAGE HEEL WITH GRACILIS MUSCLE FLAP/SKINGRAFT ;  Surgeon: Manav Alcantara MD;  Location:  MAIN OR;  Service: Plastics    NE PROSTATE NEEDLE BIOPSY ANY APPROACH N/A 7/18/2024    Procedure: TRANSPERINEAL MRI FUSION BX PROSTATE;  Surgeon: Juan Carlos Burroughs MD;  [facility]: [facility];  Service: Urology    NE REPAIR SECONDARY ACHILLES TENDON W/WO GRAFT Right 4/23/2018    Procedure: REPAIR TENDON ACHILLES. FHL TRANSFER;  Surgeon: Saroj Quiñones DPM;  Location: [facility];  Service: Podiatry    NE SPLIT AGRFT T/A/L 1ST 100 CM/&/1% BDY INFT/CHLD Right 2/15/2023    Procedure: SKIN GRAFT TO ANKLE, WOUND VAC APPLICATION;  Surgeon: Manav Alcantara MD;  Location:  MAIN OR;  Service: Plastics    TRIGGER FINGER RELEASE      thumb       Family History   Problem Relation Age of Onset    Hypertension Mother     Diabetes Father     Pancreatic cancer Father     Prostate cancer Father     Colon cancer Maternal Uncle     Colon cancer Maternal Uncle        Social History     Tobacco Use    Smoking status: Never    Smokeless tobacco: Never   Vaping Use    Vaping status: Never Used   Substance Use Topics    Alcohol use: Not Currently     Comment: rarely    Drug use: Never          Current Outpatient Medications:     alfuzosin (UROXATRAL) 10 mg 24 hr tablet, Take 1 tablet (10 mg total) by mouth daily, Disp: 90 tablet, Rfl: 3    amLODIPine (NORVASC) 10 mg tablet, Take 10 mg by mouth daily, Disp: , Rfl:     apixaban (ELIQUIS) 5 mg, Take 5 mg by mouth 2 (two) times a day, Disp: , Rfl:     clopidogrel (PLAVIX) 75 mg tablet, Take 1 tablet by mouth daily, Disp: , Rfl:     ezetimibe (ZETIA) 10 mg tablet, Take 10 mg by mouth daily, Disp: , Rfl:     furosemide (LASIX) 20 mg tablet, Take 1 tablet (20 mg total) by mouth daily, Disp: 30 tablet, Rfl: 0    lansoprazole (PREVACID) 30 mg capsule, Take 1 capsule by mouth daily,  "Disp: , Rfl:     rosuvastatin (CRESTOR) 20 MG tablet, Take 20 mg by mouth 2 (two) times a week Unsure of dose, Disp: , Rfl:     acetaminophen (TYLENOL) 325 mg tablet, Take 2 tablets (650 mg total) by mouth 3 (three) times a day (Patient not taking: Reported on 8/29/2024), Disp: 30 tablet, Rfl: 0    acetaminophen-codeine (TYLENOL with CODEINE #3) 300-30 MG per tablet, Take 1 tablet by mouth every 4 (four) hours as needed POST OP (Patient not taking: Reported on 5/31/2024), Disp: , Rfl:     aspirin 81 mg chewable tablet, Chew 81 mg daily (Patient not taking: Reported on 8/29/2024), Disp: , Rfl:     ciprofloxacin (CIPRO) 500 mg tablet, , Disp: , Rfl:     clotrimazole-betamethasone (LOTRISONE) 1-0.05 % cream, APPLY TO AFFECTED AREA TWICE A DAY (Patient not taking: Reported on 5/31/2024), Disp: 45 g, Rfl: 2    Edex 40 MCG injection, INJECT 40 MCG INTRACAVERNOSALLY ONCE A DAY AS NEEDED (Patient not taking: Reported on 7/30/2024), Disp: , Rfl:     fluticasone (FLONASE) 50 mcg/act nasal spray, SPRAY 2 SPRAYS INTO EACH NOSTRIL EVERY DAY (Patient not taking: Reported on 7/11/2024), Disp: , Rfl:     linezolid (ZYVOX) 600 mg tablet, 600 mg 2 (two) times a day (Patient not taking: Reported on 5/31/2024), Disp: , Rfl:     Allergies   Allergen Reactions    Bactrim [Sulfamethoxazole-Trimethoprim]      High kidney levels    Latex Itching    Medical Tape Itching        Vitals:    08/29/24 1400   BP: 149/86   Pulse: 85   Resp: 18   Temp: (!) 96.2 °F (35.7 °C)   SpO2: 96%   Weight: (!) 137 kg (302 lb)   Height: 5' 9\" (1.753 m)       Pain Score:   3  "

## 2024-08-29 NOTE — PROGRESS NOTES
Consultation - Radiation Oncology      Patient Name: Peewee Nix MRN:[id number] : 1960  Encounter: [id number]  Referring Provider: Juan Carlos Burroughs MD    Cancer Staging   No matching staging information was found for the patient.    ASSESSMENT & PLAN  Peewee Nix is a 64 y.o. male, practicing oral surgeon, who was referred to urology in May of this year for an elevated PSA measuring approximately 17 in 2024.  It seems that his PSA had been elevated for some time, measuring 14 in 2023.  Digital rectal exam was reportedly benign.  Of note, he also has a history of cryptorchidism and hypogonadism and had been on testosterone replacement therapy until recently.  He was referred for an MRI of the prostate performed on 2024 revealing category 4 lesions in the anterior right peripheral zone at the apex as well as the anterior and posterior right transition zones at the base.  There was no extraprostatic tumor, seminal vesicle invasion, pelvic lymphadenopathy, or pelvic osseous metastatic disease.  He underwent prostate biopsy on 2024 revealing high-volume Nolberto 3+4 = 7 as well as 3+3 = 6 disease.  A decipher score was checked returning as 0.89 suggesting high risk genomics.  A PSMA PET/CT was then performed on 2024 revealing foci of increased uptake in the prostate gland bilaterally.  Note was also made of a few mildly hypermetabolic lymph nodes including a right external iliac chain lymph node with a max SUV of 3.2 measuring 1.5 cm in the short axis as well as right inguinal lymphadenopathy including a 1.3 cm lymph node with an SUV of 2.6.  There was also increased uptake at the left anterior sixth, seventh and eighth ribs without definite corresponding findings on CT suggestive of prior injury given the distribution with an SUV of 6.1.  A similar distribution was noted on the right side although to a lesser extent.    At this point, the patient's stage remains somewhat  unclear.  Given the distribution of uptake in the anterior ribs and the lack of CT correlate, I am inclined to agree with radiology that this seems perhaps related to prior injury rather than metastatic disease.  In regards to the right external iliac and inguinal mildly hypermetabolic lymphadenopathy, morphologically the lymph nodes appear benign.  Furthermore, given his chronic nonhealing wound of the right lower extremity with recent infection as well as recent right DVT, I suspect that the right inguinal and external iliac nodes may be reactive rather than metastatic.  The PET findings side, given the high-volume Nolberto 3+4 equal 7 disease, PSA greater than 10, and high risk decipher score, I would typically recommend a course of definitive radiation to the prostate and pelvic lymph nodes with 6 months of androgen deprivation therapy.  However, if he truly has gross pelvic and inguinal lymph nodes this would certainly change the recommendations.    I have asked that his case be added to the upcoming urology multidisciplinary tumor board for review of his pet imaging and to see if any of the lymph nodes would be amenable to needle biopsy.  He will also continue following up at [facility] where he has already met with urology, in order to get their opinion regarding his PET findings.  I will contact the patient once his case has been discussed with further plans.      Thank you for the opportunity to participate in the care of this patient.    Bernardino Puentes MD  Department of Radiation Oncology  [facility]    No orders of the defined types were placed in this encounter.    1. Prostate cancer (HCC)  Ambulatory Referral to Radiation Oncology          Total Time Spent  65  minutes spent reviewing EMR in preparation for visit, with the patient, coordination with other providers, and documentation.    CHIEF COMPLAINT  Chief Complaint   Patient presents with    Consult       History of Present  Illness  Peewee Nix 1960 is a 64 y.o. male with newly diagnosed 3+4=7 prostate cancer. He has h/o of elevated PSA. He also reports slow stream, nocturia 2x, has postvoid dribbling at times, and some urinary discomfort. Referred by .      He also h/o hyperlipidemia, PVC, hypertension, GERD, KIRIT,obesity, asthma,LUIS CARLOS, MI, and BPH.      Patient presents in consult for discussion of Radiation Therapy.         PSA   2/10/23  14.09  5/7/24     17     5/31/24   Recent PSA was 17. Recommend MRI at this time. Patient upset with voiding. Specifically with urinary obstruction. Discussed starting Uroxatrol. Patient has history of hypogonadism. Recommend seeing endocrinology, before going through testosterone replacement therapy. Reports ED.       6/7/24 MRI prostate multiparametric wo w contrast  IMPRESSION:  1. PI-RADSv2.1 Category 4 - High (clinically significant cancer is likely to be present). Possible lesions in the anterior right peripheral zone at apex (category 4), and anterior and posterior right transition zones at base (category 3).   2. No extraprostatic tumor, seminal vesicle invasion, pelvic lymphadenopathy, or pelvic osseous metastatic disease.  3. Calculated prostate volume of 28 mL.  Prostate gland boundaries and areas of concern for significant prostate cancer were segmented using 3D advanced post-processing on an independent [employer] system workstation with active physician participation. The segmentation was performed should   MR-ultrasound fusion biopsy be required.        7/18/24 Tissue Exam      A. Prostate, MINNA #1:  Acinar adenocarcinoma  - Grade group 1 (Nolberto score: 3 + 3 = 6)  - Involving 70%, 66% and 50% of 3 out of 3 core fragments, tumor lengths 7 mm, 5 mm and 4 mm   - Fragments of fibroadipose / fibromuscular tissue     B. Prostate, MINNA #2:  Benign prostatic hyperplasia  Corpora amylacea with focal calcification      C. Prostate, LEFT POSTERIOR LATERAL:  Acinar  adenocarcinoma  - Grade group 1 (Nolberto score: 3 + 3 = 6)  - Involving 27% and 15% of 2 out of 2 core fragments, tumor lengths 3 mm and 1 mm       D. Prostate, RIGHT POSTERIOR MEDIAL:  Acinar adenocarcinoma  - Grade group 2 (Nolberto score: 3 + 4 = 7)  - Involving 10% of 1 out of 2 core fragments, tumor length 1.5 mm  - Percentage of pattern 4: approx. 5%  - Perineural invasion identified      E. Prostate, LEFT POSTERIOR MEDIAL:  Acinar adenocarcinoma  - Grade group 1 (Sekiu score: 3 + 3 = 6)  - Involving 8% of 1 out of 2 core fragments, tumor length 1 mm     F. Prostate, RIGHT POSTERIOR LATERAL:  Acinar adenocarcinoma  - Grade group 1 (Sekiu score: 3 + 3 = 6)  - Involving 10% and <5% of 2 out of 2 core fragments, tumor lengths 1 mm and <1 mm     - Perineural invasion identified      G. Prostate, LEFT BASE:  Acinar adenocarcinoma  - Grade group 2 (Sekiu score: 3 + 4 = 7)  - Involving 15% of 1 out of 2 core fragments, tumor lengths 2.5 mm  - Percentage of pattern 4: approx. 10%      H. Prostate, RIGHT BASE:  Rare atypical glands, suspicious for malignancy      I. Prostate, RIGHT ANTERIOR MEDIAL:  Acinar adenocarcinoma  - Grade group 2 (Nolberto score: 3 + 4 = 7)  - Involving 75% and 66% of 2 out of 2 core fragments, tumor lengths 8 mm and 7.5 mm  - Percentage of pattern 4: approx. 45%      J. Prostate, RIGHT ANTERIOR LATERAL:  Fragments of fibromuscular tissue      K. Prostate, LEFT ANTERIOR MEDIAL:  Fragments of fibromuscular tissue       L. Prostate, LEFT ANTERIOR LATERAL:  Acinar adenocarcinoma  - Grade group 1 (Nolberto score: 3 + 3 = 6)  - Involving 20% of 1 out of 1 core fragment, tumor length 1.5 mm  - Fragment of fibromuscular tissue      M. Prostate, LEFT TRANSITIONAL ZONE:  Benign prostate tissue      N. Prostate, RIGHT TRANSITIONAL ZONE:  Rare atypical glands, suspicious for malignancy      See note      Electronically signed by Cain Keys MD on 7/23/2024 at 1451   Note     No cribriform glands, no  intraductal carcinoma identified.     If clinically indicated, the most appropriate tissue block(s) for ancillary testing are block(s): I1     Multiplex immunohistochemical stain performed with appropriate controls on blocks A1, A3, A5, C1, C2, D1, E2, F1, F2, G2, H2, I1, I2, L2 and N1 shows malignant glands with loss of basal layer cells staining for p63 and high molecular weight keratin with luminal racemase expression, supporting the diagnosis.     Multiplex immunohistochemical stain performed with appropriate controls on blocks G1 and M1 shows benign glands with basal layer cells staining for p63 and high molecular weight keratin without luminal racemase expression, supporting the diagnosis.        7/30/24 Dr. Burroughs   Discussed grade group 1 and group 2 present. His biopsy and PSA level, reviewed by [facility] guidelines. Order PSMA PET scan. Decipher testing ordered. PET CT scan. This will help with future treatment. Discussed robotic assisted radical prostatectomy. Will see radiation oncology after scan. Also will see surgeon.       8/16/24-8/19/24 admitted to [facility] with RLE DVT and PE, treated with heparin drip. D/c on Eliquis       8/22/24 Decipher test  Results High Risk 0.89        8/28/24 PET CT  IMPRESSION:  1. Foci of increased radiotracer uptake at the bilateral prostate gland likely related to the known malignancy.  2. Mild radiotracer uptake noted at a right external iliac chain lymph node and right inguinal lymph nodes, nonspecific, could be reactive but metastasis is not excluded.  3. No findings for PSMA avid soft tissue metastasis in the neck, chest or upper abdomen.  4. Increased radiotracer uptake at several left anterior mid ribs probably posttraumatic. This is noted to a lesser extent at the right anterior mid ribs.        Upcoming   9/4/24 Endo   9/25/24 Dr. Razo     Today, the patient presents overall feeling well.  He was recently hospitalized at [facility] due to a DVT in the right  lower extremity as well as possible wound infection for his chronic right lower leg wound.  He is now on anticoagulation.    Oncology History   Prostate cancer (HCC)   7/18/2024 Biopsy    A. Prostate, MINNA #1:  Acinar adenocarcinoma  - Grade group 1 (Siloam score: 3 + 3 = 6)  - Involving 70%, 66% and 50% of 3 out of 3 core fragments, tumor lengths 7 mm, 5 mm and 4 mm   - Fragments of fibroadipose / fibromuscular tissue     B. Prostate, MINNA #2:  Benign prostatic hyperplasia  Corpora amylacea with focal calcification      C. Prostate, LEFT POSTERIOR LATERAL:  Acinar adenocarcinoma  - Grade group 1 (Siloam score: 3 + 3 = 6)  - Involving 27% and 15% of 2 out of 2 core fragments, tumor lengths 3 mm and 1 mm       D. Prostate, RIGHT POSTERIOR MEDIAL:  Acinar adenocarcinoma  - Grade group 2 (Siloam score: 3 + 4 = 7)  - Involving 10% of 1 out of 2 core fragments, tumor length 1.5 mm  - Percentage of pattern 4: approx. 5%  - Perineural invasion identified      E. Prostate, LEFT POSTERIOR MEDIAL:  Acinar adenocarcinoma  - Grade group 1 (Nolbreto score: 3 + 3 = 6)  - Involving 8% of 1 out of 2 core fragments, tumor length 1 mm     F. Prostate, RIGHT POSTERIOR LATERAL:  Acinar adenocarcinoma  - Grade group 1 (Nolberto score: 3 + 3 = 6)  - Involving 10% and <5% of 2 out of 2 core fragments, tumor lengths 1 mm and <1 mm     - Perineural invasion identified      G. Prostate, LEFT BASE:  Acinar adenocarcinoma  - Grade group 2 (Nolberto score: 3 + 4 = 7)  - Involving 15% of 1 out of 2 core fragments, tumor lengths 2.5 mm  - Percentage of pattern 4: approx. 10%      H. Prostate, RIGHT BASE:  Rare atypical glands, suspicious for malignancy      I. Prostate, RIGHT ANTERIOR MEDIAL:  Acinar adenocarcinoma  - Grade group 2 (Siloam score: 3 + 4 = 7)  - Involving 75% and 66% of 2 out of 2 core fragments, tumor lengths 8 mm and 7.5 mm  - Percentage of pattern 4: approx. 45%      J. Prostate, RIGHT ANTERIOR LATERAL:  Fragments of  fibromuscular tissue      K. Prostate, LEFT ANTERIOR MEDIAL:  Fragments of fibromuscular tissue       L. Prostate, LEFT ANTERIOR LATERAL:  Acinar adenocarcinoma  - Grade group 1 (Troy score: 3 + 3 = 6)  - Involving 20% of 1 out of 1 core fragment, tumor length 1.5 mm  - Fragment of fibromuscular tissue      M. Prostate, LEFT TRANSITIONAL ZONE:  Benign prostate tissue      N. Prostate, RIGHT TRANSITIONAL ZONE:  Rare atypical glands, suspicious for malignancy      See note      Electronically signed by Cain Keys MD on 7/23/2024 at 1451   Note    No cribriform glands, no intraductal carcinoma identified.     If clinically indicated, the most appropriate tissue block(s) for ancillary testing are block(s): I1     Multiplex immunohistochemical stain performed with appropriate controls on blocks A1, A3, A5, C1, C2, D1, E2, F1, F2, G2, H2, I1, I2, L2 and N1 shows malignant glands with loss of basal layer cells staining for p63 and high molecular weight keratin with luminal racemase expression, supporting the diagnosis.     Multiplex immunohistochemical stain performed with appropriate controls on blocks G1 and M1 shows benign glands with basal layer cells staining for p63 and high molecular weight keratin without luminal racemase expression, supporting the diagnosis.        7/30/2024 Initial Diagnosis    Prostate cancer (HCC)       Historical Information   Past Medical History:   Diagnosis Date    Asthma     Breathing difficulty     CPAP (continuous positive airway pressure) dependence     Diverticulosis     GERD (gastroesophageal reflux disease)     H/O blood clots     Hyperlipidemia     Kidney stone     Morbid obesity (HCC)     Myocardial infarction (HCC)     Open wound of right ankle     Pulmonary embolism (HCC)     Sleep apnea     Ventricular tachycardia (HCC)     1 episode     Past Surgical History:   Procedure Laterality Date    CARPAL TUNNEL RELEASE      COLONOSCOPY  2007    FLAP LOCAL EXTREMITY Right  8/14/2018    Procedure: ASPIRATION FAILING FREE FLAP RIGHT LEG;  Surgeon: Manav Alcantara MD;  Location:  MAIN OR;  Service: Plastics    FREE FLAP GRAFT Right 8/13/2018    Procedure: TRANSFER RIGHT THIGH FREE FLAP TO RIGHT HEEL;  Surgeon: Manav Alcantara MD;  Location:  MAIN OR;  Service: Plastics    HERNIA REPAIR      abdominal x 2    IR PICC LINE  10/24/2019    POLYPECTOMY      hyperplastic    VA ADJT/REARRGMT SCALP/ARM/LEG 10.1-30.0 SQ CM Right 8/23/2018    Procedure: LOCAL FLAP POSS STSG ACHILLES TENDON;  Surgeon: Manav Alcantara MD;  Location:  MAIN OR;  Service: Plastics    VA COLONOSCOPY FLX DX W/COLLJ SPEC WHEN PFRMD N/A 11/27/2017    Procedure: COLONOSCOPY;  Surgeon: Leif Vu MD;  Location: [facility];  Service: Gastroenterology    VA DEBRIDEMENT SUBCUTANEOUS TISSUE 1ST 20 SQ CM/< Right 7/18/2018    Procedure: DEBRIDEMENT ANKLE WOUND;  Surgeon: Manav Alcantara MD;  Location:  MAIN OR;  Service: Plastics    VA DEBRIDEMENT SUBCUTANEOUS TISSUE 1ST 20 SQ CM/< Right 7/23/2018    Procedure: DEBRIDEMENT RIGHT ANKLE WOUND;  Surgeon: Manav Alcantara MD;  Location:  MAIN OR;  Service: Plastics    VA DEBRIDEMENT SUBCUTANEOUS TISSUE 1ST 20 SQ CM/< Right 11/20/2019    Procedure: SKIN GRAFT  RIGHT LEG;  Surgeon: Manav Alcantara MD;  Location:  MAIN OR;  Service: Plastics    VA DEBRIDEMENT SUBCUTANEOUS TISSUE 1ST 20 SQ CM/< Right 2/13/2023    Procedure: DEBRIDEMENT OF ANKLE;  Surgeon: Manav Alcantara MD;  Location:  MAIN OR;  Service: Plastics    VA FREE MUSCLE/MYOCUTANEOUS FLAP W/MVASC ANAST Right 7/30/2018    Procedure: COVERAGE HEEL WITH GRACILIS MUSCLE FLAP/SKINGRAFT ;  Surgeon: Manav Alcantara MD;  Location:  MAIN OR;  Service: Plastics    VA PROSTATE NEEDLE BIOPSY ANY APPROACH N/A 7/18/2024    Procedure: TRANSPERINEAL MRI FUSION BX PROSTATE;  Surgeon: Juan Carlos Burroughs MD;  Location: [facility];  Service: Urology    VA REPAIR SECONDARY ACHILLES TENDON W/WO GRAFT Right  4/23/2018    Procedure: REPAIR TENDON ACHILLES. FHL TRANSFER;  Surgeon: Saroj Quiñones DPM;  Location: [facility];  Service: Podiatry    OH SPLIT AGRFT T/A/L 1ST 100 CM/&/1% BDY INFT/CHLD Right 2/15/2023    Procedure: SKIN GRAFT TO ANKLE, WOUND VAC APPLICATION;  Surgeon: Manav Alcantara MD;  Location:  MAIN OR;  Service: Plastics    TRIGGER FINGER RELEASE      thumb     Family History   Problem Relation Age of Onset    Hypertension Mother     Diabetes Father     Pancreatic cancer Father     Prostate cancer Father     Colon cancer Maternal Uncle     Colon cancer Maternal Uncle      Social History   Social History     Substance and Sexual Activity   Alcohol Use Not Currently    Comment: rarely     Social History     Substance and Sexual Activity   Drug Use Never     Social History     Tobacco Use   Smoking Status Never   Smokeless Tobacco Never     Meds/Allergies     Current Outpatient Medications:     alfuzosin (UROXATRAL) 10 mg 24 hr tablet, Take 1 tablet (10 mg total) by mouth daily, Disp: 90 tablet, Rfl: 3    amLODIPine (NORVASC) 10 mg tablet, Take 10 mg by mouth daily, Disp: , Rfl:     apixaban (ELIQUIS) 5 mg, Take 5 mg by mouth 2 (two) times a day, Disp: , Rfl:     clopidogrel (PLAVIX) 75 mg tablet, Take 1 tablet by mouth daily, Disp: , Rfl:     ezetimibe (ZETIA) 10 mg tablet, Take 10 mg by mouth daily, Disp: , Rfl:     furosemide (LASIX) 20 mg tablet, Take 1 tablet (20 mg total) by mouth daily, Disp: 30 tablet, Rfl: 0    lansoprazole (PREVACID) 30 mg capsule, Take 1 capsule by mouth daily, Disp: , Rfl:     rosuvastatin (CRESTOR) 20 MG tablet, Take 20 mg by mouth 2 (two) times a week Unsure of dose, Disp: , Rfl:     acetaminophen (TYLENOL) 325 mg tablet, Take 2 tablets (650 mg total) by mouth 3 (three) times a day (Patient not taking: Reported on 8/29/2024), Disp: 30 tablet, Rfl: 0    acetaminophen-codeine (TYLENOL with CODEINE #3) 300-30 MG per tablet, Take 1 tablet by mouth every 4 (four) hours as needed  POST OP (Patient not taking: Reported on 5/31/2024), Disp: , Rfl:     aspirin 81 mg chewable tablet, Chew 81 mg daily (Patient not taking: Reported on 8/29/2024), Disp: , Rfl:     ciprofloxacin (CIPRO) 500 mg tablet, , Disp: , Rfl:     clotrimazole-betamethasone (LOTRISONE) 1-0.05 % cream, APPLY TO AFFECTED AREA TWICE A DAY (Patient not taking: Reported on 5/31/2024), Disp: 45 g, Rfl: 2    Edex 40 MCG injection, INJECT 40 MCG INTRACAVERNOSALLY ONCE A DAY AS NEEDED (Patient not taking: Reported on 7/30/2024), Disp: , Rfl:     fluticasone (FLONASE) 50 mcg/act nasal spray, SPRAY 2 SPRAYS INTO EACH NOSTRIL EVERY DAY (Patient not taking: Reported on 7/11/2024), Disp: , Rfl:     linezolid (ZYVOX) 600 mg tablet, 600 mg 2 (two) times a day (Patient not taking: Reported on 5/31/2024), Disp: , Rfl:   Allergies   Allergen Reactions    Bactrim [Sulfamethoxazole-Trimethoprim]      High kidney levels    Latex Itching    Medical Tape Itching       Pathology:  See above.    Review of Systems  Review of Systems   Constitutional:  Positive for fatigue (d/t recent PE).   HENT:  Positive for rhinorrhea.    Eyes: Negative.    Respiratory:  Positive for shortness of breath (recent PE, mild SOB on exertion).         CPAP   Cardiovascular:  Positive for leg swelling (RLE, chronic ankle wound).   Gastrointestinal: Negative.    Endocrine: Negative.    Genitourinary: Negative.  Negative for dysuria and hematuria.        Nocturia x 1-3, stream fair   Musculoskeletal:  Positive for arthralgias (right hip).        RLE pain   Skin:  Positive for wound (chronic right ankle wound).   Allergic/Immunologic: Negative.    Neurological:  Positive for dizziness (with recent hospital admission) and numbness (right foot).   Hematological:  Bruises/bleeds easily.   Psychiatric/Behavioral:  Positive for sleep disturbance.        Clinical Trial: no    IPSS Questionnaire (AUA-7):  Over the past month…    1)  How often have you had a sensation of not emptying  "your bladder completely after you finish urinating?  2 - Less than half the time   2)  How often have you had to urinate again less than two hours after you finished urinating? 2 - Less than half the time   3)  How often have you found you stopped and started again several times when you urinated?  1 - Less than 1 time in 5   4) How difficult have you found it to postpone urination?  1 - Less than 1 time in 5   5) How often have you had a weak urinary stream?  2 - Less than half the time   6) How often have you had to push or strain to begin urination?  0 - Not at all   7) How many times did you most typically get up to urinate from the time you went to bed until the time you got up in the morning?  2 - 2 times   Total Score:  10       Pain assessment: 3    Prior Radiation no    Teaching NCI RT packet given    Implantable Devices (Port, pacemaker, pain stimulator) no    Hip Replacement no    OBJECTIVE:   /86   Pulse 85   Temp (!) 96.2 °F (35.7 °C)   Resp 18   Ht 5' 9\" (1.753 m)   Wt (!) 137 kg (302 lb)   SpO2 96%   BMI 44.60 kg/m²   Pain Assessment:  0  Performance Status: ECO - Asymptomatic    Physical Exam  General Appearance:  Alert, cooperative, no distress, appears stated age. Obese body habitus.  Cardiovascular:  Extremities warm and well perfused  Lungs: Respirations unlabored, no cyanosis, able to speak in complete sentences without dyspnea.  Abdomen: Non-distended  Skin: No generalized rash or dermatitis  Neurologic: AAOx3, speech and cognition intact.    Portions of the record may have been created with voice recognition software.  Occasional wrong word or \"sound a like\" substitutions may have occurred due to the inherent limitations of voice recognition software.  Read the chart carefully and recognize, using context, where substitutions have occurred.  "

## 2024-08-30 ENCOUNTER — DOCUMENTATION (OUTPATIENT)
Dept: HEMATOLOGY ONCOLOGY | Facility: CLINIC | Age: 64
End: 2024-08-30

## 2024-08-30 NOTE — PROGRESS NOTES
In-basket message received from Dr. Puentes to add patient to the [facility] on 9/3/2024. Chart reviewed and prep completed.

## 2024-09-03 ENCOUNTER — DOCUMENTATION (OUTPATIENT)
Dept: HEMATOLOGY ONCOLOGY | Facility: CLINIC | Age: 64
End: 2024-09-03

## 2024-09-03 DIAGNOSIS — C61 PROSTATE CANCER (HCC): Primary | ICD-10-CM

## 2024-09-03 NOTE — PROGRESS NOTES
Oncology Navigator Note: Multidisciplinary Urology Case Review 9/3/24    Presenting physician:  Dr. Puentes   Urologist: Dr. Burroughs and Dr. Frausto at [facility]      Diagnosis: Peewee Nix is a 64 y.o. male who was presented at the Urology Oncology Multidisciplinary Tumor Conference today. Patient presents with prostate cancer and recent PSMA PET scan showing concern for Iliac and inguinal lymph node uptake. Presented to review imaging and discuss treatment planning.         Radiology Review:       [] CT  [] MRI  [x] PET    Pathology Review:       7/18/24 - Prostate    Recommendations of Group:  Recommend referral to IR for biopsy of concerning lymph node  Recommend medical oncology referral based on results of biopsy       Future Appointments   Date Time Provider Department Center   9/4/2024 10:00 AM Zuly Barrios MD DIAB [facility]   9/25/2024 11:30 AM Kali Razo MD [facility]            Patient was discussed at the Multidisciplinary Urology Case review       NCCN guidelines were available for review.    The final treatment plan will be left to the discretion of the patient and the treating physician.     DISCLAIMERS:  TO THE TREATING PHYSICIAN:  This conference is a meeting of clinicians from various specialty areas who evaluate and discuss patients for whom a multidisciplinary treatment approach is being considered. Please note that the above opinion was a consensus of the conference attendees and is intended only to assist in quality care of the discussed patient.  The responsibility for follow up on the input given during the conference, along with any final decisions regarding plan of care, is that of the patient and the patient's provider. Accordingly, appointments have only been recommended based on this information and have NOT been scheduled unless otherwise noted.      TO THE PATIENT:  This summary is a brief record of major aspects of your cancer treatment. You may choose to  share a copy with any of your doctors or nurses. However, this is not a detailed or comprehensive record of your care.

## 2024-09-03 NOTE — PROGRESS NOTES
VM left on patient's phone explaining recommendation from tumor board for attempted biopsy of R external iliac LN.  Order placed with IR.

## 2024-09-04 ENCOUNTER — CONSULT (OUTPATIENT)
Dept: ENDOCRINOLOGY | Facility: CLINIC | Age: 64
End: 2024-09-04
Payer: COMMERCIAL

## 2024-09-04 ENCOUNTER — PREP FOR PROCEDURE (OUTPATIENT)
Dept: INTERVENTIONAL RADIOLOGY/VASCULAR | Facility: CLINIC | Age: 64
End: 2024-09-04

## 2024-09-04 VITALS
WEIGHT: 294.4 LBS | OXYGEN SATURATION: 98 % | DIASTOLIC BLOOD PRESSURE: 84 MMHG | HEIGHT: 69 IN | BODY MASS INDEX: 43.6 KG/M2 | HEART RATE: 74 BPM | SYSTOLIC BLOOD PRESSURE: 120 MMHG

## 2024-09-04 DIAGNOSIS — C61 PROSTATE CANCER (HCC): ICD-10-CM

## 2024-09-04 DIAGNOSIS — C61 PROSTATE CANCER (HCC): Primary | ICD-10-CM

## 2024-09-04 DIAGNOSIS — E29.1 HYPOGONADISM IN MALE: Primary | ICD-10-CM

## 2024-09-04 PROCEDURE — 99204 OFFICE O/P NEW MOD 45 MIN: CPT | Performed by: INTERNAL MEDICINE

## 2024-09-04 RX ORDER — SODIUM CHLORIDE 9 MG/ML
30 INJECTION, SOLUTION INTRAVENOUS CONTINUOUS
OUTPATIENT
Start: 2024-09-04

## 2024-09-04 NOTE — PATIENT INSTRUCTIONS
Please get blood work done, make sure those are done in the morning while fasting.   Please obtain a DXA scan at your earliest convenience. This is not an urgent test.   Please resturn for follow up in approximately 6 months      .

## 2024-09-04 NOTE — PROGRESS NOTES
Peewee Nix 64 y.o. male MRN: [id number]    Encounter: [id number]      Assessment & Plan     Assessment:  This is a 64 y.o.-year-old male with hypogonadism and prostate cancer    Plan:  1. Hypogonadism in male  Assessment & Plan:  Endorses a history of ED and low libido since he was 19 years old, and notes that testes have always been smaller in size and soft.  Also has a history of cryptorchidism diagnosed at age 11 and treated with testosterone injections which prompted descent of testis.  Was started on testosterone injections by his  10 years ago, and has been getting it prescribed through his PCP.   Taking Testosterone cypionate 100mg every 2 weeks, however has been off of it for the past 6 weeks after being diagnosed with prostate cancer.   There are no testosterone levels available for review, however given recently diagnosed prostate cancer, he is not a candidate for testosterone therapy.  At this time etiology of hypogonadism is not clear, however given history of ED since the age of 19 he may have had low testosterone levels over the years putting him at increased risk of decreased BMD. He has a history of rib fracture with mininmal effort (coughing) which is concerning for low bone density and he will benefit from a DXA scan, particularly because he will likely be started on androgen deprivation therapy in the setting of prostate cancer.       Plan:  --Discussed lab findings with patient.  --Discussed hypogonadism pathophysiology  with patient.  -- Counseled patient that at this time given recently diagnosed prostate cancer, it is not recommended for him to be taking testosterone as this can stimulate his tumor to grow.   --will order baseline tests today to include total and free testosterone, SHBG, FSH and LH.  --DXA scan and 25-OH vitamin D ordered given history of rib fracture with coughing and hypogonadism.       Orders:  -     Ambulatory Referral to Endocrinology  -      Testosterone, free, total; Future  -     Luteinizing hormone; Future  -     Follicle stimulating hormone; Future  -     Vitamin D 25 hydroxy; Future  -     DXA bone density spine hip and pelvis; Future; Expected date: 09/04/2024  -     Sex Hormone Binding Globulin; Future        History of Present Illness     HPI:    Peewee Nix is a 64 y.o. male with a past medical history of hypogonadism, prostate cancer, hypertension, hyperlipidemia, KIRIT on CPAP, recent DVT and PE, nephrolithiasis who is here for new patient visit for management of hypogonadism.  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  He was recently diagnosed with prostate cancer after a PSA of 17 and discontinued testosterone approximately 6 weeks ago.  He started taking testosterone cypionate approximately 10 years ago.  Reports that at the time he was being evaluated for hair transplant and was told he has low testosterone.  States that labs in the time demonstrated total testosterone of 74 and his  started him on T.  He reports a history of cryptorchidism which at the age of 11 was treated with 2 testosterone injections after which his testis descended.   He states that he underwent puberty early-attributes it to the testosterone injections that he received for the cryptorchidism.  He reports difficulty obtaining and maintaining erection since he was 19 to 20 years old which worsened significantly by the time he was 31 years old.  He also endorses difficulty growing a beard when he was younger and thin hair which prompted him to get a hair transplant.  Peewee also notes that his testes were always small and he always had increased breast tissue. He is unsure as to whether he has decreased libido, as he and his wife have not been sexually active for some time.   He does not have any children, states that he and his first wife tried briefly before .  He endorses a history of  fatigue, decreased muscle mass and strength that improved while he was on testosterone denies loss of axillary or pubic hair, vision changes, breast changes.  He has a history of TBI at the age of 20 after a bad concussion which caused him to quit boxing.  Also endorses possible testicular trauma in his youth.  He denies radiation to the pelvis, chemotherapy, history of HIV or narcotics use.  He does endorse a history of obesity and prediabetes, as well as glucocorticoid use during COVID-was on 50 mg of Decadron for approximately 5 weeks.  He has a history of KIRIT for which he wears CPAP.  He also endorses a history of multiple rib fractures, including with minimal effort such as coughing.  He is currently seeing urology, heme-onc, as well as radiation oncology for management of his prostate cancer. He recently underwent a PET scan which noted activity in the right external iliac chain and right ingional lymph nodes which are pending biopsy. Further management will depend on biopsy results.       All other systems were reviewed and are negative.       Review of Systems   Constitutional:  Negative for activity change, appetite change, chills, fever and unexpected weight change.   HENT:  Negative for congestion, postnasal drip, rhinorrhea, sinus pressure and sore throat.    Eyes:  Negative for photophobia and visual disturbance.   Respiratory:  Negative for cough, chest tightness, shortness of breath and wheezing.    Cardiovascular:  Negative for chest pain, palpitations and leg swelling.   Gastrointestinal:  Negative for abdominal distention, abdominal pain, constipation, diarrhea, nausea and vomiting.   Endocrine: Negative for polydipsia and polyuria.   Genitourinary:  Negative for dysuria, frequency, penile swelling, scrotal swelling and testicular pain.        ED   Musculoskeletal:  Negative for arthralgias, back pain and myalgias.        Decreased strength and muscle mass   Skin:  Negative for color change, pallor  and rash.   Neurological:  Negative for dizziness, facial asymmetry, weakness, light-headedness, numbness and headaches.   Psychiatric/Behavioral:  Positive for dysphoric mood. Negative for agitation, behavioral problems and confusion.    All other systems reviewed and are negative.      Historical Information   Past Medical History:   Diagnosis Date    Asthma     Breathing difficulty     CPAP (continuous positive airway pressure) dependence     Diverticulosis     DVT (deep venous thrombosis) (HCC)     GERD (gastroesophageal reflux disease)     H/O blood clots     Hyperlipidemia     Kidney stone     Morbid obesity (HCC)     Myocardial infarction (HCC)     Open wound of right ankle     Prostate cancer (HCC)     Pulmonary embolism (HCC)     Sleep apnea     Ventricular tachycardia (HCC)     1 episode     Past Surgical History:   Procedure Laterality Date    CARPAL TUNNEL RELEASE      COLONOSCOPY  2007    FLAP LOCAL EXTREMITY Right 8/14/2018    Procedure: ASPIRATION FAILING FREE FLAP RIGHT LEG;  Surgeon: Manav Alcantara MD;  Location:  MAIN OR;  Service: Plastics    FREE FLAP GRAFT Right 8/13/2018    Procedure: TRANSFER RIGHT THIGH FREE FLAP TO RIGHT HEEL;  Surgeon: Manav Alcantara MD;  Location:  MAIN OR;  Service: Plastics    HERNIA REPAIR      abdominal x 2    IR PICC LINE  10/24/2019    POLYPECTOMY      hyperplastic    IA ADJT/REARRGMT SCALP/ARM/LEG 10.1-30.0 SQ CM Right 8/23/2018    Procedure: LOCAL FLAP POSS STSG ACHILLES TENDON;  Surgeon: Manav Alcantara MD;  Location:  MAIN OR;  Service: Plastics    IA COLONOSCOPY FLX DX W/COLLJ SPEC WHEN PFRMD N/A 11/27/2017    Procedure: COLONOSCOPY;  Surgeon: Leif Vu MD;  Location: [facility];  Service: Gastroenterology    IA DEBRIDEMENT SUBCUTANEOUS TISSUE 1ST 20 SQ CM/< Right 7/18/2018    Procedure: DEBRIDEMENT ANKLE WOUND;  Surgeon: Manav Alcantara MD;  Location:  MAIN OR;  Service: Plastics    IA DEBRIDEMENT SUBCUTANEOUS TISSUE 1ST 20 SQ CM/<  Right 7/23/2018    Procedure: DEBRIDEMENT RIGHT ANKLE WOUND;  Surgeon: Manav Alcantara MD;  Location:  MAIN OR;  Service: Plastics    MN DEBRIDEMENT SUBCUTANEOUS TISSUE 1ST 20 SQ CM/< Right 11/20/2019    Procedure: SKIN GRAFT  RIGHT LEG;  Surgeon: Manav Alcantara MD;  Location:  MAIN OR;  Service: Plastics    MN DEBRIDEMENT SUBCUTANEOUS TISSUE 1ST 20 SQ CM/< Right 2/13/2023    Procedure: DEBRIDEMENT OF ANKLE;  Surgeon: Manav Alcantara MD;  Location:  MAIN OR;  Service: Plastics    MN FREE MUSCLE/MYOCUTANEOUS FLAP W/MVASC ANAST Right 7/30/2018    Procedure: COVERAGE HEEL WITH GRACILIS MUSCLE FLAP/SKINGRAFT ;  Surgeon: Manav Alcantara MD;  Location:  MAIN OR;  Service: Plastics    MN PROSTATE NEEDLE BIOPSY ANY APPROACH N/A 7/18/2024    Procedure: TRANSPERINEAL MRI FUSION BX PROSTATE;  Surgeon: Juan Carlos Burorughs MD;  Location: [facility];  Service: Urology    MN REPAIR SECONDARY ACHILLES TENDON W/WO GRAFT Right 4/23/2018    Procedure: REPAIR TENDON ACHILLES. FHL TRANSFER;  Surgeon: Saroj Quiñones DPM;  Location: [facility];  Service: Podiatry    MN SPLIT AGRFT T/A/L 1ST 100 CM/&/1% BDY INFT/CHLD Right 2/15/2023    Procedure: SKIN GRAFT TO ANKLE, WOUND VAC APPLICATION;  Surgeon: Manav Alcantara MD;  Location:  MAIN OR;  Service: Plastics    TRIGGER FINGER RELEASE      thumb     Social History   Social History     Substance and Sexual Activity   Alcohol Use Not Currently    Comment: rarely     Social History     Substance and Sexual Activity   Drug Use Never     Social History     Tobacco Use   Smoking Status Never   Smokeless Tobacco Never     Family History:   Family History   Problem Relation Age of Onset    Hypertension Mother     Diabetes Father     Pancreatic cancer Father     Prostate cancer Father     Colon cancer Maternal Uncle     Colon cancer Maternal Uncle        Meds/Allergies   Current Outpatient Medications   Medication Sig Dispense Refill    apixaban (ELIQUIS) 5 mg Take 5 mg by mouth 2  "(two) times a day      ciprofloxacin (CIPRO) 500 mg tablet       clopidogrel (PLAVIX) 75 mg tablet Take 1 tablet by mouth daily      ezetimibe (ZETIA) 10 mg tablet Take 10 mg by mouth daily      furosemide (LASIX) 20 mg tablet Take 1 tablet (20 mg total) by mouth daily 30 tablet 0    lansoprazole (PREVACID) 30 mg capsule Take 1 capsule by mouth daily      rosuvastatin (CRESTOR) 20 MG tablet Take 20 mg by mouth 2 (two) times a week Unsure of dose      acetaminophen (TYLENOL) 325 mg tablet Take 2 tablets (650 mg total) by mouth 3 (three) times a day (Patient not taking: Reported on 8/29/2024) 30 tablet 0    acetaminophen-codeine (TYLENOL with CODEINE #3) 300-30 MG per tablet Take 1 tablet by mouth every 4 (four) hours as needed POST OP (Patient not taking: Reported on 5/31/2024)      alfuzosin (UROXATRAL) 10 mg 24 hr tablet Take 1 tablet (10 mg total) by mouth daily (Patient not taking: Reported on 9/4/2024) 90 tablet 3    amLODIPine (NORVASC) 10 mg tablet Take 10 mg by mouth daily (Patient not taking: Reported on 9/4/2024)      aspirin 81 mg chewable tablet Chew 81 mg daily (Patient not taking: Reported on 8/29/2024)      clotrimazole-betamethasone (LOTRISONE) 1-0.05 % cream APPLY TO AFFECTED AREA TWICE A DAY (Patient not taking: Reported on 5/31/2024) 45 g 2    Edex 40 MCG injection INJECT 40 MCG INTRACAVERNOSALLY ONCE A DAY AS NEEDED (Patient not taking: Reported on 7/30/2024)      fluticasone (FLONASE) 50 mcg/act nasal spray SPRAY 2 SPRAYS INTO EACH NOSTRIL EVERY DAY (Patient not taking: Reported on 7/11/2024)      linezolid (ZYVOX) 600 mg tablet 600 mg 2 (two) times a day (Patient not taking: Reported on 5/31/2024)       No current facility-administered medications for this visit.     Allergies   Allergen Reactions    Bactrim [Sulfamethoxazole-Trimethoprim]      High kidney levels    Latex Itching    Medical Tape Itching       Objective   Vitals: Blood pressure 120/84, pulse 74, height 5' 9\" (1.753 m), weight 134 " kg (294 lb 6.4 oz), SpO2 98%.    Physical Exam  Vitals and nursing note reviewed.   Constitutional:       General: He is not in acute distress.     Appearance: Normal appearance. He is obese. He is not ill-appearing, toxic-appearing or diaphoretic.   HENT:      Head: Normocephalic and atraumatic.      Nose: Nose normal.      Mouth/Throat:      Mouth: Mucous membranes are moist.      Pharynx: Oropharynx is clear.   Eyes:      General: No scleral icterus.        Right eye: No discharge.         Left eye: No discharge.      Extraocular Movements: Extraocular movements intact.      Conjunctiva/sclera: Conjunctivae normal.   Cardiovascular:      Rate and Rhythm: Normal rate and regular rhythm.      Pulses: Normal pulses.      Heart sounds: Normal heart sounds. No murmur heard.  Pulmonary:      Effort: Pulmonary effort is normal. No respiratory distress.      Breath sounds: Normal breath sounds. No wheezing, rhonchi or rales.   Abdominal:      General: Abdomen is flat. Bowel sounds are normal. There is no distension.      Palpations: Abdomen is soft.      Tenderness: There is no abdominal tenderness. There is no guarding or rebound.   Genitourinary:     Penis: Normal.       Comments: Testes are soft, approximately 15mL in volume  Musculoskeletal:      Cervical back: Normal range of motion and neck supple.      Right lower leg: No edema.      Left lower leg: No edema.   Skin:     General: Skin is warm and dry.      Coloration: Skin is not jaundiced or pale.      Comments: Wound packed with gauze draining purulent material with surrounding erythema located on the medial surface of the right lower extremity in the area of the Achilles tendon.    Neurological:      Mental Status: He is alert and oriented to person, place, and time. Mental status is at baseline.   Psychiatric:         Mood and Affect: Mood normal.         Behavior: Behavior normal.         Thought Content: Thought content normal.         Judgment: Judgment  "normal.                The history was obtained from the review of the chart, patient.    Lab Results:   Lab Results   Component Value Date    WBC 9.38 12/07/2019    HGB 16.0 12/07/2019    HCT 49.5 (H) 12/07/2019    MCV 88 12/07/2019     12/07/2019     No results found for: \"PSA\"  No results found for: \"TESTOSTERONE\"    Imaging Studies: I have personally reviewed pertinent reports.      Portions of the record may have been created with voice recognition software. Occasional wrong word or \"sound a like\" substitutions may have occurred due to the inherent limitations of voice recognition software. Read the chart carefully and recognize, using context, where substitutions have occurred.   "

## 2024-09-04 NOTE — H&P (VIEW-ONLY)
Peewee Nix 64 y.o. male MRN: [id number]    Encounter: [id number]      Assessment & Plan     Assessment:  This is a 64 y.o.-year-old male with hypogonadism and prostate cancer    Plan:  1. Hypogonadism in male  Assessment & Plan:  Endorses a history of ED and low libido since he was 19 years old, and notes that testes have always been smaller in size and soft.  Also has a history of cryptorchidism diagnosed at age 11 and treated with testosterone injections which prompted descent of testis.  Was started on testosterone injections by his  10 years ago, and has been getting it prescribed through his PCP.   Taking Testosterone cypionate 100mg every 2 weeks, however has been off of it for the past 6 weeks after being diagnosed with prostate cancer.   There are no testosterone levels available for review, however given recently diagnosed prostate cancer, he is not a candidate for testosterone therapy.  At this time etiology of hypogonadism is not clear, however given history of ED since the age of 19 he may have had low testosterone levels over the years putting him at increased risk of decreased BMD. He has a history of rib fracture with mininmal effort (coughing) which is concerning for low bone density and he will benefit from a DXA scan, particularly because he will likely be started on androgen deprivation therapy in the setting of prostate cancer.       Plan:  --Discussed lab findings with patient.  --Discussed hypogonadism pathophysiology  with patient.  -- Counseled patient that at this time given recently diagnosed prostate cancer, it is not recommended for him to be taking testosterone as this can stimulate his tumor to grow.   --will order baseline tests today to include total and free testosterone, SHBG, FSH and LH.  --DXA scan and 25-OH vitamin D ordered given history of rib fracture with coughing and hypogonadism.       Orders:  -     Ambulatory Referral to Endocrinology  -      Testosterone, free, total; Future  -     Luteinizing hormone; Future  -     Follicle stimulating hormone; Future  -     Vitamin D 25 hydroxy; Future  -     DXA bone density spine hip and pelvis; Future; Expected date: 09/04/2024  -     Sex Hormone Binding Globulin; Future        History of Present Illness     HPI:    Peewee Nix is a 64 y.o. male with a past medical history of hypogonadism, prostate cancer, hypertension, hyperlipidemia, KIRIT on CPAP, recent DVT and PE, nephrolithiasis who is here for new patient visit for management of hypogonadism.  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  He was recently diagnosed with prostate cancer after a PSA of 17 and discontinued testosterone approximately 6 weeks ago.  He started taking testosterone cypionate approximately 10 years ago.  Reports that at the time he was being evaluated for hair transplant and was told he has low testosterone.  States that labs in the time demonstrated total testosterone of 74 and his  started him on T.  He reports a history of cryptorchidism which at the age of 11 was treated with 2 testosterone injections after which his testis descended.   He states that he underwent puberty early-attributes it to the testosterone injections that he received for the cryptorchidism.  He reports difficulty obtaining and maintaining erection since he was 19 to 20 years old which worsened significantly by the time he was 31 years old.  He also endorses difficulty growing a beard when he was younger and thin hair which prompted him to get a hair transplant.  Peewee also notes that his testes were always small and he always had increased breast tissue. He is unsure as to whether he has decreased libido, as he and his wife have not been sexually active for some time.   He does not have any children, states that he and his first wife tried briefly before .  He endorses a history of  fatigue, decreased muscle mass and strength that improved while he was on testosterone denies loss of axillary or pubic hair, vision changes, breast changes.  He has a history of TBI at the age of 20 after a bad concussion which caused him to quit boxing.  Also endorses possible testicular trauma in his youth.  He denies radiation to the pelvis, chemotherapy, history of HIV or narcotics use.  He does endorse a history of obesity and prediabetes, as well as glucocorticoid use during COVID-was on 50 mg of Decadron for approximately 5 weeks.  He has a history of KIRIT for which he wears CPAP.  He also endorses a history of multiple rib fractures, including with minimal effort such as coughing.  He is currently seeing urology, heme-onc, as well as radiation oncology for management of his prostate cancer. He recently underwent a PET scan which noted activity in the right external iliac chain and right ingional lymph nodes which are pending biopsy. Further management will depend on biopsy results.       All other systems were reviewed and are negative.       Review of Systems   Constitutional:  Negative for activity change, appetite change, chills, fever and unexpected weight change.   HENT:  Negative for congestion, postnasal drip, rhinorrhea, sinus pressure and sore throat.    Eyes:  Negative for photophobia and visual disturbance.   Respiratory:  Negative for cough, chest tightness, shortness of breath and wheezing.    Cardiovascular:  Negative for chest pain, palpitations and leg swelling.   Gastrointestinal:  Negative for abdominal distention, abdominal pain, constipation, diarrhea, nausea and vomiting.   Endocrine: Negative for polydipsia and polyuria.   Genitourinary:  Negative for dysuria, frequency, penile swelling, scrotal swelling and testicular pain.        ED   Musculoskeletal:  Negative for arthralgias, back pain and myalgias.        Decreased strength and muscle mass   Skin:  Negative for color change, pallor  and rash.   Neurological:  Negative for dizziness, facial asymmetry, weakness, light-headedness, numbness and headaches.   Psychiatric/Behavioral:  Positive for dysphoric mood. Negative for agitation, behavioral problems and confusion.    All other systems reviewed and are negative.      Historical Information   Past Medical History:   Diagnosis Date    Asthma     Breathing difficulty     CPAP (continuous positive airway pressure) dependence     Diverticulosis     DVT (deep venous thrombosis) (HCC)     GERD (gastroesophageal reflux disease)     H/O blood clots     Hyperlipidemia     Kidney stone     Morbid obesity (HCC)     Myocardial infarction (HCC)     Open wound of right ankle     Prostate cancer (HCC)     Pulmonary embolism (HCC)     Sleep apnea     Ventricular tachycardia (HCC)     1 episode     Past Surgical History:   Procedure Laterality Date    CARPAL TUNNEL RELEASE      COLONOSCOPY  2007    FLAP LOCAL EXTREMITY Right 8/14/2018    Procedure: ASPIRATION FAILING FREE FLAP RIGHT LEG;  Surgeon: Manav Alcantara MD;  Location:  MAIN OR;  Service: Plastics    FREE FLAP GRAFT Right 8/13/2018    Procedure: TRANSFER RIGHT THIGH FREE FLAP TO RIGHT HEEL;  Surgeon: Manav Alcantara MD;  Location:  MAIN OR;  Service: Plastics    HERNIA REPAIR      abdominal x 2    IR PICC LINE  10/24/2019    POLYPECTOMY      hyperplastic    KS ADJT/REARRGMT SCALP/ARM/LEG 10.1-30.0 SQ CM Right 8/23/2018    Procedure: LOCAL FLAP POSS STSG ACHILLES TENDON;  Surgeon: Manav Alcantara MD;  Location:  MAIN OR;  Service: Plastics    KS COLONOSCOPY FLX DX W/COLLJ SPEC WHEN PFRMD N/A 11/27/2017    Procedure: COLONOSCOPY;  Surgeon: Leif Vu MD;  Location: [facility];  Service: Gastroenterology    KS DEBRIDEMENT SUBCUTANEOUS TISSUE 1ST 20 SQ CM/< Right 7/18/2018    Procedure: DEBRIDEMENT ANKLE WOUND;  Surgeon: Manav Alcantara MD;  Location:  MAIN OR;  Service: Plastics    KS DEBRIDEMENT SUBCUTANEOUS TISSUE 1ST 20 SQ CM/<  Right 7/23/2018    Procedure: DEBRIDEMENT RIGHT ANKLE WOUND;  Surgeon: Manav Alcantara MD;  Location:  MAIN OR;  Service: Plastics    MD DEBRIDEMENT SUBCUTANEOUS TISSUE 1ST 20 SQ CM/< Right 11/20/2019    Procedure: SKIN GRAFT  RIGHT LEG;  Surgeon: Manav Alcantara MD;  Location:  MAIN OR;  Service: Plastics    MD DEBRIDEMENT SUBCUTANEOUS TISSUE 1ST 20 SQ CM/< Right 2/13/2023    Procedure: DEBRIDEMENT OF ANKLE;  Surgeon: Manav Alcantara MD;  Location:  MAIN OR;  Service: Plastics    MD FREE MUSCLE/MYOCUTANEOUS FLAP W/MVASC ANAST Right 7/30/2018    Procedure: COVERAGE HEEL WITH GRACILIS MUSCLE FLAP/SKINGRAFT ;  Surgeon: Manav Alcantara MD;  Location:  MAIN OR;  Service: Plastics    MD PROSTATE NEEDLE BIOPSY ANY APPROACH N/A 7/18/2024    Procedure: TRANSPERINEAL MRI FUSION BX PROSTATE;  Surgeon: Juna Carlos Burroughs MD;  [facility];  Service: Urology    MD REPAIR SECONDARY ACHILLES TENDON W/WO GRAFT Right 4/23/2018    Procedure: REPAIR TENDON ACHILLES. FHL TRANSFER;  Surgeon: Saroj Quiñones DPM;  Location: [facility];  Service: Podiatry    MD SPLIT AGRFT T/A/L 1ST 100 CM/&/1% BDY INFT/CHLD Right 2/15/2023    Procedure: SKIN GRAFT TO ANKLE, WOUND VAC APPLICATION;  Surgeon: Manav Alcantara MD;  Location:  MAIN OR;  Service: Plastics    TRIGGER FINGER RELEASE      thumb     Social History   Social History     Substance and Sexual Activity   Alcohol Use Not Currently    Comment: rarely     Social History     Substance and Sexual Activity   Drug Use Never     Social History     Tobacco Use   Smoking Status Never   Smokeless Tobacco Never     Family History:   Family History   Problem Relation Age of Onset    Hypertension Mother     Diabetes Father     Pancreatic cancer Father     Prostate cancer Father     Colon cancer Maternal Uncle     Colon cancer Maternal Uncle        Meds/Allergies   Current Outpatient Medications   Medication Sig Dispense Refill    apixaban (ELIQUIS) 5 mg Take 5 mg by mouth 2  "(two) times a day      ciprofloxacin (CIPRO) 500 mg tablet       clopidogrel (PLAVIX) 75 mg tablet Take 1 tablet by mouth daily      ezetimibe (ZETIA) 10 mg tablet Take 10 mg by mouth daily      furosemide (LASIX) 20 mg tablet Take 1 tablet (20 mg total) by mouth daily 30 tablet 0    lansoprazole (PREVACID) 30 mg capsule Take 1 capsule by mouth daily      rosuvastatin (CRESTOR) 20 MG tablet Take 20 mg by mouth 2 (two) times a week Unsure of dose      acetaminophen (TYLENOL) 325 mg tablet Take 2 tablets (650 mg total) by mouth 3 (three) times a day (Patient not taking: Reported on 8/29/2024) 30 tablet 0    acetaminophen-codeine (TYLENOL with CODEINE #3) 300-30 MG per tablet Take 1 tablet by mouth every 4 (four) hours as needed POST OP (Patient not taking: Reported on 5/31/2024)      alfuzosin (UROXATRAL) 10 mg 24 hr tablet Take 1 tablet (10 mg total) by mouth daily (Patient not taking: Reported on 9/4/2024) 90 tablet 3    amLODIPine (NORVASC) 10 mg tablet Take 10 mg by mouth daily (Patient not taking: Reported on 9/4/2024)      aspirin 81 mg chewable tablet Chew 81 mg daily (Patient not taking: Reported on 8/29/2024)      clotrimazole-betamethasone (LOTRISONE) 1-0.05 % cream APPLY TO AFFECTED AREA TWICE A DAY (Patient not taking: Reported on 5/31/2024) 45 g 2    Edex 40 MCG injection INJECT 40 MCG INTRACAVERNOSALLY ONCE A DAY AS NEEDED (Patient not taking: Reported on 7/30/2024)      fluticasone (FLONASE) 50 mcg/act nasal spray SPRAY 2 SPRAYS INTO EACH NOSTRIL EVERY DAY (Patient not taking: Reported on 7/11/2024)      linezolid (ZYVOX) 600 mg tablet 600 mg 2 (two) times a day (Patient not taking: Reported on 5/31/2024)       No current facility-administered medications for this visit.     Allergies   Allergen Reactions    Bactrim [Sulfamethoxazole-Trimethoprim]      High kidney levels    Latex Itching    Medical Tape Itching       Objective   Vitals: Blood pressure 120/84, pulse 74, height 5' 9\" (1.753 m), weight 134 " kg (294 lb 6.4 oz), SpO2 98%.    Physical Exam  Vitals and nursing note reviewed.   Constitutional:       General: He is not in acute distress.     Appearance: Normal appearance. He is obese. He is not ill-appearing, toxic-appearing or diaphoretic.   HENT:      Head: Normocephalic and atraumatic.      Nose: Nose normal.      Mouth/Throat:      Mouth: Mucous membranes are moist.      Pharynx: Oropharynx is clear.   Eyes:      General: No scleral icterus.        Right eye: No discharge.         Left eye: No discharge.      Extraocular Movements: Extraocular movements intact.      Conjunctiva/sclera: Conjunctivae normal.   Cardiovascular:      Rate and Rhythm: Normal rate and regular rhythm.      Pulses: Normal pulses.      Heart sounds: Normal heart sounds. No murmur heard.  Pulmonary:      Effort: Pulmonary effort is normal. No respiratory distress.      Breath sounds: Normal breath sounds. No wheezing, rhonchi or rales.   Abdominal:      General: Abdomen is flat. Bowel sounds are normal. There is no distension.      Palpations: Abdomen is soft.      Tenderness: There is no abdominal tenderness. There is no guarding or rebound.   Genitourinary:     Penis: Normal.       Comments: Testes are soft, approximately 15mL in volume  Musculoskeletal:      Cervical back: Normal range of motion and neck supple.      Right lower leg: No edema.      Left lower leg: No edema.   Skin:     General: Skin is warm and dry.      Coloration: Skin is not jaundiced or pale.      Comments: Wound packed with gauze draining purulent material with surrounding erythema located on the medial surface of the right lower extremity in the area of the Achilles tendon.    Neurological:      Mental Status: He is alert and oriented to person, place, and time. Mental status is at baseline.   Psychiatric:         Mood and Affect: Mood normal.         Behavior: Behavior normal.         Thought Content: Thought content normal.         Judgment: Judgment  "normal.                The history was obtained from the review of the chart, patient.    Lab Results:   Lab Results   Component Value Date    WBC 9.38 12/07/2019    HGB 16.0 12/07/2019    HCT 49.5 (H) 12/07/2019    MCV 88 12/07/2019     12/07/2019     No results found for: \"PSA\"  No results found for: \"TESTOSTERONE\"    Imaging Studies: I have personally reviewed pertinent reports.      Portions of the record may have been created with voice recognition software. Occasional wrong word or \"sound a like\" substitutions may have occurred due to the inherent limitations of voice recognition software. Read the chart carefully and recognize, using context, where substitutions have occurred.   "

## 2024-09-04 NOTE — ASSESSMENT & PLAN NOTE
Endorses a history of ED and low libido since he was 19 years old, and notes that testes have always been smaller in size and soft.  Also has a history of cryptorchidism diagnosed at age 11 and treated with testosterone injections which prompted descent of testis.  Was started on testosterone injections by his  10 years ago, and has been getting it prescribed through his PCP.   Taking Testosterone cypionate 100mg every 2 weeks, however has been off of it for the past 6 weeks after being diagnosed with prostate cancer.   There are no testosterone levels available for review, however given recently diagnosed prostate cancer, he is not a candidate for testosterone therapy.  At this time etiology of hypogonadism is not clear, however given history of ED since the age of 19 he may have had low testosterone levels over the years putting him at increased risk of decreased BMD. He has a history of rib fracture with mininmal effort (coughing) which is concerning for low bone density and he will benefit from a DXA scan, particularly because he will likely be started on androgen deprivation therapy in the setting of prostate cancer.       Plan:  --Discussed lab findings with patient.  --Discussed hypogonadism pathophysiology  with patient.  -- Counseled patient that at this time given recently diagnosed prostate cancer, it is not recommended for him to be taking testosterone as this can stimulate his tumor to grow.   --will order baseline tests today to include total and free testosterone, SHBG, FSH and LH.  --DXA scan and 25-OH vitamin D ordered given history of rib fracture with coughing and hypogonadism.

## 2024-09-05 ENCOUNTER — TELEPHONE (OUTPATIENT)
Dept: UROLOGY | Facility: MEDICAL CENTER | Age: 64
End: 2024-09-05

## 2024-09-05 NOTE — TELEPHONE ENCOUNTER
Spoke to the patient this morning about the tumor board discussion and need for biopsy of the external iliac lymph node.  Orders have been placed to IR by Dr. Gan.  Patient is willing to proceed and is waiting for a call from IR.

## 2024-09-06 ENCOUNTER — PATIENT OUTREACH (OUTPATIENT)
Dept: HEMATOLOGY ONCOLOGY | Facility: CLINIC | Age: 64
End: 2024-09-06

## 2024-09-06 NOTE — PROGRESS NOTES
Outreach made to patient. I LM introducing myself as  Oncology Patient Navigator. I let patient know I would like to introduce myself further and explain my role in his care moving forward. I gave my direct line and requested a call back from patient to assess barriers to care and go over DT.

## 2024-09-09 ENCOUNTER — PATIENT OUTREACH (OUTPATIENT)
Dept: HEMATOLOGY ONCOLOGY | Facility: CLINIC | Age: 64
End: 2024-09-09

## 2024-09-09 NOTE — PROGRESS NOTES
2nd outreach made to patient. I LM introducing myself as  Oncology Patient Navigator. I let patient know I would like to introduce myself further and explain my role in his care moving forward. I gave my direct line and requested a call back from patient to assess barriers to care.

## 2024-09-11 NOTE — PRE-PROCEDURE INSTRUCTIONS
Pre-procedure Instructions for Interventional Radiology  [address] [address]  INTERVENTIONAL RADIOLOGY [phone]    You are scheduled for a/an biopsy.    On Monday 9-16-24.    Your tentative arrival time is 9am.  Short stay will notify you the day before your procedure with the exact arrival time and the location to arrive.    To prepare for your procedure:  Please arrange for someone to drive you home after the procedure and stay with you until the next morning if you are instructed to do so.  This is typically for patients receiving some type of sedative or anesthetic for the procedure.  DO NOT EAT OR DRINK ANYTHING after midnight on the evening before your procedure including candy & gum.  ONLY SIPS OF WATER with your medications are allowed on the morning of your procedure.  TAKE ALL OF YOUR REGULAR MEDICATIONS THE MORNING OF YOUR PROCEDURE with sips of water!  We may call you to stop some of your blood sugar, blood pressure and blood thinning medications depending on the procedure.  Please take all of these medications unless we instruct you to stop them.  If you have an allergy to x-ray dye, please contact Interventional Radiology for an x-ray dye preparation which usually consists of an oral steroid and Benadryl.    The day of your procedure:  Bring a list of the medications you take at home.  Bring medications you take for breathing problems (such as inhalers), medications for chest pain, or both.  Bring a case for your glasses or contacts.  Bring your insurance card and a form of photo ID.  Please leave all valuables such as credit cards and jewelry at home.  Report to the admitting office to the left of the registration desk in the main lobby at the [facility], Entrance B.  You will then be directed to the Short Stay Center.  While your procedure is being performed, your family may wait in the Radiology Waiting Room on the 1st floor in Radiology.  if  they need to leave, they may provide a number to be called following the procedure.   Be prepared to stay overnight just in case. Sometimes procedures will indicate the need for further observation or treatment.   If you are scheduled for a follow-up visit with the Interventional Radiologist after your procedure, you will be called with a date and time.    Special Instructions (Medications to stop taking before your procedure etc.):  Hold Lasix the morning of the procedure  Hold Eliquis for 2 days before the procedure.  Hold Plavix for 5 days before the procedure

## 2024-09-12 NOTE — ASSESSMENT & PLAN NOTE
History of hyperlipidemia  Continue Lipitor Patient was not on any medications prior to admission.  Since admission she has been started on Coreg 25mg BID, Hydralazine 75 mg q8hrs, Nifedipine 60mg daily, Aldactone 25mg daily and Losartan 100mg qd  Followup with IM as OP.  On DC summary was listed to see Megan DORSEY from that group

## 2024-09-16 ENCOUNTER — HOSPITAL ENCOUNTER (OUTPATIENT)
Dept: RADIOLOGY | Facility: HOSPITAL | Age: 64
Discharge: HOME | End: 2024-09-16
Attending: RADIOLOGY
Payer: COMMERCIAL

## 2024-09-16 VITALS
SYSTOLIC BLOOD PRESSURE: 144 MMHG | WEIGHT: 289 LBS | RESPIRATION RATE: 16 BRPM | BODY MASS INDEX: 42.8 KG/M2 | HEART RATE: 72 BPM | TEMPERATURE: 97.8 F | DIASTOLIC BLOOD PRESSURE: 78 MMHG | OXYGEN SATURATION: 97 % | HEIGHT: 69 IN

## 2024-09-16 DIAGNOSIS — C61 PROSTATE CANCER (HCC): ICD-10-CM

## 2024-09-16 PROCEDURE — 99152 MOD SED SAME PHYS/QHP 5/>YRS: CPT

## 2024-09-16 PROCEDURE — 88341 IMHCHEM/IMCYTCHM EA ADD ANTB: CPT | Performed by: STUDENT IN AN ORGANIZED HEALTH CARE EDUCATION/TRAINING PROGRAM

## 2024-09-16 PROCEDURE — 77012 CT SCAN FOR NEEDLE BIOPSY: CPT

## 2024-09-16 PROCEDURE — 88342 IMHCHEM/IMCYTCHM 1ST ANTB: CPT | Performed by: STUDENT IN AN ORGANIZED HEALTH CARE EDUCATION/TRAINING PROGRAM

## 2024-09-16 PROCEDURE — 88185 FLOWCYTOMETRY/TC ADD-ON: CPT | Performed by: RADIOLOGY

## 2024-09-16 PROCEDURE — 88184 FLOWCYTOMETRY/ TC 1 MARKER: CPT | Performed by: RADIOLOGY

## 2024-09-16 PROCEDURE — 88360 TUMOR IMMUNOHISTOCHEM/MANUAL: CPT | Performed by: STUDENT IN AN ORGANIZED HEALTH CARE EDUCATION/TRAINING PROGRAM

## 2024-09-16 PROCEDURE — 49180 BIOPSY ABDOMINAL MASS: CPT

## 2024-09-16 PROCEDURE — 99153 MOD SED SAME PHYS/QHP EA: CPT

## 2024-09-16 PROCEDURE — 88307 TISSUE EXAM BY PATHOLOGIST: CPT | Performed by: STUDENT IN AN ORGANIZED HEALTH CARE EDUCATION/TRAINING PROGRAM

## 2024-09-16 RX ORDER — MIDAZOLAM HYDROCHLORIDE 2 MG/2ML
INJECTION, SOLUTION INTRAMUSCULAR; INTRAVENOUS AS NEEDED
Status: COMPLETED | OUTPATIENT
Start: 2024-09-16 | End: 2024-09-16

## 2024-09-16 RX ORDER — LIDOCAINE WITH 8.4% SOD BICARB 0.9%(10ML)
SYRINGE (ML) INJECTION AS NEEDED
Status: COMPLETED | OUTPATIENT
Start: 2024-09-16 | End: 2024-09-16

## 2024-09-16 RX ORDER — SODIUM CHLORIDE 9 MG/ML
30 INJECTION, SOLUTION INTRAVENOUS CONTINUOUS
Status: DISCONTINUED | OUTPATIENT
Start: 2024-09-16 | End: 2024-09-17 | Stop reason: HOSPADM

## 2024-09-16 RX ORDER — FENTANYL CITRATE 50 UG/ML
INJECTION, SOLUTION INTRAMUSCULAR; INTRAVENOUS AS NEEDED
Status: COMPLETED | OUTPATIENT
Start: 2024-09-16 | End: 2024-09-16

## 2024-09-16 RX ADMIN — FENTANYL CITRATE 50 MCG: 50 INJECTION INTRAMUSCULAR; INTRAVENOUS at 10:58

## 2024-09-16 RX ADMIN — Medication 20 ML: at 11:09

## 2024-09-16 RX ADMIN — SODIUM CHLORIDE 30 ML/HR: 0.9 INJECTION, SOLUTION INTRAVENOUS at 10:11

## 2024-09-16 RX ADMIN — FENTANYL CITRATE 50 MCG: 50 INJECTION INTRAMUSCULAR; INTRAVENOUS at 11:13

## 2024-09-16 RX ADMIN — FENTANYL CITRATE 50 MCG: 50 INJECTION INTRAMUSCULAR; INTRAVENOUS at 11:07

## 2024-09-16 RX ADMIN — MIDAZOLAM 1 MG: 1 INJECTION INTRAMUSCULAR; INTRAVENOUS at 11:04

## 2024-09-16 RX ADMIN — MIDAZOLAM 1 MG: 1 INJECTION INTRAMUSCULAR; INTRAVENOUS at 11:07

## 2024-09-16 RX ADMIN — MIDAZOLAM 1 MG: 1 INJECTION INTRAMUSCULAR; INTRAVENOUS at 10:58

## 2024-09-16 RX ADMIN — MIDAZOLAM 1 MG: 1 INJECTION INTRAMUSCULAR; INTRAVENOUS at 11:13

## 2024-09-16 RX ADMIN — FENTANYL CITRATE 50 MCG: 50 INJECTION INTRAMUSCULAR; INTRAVENOUS at 11:04

## 2024-09-16 NOTE — BRIEF OP NOTE (RAD/CATH)
INTERVENTIONAL RADIOLOGY PROCEDURE NOTE    Date: 9/16/2024    Procedure:   Procedure Summary       Date: 09/16/24 Room / Location: [facility] CAT Scan    Anesthesia Start:  Anesthesia Stop:     Procedure: IR BIOPSY PELVIS Diagnosis:       Prostate cancer (HCC)      (node)    Scheduled Providers:  Responsible Provider:     Anesthesia Type: Not recorded ASA Status: Not recorded            Preoperative diagnosis:   1. Prostate cancer (HCC)         Postoperative diagnosis: Same.    Surgeon: Esme Larry MD     Assistant: None. No qualified resident was available.    Blood loss: 0    Specimens:   18g core x3 formalin  Core x1 flow     Findings:     Right external iliac pelvic lymph node biopsy    Gelfoam tract hemostasis    Complications: None immediate.    Anesthesia: conscious sedation

## 2024-09-16 NOTE — INTERVAL H&P NOTE
Update: (This section must be completed if the H&P was completed greater than 24 hrs to procedure or admission)    H&P reviewed. After examining the patient, I find no changed to the H&P since it had been written.    Right pelvic lymph node avid on PSMA scan    Imaging reviewed with patient.  Biopsy indicated.  Risk of adjacent vessel damage bleeding reviewed    Sleep apnea noted    Mp2 asa3    Patient re-evaluated. Accept as history and physical.    Esme Larry MD/September 16, 2024/11:00 AM

## 2024-09-16 NOTE — SEDATION DOCUMENTATION
IR lymph node biopsy performed by Dr larry without complication. Patient tolerated procedure well. Specimens sent. IR Procedure Bedrest Start Time is 1130 x 2 hours per Dr Larry. Report called to [facility] nurse.

## 2024-09-16 NOTE — DISCHARGE INSTRUCTIONS
POST BIOPSY    Care after your procedure:     Okay to restart blood thinners Evening of 917  If no worsening signs of bleeding or pelvic pain      1. Limit your activities for 24 hours after your biopsy.    2. No driving day of biopsy.    3. Return to your normal diet.Small sips of flat soda will help with mild nausea.    4. Remove band-aid or dressing 24 hours after procedure.     Contact Interventional Radiology at [phone] ([facility] PATIENTS: Contact Interventional Radiology at [phone]) ([facility] PATIENTS: Contact Interventional Radiology at [phone]) if:    1. Difficulty breathing, nausea or vomiting.    2. Chills or fever above 101 degrees F.     3. Pain at biopsy site not relieved by medication.     4. Develop any redness, swelling, heat, unusual drainage, heavy bruising or bleeding from biopsy site.            Moderate Sedation   WHAT YOU NEED TO KNOW:   Moderate sedation, or conscious sedation, is medicine used during procedures to help you feel relaxed and calm. You will be awake and able to follow directions without anxiety or pain. You will remember little to none of the procedure. You may feel tired, weak, or unsteady on your feet after you get sedation. You may also have trouble concentrating or short-term memory loss. These symptoms should go away in 24 hours or less.   DISCHARGE INSTRUCTIONS:   Call 911 or have someone else call for any of the following:   You have sudden trouble breathing.     You cannot be woken.  Seek care immediately if:   You have a severe headache or dizziness.     Your heart is beating faster than usual.  Contact your healthcare provider if:   You have a fever.     You have nausea or are vomiting for more than 8 hours after the procedure.      Your skin is itchy, swollen, or you have a rash.     You have questions or concerns about your condition or care.  Self-care:   Have someone stay with you for 24 hours. This person can drive you to errands and help you do  things around the house. This person can also watch for problems.      Rest and do quiet activities for 24 hours. Do not exercise, ride a bike, or play sports. Stand up slowly to prevent dizziness and falls. Take short walks around the house with another person. Slowly return to your usual activities the next day.      Do not drive or use dangerous machines or tools for 24 hours. You may injure yourself or others. Examples include a lawnmower, saw, or drill. Do not return to work for 24 hours if you use dangerous machines or tools for work.      Do not make important decisions for 24 hours. For example, do not sign important papers or invest money.      Drink liquids as directed. Liquids help flush the sedation medicine out of your body. Ask how much liquid to drink each day and which liquids are best for you.      Eat small, frequent meals to prevent nausea and vomiting. Start with clear liquids such as juice or broth. If you do not vomit after clear liquids, you can eat your usual foods.      Do not drink alcohol or take medicines that make you drowsy. This includes medicines that help you sleep and anxiety medicines. Ask your healthcare provider if it is safe for you to take pain medicine.  Follow up with your healthcare provider as directed: Write down your questions so you remember to ask them during your visits.   © 2017 [employer] Information is for End User's use only and may not be sold, redistributed or otherwise used for commercial purposes. All illustrations and images included in CareNotes® are the copyrighted property of [employer], [employer] or [employer].  The above information is an  only. It is not intended as medical advice for individual conditions or treatments. Talk to your doctor, nurse or pharmacist before following any medical regimen to see if it is safe and effective for you.

## 2024-09-18 LAB — SCAN RESULT: NORMAL

## 2024-09-23 PROCEDURE — 88307 TISSUE EXAM BY PATHOLOGIST: CPT | Performed by: STUDENT IN AN ORGANIZED HEALTH CARE EDUCATION/TRAINING PROGRAM

## 2024-09-23 PROCEDURE — 88342 IMHCHEM/IMCYTCHM 1ST ANTB: CPT | Performed by: STUDENT IN AN ORGANIZED HEALTH CARE EDUCATION/TRAINING PROGRAM

## 2024-09-23 PROCEDURE — 88341 IMHCHEM/IMCYTCHM EA ADD ANTB: CPT | Performed by: STUDENT IN AN ORGANIZED HEALTH CARE EDUCATION/TRAINING PROGRAM

## 2024-09-23 PROCEDURE — 88360 TUMOR IMMUNOHISTOCHEM/MANUAL: CPT | Performed by: STUDENT IN AN ORGANIZED HEALTH CARE EDUCATION/TRAINING PROGRAM

## 2024-09-24 ENCOUNTER — TELEPHONE (OUTPATIENT)
Dept: RADIATION ONCOLOGY | Facility: CLINIC | Age: 64
End: 2024-09-24

## 2024-09-24 NOTE — TELEPHONE ENCOUNTER
Patient has been seen for RT consult by Dr. Puentes.  He is requesting 6 months ADT.  Followed by gabriella and fiducial markers 2 months after injection.    He will have his treatment at [facility]

## 2024-09-25 ENCOUNTER — OFFICE VISIT (OUTPATIENT)
Dept: UROLOGY | Facility: MEDICAL CENTER | Age: 64
End: 2024-09-25
Payer: COMMERCIAL

## 2024-09-25 ENCOUNTER — DOCUMENTATION (OUTPATIENT)
Dept: HEMATOLOGY ONCOLOGY | Facility: CLINIC | Age: 64
End: 2024-09-25

## 2024-09-25 VITALS
WEIGHT: 294.4 LBS | HEART RATE: 95 BPM | DIASTOLIC BLOOD PRESSURE: 98 MMHG | HEIGHT: 69 IN | SYSTOLIC BLOOD PRESSURE: 144 MMHG | OXYGEN SATURATION: 96 % | BODY MASS INDEX: 43.6 KG/M2

## 2024-09-25 DIAGNOSIS — C61 PROSTATE CANCER (HCC): Primary | ICD-10-CM

## 2024-09-25 PROCEDURE — 99214 OFFICE O/P EST MOD 30 MIN: CPT | Performed by: UROLOGY

## 2024-09-25 NOTE — PROGRESS NOTES
9/25/2024    Peewee Nix  1960  [id number]    1. Prostate cancer (HCC)  Assessment & Plan:  Unfavorable intermediate risk prostate cancer    Today we discussed his pathology from his prostate biopsy including definition of prostate cancer risk groups, a discussion of the Nolberto score and the meaning of his biopsy results in terms of his future management and overall health and treatment.    I had a discussion with the patient regarding the natural history and treatment options for prostate cancer and the potential need for multimodal treatments.  Active surveillance, surgical excision in the form of open or robotic surgery, and radiation therapies plus or minus androgen deprivation therapy were discussed at length with the patient. With regard to multimodal therapy discussion this could include surgical excision with postoperative radiation therapy (RT) +/-androgen deprivation therapy (ADT) if adverse pathology or RT with long-term ADT.     I discussed robot assisted laparoscopic radical prostatectomy with the inclusion or exclusion of bilateral pelvic lymph node dissection (depending on disease parameters and intraoperative findings) in depth with the patient.      I discussed that surgery has potential advantages compared with radiation including the more accurate prognostic, pathologic information afforded by the surgical pathology, the more immediate indication of effective treatment as indicated by an undetectable PSA, as well as the relatively easier use of adjuvant or salvage radiotherapy postoperatively if the need arises.     I discussed that in comparison with open surgery that robotic prostatectomy has the benefits of improved convalescence and decreased blood loss and appears to have equivalent cancer control rates and quality-of-life side effects such similar rates of urinary incontinence and erectile dysfunction.     I discussed that most men stay in the hospital for 1-2 days and go home  with a catheter for 7-10 days. I discussed the side effects of surgery including stress incontinence, and I counseled him that most men leak urine immediately after surgery. I expect a stress incontinence rate at six months in the 10%-15% range, improving to 5%-10% in one year.  I did communicate that there additional treatments for urinary incontinence that may be persistent in the post-prostatectomy setting.    In terms of erectile dysfunction, we discussed the normal physiology of the erectile response as well as discussing the anatomy of the cavernous nerves.  I drew a diagram for him outlining the pelvic anatomy with regard to the bladder, prostate, and parasympathetic nerve plexus running laterally to the prostate.  I will do everything that I safely can to attempt a nerve-sparing procedure if deemed to be appropriate intraoperatively.  I did discuss with him that my priority is 1st to rid him of his cancer, 2nd to keep him dry, and 3rd to keep him with sexual function.  He does understand that his sexual function postoperatively is a function of his current sexual function preoperatively and is closely tied to his overall general health.  He understands that there are other therapies for erectile dysfunction the post-prostatectomy setting including oral therapies, injections, and penile prosthesis placement. Additionally, I did stress to him that his post-operative sexual function will not be as good as his preoperative sexual function due to the nature of prostate surgery. We discussed that men are surgically sterile after radical prostatectomy    I discussed other surgical complications including, but not limited to, a bladder neck contracture rate in the 5%-10% range any time after surgery, a rectal injury rate of 1% or less, an open conversion rate in the 1% range, a lymphocele causing symptoms and/or requiring intervention in the 5% range, and a transfusion rate in the 1% to 2% range. Additionally, he  may experience some degree of penile remodeling and/or loss of penile length after surgery.    He has participated in shared/informed decision-making model with regard to this major urologic surgery with risk.  All questions answered and addressed    He will undergo medical optimization and clearance in preparation for surgery.  As outlined above, the risks, benefits, potential complications, and alternative therapies were explained to the patient. Informed consent for the proposed procedure was obtained.          Patient not yet decided between radiation and surgery for his prostate cancer  Discussed concerns regarding surgery with his morbid obesity, recent DVT on Eliquis, and large abdominal wall mesh.  Discussed increased difficulty ventilating patients in steep Trendelenburg position during RALP with large central obesity.  Discussed potential need for open retropubic prostatectomy.  Also has chronic non-healing right ankle wound.    Discussed with patient importance of continued weight loss prior to considering RALP (reports losing 80 lbs), and increase risks of DVT/PE around the time of pelvic surgery for his malignancy while holding blood thinners.         History of Present Illness  64 y.o. male with a history of unfavorable intermediate risk prostate cancer    Here for surgical consultation  Has GG 2 prostate cancer, PSA 17 an > 50% prostate cores positive, cT1c  PSMA PET scan was concerning for possible lymph node metastasis, but IR biopsy was negative for malignancy       AUA Symptom Score  AUA SYMPTOM SCORE      Flowsheet Row Most Recent Value   AUA SYMPTOM SCORE    How often have you had a sensation of not emptying your bladder completely after you finished urinating? 3   How often have you had to urinate again less than two hours after you finished urinating? 4   How often have you found you stopped and started again several times when you urinate? 5   How often have you found it difficult to postpone  urination? 4   How often have you had a weak urinary stream? 4   How often have you had to push or strain to begin urination? 3   How many times did you most typically get up to urinate from the time you went to bed at night until the time you got up in the morning? 2   Quality of Life: If you were to spend the rest of your life with your urinary condition just the way it is now, how would you feel about that? 5   AUA SYMPTOM SCORE 25            Review of Systems   All other systems reviewed and are negative.      Past Medical History  Past Medical History:   Diagnosis Date    Asthma     Breathing difficulty     CPAP (continuous positive airway pressure) dependence     Diverticulosis     DVT (deep venous thrombosis) (HCC)     GERD (gastroesophageal reflux disease)     H/O blood clots     Hyperlipidemia     Kidney stone     Morbid obesity (HCC)     Myocardial infarction (HCC)     Open wound of right ankle     Prostate cancer (HCC)     Pulmonary embolism (HCC)     Sleep apnea     Ventricular tachycardia (HCC)     1 episode       Past Social History  Past Surgical History:   Procedure Laterality Date    CARPAL TUNNEL RELEASE      COLONOSCOPY  2007    FLAP LOCAL EXTREMITY Right 8/14/2018    Procedure: ASPIRATION FAILING FREE FLAP RIGHT LEG;  Surgeon: Manav Alcantara MD;  Location:  MAIN OR;  Service: Plastics    FREE FLAP GRAFT Right 8/13/2018    Procedure: TRANSFER RIGHT THIGH FREE FLAP TO RIGHT HEEL;  Surgeon: Manav Alcantara MD;  Location:  MAIN OR;  Service: Plastics    HERNIA REPAIR      abdominal x 2    IR BIOPSY PELVIS  9/16/2024    IR PICC LINE  10/24/2019    POLYPECTOMY      hyperplastic    OR ADJT/REARRGMT SCALP/ARM/LEG 10.1-30.0 SQ CM Right 8/23/2018    Procedure: LOCAL FLAP POSS STSG ACHILLES TENDON;  Surgeon: Manav Alcantara MD;  Location:  MAIN OR;  Service: Plastics    OR COLONOSCOPY FLX DX W/COLLJ SPEC WHEN PFRMD N/A 11/27/2017    Procedure: COLONOSCOPY;  Surgeon: Leif Vu MD;   Location: [facility];  Service: Gastroenterology    HI DEBRIDEMENT SUBCUTANEOUS TISSUE 1ST 20 SQ CM/< Right 7/18/2018    Procedure: DEBRIDEMENT ANKLE WOUND;  Surgeon: Manav Alcantara MD;  Location:  MAIN OR;  Service: Plastics    HI DEBRIDEMENT SUBCUTANEOUS TISSUE 1ST 20 SQ CM/< Right 7/23/2018    Procedure: DEBRIDEMENT RIGHT ANKLE WOUND;  Surgeon: Manav Alcantara MD;  Location:  MAIN OR;  Service: Plastics    HI DEBRIDEMENT SUBCUTANEOUS TISSUE 1ST 20 SQ CM/< Right 11/20/2019    Procedure: SKIN GRAFT  RIGHT LEG;  Surgeon: Manav Alcantara MD;  Location:  MAIN OR;  Service: Plastics    HI DEBRIDEMENT SUBCUTANEOUS TISSUE 1ST 20 SQ CM/< Right 2/13/2023    Procedure: DEBRIDEMENT OF ANKLE;  Surgeon: Manav Alcantara MD;  Location:  MAIN OR;  Service: Plastics    HI FREE MUSCLE/MYOCUTANEOUS FLAP W/MVASC ANAST Right 7/30/2018    Procedure: COVERAGE HEEL WITH GRACILIS MUSCLE FLAP/SKINGRAFT ;  Surgeon: Manav Alcantara MD;  Location:  MAIN OR;  Service: Plastics    HI PROSTATE NEEDLE BIOPSY ANY APPROACH N/A 7/18/2024    Procedure: TRANSPERINEAL MRI FUSION BX PROSTATE;  Surgeon: Juan Carlos Burroughs MD;  Location: [facility];  Service: Urology    HI REPAIR SECONDARY ACHILLES TENDON W/WO GRAFT Right 4/23/2018    Procedure: REPAIR TENDON ACHILLES. FHL TRANSFER;  Surgeon: Saroj Quiñones DPM;  Location: [facility];  Service: Podiatry    HI SPLIT AGRFT T/A/L 1ST 100 CM/&/1% BDY INFT/CHLD Right 2/15/2023    Procedure: SKIN GRAFT TO ANKLE, WOUND VAC APPLICATION;  Surgeon: Manav Alcantara MD;  Location:  MAIN OR;  Service: Plastics    TRIGGER FINGER RELEASE      thumb       Past Family History  Family History   Problem Relation Age of Onset    Hypertension Mother     Diabetes Father     Pancreatic cancer Father     Prostate cancer Father     Colon cancer Maternal Uncle     Colon cancer Maternal Uncle        Past Social history  Social History     Socioeconomic History    Marital status: /Civil Union     Spouse  name: Not on file    Number of children: Not on file    Years of education: Not on file    Highest education level: Not on file   Occupational History    Not on file   Tobacco Use    Smoking status: Never    Smokeless tobacco: Never   Vaping Use    Vaping status: Never Used   Substance and Sexual Activity    Alcohol use: Not Currently     Comment: rarely    Drug use: Never    Sexual activity: Not on file   Other Topics Concern    Not on file   Social History Narrative    Not on file     Social Determinants of Health     Financial Resource Strain: Low Risk  (8/17/2024)    Received from [facility]    Overall Financial Resource Strain (CARDIA)     Difficulty of Paying Living Expenses: Not hard at all   Food Insecurity: No Food Insecurity (8/17/2024)    Received from [facility]    Hunger Vital Sign     Worried About Running Out of Food in the Last Year: Never true     Ran Out of Food in the Last Year: Never true   Transportation Needs: No Transportation Needs (8/17/2024)    Received from [facility]    PRAPARE - Transportation     Lack of Transportation (Medical): No     Lack of Transportation (Non-Medical): No   Physical Activity: Not on file   Stress: Not on file   Social Connections: Unknown (6/18/2024)    Received from [facility]     How often do you feel lonely or isolated from those around you? (Adult - for ages 18 years and over): Not on file   Intimate Partner Violence: Not At Risk (8/17/2024)    Received from [facility]    Humiliation, Afraid, Rape, and Kick questionnaire     Fear of Current or Ex-Partner: No     Emotionally Abused: No     Physically Abused: No     Sexually Abused: No   Housing Stability: High Risk (8/17/2024)    Received from [facility]    Housing Stability Vital Sign     Unable to Pay for Housing in the Last Year: Yes     Number of Times Moved in the Last Year: 0     Homeless in the  Last Year: No       Current Medications  Current Outpatient Medications   Medication Sig Dispense Refill    apixaban (ELIQUIS) 5 mg Take 5 mg by mouth 2 (two) times a day      clopidogrel (PLAVIX) 75 mg tablet Take 1 tablet by mouth daily      ezetimibe (ZETIA) 10 mg tablet Take 10 mg by mouth daily      furosemide (LASIX) 20 mg tablet Take 1 tablet (20 mg total) by mouth daily 30 tablet 0    lansoprazole (PREVACID) 30 mg capsule Take 1 capsule by mouth daily      rosuvastatin (CRESTOR) 20 MG tablet Take 20 mg by mouth 2 (two) times a week Unsure of dose      acetaminophen (TYLENOL) 325 mg tablet Take 2 tablets (650 mg total) by mouth 3 (three) times a day (Patient not taking: Reported on 8/29/2024) 30 tablet 0    acetaminophen-codeine (TYLENOL with CODEINE #3) 300-30 MG per tablet Take 1 tablet by mouth every 4 (four) hours as needed POST OP (Patient not taking: Reported on 5/31/2024)      alfuzosin (UROXATRAL) 10 mg 24 hr tablet Take 1 tablet (10 mg total) by mouth daily (Patient not taking: Reported on 9/4/2024) 90 tablet 3    amLODIPine (NORVASC) 10 mg tablet Take 10 mg by mouth daily (Patient not taking: Reported on 9/4/2024)      aspirin 81 mg chewable tablet Chew 81 mg daily (Patient not taking: Reported on 8/29/2024)      ciprofloxacin (CIPRO) 500 mg tablet  (Patient not taking: Reported on 9/25/2024)      clotrimazole-betamethasone (LOTRISONE) 1-0.05 % cream APPLY TO AFFECTED AREA TWICE A DAY (Patient not taking: Reported on 5/31/2024) 45 g 2    Edex 40 MCG injection INJECT 40 MCG INTRACAVERNOSALLY ONCE A DAY AS NEEDED (Patient not taking: Reported on 7/30/2024)      fluticasone (FLONASE) 50 mcg/act nasal spray SPRAY 2 SPRAYS INTO EACH NOSTRIL EVERY DAY (Patient not taking: Reported on 7/11/2024)      linezolid (ZYVOX) 600 mg tablet 600 mg 2 (two) times a day (Patient not taking: Reported on 5/31/2024)       No current facility-administered medications for this visit.       Allergies  Allergies   Allergen  "Reactions    Bactrim [Sulfamethoxazole-Trimethoprim]      High kidney levels    Latex Itching    Medical Tape Itching       Past Medical History, Social History, Family History, medications and allergies were reviewed.    Vitals  Vitals:    09/25/24 1122   BP: 144/98   BP Location: Right arm   Patient Position: Sitting   Cuff Size: Adult   Pulse: 95   SpO2: 96%   Weight: 134 kg (294 lb 6.4 oz)   Height: 5' 9\" (1.753 m)       Physical Exam  Constitutional:       Appearance: Normal appearance. He is obese.   HENT:      Head: Normocephalic.      Nose: Nose normal. No congestion.   Eyes:      Conjunctiva/sclera: Conjunctivae normal.   Cardiovascular:      Rate and Rhythm: Normal rate.   Pulmonary:      Effort: Pulmonary effort is normal. No respiratory distress.   Abdominal:      General: Abdomen is flat. There is no distension.      Palpations: Abdomen is soft.      Tenderness: There is no right CVA tenderness or left CVA tenderness.   Musculoskeletal:      Comments: Non-healing right ankle wound   Skin:     General: Skin is warm and dry.   Neurological:      General: No focal deficit present.      Mental Status: He is alert and oriented to person, place, and time.   Psychiatric:         Mood and Affect: Mood normal.         Results  No results found for: \"PSA\"  Lab Results   Component Value Date    CALCIUM 8.8 08/19/2024    K 4.6 08/19/2024    CO2 25 08/19/2024     08/19/2024    BUN 14 08/19/2024    CREATININE 0.97 08/19/2024     Lab Results   Component Value Date    WBC 9.38 12/07/2019    HGB 16.0 12/07/2019    HCT 49.5 (H) 12/07/2019    MCV 88 12/07/2019     12/07/2019       Office Urine Dip  No results found for this or any previous visit (from the past 1 hour(s)).]    "

## 2024-09-25 NOTE — PROGRESS NOTES
Patient will need (6 months of ) ADT. Please schedule ADT. Once ADT is given, please send encounter to Urology surgery scheduler to schedule SpaceOAR/Markers. Once SpaceOAR/Markers are scheduled encounter then is to be sent to RAD ONC to schedule SIM. Patient will be getting RT at ([place]). Thank you     - spaceoar and fiducial markers 2 months after injection.    Message sent to the [facility] clinical pool.

## 2024-09-25 NOTE — TELEPHONE ENCOUNTER
Patient will need (6 months of ) ADT. Please schedule ADT. Once ADT is given, please send encounter to Urology surgery scheduler to schedule SpaceOAR/Markers. Once SpaceOAR/Markers are scheduled encounter then is to be sent to RAD ONC to schedule SIM. Patient will be getting RT at ([place]). Thank you    - spaceoar and fiducial markers 2 months after injection.

## 2024-09-25 NOTE — ASSESSMENT & PLAN NOTE
Unfavorable intermediate risk prostate cancer    Today we discussed his pathology from his prostate biopsy including definition of prostate cancer risk groups, a discussion of the Marissa score and the meaning of his biopsy results in terms of his future management and overall health and treatment.    I had a discussion with the patient regarding the natural history and treatment options for prostate cancer and the potential need for multimodal treatments.  Active surveillance, surgical excision in the form of open or robotic surgery, and radiation therapies plus or minus androgen deprivation therapy were discussed at length with the patient. With regard to multimodal therapy discussion this could include surgical excision with postoperative radiation therapy (RT) +/-androgen deprivation therapy (ADT) if adverse pathology or RT with long-term ADT.     I discussed robot assisted laparoscopic radical prostatectomy with the inclusion or exclusion of bilateral pelvic lymph node dissection (depending on disease parameters and intraoperative findings) in depth with the patient.      I discussed that surgery has potential advantages compared with radiation including the more accurate prognostic, pathologic information afforded by the surgical pathology, the more immediate indication of effective treatment as indicated by an undetectable PSA, as well as the relatively easier use of adjuvant or salvage radiotherapy postoperatively if the need arises.     I discussed that in comparison with open surgery that robotic prostatectomy has the benefits of improved convalescence and decreased blood loss and appears to have equivalent cancer control rates and quality-of-life side effects such similar rates of urinary incontinence and erectile dysfunction.     I discussed that most men stay in the hospital for 1-2 days and go home with a catheter for 7-10 days. I discussed the side effects of surgery including stress incontinence, and  I counseled him that most men leak urine immediately after surgery. I expect a stress incontinence rate at six months in the 10%-15% range, improving to 5%-10% in one year.  I did communicate that there additional treatments for urinary incontinence that may be persistent in the post-prostatectomy setting.    In terms of erectile dysfunction, we discussed the normal physiology of the erectile response as well as discussing the anatomy of the cavernous nerves.  I drew a diagram for him outlining the pelvic anatomy with regard to the bladder, prostate, and parasympathetic nerve plexus running laterally to the prostate.  I will do everything that I safely can to attempt a nerve-sparing procedure if deemed to be appropriate intraoperatively.  I did discuss with him that my priority is 1st to rid him of his cancer, 2nd to keep him dry, and 3rd to keep him with sexual function.  He does understand that his sexual function postoperatively is a function of his current sexual function preoperatively and is closely tied to his overall general health.  He understands that there are other therapies for erectile dysfunction the post-prostatectomy setting including oral therapies, injections, and penile prosthesis placement. Additionally, I did stress to him that his post-operative sexual function will not be as good as his preoperative sexual function due to the nature of prostate surgery. We discussed that men are surgically sterile after radical prostatectomy    I discussed other surgical complications including, but not limited to, a bladder neck contracture rate in the 5%-10% range any time after surgery, a rectal injury rate of 1% or less, an open conversion rate in the 1% range, a lymphocele causing symptoms and/or requiring intervention in the 5% range, and a transfusion rate in the 1% to 2% range. Additionally, he may experience some degree of penile remodeling and/or loss of penile length after surgery.    He has  participated in shared/informed decision-making model with regard to this major urologic surgery with risk.  All questions answered and addressed    He will undergo medical optimization and clearance in preparation for surgery.  As outlined above, the risks, benefits, potential complications, and alternative therapies were explained to the patient. Informed consent for the proposed procedure was obtained.          Patient not yet decided between radiation and surgery for his prostate cancer  Discussed concerns regarding surgery with his morbid obesity, recent DVT on Eliquis, and large abdominal wall mesh.  Discussed increased difficulty ventilating patients in steep Trendelenburg position during RALP with large central obesity.  Discussed potential need for open retropubic prostatectomy.  Also has chronic non-healing right ankle wound.    Discussed with patient importance of continued weight loss prior to considering RALP (reports losing 80 lbs), and increase risks of DVT/PE around the time of pelvic surgery for his malignancy while holding blood thinners.

## 2024-10-01 ENCOUNTER — DOCUMENTATION (OUTPATIENT)
Dept: HEMATOLOGY ONCOLOGY | Facility: CLINIC | Age: 64
End: 2024-10-01

## 2024-10-01 NOTE — PROGRESS NOTES
Per urology RNDelmi. Patient will be calling office with treatment decision. She states he will call urology office with treatment choice. No oncology navigation needed a this time. I asked office to make me aware of plan.

## 2024-10-07 ENCOUNTER — TELEPHONE (OUTPATIENT)
Age: 64
End: 2024-10-07

## 2024-10-07 NOTE — TELEPHONE ENCOUNTER
Pt returned call to the RN.  Pt sts that he received a call from the RN to schedule f/u appt withj Dr. Burroughs to discuss planning.  Was informed by the RN to offer pt 10/22/24 at 3PM with Dr. Burroughs.  Pt disconnected call before discussing appt.    Pt call back: [phone]

## 2024-10-09 NOTE — TELEPHONE ENCOUNTER
Call placed to pt and spoke with him. Offered a few appointments for pt but due to his work and surgery schedule he is only able to do a few days.     Offered Friday 11- at 3pm with Dr. Burroughs and pt confirmed.

## 2024-10-09 NOTE — TELEPHONE ENCOUNTER
Pt returning call to clinical to discuss scheduling follow up appt. Pt sts 10/22/24 would not work for him and is requesting a call back from clinical to discuss other appts.    Pt call back- [phone]

## 2024-10-15 NOTE — ANESTHESIA POSTPROCEDURE EVALUATION
Post-Op Assessment Note    CV Status:  Stable  Pain Score: 0    Pain management: adequate     Mental Status:  Alert and awake   Hydration Status:  Euvolemic   PONV Controlled:  Controlled   Airway Patency:  Patent      Post Op Vitals Reviewed: Yes      Staff: Anesthesiologist, with CRNAs   Comments: Pt was extubated when case was done        No notable events documented      BP      Temp     Pulse     Resp      SpO2 Name band; Vaccine status unknown

## 2024-10-16 ENCOUNTER — TELEPHONE (OUTPATIENT)
Age: 64
End: 2024-10-16

## 2024-10-16 NOTE — TELEPHONE ENCOUNTER
Patient requests lab orders from September to be printed. States he will come to the office in a half hour to an hour to . Thank you.

## 2024-10-22 LAB
25(OH)D3 SERPL-MCNC: 31 NG/ML (ref 30–100)
FSH SERPL-ACNC: 6 MIU/ML (ref 1.4–12.8)
LH SERPL-ACNC: 6 MIU/ML (ref 1.6–15.2)
SHBG SERPL-SCNC: 32 NMOL/L (ref 22–77)
TESTOST FREE SERPL-MCNC: 6.1 PG/ML (ref 35–155)
TESTOST SERPL-MCNC: 40 NG/DL (ref 250–1100)

## 2024-10-25 ENCOUNTER — TELEPHONE (OUTPATIENT)
Dept: ENDOCRINOLOGY | Facility: CLINIC | Age: 64
End: 2024-10-25

## 2024-10-25 NOTE — TELEPHONE ENCOUNTER
Reached out to Peewee to discuss labs. His testosterone levels are low as expected given history of hypogonadism. At this time he is undergoing treatment for prostate cancer and is not a candidate for testosterone replacement therapy. He tells me that overall he feels well and symptoms are manageable. Potentially plan is for surgery. Has discussed initiation of Lupron with urology, but no final recommendations.   His vitamin D level is low normal, he reports intermittently taking vitamin D supplement. Advised Peewee to get at least 1000 units of vitamin D3 daily. He knows to call us with any questions or concerns.

## 2024-11-15 ENCOUNTER — OFFICE VISIT (OUTPATIENT)
Dept: UROLOGY | Facility: MEDICAL CENTER | Age: 64
End: 2024-11-15
Payer: COMMERCIAL

## 2024-11-15 VITALS
HEART RATE: 84 BPM | OXYGEN SATURATION: 96 % | WEIGHT: 290 LBS | BODY MASS INDEX: 42.95 KG/M2 | DIASTOLIC BLOOD PRESSURE: 84 MMHG | SYSTOLIC BLOOD PRESSURE: 142 MMHG | HEIGHT: 69 IN

## 2024-11-15 DIAGNOSIS — C61 PROSTATE CANCER (HCC): Primary | ICD-10-CM

## 2024-11-15 PROCEDURE — 99213 OFFICE O/P EST LOW 20 MIN: CPT | Performed by: UROLOGY

## 2024-11-15 NOTE — ASSESSMENT & PLAN NOTE
Patient will be proceeding with robotic assisted laparoscopic radical prostatectomy at the [facility] in February 2025.

## 2024-11-15 NOTE — PROGRESS NOTES
Chief complaint: Prostate cancer    HPI: 64-year-old male with unfavorable intermediate risk prostate cancer.  Prostate MRI revealed a 28 g prostate.  There was no extracapsular prostatic tumor or seminal vesicle invasion.  A PSMA PET/CT did note radiotracer uptake in the right external iliac lymph chain as well as right inguinal lymph nodes.  Biopsies were undertaken and there was no evidence of metastatic disease.  The patient reports that he has been to the [facility] and that he is scheduled to undergo robotic assisted laparoscopic radical prostatectomy in February.  In preparation for this he has been going to the gym and he is already lost about 80 pounds.  He is feeling well.    We had a long discussion today regarding the upcoming surgery.  Specifically we discussed the pros and cons of robotic assisted radical prostatectomy versus radiation therapy.  He was also interested in an opinion of whether he should have an open radical prostatectomy if the robotic option was not possible.  The increased risk of incontinence with the open procedure was noted by Dr. Frausto.    At the conclusion of her discussion the patient felt confident in his decision to proceed with robotic assisted radical prostatectomy.  He will be following up at the [facility] in February for the procedure.  He knows he can call here with any questions and that we would be happy to see him postoperatively.    25 minutes was spent at today's visit discussing the above.

## 2024-12-06 ENCOUNTER — PATIENT OUTREACH (OUTPATIENT)
Dept: HEMATOLOGY ONCOLOGY | Facility: CLINIC | Age: 64
End: 2024-12-06

## 2024-12-06 NOTE — PROGRESS NOTES
3rd Outreach made to patient. I LM introducing myself as  Oncology Patient Navigator. I let patient know I would like to introduce myself further and explain my role in his care moving forward. I gave my direct line and requested a call back from patient to assess barriers to care and go over DT.

## 2025-02-19 ENCOUNTER — OFFICE VISIT (OUTPATIENT)
Dept: RADIATION ONCOLOGY | Facility: HOSPITAL | Age: 65
End: 2025-02-19
Attending: RADIOLOGY
Payer: COMMERCIAL

## 2025-02-19 VITALS
DIASTOLIC BLOOD PRESSURE: 80 MMHG | BODY MASS INDEX: 44.01 KG/M2 | SYSTOLIC BLOOD PRESSURE: 125 MMHG | HEART RATE: 90 BPM | TEMPERATURE: 98.2 F | WEIGHT: 298 LBS | OXYGEN SATURATION: 99 % | RESPIRATION RATE: 16 BRPM

## 2025-02-19 DIAGNOSIS — C61 PROSTATE CANCER (HCC): Primary | ICD-10-CM

## 2025-02-19 PROCEDURE — 99211 OFF/OP EST MAY X REQ PHY/QHP: CPT | Performed by: RADIOLOGY

## 2025-02-19 PROCEDURE — G0463 HOSPITAL OUTPT CLINIC VISIT: HCPCS | Performed by: RADIOLOGY

## 2025-02-19 PROCEDURE — 99215 OFFICE O/P EST HI 40 MIN: CPT | Performed by: RADIOLOGY

## 2025-02-19 NOTE — ASSESSMENT & PLAN NOTE
Peewee Nix is a 64 y.o. male, practicing oral surgeon, who was referred to urology in May 2024 for an elevated PSA measuring approximately 17 in January 2024.  It seems that his PSA had been elevated for some time, measuring 14 in February 2023.  Digital rectal exam was reportedly benign.  Of note, he also has a history of cryptorchidism and hypogonadism and had been on testosterone replacement therapy, which he has now discontinued.  He was referred for an MRI of the prostate performed on 6/7/2024 revealing category 4 lesions in the anterior right peripheral zone at the apex as well as the anterior and posterior right transition zones at the base.  There was no extraprostatic tumor, seminal vesicle invasion, pelvic lymphadenopathy, or pelvic osseous metastatic disease.  He underwent prostate biopsy on 7/18/2024 revealing high-volume Nolberto 3+4 = 7 as well as 3+3 = 6 disease.  A decipher score was checked returning as 0.89 suggesting high risk genomics.  A PSMA PET/CT was then performed on 8/28/2024 revealing foci of increased uptake in the prostate gland bilaterally.  Note was also made of a few mildly hypermetabolic lymph nodes including a right external iliac chain lymph node with a max SUV of 3.2 measuring 1.5 cm in the short axis as well as right inguinal lymphadenopathy including a 1.3 cm lymph node with an SUV of 2.6.  There was also increased uptake at the left anterior sixth, seventh and eighth ribs without definite corresponding findings on CT suggestive of prior injury given the distribution with an SUV of 6.1.  A similar distribution was noted on the right side although to a lesser extent.  On 9/12/2024 he underwent CT-guided biopsy of one of the PSMA avid right external iliac lymph nodes returning as reactive, negative for malignancy.    The patient was initially seen in consultation in our department on 8/29/2024.  He ultimately opted to pursue surgical management at [facility].   Surgery was delayed until at least 6 months had labs from his recent DVT.  Surgery was attempted on 2/10/2025, but aborted secondary to significant abdominal adhesions related to his prior hernia repair.  He has self referred back to our department to now pursue definitive radiation therapy.  As was explained to the patient last year, given his unfavorable intermediate risk disease status, with rather high risk decipher score of 0.89, I would recommend a short 6-month course of neoadjuvant and concurrent androgen deprivation with definitive radiation directed at the prostate and regional pelvic lymphatics.  Treatment would be delivered over the course of 5-1/2 weeks.  He will first require fiducial marker and rectal spacer placement.  He would then return to our department 1 week thereafter for CT planning.  The associated risks and toxicities of treatment were discussed with the patient in detail, including, but not limited to, fatigue, increased frequency of urination, dysuria, hematuria, diarrhea, rectal bleeding, impotence, dry ejaculation, incontinence, and long-term radiation cystitis/proctitis.  We will contact urology to arrange for administration of Lupron and scheduling of fiducial marker and SpaceOAR.  We have also requested a new PSA test as it seems it has been over a year since his PSA was last checked.  In January 2024 it measured 17.  Since that time he has discontinued his testosterone supplementation with recent testosterone level measuring 40.

## 2025-02-19 NOTE — PROGRESS NOTES
Follow-up Visit   Name: Peewee Nix      : 1960      MRN: [id number]  Encounter Provider: Bernardino Puentes MD  Encounter Date: 2025   Encounter department: [facility] RADIATION ONCOLOGY  :  Assessment & Plan  Prostate cancer (HCC)  Peewee Nix is a 64 y.o. male, practicing oral surgeon, who was referred to urology in May 2024 for an elevated PSA measuring approximately 17 in 2024.  It seems that his PSA had been elevated for some time, measuring 14 in 2023.  Digital rectal exam was reportedly benign.  Of note, he also has a history of cryptorchidism and hypogonadism and had been on testosterone replacement therapy, which he has now discontinued.  He was referred for an MRI of the prostate performed on 2024 revealing category 4 lesions in the anterior right peripheral zone at the apex as well as the anterior and posterior right transition zones at the base.  There was no extraprostatic tumor, seminal vesicle invasion, pelvic lymphadenopathy, or pelvic osseous metastatic disease.  He underwent prostate biopsy on 2024 revealing high-volume Nolberto 3+4 = 7 as well as 3+3 = 6 disease.  A decipher score was checked returning as 0.89 suggesting high risk genomics.  A PSMA PET/CT was then performed on 2024 revealing foci of increased uptake in the prostate gland bilaterally.  Note was also made of a few mildly hypermetabolic lymph nodes including a right external iliac chain lymph node with a max SUV of 3.2 measuring 1.5 cm in the short axis as well as right inguinal lymphadenopathy including a 1.3 cm lymph node with an SUV of 2.6.  There was also increased uptake at the left anterior sixth, seventh and eighth ribs without definite corresponding findings on CT suggestive of prior injury given the distribution with an SUV of 6.1.  A similar distribution was noted on the right side although to a lesser extent.  On 2024 he underwent CT-guided  biopsy of one of the PSMA avid right external iliac lymph nodes returning as reactive, negative for malignancy.    The patient was initially seen in consultation in our department on 8/29/2024.  He ultimately opted to pursue surgical management at [facility].  Surgery was delayed until at least 6 months had labs from his recent DVT.  Surgery was attempted on 2/10/2025, but aborted secondary to significant abdominal adhesions related to his prior hernia repair.  He has self referred back to our department to now pursue definitive radiation therapy.  As was explained to the patient last year, given his unfavorable intermediate risk disease status, with rather high risk decipher score of 0.89, I would recommend a short 6-month course of neoadjuvant and concurrent androgen deprivation with definitive radiation directed at the prostate and regional pelvic lymphatics.  Treatment would be delivered over the course of 5-1/2 weeks.  He will first require fiducial marker and rectal spacer placement.  He would then return to our department 1 week thereafter for CT planning.  The associated risks and toxicities of treatment were discussed with the patient in detail, including, but not limited to, fatigue, increased frequency of urination, dysuria, hematuria, diarrhea, rectal bleeding, impotence, dry ejaculation, incontinence, and long-term radiation cystitis/proctitis.  We will contact urology to arrange for administration of Lupron and scheduling of fiducial marker and SpaceOAR.  We have also requested a new PSA test as it seems it has been over a year since his PSA was last checked.  In January 2024 it measured 17.  Since that time he has discontinued his testosterone supplementation with recent testosterone level measuring 40.           History of Present Illness   Chief Complaint   Patient presents with    Prostate Cancer   Peewee JONATHAN Nix 1960 is a 64 y.o. male with recently diagnosed 3+4=7  unfavorable intermediate risk prostate cancer. He was initially seen in consultation in August, 2024.  He elected to proceed with robotic prostatectomy but the procedure was aborted and he returns today to discuss nonsurgical management.     PSA   2/10/23  14.09  5/7/24     17     5/31/24   Recent PSA was 17. Recommend MRI at this time. Patient upset with voiding. Specifically with urinary obstruction. Discussed starting Uroxatrol. Patient has history of hypogonadism. Recommend seeing endocrinology, before going through testosterone replacement therapy. Reports ED.        6/7/24 MRI prostate multiparametric wo w contrast  IMPRESSION:  1. PI-RADSv2.1 Category 4 - High (clinically significant cancer is likely to be present). Possible lesions in the anterior right peripheral zone at apex (category 4), and anterior and posterior right transition zones at base (category 3).   2. No extraprostatic tumor, seminal vesicle invasion, pelvic lymphadenopathy, or pelvic osseous metastatic disease.  3. Calculated prostate volume of 28 mL.  Prostate gland boundaries and areas of concern for significant prostate cancer were segmented using 3D advanced post-processing on an independent [employer] system workstation with active physician participation. The segmentation was performed should   MR-ultrasound fusion biopsy be required.        7/18/24 Tissue Exam        A. Prostate, MINNA #1:  Acinar adenocarcinoma  - Grade group 1 (Nolberto score: 3 + 3 = 6)  - Involving 70%, 66% and 50% of 3 out of 3 core fragments, tumor lengths 7 mm, 5 mm and 4 mm   - Fragments of fibroadipose / fibromuscular tissue     B. Prostate, MINNA #2:  Benign prostatic hyperplasia  Corpora amylacea with focal calcification      C. Prostate, LEFT POSTERIOR LATERAL:  Acinar adenocarcinoma  - Grade group 1 (Nolberto score: 3 + 3 = 6)  - Involving 27% and 15% of 2 out of 2 core fragments, tumor lengths 3 mm and 1 mm       D. Prostate, RIGHT POSTERIOR  MEDIAL:  Acinar adenocarcinoma  - Grade group 2 ([place] score: 3 + 4 = 7)  - Involving 10% of 1 out of 2 core fragments, tumor length 1.5 mm  - Percentage of pattern 4: approx. 5%  - Perineural invasion identified      E. Prostate, LEFT POSTERIOR MEDIAL:  Acinar adenocarcinoma  - Grade group 1 (Nolberto score: 3 + 3 = 6)  - Involving 8% of 1 out of 2 core fragments, tumor length 1 mm     F. Prostate, RIGHT POSTERIOR LATERAL:  Acinar adenocarcinoma  - Grade group 1 (Nolberto score: 3 + 3 = 6)  - Involving 10% and <5% of 2 out of 2 core fragments, tumor lengths 1 mm and <1 mm     - Perineural invasion identified      G. Prostate, LEFT BASE:  Acinar adenocarcinoma  - Grade group 2 ([place] score: 3 + 4 = 7)  - Involving 15% of 1 out of 2 core fragments, tumor lengths 2.5 mm  - Percentage of pattern 4: approx. 10%      H. Prostate, RIGHT BASE:  Rare atypical glands, suspicious for malignancy      I. Prostate, RIGHT ANTERIOR MEDIAL:  Acinar adenocarcinoma  - Grade group 2 ([place] score: 3 + 4 = 7)  - Involving 75% and 66% of 2 out of 2 core fragments, tumor lengths 8 mm and 7.5 mm  - Percentage of pattern 4: approx. 45%      J. Prostate, RIGHT ANTERIOR LATERAL:  Fragments of fibromuscular tissue      K. Prostate, LEFT ANTERIOR MEDIAL:  Fragments of fibromuscular tissue       L. Prostate, LEFT ANTERIOR LATERAL:  Acinar adenocarcinoma  - Grade group 1 (Nolberto score: 3 + 3 = 6)  - Involving 20% of 1 out of 1 core fragment, tumor length 1.5 mm  - Fragment of fibromuscular tissue      M. Prostate, LEFT TRANSITIONAL ZONE:  Benign prostate tissue      N. Prostate, RIGHT TRANSITIONAL ZONE:  Rare atypical glands, suspicious for malignancy      See note      Electronically signed by Cain Keys MD on 7/23/2024 at 1451   Note     No cribriform glands, no intraductal carcinoma identified.     If clinically indicated, the most appropriate tissue block(s) for ancillary testing are block(s): I1     Multiplex immunohistochemical  stain performed with appropriate controls on blocks A1, A3, A5, C1, C2, D1, E2, F1, F2, G2, H2, I1, I2, L2 and N1 shows malignant glands with loss of basal layer cells staining for p63 and high molecular weight keratin with luminal racemase expression, supporting the diagnosis.     Multiplex immunohistochemical stain performed with appropriate controls on blocks G1 and M1 shows benign glands with basal layer cells staining for p63 and high molecular weight keratin without luminal racemase expression, supporting the diagnosis.         7/30/24 Dr. Burroughs   Discussed grade group 1 and group 2 present. His biopsy and PSA level, reviewed by [facility] guidelines. Order PSMA PET scan. Decipher testing ordered. PET CT scan. This will help with future treatment. Discussed robotic assisted radical prostatectomy. Will see radiation oncology after scan. Also will see surgeon.         8/16/24-8/19/24 admitted to [facility] with RLE DVT and PE, treated with heparin drip. D/c on Eliquis        8/22/24 Decipher test  Results High Risk 0.89        8/28/24 PET CT  IMPRESSION:  1. Foci of increased radiotracer uptake at the bilateral prostate gland likely related to the known malignancy.  2. Mild radiotracer uptake noted at a right external iliac chain lymph node and right inguinal lymph nodes, nonspecific, could be reactive but metastasis is not excluded.  3. No findings for PSMA avid soft tissue metastasis in the neck, chest or upper abdomen.  4. Increased radiotracer uptake at several left anterior mid ribs probably posttraumatic. This is noted to a lesser extent at the right anterior mid ribs.    9/16/24 - CT biopsy of R Ext Iliac LN  Reactive, negative for malignancy.     09/25/24 - [facility]  Pt has not decided between radiation and surgery  Discussed concerns in regards to surgery    11/15/24 - Manjeet Parks  Pt scheduled for RALP at [facility] in February 12/12/24 - MRI Prostate at [facility]  1.  PI-RADSv2.1 Category 4 -High  (clinically significant cancer is likely to be present). UT-4 lesions in both the right anterior peripheral zone and left posterior medial peripheral zones   2. No extraprostatic tumor, seminal vesicle invasion, or pelvic osseous metastatic disease.   3. Calculated prostate volume of 21 mL.   4. Mildly enlarged right pelvic lymph nodes, nonspecific. Of note these were also PSMA avid on 8/20/2024 PET/CT and were described as nonspecific with metastases not excluded.      02/10/25 - RALP at [facility] - aborted due to scar tissue near intestines    Oncology History   Cancer Staging   Prostate cancer (HCC)  Staging form: Prostate, AJCC 8th Edition  - Clinical: Stage IIB (cT1c, cN0, cM0, PSA: 17, Grade Group: 2) - Signed by Bernardino Puentes MD on 2/19/2025  Prostate specific antigen (PSA) range: 10 to 19  [place] score: 7  Histologic grading system: 5 grade system  Oncology History   Prostate cancer (HCC)   7/18/2024 Biopsy    A. Prostate, MINNA #1:  Acinar adenocarcinoma  - Grade group 1 ([place] score: 3 + 3 = 6)  - Involving 70%, 66% and 50% of 3 out of 3 core fragments, tumor lengths 7 mm, 5 mm and 4 mm   - Fragments of fibroadipose / fibromuscular tissue     B. Prostate, MINNA #2:  Benign prostatic hyperplasia  Corpora amylacea with focal calcification      C. Prostate, LEFT POSTERIOR LATERAL:  Acinar adenocarcinoma  - Grade group 1 (Nolberto score: 3 + 3 = 6)  - Involving 27% and 15% of 2 out of 2 core fragments, tumor lengths 3 mm and 1 mm       D. Prostate, RIGHT POSTERIOR MEDIAL:  Acinar adenocarcinoma  - Grade group 2 (Nolberto score: 3 + 4 = 7)  - Involving 10% of 1 out of 2 core fragments, tumor length 1.5 mm  - Percentage of pattern 4: approx. 5%  - Perineural invasion identified      E. Prostate, LEFT POSTERIOR MEDIAL:  Acinar adenocarcinoma  - Grade group 1 ([place] score: 3 + 3 = 6)  - Involving 8% of 1 out of 2 core fragments, tumor length 1 mm     F. Prostate, RIGHT POSTERIOR LATERAL:  Acinar adenocarcinoma  -  Grade group 1 ([place] score: 3 + 3 = 6)  - Involving 10% and <5% of 2 out of 2 core fragments, tumor lengths 1 mm and <1 mm     - Perineural invasion identified      G. Prostate, LEFT BASE:  Acinar adenocarcinoma  - Grade group 2 ([place] score: 3 + 4 = 7)  - Involving 15% of 1 out of 2 core fragments, tumor lengths 2.5 mm  - Percentage of pattern 4: approx. 10%      H. Prostate, RIGHT BASE:  Rare atypical glands, suspicious for malignancy      I. Prostate, RIGHT ANTERIOR MEDIAL:  Acinar adenocarcinoma  - Grade group 2 ([place] score: 3 + 4 = 7)  - Involving 75% and 66% of 2 out of 2 core fragments, tumor lengths 8 mm and 7.5 mm  - Percentage of pattern 4: approx. 45%      J. Prostate, RIGHT ANTERIOR LATERAL:  Fragments of fibromuscular tissue      K. Prostate, LEFT ANTERIOR MEDIAL:  Fragments of fibromuscular tissue       L. Prostate, LEFT ANTERIOR LATERAL:  Acinar adenocarcinoma  - Grade group 1 (Nolberto score: 3 + 3 = 6)  - Involving 20% of 1 out of 1 core fragment, tumor length 1.5 mm  - Fragment of fibromuscular tissue      M. Prostate, LEFT TRANSITIONAL ZONE:  Benign prostate tissue      N. Prostate, RIGHT TRANSITIONAL ZONE:  Rare atypical glands, suspicious for malignancy      See note      Electronically signed by Cain Keys MD on 7/23/2024 at 1451   Note    No cribriform glands, no intraductal carcinoma identified.     If clinically indicated, the most appropriate tissue block(s) for ancillary testing are block(s): I1     Multiplex immunohistochemical stain performed with appropriate controls on blocks A1, A3, A5, C1, C2, D1, E2, F1, F2, G2, H2, I1, I2, L2 and N1 shows malignant glands with loss of basal layer cells staining for p63 and high molecular weight keratin with luminal racemase expression, supporting the diagnosis.     Multiplex immunohistochemical stain performed with appropriate controls on blocks G1 and M1 shows benign glands with basal layer cells staining for p63 and high molecular  weight keratin without luminal racemase expression, supporting the diagnosis.        7/30/2024 Initial Diagnosis    Prostate cancer (HCC)     2/19/2025 -  Cancer Staged    Staging form: Prostate, AJCC 8th Edition  - Clinical: Stage IIB (cT1c, cN0, cM0, PSA: 17, Grade Group: 2) - Signed by Bernardino Puentes MD on 2/19/2025  Prostate specific antigen (PSA) range: 10 to 19  Nolberto score: 7  Histologic grading system: 5 grade system          Review of Systems Refer to nursing note.  Review of Systems   Constitutional:         80 pound weight loss over past year with dieting.    HENT:  Positive for congestion.    Eyes: Negative.    Respiratory:  Positive for shortness of breath.    Gastrointestinal: Negative.    Genitourinary:  Positive for difficulty urinating, dysuria, frequency and urgency.        Pain with ejaculation and urination.    Musculoskeletal:  Positive for arthralgias and gait problem.        Hip pain, sciatica, needs hip replacement.    Skin:  Positive for wound (seeing wound care - leg).   Allergic/Immunologic: Negative.    Neurological:  Positive for light-headedness.   Hematological: Negative.    Psychiatric/Behavioral: Negative.         Clinical Trial: no    IPSS Questionnaire (AUA-7):  Over the past month…    1)  How often have you had a sensation of not emptying your bladder completely after you finish urinating?  3 - About half the time   2)  How often have you had to urinate again less than two hours after you finished urinating? 4 - More than half the time   3)  How often have you found you stopped and started again several times when you urinated?  4 - More than half the time   4) How difficult have you found it to postpone urination?  4 - More than half the time   5) How often have you had a weak urinary stream?  3 - About half the time   6) How often have you had to push or strain to begin urination?  2 - Less than half the time   7) How many times did you most typically get up to urinate from  the time you went to bed until the time you got up in the morning?  1 - 1 time   Total Score:  22     Past Medical History   Past Medical History:   Diagnosis Date    Asthma     Breathing difficulty     CPAP (continuous positive airway pressure) dependence     Diverticulosis     DVT (deep venous thrombosis) (HCC)     GERD (gastroesophageal reflux disease)     H/O blood clots     Hyperlipidemia     Kidney stone     Morbid obesity (HCC)     Myocardial infarction (HCC)     Open wound of right ankle     Prostate cancer (HCC)     Pulmonary embolism (HCC)     Sleep apnea     Ventricular tachycardia (HCC)     1 episode     Past Surgical History:   Procedure Laterality Date    CARPAL TUNNEL RELEASE      COLONOSCOPY  2007    FLAP LOCAL EXTREMITY Right 8/14/2018    Procedure: ASPIRATION FAILING FREE FLAP RIGHT LEG;  Surgeon: Manav Alcantara MD;  Location:  MAIN OR;  Service: Plastics    FREE FLAP GRAFT Right 8/13/2018    Procedure: TRANSFER RIGHT THIGH FREE FLAP TO RIGHT HEEL;  Surgeon: Manav Alcantara MD;  Location:  MAIN OR;  Service: Plastics    HERNIA REPAIR      abdominal x 2    IR BIOPSY PELVIS  9/16/2024    IR PICC LINE  10/24/2019    POLYPECTOMY      hyperplastic    IN ADJT/REARRGMT SCALP/ARM/LEG 10.1-30.0 SQ CM Right 8/23/2018    Procedure: LOCAL FLAP POSS STSG ACHILLES TENDON;  Surgeon: Manav Alcantara MD;  Location:  MAIN OR;  Service: Plastics    IN COLONOSCOPY FLX DX W/COLLJ SPEC WHEN PFRMD N/A 11/27/2017    Procedure: COLONOSCOPY;  Surgeon: Leif Vu MD;  Location: [facility];  Service: Gastroenterology    IN DEBRIDEMENT SUBCUTANEOUS TISSUE 1ST 20 SQ CM/< Right 7/18/2018    Procedure: DEBRIDEMENT ANKLE WOUND;  Surgeon: Manav Alcantara MD;  Location:  MAIN OR;  Service: Plastics    IN DEBRIDEMENT SUBCUTANEOUS TISSUE 1ST 20 SQ CM/< Right 7/23/2018    Procedure: DEBRIDEMENT RIGHT ANKLE WOUND;  Surgeon: Manav Alcantara MD;  Location:  MAIN OR;  Service: Plastics    IN DEBRIDEMENT  SUBCUTANEOUS TISSUE 1ST 20 SQ CM/< Right 11/20/2019    Procedure: SKIN GRAFT  RIGHT LEG;  Surgeon: Manav Alcantara MD;  Location:  MAIN OR;  Service: Plastics    WI DEBRIDEMENT SUBCUTANEOUS TISSUE 1ST 20 SQ CM/< Right 2/13/2023    Procedure: DEBRIDEMENT OF ANKLE;  Surgeon: Manav Alcantara MD;  Location:  MAIN OR;  Service: Plastics    WI FREE MUSCLE/MYOCUTANEOUS FLAP W/MVASC ANAST Right 7/30/2018    Procedure: COVERAGE HEEL WITH GRACILIS MUSCLE FLAP/SKINGRAFT ;  Surgeon: Manav Alcantara MD;  Location:  MAIN OR;  Service: Plastics    WI PROSTATE NEEDLE BIOPSY ANY APPROACH N/A 7/18/2024    Procedure: TRANSPERINEAL MRI FUSION BX PROSTATE;  Surgeon: Juan Carlos Burroughs MD;  Location: [facility];  Service: Urology    WI REPAIR SECONDARY ACHILLES TENDON W/WO GRAFT Right 4/23/2018    Procedure: REPAIR TENDON ACHILLES. FHL TRANSFER;  Surgeon: Saroj Quiñones DPM;  Location: [facility];  Service: Podiatry    WI SPLIT AGRFT T/A/L 1ST 100 CM/&/1% BDY INFT/CHLD Right 2/15/2023    Procedure: SKIN GRAFT TO ANKLE, WOUND VAC APPLICATION;  Surgeon: Manav Alcantara MD;  Location:  MAIN OR;  Service: Plastics    TRIGGER FINGER RELEASE      thumb     Family History   Problem Relation Age of Onset    Hypertension Mother     Diabetes Father     Pancreatic cancer Father     Prostate cancer Father     Colon cancer Maternal Uncle     Colon cancer Maternal Uncle       reports that he has never smoked. He has never used smokeless tobacco. He reports that he does not currently use alcohol. He reports that he does not use drugs.  Current Outpatient Medications   Medication Instructions    acetaminophen (TYLENOL) 650 mg, Oral, 3 times daily    acetaminophen-codeine (TYLENOL with CODEINE #3) 300-30 MG per tablet 1 tablet, Every 4 hours PRN    alfuzosin (UROXATRAL) 10 mg, Oral, Daily    amLODIPine (NORVASC) 10 mg, Daily    apixaban (ELIQUIS) 5 mg, 2 times daily    aspirin 81 mg, Daily    Cholecalciferol (VITAMIN D3) 1,000 Units, Daily  "   ciprofloxacin (CIPRO) 500 mg tablet     clopidogrel (PLAVIX) 75 mg tablet 1 tablet, Daily    clotrimazole-betamethasone (LOTRISONE) 1-0.05 % cream APPLY TO AFFECTED AREA TWICE A DAY    Edex 40 MCG injection INJECT 40 MCG INTRACAVERNOSALLY ONCE A DAY AS NEEDED    ezetimibe (ZETIA) 10 mg, Daily    fluticasone (FLONASE) 50 mcg/act nasal spray     furosemide (LASIX) 20 mg, Oral, Daily    lansoprazole (PREVACID) 30 mg capsule 1 capsule, Daily    linezolid (ZYVOX) 600 mg, 2 times daily    rosuvastatin (CRESTOR) 20 mg, 2 times weekly     Allergies   Allergen Reactions    Bactrim [Sulfamethoxazole-Trimethoprim]      High kidney levels    Latex Itching    Medical Tape Itching          Objective   /80   Pulse 90   Temp 98.2 °F (36.8 °C)   Resp 16   Wt 135 kg (298 lb)   SpO2 99%   BMI 44.01 kg/m²     Pain Screening:  Pain Score: 0-No pain  ECOG  0  Physical Exam   The patient presents today in no apparent distress.  Sclera anicteric.  Normal respiratory effort.  Abdomen obese.  Walks with a limp secondary to a nonhealing wound on his right lower leg.  Normal speech.  Normal affect.    Administrative Statements   I have spent a total time of 45 minutes in caring for this patient on the day of the visit/encounter including Diagnostic results, Prognosis, Risks and benefits of tx options, Instructions for management, Patient and family education, Importance of tx compliance, Impressions, Counseling / Coordination of care, Documenting in the medical record, Reviewing/placing orders in the medical record (including tests, medications, and/or procedures), and Obtaining or reviewing history  .  Portions of the record may have been created with voice recognition software.  Occasional wrong word or \"sound a like\" substitutions may have occurred due to the inherent limitations of voice recognition software.  Read the chart carefully and recognize, using context, where substitutions have occurred.  "

## 2025-02-19 NOTE — PROGRESS NOTES
Peewee Nix 1960 is a 64 y.o. male With newly diagnosed 3+4=7 prostate cancer. He has h/o of elevated PSA. He also reports slow stream, nocturia 2x, has postvoid dribbling at times, and some urinary discomfort. Referred by .      He also h/o hyperlipidemia, PVC, hypertension, GERD, KIRIT,obesity, asthma,LUIS CARLOS, MI, and BPH. The pt was seen in consult on 08/29/24. He returns today for follow up.    09/25/24 - UrologyDung  Pt has not decided between radiation and surgery  Discussed concerns in regards to surgery    11/15/24 - UrologyManjeet  Pt scheduled for RALP at [facility] in February      02/10/25 - RALP at [facility] - aborted due to scar tissue near intestines    Upcoming:      Follow up visit     Oncology History Overview Note   With newly diagnosed 3+4=7 prostate cancer. He has h/o of elevated PSA. He also reports slow stream, nocturia 2x, has postvoid dribbling at times, and some urinary discomfort. Referred by .      He also h/o hyperlipidemia, PVC, hypertension, GERD, KIRIT,obesity, asthma,LUIS CARLOS, MI, and BPH. The pt was seen in consult on 08/29/24. He returns today for follow up.    09/25/24 - [facility]  Pt has not decided between radiation and surgery  Discussed concerns in regards to surgery    11/15/24 - Kasey, Manjeet  Pt scheduled for RALP at [facility] in February      02/10/25 - RALP at [facility] - aborted due to scar tissue near intestines    Upcoming:       Prostate cancer (HCC)   7/18/2024 Biopsy    A. Prostate, MINNA #1:  Acinar adenocarcinoma  - Grade group 1 ([place] score: 3 + 3 = 6)  - Involving 70%, 66% and 50% of 3 out of 3 core fragments, tumor lengths 7 mm, 5 mm and 4 mm   - Fragments of fibroadipose / fibromuscular tissue     B. Prostate, MINNA #2:  Benign prostatic hyperplasia  Corpora amylacea with focal calcification      C. Prostate, LEFT POSTERIOR LATERAL:  Acinar adenocarcinoma  - Grade group 1 ([place] score: 3 + 3 = 6)  - Involving 27% and 15% of 2 out of 2 core  fragments, tumor lengths 3 mm and 1 mm       D. Prostate, RIGHT POSTERIOR MEDIAL:  Acinar adenocarcinoma  - Grade group 2 (Nolberto score: 3 + 4 = 7)  - Involving 10% of 1 out of 2 core fragments, tumor length 1.5 mm  - Percentage of pattern 4: approx. 5%  - Perineural invasion identified      E. Prostate, LEFT POSTERIOR MEDIAL:  Acinar adenocarcinoma  - Grade group 1 (Olema score: 3 + 3 = 6)  - Involving 8% of 1 out of 2 core fragments, tumor length 1 mm     F. Prostate, RIGHT POSTERIOR LATERAL:  Acinar adenocarcinoma  - Grade group 1 (Nolberto score: 3 + 3 = 6)  - Involving 10% and <5% of 2 out of 2 core fragments, tumor lengths 1 mm and <1 mm     - Perineural invasion identified      G. Prostate, LEFT BASE:  Acinar adenocarcinoma  - Grade group 2 (Olema score: 3 + 4 = 7)  - Involving 15% of 1 out of 2 core fragments, tumor lengths 2.5 mm  - Percentage of pattern 4: approx. 10%      H. Prostate, RIGHT BASE:  Rare atypical glands, suspicious for malignancy      I. Prostate, RIGHT ANTERIOR MEDIAL:  Acinar adenocarcinoma  - Grade group 2 (Olema score: 3 + 4 = 7)  - Involving 75% and 66% of 2 out of 2 core fragments, tumor lengths 8 mm and 7.5 mm  - Percentage of pattern 4: approx. 45%      J. Prostate, RIGHT ANTERIOR LATERAL:  Fragments of fibromuscular tissue      K. Prostate, LEFT ANTERIOR MEDIAL:  Fragments of fibromuscular tissue       L. Prostate, LEFT ANTERIOR LATERAL:  Acinar adenocarcinoma  - Grade group 1 (Olema score: 3 + 3 = 6)  - Involving 20% of 1 out of 1 core fragment, tumor length 1.5 mm  - Fragment of fibromuscular tissue      M. Prostate, LEFT TRANSITIONAL ZONE:  Benign prostate tissue      N. Prostate, RIGHT TRANSITIONAL ZONE:  Rare atypical glands, suspicious for malignancy      See note      Electronically signed by Cain Keys MD on 7/23/2024 at 1451   Note    No cribriform glands, no intraductal carcinoma identified.     If clinically indicated, the most appropriate tissue block(s) for  ancillary testing are block(s): I1     Multiplex immunohistochemical stain performed with appropriate controls on blocks A1, A3, A5, C1, C2, D1, E2, F1, F2, G2, H2, I1, I2, L2 and N1 shows malignant glands with loss of basal layer cells staining for p63 and high molecular weight keratin with luminal racemase expression, supporting the diagnosis.     Multiplex immunohistochemical stain performed with appropriate controls on blocks G1 and M1 shows benign glands with basal layer cells staining for p63 and high molecular weight keratin without luminal racemase expression, supporting the diagnosis.        7/30/2024 Initial Diagnosis    Prostate cancer (HCC)         Review of Systems:  Review of Systems   Constitutional:         80 pound weight loss over past year with dieting.    HENT:  Positive for congestion.    Eyes: Negative.    Respiratory:  Positive for shortness of breath.    Gastrointestinal: Negative.    Genitourinary:  Positive for difficulty urinating, dysuria, frequency and urgency.        Pain with ejaculation and urination.    Musculoskeletal:  Positive for arthralgias and gait problem.        Hip pain, sciatica, needs hip replacement.    Skin:  Positive for wound (seeing wound care - leg).   Allergic/Immunologic: Negative.    Neurological:  Positive for light-headedness.   Hematological: Negative.    Psychiatric/Behavioral: Negative.         Clinical Trial: no    IPSS Questionnaire (AUA-7):  Over the past month…    1)  How often have you had a sensation of not emptying your bladder completely after you finish urinating?  3 - About half the time   2)  How often have you had to urinate again less than two hours after you finished urinating? 4 - More than half the time   3)  How often have you found you stopped and started again several times when you urinated?  4 - More than half the time   4) How difficult have you found it to postpone urination?  4 - More than half the time   5) How often have you had a  weak urinary stream?  3 - About half the time   6) How often have you had to push or strain to begin urination?  2 - Less than half the time   7) How many times did you most typically get up to urinate from the time you went to bed until the time you got up in the morning?  1 - 1 time   Total Score:  22       Teaching yes    Health Maintenance   Topic Date Due    Hepatitis C Screening  Never done    HIV Screening  Never done    BMI: Followup Plan  Never done    Annual Physical  Never done    Pneumococcal Vaccine: Pediatrics (0 to 5 Years) and At-Risk Patients (6 to 64 Years) (2 of 2 - PCV) 10/23/2016    Zoster Vaccine (2 of 2) 10/07/2019    RSV Vaccine for Pregnant Patients and Patients Age 60+ Years (1 - Risk 60-74 years 1-dose series) Never done    Influenza Vaccine (1) 09/01/2024    COVID-19 Vaccine (3 - 2024-25 season) 09/01/2024    Depression Screening  08/29/2025    BMI: Adult  11/15/2025    Colorectal Cancer Screening  11/27/2027    DTaP,Tdap,and Td Vaccines (3 - Td or Tdap) 05/16/2033    Meningococcal B Vaccine  Aged Out    RSV Vaccine age 0-20 Months  Aged Out    HIB Vaccine  Aged Out    IPV Vaccine  Aged Out    Hepatitis A Vaccine  Aged Out    Meningococcal ACWY Vaccine  Aged Out    HPV Vaccine  Aged Out     Patient Active Problem List   Diagnosis    Open wound of right foot with tendon involvement    HLD (hyperlipidemia)    PVC (premature ventricular contraction)    Essential hypertension    GERD (gastroesophageal reflux disease)    KIRIT on CPAP    Ankle wound, right, sequela    Acute hyponatremia    Open wound of right foot    Morbid obesity with BMI of 45.0-49.9, adult (HCC)    Slow transit constipation    Mild intermittent asthma without complication    LUIS CARLOS (acute kidney injury) ([facility])    Cellulitis of right lower extremity without foot    History of Clostridioides difficile colitis    Disorder of the skin and subcutaneous tissue, unspecified    MI (myocardial infarction) ([facility])    Pain    Benign  prostatic hyperplasia with urinary obstruction    Elevated PSA    Erectile dysfunction    Hypogonadism in male    Prostate cancer (HCC)     Past Medical History:   Diagnosis Date    Asthma     Breathing difficulty     CPAP (continuous positive airway pressure) dependence     Diverticulosis     DVT (deep venous thrombosis) (HCC)     GERD (gastroesophageal reflux disease)     H/O blood clots     Hyperlipidemia     Kidney stone     Morbid obesity (HCC)     Myocardial infarction (HCC)     Open wound of right ankle     Prostate cancer (HCC)     Pulmonary embolism (HCC)     Sleep apnea     Ventricular tachycardia (HCC)     1 episode     Past Surgical History:   Procedure Laterality Date    CARPAL TUNNEL RELEASE      COLONOSCOPY  2007    FLAP LOCAL EXTREMITY Right 8/14/2018    Procedure: ASPIRATION FAILING FREE FLAP RIGHT LEG;  Surgeon: Manav Alcantara MD;  Location:  MAIN OR;  Service: Plastics    FREE FLAP GRAFT Right 8/13/2018    Procedure: TRANSFER RIGHT THIGH FREE FLAP TO RIGHT HEEL;  Surgeon: Manav Alcantara MD;  Location:  MAIN OR;  Service: Plastics    HERNIA REPAIR      abdominal x 2    IR BIOPSY PELVIS  9/16/2024    IR PICC LINE  10/24/2019    POLYPECTOMY      hyperplastic    IN ADJT/REARRGMT SCALP/ARM/LEG 10.1-30.0 SQ CM Right 8/23/2018    Procedure: LOCAL FLAP POSS STSG ACHILLES TENDON;  Surgeon: Manav Alcantara MD;  Location:  MAIN OR;  Service: Plastics    IN COLONOSCOPY FLX DX W/COLLJ SPEC WHEN PFRMD N/A 11/27/2017    Procedure: COLONOSCOPY;  Surgeon: Leif Vu MD;  [facility]: [facility];  Service: Gastroenterology    IN DEBRIDEMENT SUBCUTANEOUS TISSUE 1ST 20 SQ CM/< Right 7/18/2018    Procedure: DEBRIDEMENT ANKLE WOUND;  Surgeon: Manav Alcantara MD;  Location:  MAIN OR;  Service: Plastics    IN DEBRIDEMENT SUBCUTANEOUS TISSUE 1ST 20 SQ CM/< Right 7/23/2018    Procedure: DEBRIDEMENT RIGHT ANKLE WOUND;  Surgeon: Manav Alcantara MD;  Location:  MAIN OR;  Service: Plastics    IN  DEBRIDEMENT SUBCUTANEOUS TISSUE 1ST 20 SQ CM/< Right 11/20/2019    Procedure: SKIN GRAFT  RIGHT LEG;  Surgeon: Manav Alcantara MD;  Location:  MAIN OR;  Service: Plastics    HI DEBRIDEMENT SUBCUTANEOUS TISSUE 1ST 20 SQ CM/< Right 2/13/2023    Procedure: DEBRIDEMENT OF ANKLE;  Surgeon: Manav Alcantara MD;  Location:  MAIN OR;  Service: Plastics    HI FREE MUSCLE/MYOCUTANEOUS FLAP W/MVASC ANAST Right 7/30/2018    Procedure: COVERAGE HEEL WITH GRACILIS MUSCLE FLAP/SKINGRAFT ;  Surgeon: Manav Alcantara MD;  Location:  MAIN OR;  Service: Plastics    HI PROSTATE NEEDLE BIOPSY ANY APPROACH N/A 7/18/2024    Procedure: TRANSPERINEAL MRI FUSION BX PROSTATE;  Surgeon: Juan Carlos Burroughs MD;  [facility];  Service: Urology    HI REPAIR SECONDARY ACHILLES TENDON W/WO GRAFT Right 4/23/2018    Procedure: REPAIR TENDON ACHILLES. FHL TRANSFER;  Surgeon: Saroj Quiñones DPM;  Location: [facility];  Service: Podiatry    HI SPLIT AGRFT T/A/L 1ST 100 CM/&/1% BDY INFT/CHLD Right 2/15/2023    Procedure: SKIN GRAFT TO ANKLE, WOUND VAC APPLICATION;  Surgeon: Manav Alcantara MD;  Location:  MAIN OR;  Service: Plastics    TRIGGER FINGER RELEASE      thumb     Family History   Problem Relation Age of Onset    Hypertension Mother     Diabetes Father     Pancreatic cancer Father     Prostate cancer Father     Colon cancer Maternal Uncle     Colon cancer Maternal Uncle      Social History     Socioeconomic History    Marital status: /Civil Union     Spouse name: Not on file    Number of children: Not on file    Years of education: Not on file    Highest education level: Not on file   Occupational History    Not on file   Tobacco Use    Smoking status: Never    Smokeless tobacco: Never   Vaping Use    Vaping status: Never Used   Substance and Sexual Activity    Alcohol use: Not Currently     Comment: rarely    Drug use: Never    Sexual activity: Not on file   Other Topics Concern    Not on file   Social History  Narrative    Not on file     Social Drivers of Health     Financial Resource Strain: Low Risk  (8/17/2024)    Received from [facility]    Overall Financial Resource Strain (CARDIA)     Difficulty of Paying Living Expenses: Not hard at all   Food Insecurity: No Food Insecurity (8/17/2024)    Received from [facility]    Hunger Vital Sign     Worried About Running Out of Food in the Last Year: Never true     Ran Out of Food in the Last Year: Never true   Transportation Needs: No Transportation Needs (8/17/2024)    Received from [facility]    PRAPARE - Transportation     Lack of Transportation (Medical): No     Lack of Transportation (Non-Medical): No   Physical Activity: Not on file   Stress: Not on file   Social Connections: Unknown (6/18/2024)    Received from [facility]    Social [place]     How often do you feel lonely or isolated from those around you? (Adult - for ages 18 years and over): Not on file   Intimate Partner Violence: Not At Risk (8/17/2024)    [facility] from [facility], [facility]    Humiliation, Afraid, Rape, and Kick questionnaire     Fear of Current or Ex-Partner: No     Emotionally Abused: No     Physically Abused: No     Sexually Abused: No   Housing Stability: High Risk (8/17/2024)    Received from [facility]    Housing Stability Vital Sign     Unable to Pay for Housing in the Last Year: Yes     Number of Times Moved in the Last Year: 0     Homeless in the Last Year: No       Current Outpatient Medications:     ezetimibe (ZETIA) 10 mg tablet, Take 10 mg by mouth daily, Disp: , Rfl:     fluticasone (FLONASE) 50 mcg/act nasal spray, , Disp: , Rfl:     furosemide (LASIX) 20 mg tablet, Take 1 tablet (20 mg total) by mouth daily, Disp: 30 tablet, Rfl: 0    lansoprazole (PREVACID) 30 mg capsule, Take 1 capsule by mouth daily, Disp: , Rfl:     rosuvastatin (CRESTOR) 20 MG tablet, Take 20 mg by  mouth 2 (two) times a week Unsure of dose, Disp: , Rfl:     acetaminophen (TYLENOL) 325 mg tablet, Take 2 tablets (650 mg total) by mouth 3 (three) times a day (Patient not taking: Reported on 11/15/2024), Disp: 30 tablet, Rfl: 0    acetaminophen-codeine (TYLENOL with CODEINE #3) 300-30 MG per tablet, Take 1 tablet by mouth every 4 (four) hours as needed POST OP (Patient not taking: Reported on 11/15/2024), Disp: , Rfl:     alfuzosin (UROXATRAL) 10 mg 24 hr tablet, Take 1 tablet (10 mg total) by mouth daily (Patient not taking: Reported on 11/15/2024), Disp: 90 tablet, Rfl: 3    amLODIPine (NORVASC) 10 mg tablet, Take 10 mg by mouth daily (Patient not taking: Reported on 11/15/2024), Disp: , Rfl:     apixaban (ELIQUIS) 5 mg, Take 5 mg by mouth 2 (two) times a day, Disp: , Rfl:     aspirin 81 mg chewable tablet, Chew 81 mg daily (Patient not taking: Reported on 11/15/2024), Disp: , Rfl:     Cholecalciferol (VITAMIN D3) 1,000 units tablet, Take 1,000 Units by mouth daily, Disp: , Rfl:     ciprofloxacin (CIPRO) 500 mg tablet, , Disp: , Rfl:     clopidogrel (PLAVIX) 75 mg tablet, Take 1 tablet by mouth daily, Disp: , Rfl:     clotrimazole-betamethasone (LOTRISONE) 1-0.05 % cream, APPLY TO AFFECTED AREA TWICE A DAY (Patient not taking: Reported on 11/15/2024), Disp: 45 g, Rfl: 2    Edex 40 MCG injection, INJECT 40 MCG INTRACAVERNOSALLY ONCE A DAY AS NEEDED (Patient not taking: Reported on 11/15/2024), Disp: , Rfl:     linezolid (ZYVOX) 600 mg tablet, 600 mg 2 (two) times a day (Patient not taking: Reported on 11/15/2024), Disp: , Rfl:     Current Facility-Administered Medications:     leuprolide (LUPRON DEPOT 6 MONTH KIT) IM injection kit 45 mg, 45 mg, Intramuscular, Once,   Allergies   Allergen Reactions    Bactrim [Sulfamethoxazole-Trimethoprim]      High kidney levels    Latex Itching    Medical Tape Itching     Vitals:    02/19/25 1030   BP: 125/80   Pulse: 90   Resp: 16   Temp: 98.2 °F (36.8 °C)   SpO2: 99%    Weight: 135 kg (298 lb)      Pain Score: 0-No pain

## 2025-02-24 ENCOUNTER — PREP FOR PROCEDURE (OUTPATIENT)
Dept: UROLOGY | Facility: MEDICAL CENTER | Age: 65
End: 2025-02-24

## 2025-02-24 ENCOUNTER — TELEPHONE (OUTPATIENT)
Dept: UROLOGY | Facility: MEDICAL CENTER | Age: 65
End: 2025-02-24

## 2025-02-24 DIAGNOSIS — C61 PROSTATE CANCER (HCC): Primary | ICD-10-CM

## 2025-02-24 NOTE — TELEPHONE ENCOUNTER
Call placed to pt. Call went right to voicemail. LMOM asking pt to contact the office to review MD recommendations and to schedule an appointment with Dr. Burroughs to discuss next steps.   Office number was provided for call back

## 2025-02-24 NOTE — TELEPHONE ENCOUNTER
----- Message from Juan Carlos Burroughs MD sent at 2/21/2025  4:23 PM EST -----  Needs appt to discuss hormonal therapy. Needs to be scheduled for [facility].  ----- Message -----  From: Bernardino Puentes MD  Sent: 2/19/2025  11:37 AM EST  To: Juan Carlos Burroughs MD

## 2025-02-25 NOTE — TELEPHONE ENCOUNTER
2nd attempt made to contact pt.    He did not answer. LMOM asking pt to contact the office to schedule appointment with Dr. Burroughs for ADT and discussion.   Office number was provided for call back and scheduling

## 2025-02-26 NOTE — TELEPHONE ENCOUNTER
3rd attempt made to contact pt.     He did not answer. LMOM asking pt to contact the office to schedule an appointment for Lupron injection and discussion.   Pt has not called back in the past 3 days.   Office number was provided for call back to schedule.

## 2025-03-04 ENCOUNTER — HOSPITAL ENCOUNTER (OUTPATIENT)
Dept: RADIOLOGY | Facility: CLINIC | Age: 65
Discharge: HOME | End: 2025-03-04
Payer: COMMERCIAL

## 2025-03-04 VITALS — HEIGHT: 67 IN | BODY MASS INDEX: 46.58 KG/M2 | WEIGHT: 296.8 LBS

## 2025-03-04 DIAGNOSIS — E29.1 HYPOGONADISM IN MALE: ICD-10-CM

## 2025-03-04 PROCEDURE — 77080 DXA BONE DENSITY AXIAL: CPT

## 2025-03-06 ENCOUNTER — RESULTS FOLLOW-UP (OUTPATIENT)
Dept: ENDOCRINOLOGY | Facility: CLINIC | Age: 65
End: 2025-03-06

## 2025-03-06 NOTE — TELEPHONE ENCOUNTER
Pt called to schedule appt due to needing to discuss next plan of care. Pt was offered numerous dates but he has been out of work and just getting back to work to get caught up on his own pts. 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

## 2025-03-07 ENCOUNTER — TELEPHONE (OUTPATIENT)
Age: 65
End: 2025-03-07

## 2025-03-07 NOTE — TELEPHONE ENCOUNTER
"Call placed to pt and spoke with him. Pt started getting loud on the phone stating that it is \"unacceptable for him to wait until July to be seen\".     I nicely explained to pt that the office has been calling him since 2- trying to reach him to schedule a follow up appointment with no return communication back. [employer] message was also sent with no response back.      Appointment for next week was offered to pt, and he stated \"I cannot make that appointment in such short notice\".     Explained to pt that MD is out of the office for some time and next available appointment is April 4th.     He began yelling stating \"this is unacceptable and you are telling me that since I declined an appointment on March 11th, I now have to wait until April 4th to be seen\".     Again offered the march 11th appointment with Dr. Burroughs and he refused.   He scheduled appointment for April 4th and said he will try and get a sooner appointment with his MD in [place] sooner.     Pt also said \"it's unnecessary to send me a legal punitive letter stating you have been trying to call me. I am busy ya know\".     I kindly informed the pt that this is our policy.     Pt confirmed appointment and hung up the phone   "

## 2025-03-07 NOTE — TELEPHONE ENCOUNTER
Patient called today to check if a sooner appt than 4/8 is avail with Dr Burroughs. I reviewed the appt desk and pt was never scheduled for that date. Pt states this appt date was offered to him the last time that he called.    Pt sees Dr Burroughs and is aware that the office has been trying to contact him to be scheduled to be seen. Pt declined next avail due to it being in July.     Pt stated that he will be getting into contact with the patient advocate due to this.    Please review.    Call back [phone]

## 2025-03-07 NOTE — TELEPHONE ENCOUNTER
Call received by Peewee.     Patient called Radiation oncology to make  aware of his care. Patient called in upset expressing how he's been treated by the Urology team. Per patient he's tried to contact Urology several times to schedule an appointment but they continued to push him back further in the schedule than he would like. Patient continue to state how unfair they were treating him. Patient stated Urology sent him an un-compliance letter due to patient not returning there call. Patient requested multiple times to send [employer] messages or text message in order to get a hold of him due to work hours. 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 Patient did state Urology tried to offer him 3/11 appointment but since it was late notice he had to work. Patient is just overwhelmed and doesn't believe its fair how he's being treated. Patient does have an appointment with  for 4/4/25. Patient stated he'll be contacting PCP and MD in [place] to get a sooner appointment and see if he can be treated there.

## 2025-03-12 ENCOUNTER — OFFICE VISIT (OUTPATIENT)
Dept: ENDOCRINOLOGY | Facility: CLINIC | Age: 65
End: 2025-03-12
Payer: COMMERCIAL

## 2025-03-12 VITALS — WEIGHT: 298.6 LBS | HEIGHT: 67 IN | BODY MASS INDEX: 46.87 KG/M2

## 2025-03-12 DIAGNOSIS — R73.03 PREDIABETES: ICD-10-CM

## 2025-03-12 DIAGNOSIS — Z79.818 ANDROGEN DEPRIVATION THERAPY: ICD-10-CM

## 2025-03-12 DIAGNOSIS — G47.33 OSA ON CPAP: Chronic | ICD-10-CM

## 2025-03-12 DIAGNOSIS — C61 PROSTATE CANCER (HCC): ICD-10-CM

## 2025-03-12 DIAGNOSIS — E29.1 HYPOGONADISM IN MALE: Primary | ICD-10-CM

## 2025-03-12 PROBLEM — IMO0001 ANDROGEN DEPRIVATION THERAPY: Status: ACTIVE | Noted: 2025-03-12

## 2025-03-12 PROCEDURE — 99214 OFFICE O/P EST MOD 30 MIN: CPT | Performed by: INTERNAL MEDICINE

## 2025-03-12 NOTE — ASSESSMENT & PLAN NOTE
Has upcoming follow-up with urology and RT  Orders:    DXA bone density spine hip and pelvis; Future

## 2025-03-12 NOTE — ASSESSMENT & PLAN NOTE
Off testosterone replacement due to recent diagnosis of prostate cancer  Will be on ADT monitor DEXA scan every 2 years  Orders:    DXA bone density spine hip and pelvis; Future

## 2025-03-12 NOTE — ASSESSMENT & PLAN NOTE
Advised to make sure he is getting calcium 1200 mg daily in diet or supplementations, continue vitamin D supplementations, DEXA every 2 years  Orders:    DXA bone density spine hip and pelvis; Future

## 2025-03-12 NOTE — PROGRESS NOTES
"Name: Peewee Nix      : 1960      MRN: [id number]  Encounter Provider: Zuly Barrios MD  Encounter Date: 3/12/2025   Encounter department: [facility] [place]    No chief complaint on file.  :  Assessment & Plan  Hypogonadism in male  Off testosterone replacement due to recent diagnosis of prostate cancer  Will be on ADT monitor DEXA scan every 2 years  Orders:    DXA bone density spine hip and pelvis; Future    Prostate cancer (HCC)  Has upcoming follow-up with urology and RT  Orders:    DXA bone density spine hip and pelvis; Future    KIRIT on CPAP  Uses CPAP       Androgen deprivation therapy  Advised to make sure he is getting calcium 1200 mg daily in diet or supplementations, continue vitamin D supplementations, DEXA every 2 years  Orders:    DXA bone density spine hip and pelvis; Future    Prediabetes  Focus on dietary and lifestyle modification and weight loss-follow-up with PCP           History of Present Illness     Peewee Nix is a 64 y.o. male with hypogonadism, obesity and sleep apnea seen in follow-up  For prostate cancer he Will be starting ADT and RT     C/o fatigue . Low energy , going to gym-   Hip issues /may need replacement     Gained a few lbs since last visit     Lost 100 lbs in the past year       Pertinent Medical History           Review of Systems as per HPI       Medical History Reviewed by provider this encounter:  Meds     .    Objective   Ht 5' 7\" (1.702 m)   Wt 135 kg (298 lb 9.6 oz)   BMI 46.77 kg/m²      Body mass index is 46.77 kg/m².  Wt Readings from Last 3 Encounters:   25 135 kg (298 lb 9.6 oz)   25 135 kg (296 lb 12.8 oz)   25 135 kg (298 lb)     Physical Exam  Vitals reviewed.   Constitutional:       General: He is not in acute distress.     Appearance: Normal appearance. He is obese. He is not ill-appearing, toxic-appearing or diaphoretic.   HENT:      Head: Normocephalic and atraumatic. " "  Eyes:      General: No scleral icterus.     Extraocular Movements: Extraocular movements intact.   Cardiovascular:      Rate and Rhythm: Normal rate and regular rhythm.      Heart sounds: Normal heart sounds. No murmur heard.  Pulmonary:      Effort: Pulmonary effort is normal. No respiratory distress.      Breath sounds: Normal breath sounds. No wheezing.   Musculoskeletal:      Cervical back: Neck supple.      Right lower leg: Edema present.      Left lower leg: Edema present.   Lymphadenopathy:      Cervical: No cervical adenopathy.   Neurological:      General: No focal deficit present.      Mental Status: He is alert and oriented to person, place, and time.   Psychiatric:         Mood and Affect: Mood normal.         Behavior: Behavior normal.         Labs:   Lab Results   Component Value Date    HGBA1C 6.0 05/07/2024     Lab Results   Component Value Date    CREATININE 1.14 01/30/2025    CREATININE 0.97 08/19/2024    CREATININE 0.93 08/18/2024    BUN 17 01/30/2025    K 4.3 01/30/2025     01/30/2025    CO2 28 01/30/2025     GFR, Calculated   Date Value Ref Range Status   09/25/2020 67 >60 mL/min/1.73m2 Final     Comment:     Please refer to initial GFR, CALC footnote     eGFRcr   Date Value Ref Range Status   01/30/2025 72 >=60 mL/min/1.73 m2 Final     Comment:     Estimated glomerular filtration rate (eGFR) is calculated without a race  coefficient. Values should be interpreted in the context of the patient's full  clinical presentation. Reference: Huyen Edwards et al. \"A unifying approach  for GFR estimation: recommendations of the NKF-ASN task force on reassessing the  inclusion of race in diagnosing kidney disease.\" American Journal of Kidney  Diseases (2021)   08/19/2024 88 >59 Final     No results found for: \"CHOL\", \"HDL\", \"LDL\", \"TRIG\", \"CHOLHDL\"  Lab Results   Component Value Date    ALT 23 08/17/2024    AST 21 08/17/2024    ALKPHOS 53 08/17/2024     Lab Results   Component Value Date    " "KDI4LABDBPKC 5.430 (H) 10/07/2019     No results found for: \"FREET4\", \"TSI\"    There are no Patient Instructions on file for this visit.    Discussed with the patient and all questioned fully answered. He will call me if any problems arise.      "

## 2025-03-12 NOTE — TELEPHONE ENCOUNTER
Called and spoke with patient at this time. He's upset byt the communication with our urology office. He's upset about the legal letter sent. I apologized. Reviewed when we cannot get a hold of a patient after three tries, this is standard so patients dont fall through the cracks. He states it was unnecessary. Reviewed I may be able to schedule a lupron appt with the nurse much sooner as the plan is already in motion. He states he will keep appt with [facility] next week and with us on 4/4/25. He then ended the call.

## 2025-03-13 LAB
PSA FREE MFR SERPL: 4 % (CALC)
PSA FREE SERPL-MCNC: 0.2 NG/ML
PSA SERPL-MCNC: 5.3 NG/ML

## 2025-07-23 ENCOUNTER — PRE-ADMISSION TESTING (OUTPATIENT)
Dept: PREADMISSION TESTING | Age: 65
End: 2025-07-23
Payer: COMMERCIAL

## 2025-07-23 VITALS — BODY MASS INDEX: 44.71 KG/M2 | WEIGHT: 295 LBS | HEIGHT: 68 IN

## 2025-07-23 DIAGNOSIS — L97.812 SKIN ULCER OF RIGHT PRETIBIAL REGION WITH FAT LAYER EXPOSED (CMS/HCC): Primary | ICD-10-CM

## 2025-07-23 RX ORDER — EZETIMIBE 10 MG/1
10 TABLET ORAL DAILY
COMMUNITY
Start: 2025-01-30

## 2025-07-23 RX ORDER — AMLODIPINE BESYLATE 5 MG/1
5 TABLET ORAL DAILY
COMMUNITY
End: 2025-07-23

## 2025-07-23 RX ORDER — METOPROLOL SUCCINATE 25 MG/1
25 TABLET, EXTENDED RELEASE ORAL DAILY
COMMUNITY
Start: 2025-04-04 | End: 2026-04-04

## 2025-07-23 RX ORDER — CLOTRIMAZOLE AND BETAMETHASONE DIPROPIONATE 10; .64 MG/G; MG/G
CREAM TOPICAL 2 TIMES DAILY
COMMUNITY

## 2025-07-23 RX ORDER — SODIUM CHLORIDE 9 MG/ML
500 INJECTION, SOLUTION INTRAVENOUS ONCE AS NEEDED
OUTPATIENT
Start: 2025-07-24

## 2025-07-23 RX ORDER — ALBUTEROL SULFATE 90 UG/1
1 INHALANT RESPIRATORY (INHALATION) 3 TIMES DAILY PRN
COMMUNITY
End: 2025-07-23

## 2025-07-23 RX ORDER — ALBUTEROL SULFATE 0.83 MG/ML
2.5 SOLUTION RESPIRATORY (INHALATION) ONCE AS NEEDED
OUTPATIENT
Start: 2025-07-24

## 2025-07-23 RX ORDER — ROSUVASTATIN CALCIUM 20 MG/1
20 TABLET, COATED ORAL EVERY EVENING
COMMUNITY
Start: 2025-01-14

## 2025-07-23 RX ORDER — ALPROSTADIL 40 UG/ML
40 INJECTION, POWDER, LYOPHILIZED, FOR SOLUTION INTRACAVERNOUS AS NEEDED
COMMUNITY
Start: 2025-06-09

## 2025-07-23 RX ORDER — DIPHENHYDRAMINE HCL 50 MG/ML
25 VIAL (ML) INJECTION ONCE AS NEEDED
OUTPATIENT
Start: 2025-07-24

## 2025-07-23 RX ORDER — FUROSEMIDE 20 MG/1
40 TABLET ORAL DAILY
COMMUNITY
Start: 2025-06-03

## 2025-07-23 RX ORDER — ACETAMINOPHEN AND CODEINE PHOSPHATE 300; 30 MG/1; MG/1
1 TABLET ORAL EVERY 4 HOURS
COMMUNITY
End: 2025-07-23

## 2025-07-23 RX ORDER — FAMOTIDINE 10 MG/ML
20 INJECTION, SOLUTION INTRAVENOUS ONCE AS NEEDED
OUTPATIENT
Start: 2025-07-24

## 2025-07-23 NOTE — PRE-PROCEDURE INSTRUCTIONS
[facility]  [address] [place] [address]    1.       Admissions will call you with your arrival time on  8/11/25 (day prior to surgery) between 2pm-4pm.  For questions about your arrival time, please call [phone].    2.       On the day of your procedure please report to the [facility] in the [place]. Please arrive through the [place] Lobby Entrance.  If you are parking in the [facility] [facility] [address] [facility]age, come to the ground floor of the garage and follow signs to the [facility].  After being screened, please report to either the Outpatient Registration for Pre-Admission Testing or to the Surgery Registration Desk.    3. Please follow the following fasting guidelines:     No solid food EIGHT HOURS prior to arrival time on day of surgery.  6 ounces of clear liquids, meaning water or PLAIN black coffee WITHOUT any milk, cream, sugar, or sweetener are permitted up to TWO HOURS prior to arrival at the hospital.    4. Please take ONLY the following medications with a sip of water on the morning of your procedure: (populate names and/or NONE)  WILL ASK CARDIOLOGY REF ELIQUIS    5. Other Instructions: You may brush your teeth the morning of the procedure. Rinse and spit, do not swallow.  Bring a list of your medications with dosages.  Use surgical wash as directed.     6. If you develop a cold, cough, fever, rash, or other symptom prior to the day of the procedure, please report it to your physician immediately.    7. If you need to cancel the procedure for any reason, please contact your physician.    8. Make arrangements to have safe transportation home accompanied by a responsible adult. If you have not arranged safe transportation home, your surgery will be cancelled. Safe transportation may include private vehicle, ride-share service, taxi and public transportation when accompanied by a responsible adult who will assist you home. A responsible adult is someone known to you and  does not include the taxi, ride-share or public transit drive transporting you.    9.  If it is medically necessary for you to have a longer stay, you will be informed as soon as the decision is made.    10. Only bring essential items to the hospital.  Do not wear or bring anything of value to the hospital including jewelry of any kind, money, or wallet. Do not wear make-up or contact lenses.  DO NOT BRING MEDICATIONS FROM HOME unless instructed to do so. DO bring your hearing aids, glasses, and a case    11. No lotion, creams, powders, or oils on skin the morning of procedure     12. Dress in comfortable clothes.    13.  If instructed, please bring a copy of your Advanced Directive (Living Will/Durable Power of ) on the day of your procedure.     14. Ensuring your safety at all times is a very important part of our [facility] Culture of Safety. After having surgery and sedation, you are at risk for falling and balance issues. Although you may feel awake, the effects of the medication can last up to 24 hours after anesthesia. If you need to use the bathroom during your recovery period, nursing staff will escort you there and stay with you to ensure your safety.    15. Refrain from drinking alcohol and smoking cigarettes for 24 hours prior to surgery.    16. Shower with antibacterial soap (Dial) the night before and morning of your procedure.  If your procedure indicates the need for CHG antiseptic wash (Bactoshield or Hibiclens), please use this instead and follow instructions as discussed at the time of your Pre-Admission Testing phone interview or visit.    Above instructions reviewed with patient and patient acknowledges understanding.    Form explained by: Kimberly Reveles RN     [facility]  Patient Education Preoperative Showers    Good hygiene, such as frequent handwashing and daily skin cleansing, promotes good health. Daily skin cleansing helps remove germs that may cause infections. The following  instructions should be followed to help reduce germs on your skin prior to your surgical procedure.     Bactoshield/Hibiclens CHG 4% is an antiseptic soap. The active ingredient is chlorhexidine gluconate. Do not use this product if you are allergic to chlorhexidine gluconate.  The NIGHT before and the MORNING of your procedure, shower or bathe with Bactoshield. This product should replace your regular soap used for cleansing most of your body surfaces. Bactoshield should not be used on your head or face; keep out of your eyes, ears and mouth.  If you plan to wash your hair, do so with your regular shampoo. Then, rinse the hair and your body thoroughly to remove any shampoo residue.  Wash your face with regular soap and water only.  Wash your genital area with soap and water only.  Thoroughly rinse your body with warm water from the neck down.  Apply the minimum amount of Bactoshield to cover the skin. Use this product as you would any liquid soap. Leave this on for 2 minutes. NOTE- Bactoshield DOES NOT LATHER like normal soap.   Wash the skin gently and rinse thoroughly with warm water. You do not need to scrub the skin to remove germs.  Do not use regular soap after you have applied and rinsed the Bactoshield.  Change into clean clothes after each shower.   Do not apply any lotions, powders, or perfumes to the body areas that have been cleansed with Bactoshield.  No use of hair removal products or shaving at or near the surgical site 48 hours before your procedure. (72 hours for cardiac patients.)  For those having perineal surgeries (i.e.: vaginal, rectal or cystoscopy) - please use Dial soap.

## 2025-07-23 NOTE — PROGRESS NOTES
Infectious Disease Progress Note    Patient Name: Michael Eng  MR#: [id number]  : 1960  Admission Date: (Not on file)  Date: 25   Time: 8:57 AM   Author: LIVE Sellers    Major Events:     Antibiotics:        Subjective     Review of Systems        Objective     Vital Signs:         @TMAX(72)@    Physical Exam:        Lines, Drains, Airways, Wounds:       Labs:    CBC Results    No lab values to display.       BMP Results    No lab values to display.       PT/PTT Results    No lab values to display.       UA Results    No lab values to display.       Lactate Results    No lab values to display.         Microbiology Results       ** No results found for the last 720 hours. **            Pathology Results       ** No results found for the last 720 hours. **            Echo:         Imaging:        Assessment                Plan

## 2025-07-29 ENCOUNTER — HOSPITAL ENCOUNTER (OUTPATIENT)
Dept: INPATIENT UNIT | Facility: HOSPITAL | Age: 65
Discharge: HOME | End: 2025-07-29
Payer: COMMERCIAL

## 2025-07-29 ENCOUNTER — HOSPITAL ENCOUNTER (OUTPATIENT)
Dept: ENDOSCOPY | Facility: HOSPITAL | Age: 65
Discharge: HOME | End: 2025-07-29
Payer: COMMERCIAL

## 2025-07-29 VITALS
HEART RATE: 68 BPM | SYSTOLIC BLOOD PRESSURE: 120 MMHG | DIASTOLIC BLOOD PRESSURE: 60 MMHG | TEMPERATURE: 96.7 F | RESPIRATION RATE: 18 BRPM | OXYGEN SATURATION: 100 %

## 2025-07-29 VITALS
OXYGEN SATURATION: 97 % | RESPIRATION RATE: 18 BRPM | DIASTOLIC BLOOD PRESSURE: 64 MMHG | SYSTOLIC BLOOD PRESSURE: 139 MMHG | HEART RATE: 65 BPM | TEMPERATURE: 96.3 F

## 2025-07-29 DIAGNOSIS — L97.812 SKIN ULCER OF RIGHT PRETIBIAL REGION WITH FAT LAYER EXPOSED (CMS/HCC): ICD-10-CM

## 2025-07-29 DIAGNOSIS — L97.812 SKIN ULCER OF RIGHT PRETIBIAL REGION WITH FAT LAYER EXPOSED (CMS/HCC): Primary | ICD-10-CM

## 2025-07-29 PROBLEM — Z01.818 ENCOUNTER FOR PREOPERATIVE ASSESSMENT: Status: ACTIVE | Noted: 2025-07-29

## 2025-07-29 PROBLEM — L97.919 VENOUS ULCER OF RIGHT LEG (CMS/HCC): Status: ACTIVE | Noted: 2023-06-15

## 2025-07-29 PROBLEM — I25.118 CORONARY ARTERY DISEASE OF NATIVE ARTERY WITH STABLE ANGINA PECTORIS (CMS/HCC): Status: ACTIVE | Noted: 2021-01-20

## 2025-07-29 PROBLEM — N13.8 BENIGN PROSTATIC HYPERPLASIA WITH URINARY OBSTRUCTION: Status: ACTIVE | Noted: 2024-05-31

## 2025-07-29 PROBLEM — N40.1 BENIGN PROSTATIC HYPERPLASIA WITH URINARY OBSTRUCTION: Status: ACTIVE | Noted: 2024-05-31

## 2025-07-29 PROBLEM — I83.019 VENOUS ULCER OF RIGHT LEG (CMS/HCC): Status: ACTIVE | Noted: 2023-06-15

## 2025-07-29 PROBLEM — I49.3 MULTIPLE PREMATURE VENTRICULAR COMPLEXES: Status: ACTIVE | Noted: 2018-07-31

## 2025-07-29 PROBLEM — E66.01 MORBID OBESITY (CMS/HCC): Status: ACTIVE | Noted: 2017-11-30

## 2025-07-29 PROBLEM — I82.409 DVT (DEEP VENOUS THROMBOSIS) (CMS/HCC): Status: ACTIVE | Noted: 2024-08-17

## 2025-07-29 PROBLEM — I26.99 PULMONARY EMBOLISM (CMS/HCC): Status: ACTIVE | Noted: 2024-08-17

## 2025-07-29 PROBLEM — M16.11 PRIMARY OSTEOARTHRITIS OF RIGHT HIP: Status: ACTIVE | Noted: 2025-07-29

## 2025-07-29 LAB
ANION GAP SERPL CALC-SCNC: 4 MEQ/L (ref 3–15)
BASOPHILS # BLD: 0.09 K/UL (ref 0.01–0.1)
BASOPHILS NFR BLD: 0.9 %
BUN SERPL-MCNC: 15 MG/DL (ref 7–25)
CALCIUM SERPL-MCNC: 9 MG/DL (ref 8.6–10.3)
CHLORIDE SERPL-SCNC: 106 MEQ/L (ref 98–107)
CO2 SERPL-SCNC: 28 MEQ/L (ref 21–31)
CREAT SERPL-MCNC: 0.9 MG/DL (ref 0.7–1.3)
DIFFERENTIAL METHOD BLD: ABNORMAL
EGFRCR SERPLBLD CKD-EPI 2021: >60 ML/MIN/1.73M*2
EOSINOPHIL # BLD: 0.81 K/UL (ref 0.04–0.54)
EOSINOPHIL NFR BLD: 8.2 %
ERYTHROCYTE [DISTWIDTH] IN BLOOD BY AUTOMATED COUNT: 15.1 % (ref 11.6–14.4)
GLUCOSE SERPL-MCNC: 90 MG/DL (ref 70–99)
HCT VFR BLD AUTO: 41.3 % (ref 40.1–51)
HGB BLD-MCNC: 13.6 G/DL (ref 13.7–17.5)
IMM GRANULOCYTES # BLD AUTO: 0.05 K/UL (ref 0–0.08)
IMM GRANULOCYTES NFR BLD AUTO: 0.5 %
LYMPHOCYTES # BLD: 2.89 K/UL (ref 1.2–3.5)
LYMPHOCYTES NFR BLD: 29.4 %
MCH RBC QN AUTO: 28.3 PG (ref 28–33.2)
MCHC RBC AUTO-ENTMCNC: 32.9 G/DL (ref 32.2–36.5)
MCV RBC AUTO: 86 FL (ref 83–98)
MONOCYTES # BLD: 1.18 K/UL (ref 0.3–1)
MONOCYTES NFR BLD: 12 %
NEUTROPHILS # BLD: 4.82 K/UL (ref 1.7–7)
NEUTS SEG NFR BLD: 49 %
NRBC BLD-RTO: 0 %
PLATELET # BLD AUTO: 272 K/UL (ref 150–350)
PMV BLD AUTO: 9.4 FL (ref 9.4–12.4)
POTASSIUM SERPL-SCNC: 4.2 MEQ/L (ref 3.5–5.1)
RBC # BLD AUTO: 4.8 M/UL (ref 4.5–5.8)
SODIUM SERPL-SCNC: 138 MEQ/L (ref 136–145)
WBC # BLD AUTO: 9.84 K/UL (ref 3.8–10.5)

## 2025-07-29 PROCEDURE — 63600000 HC DRUGS/DETAIL CODE: Mod: JZ

## 2025-07-29 PROCEDURE — 25800000 HC PHARMACY IV SOLUTIONS

## 2025-07-29 PROCEDURE — C1751 CATH, INF, PER/CENT/MIDLINE: HCPCS

## 2025-07-29 PROCEDURE — 85025 COMPLETE CBC W/AUTO DIFF WBC: CPT

## 2025-07-29 PROCEDURE — 36415 COLL VENOUS BLD VENIPUNCTURE: CPT

## 2025-07-29 PROCEDURE — 96365 THER/PROPH/DIAG IV INF INIT: CPT

## 2025-07-29 PROCEDURE — 80048 BASIC METABOLIC PNL TOTAL CA: CPT

## 2025-07-29 PROCEDURE — 36573 INSJ PICC RS&I 5 YR+: CPT

## 2025-07-29 RX ORDER — SODIUM CHLORIDE 9 MG/ML
500 INJECTION, SOLUTION INTRAVENOUS ONCE AS NEEDED
OUTPATIENT
Start: 2025-07-30

## 2025-07-29 RX ORDER — FAMOTIDINE 10 MG/ML
20 INJECTION, SOLUTION INTRAVENOUS ONCE AS NEEDED
OUTPATIENT
Start: 2025-07-30

## 2025-07-29 RX ORDER — FAMOTIDINE 10 MG/ML
20 INJECTION, SOLUTION INTRAVENOUS ONCE AS NEEDED
Status: DISCONTINUED | OUTPATIENT
Start: 2025-07-29 | End: 2025-07-30 | Stop reason: HOSPADM

## 2025-07-29 RX ORDER — DIPHENHYDRAMINE HCL 50 MG/ML
25 VIAL (ML) INJECTION ONCE AS NEEDED
OUTPATIENT
Start: 2025-07-30

## 2025-07-29 RX ORDER — EPINEPHRINE 1 MG/ML
0.3 INJECTION, SOLUTION INTRAMUSCULAR; SUBCUTANEOUS EVERY 5 MIN PRN
Status: DISCONTINUED | OUTPATIENT
Start: 2025-07-29 | End: 2025-07-30 | Stop reason: HOSPADM

## 2025-07-29 RX ORDER — ALBUTEROL SULFATE 0.83 MG/ML
2.5 SOLUTION RESPIRATORY (INHALATION) ONCE AS NEEDED
OUTPATIENT
Start: 2025-07-30

## 2025-07-29 RX ORDER — DIPHENHYDRAMINE HCL 50 MG/ML
25 VIAL (ML) INJECTION ONCE AS NEEDED
Status: DISCONTINUED | OUTPATIENT
Start: 2025-07-29 | End: 2025-07-30 | Stop reason: HOSPADM

## 2025-07-29 RX ORDER — ALBUTEROL SULFATE 0.83 MG/ML
2.5 SOLUTION RESPIRATORY (INHALATION) ONCE AS NEEDED
Status: DISCONTINUED | OUTPATIENT
Start: 2025-07-29 | End: 2025-07-30 | Stop reason: HOSPADM

## 2025-07-29 RX ORDER — SODIUM CHLORIDE 9 MG/ML
500 INJECTION, SOLUTION INTRAVENOUS ONCE AS NEEDED
Status: DISCONTINUED | OUTPATIENT
Start: 2025-07-29 | End: 2025-07-30 | Stop reason: HOSPADM

## 2025-07-29 RX ADMIN — CEFTAZIDIME 2 G: 2 INJECTION, POWDER, FOR SOLUTION INTRAVENOUS at 11:32

## 2025-07-29 NOTE — DISCHARGE INSTRUCTIONS
PICC Insertion, Care After  Refer to this sheet in the next few weeks. These instructions provide you with information on caring for yourself after your procedure. Your health care provider may also give you more specific instructions. Your treatment has been planned according to current medical practices, but problems sometimes occur. Call your health care provider if you have any problems or questions after your procedure.  What can I expect after the procedure?  After your procedure, it is typical to have the following:  Mild discomfort at the insertion site. This should not last more than a day.  Follow these instructions at home:  Rest at home for the remainder of the day after the procedure.  You may bend your arm and move it freely. If your PICC is near or at the bend of your elbow, avoid activity with repeated motion at the elbow.  Avoid lifting heavy objects as instructed by your health care provider.  Avoid using a crutch with the arm on the same side as your PICC. You may need to use a walker.  Bandage Care  Keep your PICC bandage (dressing) clean and dry to prevent infection.  Ask your health care provider when you may shower. To keep the dressing dry, cover the PICC with plastic wrap and tape before showering. If the dressing does become wet, replace it right after the shower.  Do not soak in the bath, swim, or use hot tubs when you have a PICC.  Change the PICC dressing as instructed by your health care provider.  Change your PICC dressing if it becomes loose or wet.  General PICC Care  Check the PICC insertion site daily for leakage, redness, swelling, or pain.  Flush the PICC as directed by your health care provider. Let your health care provider know right away if the PICC is difficult to flush or does not flush. Do not use force to flush the PICC.  Do not use a syringe that is less than 10 mL to flush the PICC.  Never pull or tug on the PICC.  Avoid blood pressure checks on the arm with the  "PICC.  Keep your PICC identification card with you at all times.  Do not take the PICC out yourself. Only a trained health care professional should remove the PICC.  Contact a health care provider if:  You have pain in your arm, ear, face, or teeth.  You have fever or chills.  You have drainage from the PICC insertion site.  You have redness or palpate a \"cord\" around the PICC insertion site.  You cannot flush the catheter.  Get help right away if:  You have swelling in the arm in which the PICC is inserted.  This information is not intended to replace advice given to you by your health care provider. Make sure you discuss any questions you have with your health care provider.  Document Released: 10/08/2014 Document Revised: 05/25/2017 Document Reviewed: 10/10/2014  [employer] Interactive Patient Education © 2017 [employer]  PICC Information:    Insertion Date: 07/29/25    Insertion Site:RIGHT BASILIC    Device Type:PICC LINE    Length: 47CM    Arm Circumference:37CM    Chest X-Ray Results:SVC VIA ECG TECHNOLOGY    External Catheter Length:1    Last Dressing Change: 07/29/25    Power Inject PICC Line :YES    Last Cap Change: 07/29/25    Keep this information in a safe place and share with your provider  and/or infusion nurse as needed.  "

## 2025-07-29 NOTE — PROGRESS NOTES
Pt received first dose of Fortaz without any issues.  Pt instructed to go to OP lab for type ans screen [facility]

## 2025-07-29 NOTE — CONSULTS
"Vascular Access Team Consult Note  Reason for consult   Device type: PICC   Line necessity: Needed for discharge    Allergies   Allergen Reactions    Latex, Natural Rubber Itching, Rash and Other (see comments)     contact dermatitis only!!!    Sulfamethoxazole-Trimethoprim Other (see comments) and Palpitations     Kidney enzymes elevate    Kidney enzymes elevate      High kidney levels      Pt reports \"it raises my Creatinine\" Other reaction(s): Other (Please comment) High kidney levels Kidney enzymes elevate    High kidney levels    Pt reports \"it raises my Creatinine\"   Other reaction(s): Other (Please comment)   High kidney levels   Kidney enzymes elevate       No results found for: \"PLT\"  No results found for: \"INR\"  No results found for: \"EGFR\"  No results found for: \"CREATININE\"    Blood Culture Results (last 5 days, any status)       No results found for the last 120 hours.          (If positive blood cultures, MD approval):     Last documented cardiac rhythm:  SR  Additional rhythm details: not applicable  P-wave present? yes     Current Facility-Administered Medications (Includes Only ANTICOAGULANTS,COUMARIN TYPE, DIRECT FACTOR XA INHIBITORS, HEPARIN AND RELATED PREPARATIONS, THROMBIN INHIBITORS, SELECTIVE, DIRECT, REVERSIBLE, PLATELET AGGREGATION INHIBITORS)   Medication Dose Route Frequency   None          Active chest wall devices - HD access, CVCs, ports, pacers, defibrillators, IABPs or VADs: no     Mastectomy / other arm restrictions: Not applicable  PICC/midline - active or removed: no    Current or hx DVT/PE/SVC syndrome: Not applicable  Other barriers to line insertion:     Comments:   Recommendation and plan: Place PICC line for DC.    "

## 2025-08-11 ENCOUNTER — ANESTHESIA EVENT (OUTPATIENT)
Dept: OPERATING ROOM | Facility: HOSPITAL | Age: 65
Setting detail: HOSPITAL OUTPATIENT SURGERY
End: 2025-08-11
Payer: COMMERCIAL

## 2025-08-11 RX ORDER — TRANEXAMIC ACID 10 MG/ML
1000 INJECTION, SOLUTION INTRAVENOUS ONCE
Status: COMPLETED | OUTPATIENT
Start: 2025-08-12 | End: 2025-08-12

## 2025-08-12 ENCOUNTER — APPOINTMENT (OUTPATIENT)
Dept: RADIOLOGY | Facility: HOSPITAL | Age: 65
End: 2025-08-12
Attending: NURSE PRACTITIONER
Payer: COMMERCIAL

## 2025-08-12 ENCOUNTER — HOSPITAL ENCOUNTER (OUTPATIENT)
Facility: HOSPITAL | Age: 65
Discharge: HOME HEALTH | End: 2025-08-13
Attending: ORTHOPAEDIC SURGERY | Admitting: ORTHOPAEDIC SURGERY
Payer: COMMERCIAL

## 2025-08-12 ENCOUNTER — ANESTHESIA (OUTPATIENT)
Dept: OPERATING ROOM | Facility: HOSPITAL | Age: 65
Setting detail: HOSPITAL OUTPATIENT SURGERY
End: 2025-08-12
Payer: COMMERCIAL

## 2025-08-12 DIAGNOSIS — Z96.641 S/P TOTAL RIGHT HIP ARTHROPLASTY: Primary | ICD-10-CM

## 2025-08-12 LAB
ANION GAP SERPL CALC-SCNC: 6 MEQ/L (ref 3–15)
BUN SERPL-MCNC: 16 MG/DL (ref 7–25)
CALCIUM SERPL-MCNC: 8.5 MG/DL (ref 8.6–10.3)
CHLORIDE SERPL-SCNC: 107 MEQ/L (ref 98–107)
CO2 SERPL-SCNC: 26 MEQ/L (ref 21–31)
CREAT SERPL-MCNC: 0.9 MG/DL (ref 0.7–1.3)
EGFRCR SERPLBLD CKD-EPI 2021: >60 ML/MIN/1.73M*2
GLUCOSE SERPL-MCNC: 165 MG/DL (ref 70–99)
POTASSIUM SERPL-SCNC: 4 MEQ/L (ref 3.5–5.1)
SODIUM SERPL-SCNC: 139 MEQ/L (ref 136–145)

## 2025-08-12 PROCEDURE — 37000010 HC ANESTHESIA SPINAL: Performed by: ORTHOPAEDIC SURGERY

## 2025-08-12 PROCEDURE — 25800000 HC PHARMACY IV SOLUTIONS: Performed by: ORTHOPAEDIC SURGERY

## 2025-08-12 PROCEDURE — 36415 COLL VENOUS BLD VENIPUNCTURE: CPT

## 2025-08-12 PROCEDURE — 63600000 HC DRUGS/DETAIL CODE: Mod: JZ | Performed by: NURSE PRACTITIONER

## 2025-08-12 PROCEDURE — 72170 X-RAY EXAM OF PELVIS: CPT

## 2025-08-12 PROCEDURE — 25800000 HC PHARMACY IV SOLUTIONS: Performed by: ANESTHESIOLOGY

## 2025-08-12 PROCEDURE — 25800000 HC PHARMACY IV SOLUTIONS: Performed by: NURSE PRACTITIONER

## 2025-08-12 PROCEDURE — 63700000 HC SELF-ADMINISTRABLE DRUG

## 2025-08-12 PROCEDURE — 25000000 HC PHARMACY GENERAL: Performed by: ORTHOPAEDIC SURGERY

## 2025-08-12 PROCEDURE — 63600000 HC DRUGS/DETAIL CODE: Mod: JZ,TB | Performed by: ANESTHESIOLOGY

## 2025-08-12 PROCEDURE — 25000000 HC PHARMACY GENERAL: Performed by: ANESTHESIOLOGY

## 2025-08-12 PROCEDURE — 71000011 HC PACU PHASE 1 EA ADDL MIN: Performed by: ORTHOPAEDIC SURGERY

## 2025-08-12 PROCEDURE — C1776 JOINT DEVICE (IMPLANTABLE): HCPCS | Performed by: ORTHOPAEDIC SURGERY

## 2025-08-12 PROCEDURE — 36000005 HC OR LEVEL 5 INITIAL 30MIN: Performed by: ORTHOPAEDIC SURGERY

## 2025-08-12 PROCEDURE — 63700000 HC SELF-ADMINISTRABLE DRUG: Performed by: ORTHOPAEDIC SURGERY

## 2025-08-12 PROCEDURE — 36000015 HC OR LEVEL 5 EA ADDL MIN: Performed by: ORTHOPAEDIC SURGERY

## 2025-08-12 PROCEDURE — 63700000 HC SELF-ADMINISTRABLE DRUG: Performed by: NURSE PRACTITIONER

## 2025-08-12 PROCEDURE — 71000001 HC PACU PHASE 1 INITIAL 30MIN: Performed by: ORTHOPAEDIC SURGERY

## 2025-08-12 PROCEDURE — 63600000 HC DRUGS/DETAIL CODE: Mod: JZ | Performed by: ORTHOPAEDIC SURGERY

## 2025-08-12 PROCEDURE — 27200000 HC STERILE SUPPLY: Performed by: ORTHOPAEDIC SURGERY

## 2025-08-12 PROCEDURE — 0SR904A REPLACEMENT OF RIGHT HIP JOINT WITH CERAMIC ON POLYETHYLENE SYNTHETIC SUBSTITUTE, UNCEMENTED, OPEN APPROACH: ICD-10-PCS | Performed by: ORTHOPAEDIC SURGERY

## 2025-08-12 PROCEDURE — 80048 BASIC METABOLIC PNL TOTAL CA: CPT

## 2025-08-12 DEVICE — IMPLANTABLE DEVICE: Type: IMPLANTABLE DEVICE | Site: HIP | Status: FUNCTIONAL

## 2025-08-12 DEVICE — EMPOWR ACETABULAR BONE SCREW 35MM: Type: IMPLANTABLE DEVICE | Site: HIP | Status: FUNCTIONAL

## 2025-08-12 RX ORDER — ONDANSETRON HYDROCHLORIDE 2 MG/ML
4 INJECTION, SOLUTION INTRAVENOUS EVERY 8 HOURS PRN
Status: DISCONTINUED | OUTPATIENT
Start: 2025-08-12 | End: 2025-08-13 | Stop reason: HOSPADM

## 2025-08-12 RX ORDER — ADHESIVE BANDAGE 7/8"
15-30 BANDAGE TOPICAL AS NEEDED
Status: DISCONTINUED | OUTPATIENT
Start: 2025-08-12 | End: 2025-08-13 | Stop reason: HOSPADM

## 2025-08-12 RX ORDER — PREGABALIN 75 MG/1
CAPSULE ORAL
Status: COMPLETED
Start: 2025-08-12 | End: 2025-08-12

## 2025-08-12 RX ORDER — ACETAMINOPHEN 325 MG/1
650 TABLET ORAL
Status: DISCONTINUED | OUTPATIENT
Start: 2025-08-12 | End: 2025-08-13 | Stop reason: HOSPADM

## 2025-08-12 RX ORDER — DIPHENHYDRAMINE HCL 50 MG/ML
25 VIAL (ML) INJECTION EVERY 6 HOURS PRN
Status: DISCONTINUED | OUTPATIENT
Start: 2025-08-12 | End: 2025-08-13 | Stop reason: HOSPADM

## 2025-08-12 RX ORDER — PREGABALIN 75 MG/1
75 CAPSULE ORAL
Status: COMPLETED | OUTPATIENT
Start: 2025-08-12 | End: 2025-08-12

## 2025-08-12 RX ORDER — VANCOMYCIN/0.9 % SOD CHLORIDE 1.5G/250ML
1500 PLASTIC BAG, INJECTION (ML) INTRAVENOUS
Status: COMPLETED | OUTPATIENT
Start: 2025-08-12 | End: 2025-08-12

## 2025-08-12 RX ORDER — EPHEDRINE SULFATE 50 MG/ML
INJECTION, SOLUTION INTRAVENOUS AS NEEDED
Status: DISCONTINUED | OUTPATIENT
Start: 2025-08-12 | End: 2025-08-12 | Stop reason: SURG

## 2025-08-12 RX ORDER — DEXTROSE 50 % IN WATER (D50W) INTRAVENOUS SYRINGE
25 AS NEEDED
Status: DISCONTINUED | OUTPATIENT
Start: 2025-08-12 | End: 2025-08-12 | Stop reason: HOSPADM

## 2025-08-12 RX ORDER — SODIUM CHLORIDE 9 MG/ML
INJECTION, SOLUTION INTRAVENOUS CONTINUOUS
Status: DISCONTINUED | OUTPATIENT
Start: 2025-08-12 | End: 2025-08-13 | Stop reason: HOSPADM

## 2025-08-12 RX ORDER — ACETAMINOPHEN 325 MG/1
975 TABLET ORAL
Status: COMPLETED | OUTPATIENT
Start: 2025-08-12 | End: 2025-08-12

## 2025-08-12 RX ORDER — FENTANYL CITRATE 50 UG/ML
50 INJECTION, SOLUTION INTRAMUSCULAR; INTRAVENOUS EVERY 5 MIN PRN
Status: DISCONTINUED | OUTPATIENT
Start: 2025-08-12 | End: 2025-08-12 | Stop reason: HOSPADM

## 2025-08-12 RX ORDER — ALUMINUM HYDROXIDE, MAGNESIUM HYDROXIDE, AND SIMETHICONE 1200; 120; 1200 MG/30ML; MG/30ML; MG/30ML
30 SUSPENSION ORAL EVERY 4 HOURS PRN
Status: DISCONTINUED | OUTPATIENT
Start: 2025-08-12 | End: 2025-08-13 | Stop reason: HOSPADM

## 2025-08-12 RX ORDER — POLYETHYLENE GLYCOL 3350 17 G/17G
17 POWDER, FOR SOLUTION ORAL DAILY
Status: DISCONTINUED | OUTPATIENT
Start: 2025-08-12 | End: 2025-08-13 | Stop reason: HOSPADM

## 2025-08-12 RX ORDER — AMOXICILLIN 250 MG
1 CAPSULE ORAL 2 TIMES DAILY
Status: DISCONTINUED | OUTPATIENT
Start: 2025-08-12 | End: 2025-08-13 | Stop reason: HOSPADM

## 2025-08-12 RX ORDER — ROSUVASTATIN CALCIUM 20 MG/1
20 TABLET, COATED ORAL EVERY EVENING
Status: DISCONTINUED | OUTPATIENT
Start: 2025-08-12 | End: 2025-08-13 | Stop reason: HOSPADM

## 2025-08-12 RX ORDER — OXYCODONE HYDROCHLORIDE 5 MG/1
5 TABLET ORAL EVERY 4 HOURS PRN
Status: DISCONTINUED | OUTPATIENT
Start: 2025-08-12 | End: 2025-08-13 | Stop reason: HOSPADM

## 2025-08-12 RX ORDER — EPHEDRINE SULFATE/0.9% NACL/PF 50 MG/5 ML
10 SYRINGE (ML) INTRAVENOUS AS NEEDED
Status: DISCONTINUED | OUTPATIENT
Start: 2025-08-12 | End: 2025-08-12 | Stop reason: HOSPADM

## 2025-08-12 RX ORDER — METOPROLOL SUCCINATE 25 MG/1
25 TABLET, EXTENDED RELEASE ORAL DAILY
Status: DISCONTINUED | OUTPATIENT
Start: 2025-08-13 | End: 2025-08-13 | Stop reason: HOSPADM

## 2025-08-12 RX ORDER — SODIUM CHLORIDE 9 MG/ML
INJECTION, SOLUTION INTRAVENOUS CONTINUOUS PRN
Status: DISCONTINUED | OUTPATIENT
Start: 2025-08-12 | End: 2025-08-12 | Stop reason: SURG

## 2025-08-12 RX ORDER — OXYCODONE HYDROCHLORIDE 5 MG/1
10 TABLET ORAL EVERY 4 HOURS PRN
Status: DISCONTINUED | OUTPATIENT
Start: 2025-08-12 | End: 2025-08-13 | Stop reason: HOSPADM

## 2025-08-12 RX ORDER — FENTANYL CITRATE 50 UG/ML
INJECTION, SOLUTION INTRAMUSCULAR; INTRAVENOUS AS NEEDED
Status: DISCONTINUED | OUTPATIENT
Start: 2025-08-12 | End: 2025-08-12 | Stop reason: SURG

## 2025-08-12 RX ORDER — CELECOXIB 200 MG/1
CAPSULE ORAL
Status: COMPLETED
Start: 2025-08-12 | End: 2025-08-12

## 2025-08-12 RX ORDER — BUPIVACAINE HYDROCHLORIDE 7.5 MG/ML
INJECTION INTRAVENOUS
Status: COMPLETED | OUTPATIENT
Start: 2025-08-12 | End: 2025-08-12

## 2025-08-12 RX ORDER — ONDANSETRON HYDROCHLORIDE 2 MG/ML
4 INJECTION, SOLUTION INTRAVENOUS
Status: DISCONTINUED | OUTPATIENT
Start: 2025-08-12 | End: 2025-08-12 | Stop reason: HOSPADM

## 2025-08-12 RX ORDER — SODIUM CHLORIDE 0.9 G/100ML
INJECTION, SOLUTION IRRIGATION
Status: DISCONTINUED | OUTPATIENT
Start: 2025-08-12 | End: 2025-08-12 | Stop reason: HOSPADM

## 2025-08-12 RX ORDER — MIDAZOLAM HYDROCHLORIDE 2 MG/2ML
INJECTION, SOLUTION INTRAMUSCULAR; INTRAVENOUS AS NEEDED
Status: DISCONTINUED | OUTPATIENT
Start: 2025-08-12 | End: 2025-08-12 | Stop reason: SURG

## 2025-08-12 RX ORDER — DEXTROSE 50 % IN WATER (D50W) INTRAVENOUS SYRINGE
25 AS NEEDED
Status: DISCONTINUED | OUTPATIENT
Start: 2025-08-12 | End: 2025-08-13 | Stop reason: HOSPADM

## 2025-08-12 RX ORDER — SODIUM CHLORIDE, SODIUM GLUCONATE, SODIUM ACETATE, POTASSIUM CHLORIDE AND MAGNESIUM CHLORIDE 30; 37; 368; 526; 502 MG/100ML; MG/100ML; MG/100ML; MG/100ML; MG/100ML
125 INJECTION, SOLUTION INTRAVENOUS CONTINUOUS
Status: DISCONTINUED | OUTPATIENT
Start: 2025-08-12 | End: 2025-08-13 | Stop reason: HOSPADM

## 2025-08-12 RX ORDER — ROPIVACAINE HYDROCHLORIDE 5 MG/ML
INJECTION, SOLUTION EPIDURAL; INFILTRATION; PERINEURAL
Status: DISCONTINUED | OUTPATIENT
Start: 2025-08-12 | End: 2025-08-12 | Stop reason: HOSPADM

## 2025-08-12 RX ORDER — DEXAMETHASONE SODIUM PHOSPHATE 4 MG/ML
8 INJECTION, SOLUTION INTRA-ARTICULAR; INTRALESIONAL; INTRAMUSCULAR; INTRAVENOUS; SOFT TISSUE ONCE
Status: COMPLETED | OUTPATIENT
Start: 2025-08-13 | End: 2025-08-13

## 2025-08-12 RX ORDER — ONDANSETRON 4 MG/1
4 TABLET, ORALLY DISINTEGRATING ORAL EVERY 8 HOURS PRN
Status: DISCONTINUED | OUTPATIENT
Start: 2025-08-12 | End: 2025-08-13 | Stop reason: HOSPADM

## 2025-08-12 RX ORDER — DIPHENHYDRAMINE HCL 25 MG
25 CAPSULE ORAL EVERY 6 HOURS PRN
Status: DISCONTINUED | OUTPATIENT
Start: 2025-08-12 | End: 2025-08-13 | Stop reason: HOSPADM

## 2025-08-12 RX ORDER — ACETAMINOPHEN 325 MG/1
TABLET ORAL
Status: COMPLETED
Start: 2025-08-12 | End: 2025-08-12

## 2025-08-12 RX ORDER — PHENYLEPHRINE HYDROCHLORIDE 10 MG/ML
INJECTION INTRAVENOUS AS NEEDED
Status: DISCONTINUED | OUTPATIENT
Start: 2025-08-12 | End: 2025-08-12 | Stop reason: SURG

## 2025-08-12 RX ORDER — SODIUM CHLORIDE 9 MG/ML
INJECTION INTRAMUSCULAR; INTRAVENOUS; SUBCUTANEOUS
Status: DISCONTINUED | OUTPATIENT
Start: 2025-08-12 | End: 2025-08-12 | Stop reason: HOSPADM

## 2025-08-12 RX ORDER — ATROPINE SULFATE 0.4 MG/ML
0.2 INJECTION, SOLUTION ENDOTRACHEAL; INTRAMEDULLARY; INTRAMUSCULAR; INTRAVENOUS; SUBCUTANEOUS EVERY 5 MIN PRN
Status: DISCONTINUED | OUTPATIENT
Start: 2025-08-12 | End: 2025-08-12 | Stop reason: HOSPADM

## 2025-08-12 RX ORDER — DEXTROSE 40 %
15-30 GEL (GRAM) ORAL AS NEEDED
Status: DISCONTINUED | OUTPATIENT
Start: 2025-08-12 | End: 2025-08-12 | Stop reason: HOSPADM

## 2025-08-12 RX ORDER — GLYCOPYRROLATE 0.6MG/3ML
SYRINGE (ML) INTRAVENOUS AS NEEDED
Status: DISCONTINUED | OUTPATIENT
Start: 2025-08-12 | End: 2025-08-12 | Stop reason: SURG

## 2025-08-12 RX ORDER — ADHESIVE BANDAGE 7/8"
15-30 BANDAGE TOPICAL AS NEEDED
Status: DISCONTINUED | OUTPATIENT
Start: 2025-08-12 | End: 2025-08-12 | Stop reason: HOSPADM

## 2025-08-12 RX ORDER — KETOROLAC TROMETHAMINE 15 MG/ML
15 INJECTION, SOLUTION INTRAMUSCULAR; INTRAVENOUS
Status: COMPLETED | OUTPATIENT
Start: 2025-08-12 | End: 2025-08-13

## 2025-08-12 RX ORDER — EZETIMIBE 10 MG/1
10 TABLET ORAL DAILY
Status: DISCONTINUED | OUTPATIENT
Start: 2025-08-12 | End: 2025-08-13 | Stop reason: HOSPADM

## 2025-08-12 RX ORDER — CELECOXIB 200 MG/1
400 CAPSULE ORAL
Status: COMPLETED | OUTPATIENT
Start: 2025-08-12 | End: 2025-08-12

## 2025-08-12 RX ORDER — HYDRALAZINE HYDROCHLORIDE 20 MG/ML
5 INJECTION INTRAMUSCULAR; INTRAVENOUS
Status: DISCONTINUED | OUTPATIENT
Start: 2025-08-12 | End: 2025-08-12 | Stop reason: HOSPADM

## 2025-08-12 RX ORDER — DEXTROSE 40 %
15-30 GEL (GRAM) ORAL AS NEEDED
Status: DISCONTINUED | OUTPATIENT
Start: 2025-08-12 | End: 2025-08-13 | Stop reason: HOSPADM

## 2025-08-12 RX ORDER — BISACODYL 10 MG/1
10 SUPPOSITORY RECTAL DAILY PRN
Status: DISCONTINUED | OUTPATIENT
Start: 2025-08-12 | End: 2025-08-13 | Stop reason: HOSPADM

## 2025-08-12 RX ORDER — LABETALOL HCL 20 MG/4 ML
5 SYRINGE (ML) INTRAVENOUS AS NEEDED
Status: DISCONTINUED | OUTPATIENT
Start: 2025-08-12 | End: 2025-08-12 | Stop reason: HOSPADM

## 2025-08-12 RX ORDER — PROPOFOL 10 MG/ML
INJECTION, EMULSION INTRAVENOUS CONTINUOUS PRN
Status: DISCONTINUED | OUTPATIENT
Start: 2025-08-12 | End: 2025-08-12 | Stop reason: SURG

## 2025-08-12 RX ORDER — HYDROMORPHONE HYDROCHLORIDE 1 MG/ML
0.5 INJECTION, SOLUTION INTRAMUSCULAR; INTRAVENOUS; SUBCUTANEOUS
Status: DISCONTINUED | OUTPATIENT
Start: 2025-08-12 | End: 2025-08-12 | Stop reason: HOSPADM

## 2025-08-12 RX ADMIN — CEFAZOLIN 2 G: 2 INJECTION, POWDER, FOR SOLUTION INTRAMUSCULAR; INTRAVENOUS at 14:40

## 2025-08-12 RX ADMIN — WATER 1500 MG: 1000 INJECTION, SOLUTION INTRAVENOUS at 13:08

## 2025-08-12 RX ADMIN — PROPOFOL 100 MCG/KG/MIN: 10 INJECTION, EMULSION INTRAVENOUS at 14:31

## 2025-08-12 RX ADMIN — EPHEDRINE SULFATE 25 MG: 50 INJECTION, SOLUTION INTRAVENOUS at 15:18

## 2025-08-12 RX ADMIN — EPHEDRINE SULFATE 10 MG: 50 INJECTION, SOLUTION INTRAVENOUS at 14:46

## 2025-08-12 RX ADMIN — CELECOXIB 400 MG: 200 CAPSULE ORAL at 13:09

## 2025-08-12 RX ADMIN — MIDAZOLAM HYDROCHLORIDE 2 MG: 1 INJECTION, SOLUTION INTRAMUSCULAR; INTRAVENOUS at 14:20

## 2025-08-12 RX ADMIN — DIPHENHYDRAMINE HYDROCHLORIDE 25 MG: 25 CAPSULE ORAL at 21:53

## 2025-08-12 RX ADMIN — SODIUM CHLORIDE: 0.9 INJECTION, SOLUTION INTRAVENOUS at 14:20

## 2025-08-12 RX ADMIN — PREGABALIN 75 MG: 75 CAPSULE ORAL at 13:10

## 2025-08-12 RX ADMIN — EPHEDRINE SULFATE 25 MG: 50 INJECTION, SOLUTION INTRAVENOUS at 14:58

## 2025-08-12 RX ADMIN — ACETAMINOPHEN 650 MG: 325 TABLET ORAL at 20:26

## 2025-08-12 RX ADMIN — PHENYLEPHRINE HYDROCHLORIDE 200 MCG: 10 INJECTION INTRAVENOUS at 15:48

## 2025-08-12 RX ADMIN — PHENYLEPHRINE HYDROCHLORIDE 100 MCG: 10 INJECTION INTRAVENOUS at 14:58

## 2025-08-12 RX ADMIN — EPHEDRINE SULFATE 10 MG: 50 INJECTION, SOLUTION INTRAVENOUS at 14:34

## 2025-08-12 RX ADMIN — FENTANYL CITRATE 100 MCG: 50 INJECTION INTRAMUSCULAR; INTRAVENOUS at 14:20

## 2025-08-12 RX ADMIN — MIDAZOLAM HYDROCHLORIDE 2 MG: 1 INJECTION, SOLUTION INTRAMUSCULAR; INTRAVENOUS at 14:29

## 2025-08-12 RX ADMIN — BUPIVACAINE HYDROCHLORIDE IN DEXTROSE 2 ML: 7.5 INJECTION, SOLUTION SUBARACHNOID at 14:25

## 2025-08-12 RX ADMIN — ROSUVASTATIN CALCIUM 20 MG: 20 TABLET, FILM COATED ORAL at 21:45

## 2025-08-12 RX ADMIN — SODIUM CHLORIDE: 9 INJECTION, SOLUTION INTRAVENOUS at 20:26

## 2025-08-12 RX ADMIN — PHENYLEPHRINE HYDROCHLORIDE 200 MCG: 10 INJECTION INTRAVENOUS at 16:06

## 2025-08-12 RX ADMIN — PHENYLEPHRINE HYDROCHLORIDE 200 MCG: 10 INJECTION INTRAVENOUS at 15:57

## 2025-08-12 RX ADMIN — DOCUSATE SODIUM AND SENNOSIDES 1 TABLET: 8.6; 5 TABLET, FILM COATED ORAL at 20:25

## 2025-08-12 RX ADMIN — ACETAMINOPHEN 975 MG: 325 TABLET ORAL at 13:10

## 2025-08-12 RX ADMIN — POLYETHYLENE GLYCOL 3350 17 G: 17 POWDER, FOR SOLUTION ORAL at 20:26

## 2025-08-12 RX ADMIN — TRANEXAMIC ACID 1000 MG: 10 INJECTION, SOLUTION INTRAVENOUS at 14:40

## 2025-08-12 RX ADMIN — KETOROLAC TROMETHAMINE 15 MG: 15 INJECTION, SOLUTION INTRAMUSCULAR; INTRAVENOUS at 20:25

## 2025-08-12 RX ADMIN — CEFTAZIDIME 2 G: 2 INJECTION, POWDER, FOR SOLUTION INTRAVENOUS at 21:45

## 2025-08-12 RX ADMIN — GLYCOPYRROLATE 0.2 MG: 0.2 INJECTION, SOLUTION INTRAMUSCULAR; INTRAVENOUS at 15:01

## 2025-08-12 RX ADMIN — EZETIMIBE 10 MG: 10 TABLET ORAL at 20:25

## 2025-08-12 RX ADMIN — PHENYLEPHRINE HYDROCHLORIDE 200 MCG: 10 INJECTION INTRAVENOUS at 15:31

## 2025-08-13 VITALS
BODY MASS INDEX: 47.14 KG/M2 | WEIGHT: 310 LBS | DIASTOLIC BLOOD PRESSURE: 71 MMHG | SYSTOLIC BLOOD PRESSURE: 132 MMHG | OXYGEN SATURATION: 95 % | TEMPERATURE: 98.6 F | RESPIRATION RATE: 18 BRPM | HEART RATE: 42 BPM

## 2025-08-13 PROBLEM — L97.812: Status: ACTIVE | Noted: 2025-08-13

## 2025-08-13 PROBLEM — B96.5 PSEUDOMONAS (AERUGINOSA) (MALLEI) (PSEUDOMALLEI) AS THE CAUSE OF DISEASES CLASSIFIED ELSEWHERE: Status: ACTIVE | Noted: 2025-08-13

## 2025-08-13 PROBLEM — Z86.718 HISTORY OF DVT (DEEP VEIN THROMBOSIS): Status: ACTIVE | Noted: 2025-08-13

## 2025-08-13 PROBLEM — Z96.641 S/P TOTAL RIGHT HIP ARTHROPLASTY: Status: ACTIVE | Noted: 2025-08-13

## 2025-08-13 LAB
ANION GAP SERPL CALC-SCNC: 6 MEQ/L (ref 3–15)
BUN SERPL-MCNC: 16 MG/DL (ref 7–25)
CALCIUM SERPL-MCNC: 8.3 MG/DL (ref 8.6–10.3)
CHLORIDE SERPL-SCNC: 106 MEQ/L (ref 98–107)
CO2 SERPL-SCNC: 27 MEQ/L (ref 21–31)
CREAT SERPL-MCNC: 0.9 MG/DL (ref 0.7–1.3)
EGFRCR SERPLBLD CKD-EPI 2021: >60 ML/MIN/1.73M*2
ERYTHROCYTE [DISTWIDTH] IN BLOOD BY AUTOMATED COUNT: 15.6 % (ref 11.6–14.4)
GLUCOSE SERPL-MCNC: 116 MG/DL (ref 70–99)
HCT VFR BLD AUTO: 38.5 % (ref 40.1–51)
HGB BLD-MCNC: 12.3 G/DL (ref 13.7–17.5)
MCH RBC QN AUTO: 28.5 PG (ref 28–33.2)
MCHC RBC AUTO-ENTMCNC: 31.9 G/DL (ref 32.2–36.5)
MCV RBC AUTO: 89.3 FL (ref 83–98)
PLATELET # BLD AUTO: 173 K/UL (ref 150–350)
PMV BLD AUTO: 10.2 FL (ref 9.4–12.4)
POTASSIUM SERPL-SCNC: 4.3 MEQ/L (ref 3.5–5.1)
RBC # BLD AUTO: 4.31 M/UL (ref 4.5–5.8)
SODIUM SERPL-SCNC: 139 MEQ/L (ref 136–145)
WBC # BLD AUTO: 10.02 K/UL (ref 3.8–10.5)

## 2025-08-13 PROCEDURE — 63600000 HC DRUGS/DETAIL CODE: Mod: JZ | Performed by: NURSE PRACTITIONER

## 2025-08-13 PROCEDURE — 25800000 HC PHARMACY IV SOLUTIONS: Performed by: NURSE PRACTITIONER

## 2025-08-13 PROCEDURE — 36573 INSJ PICC RS&I 5 YR+: CPT

## 2025-08-13 PROCEDURE — 36415 COLL VENOUS BLD VENIPUNCTURE: CPT | Performed by: NURSE PRACTITIONER

## 2025-08-13 PROCEDURE — 85027 COMPLETE CBC AUTOMATED: CPT | Performed by: NURSE PRACTITIONER

## 2025-08-13 PROCEDURE — 05HY33Z INSERTION OF INFUSION DEVICE INTO UPPER VEIN, PERCUTANEOUS APPROACH: ICD-10-PCS | Performed by: ORTHOPAEDIC SURGERY

## 2025-08-13 PROCEDURE — C1751 CATH, INF, PER/CENT/MIDLINE: HCPCS

## 2025-08-13 PROCEDURE — 97162 PT EVAL MOD COMPLEX 30 MIN: CPT | Mod: GP

## 2025-08-13 PROCEDURE — 99232 SBSQ HOSP IP/OBS MODERATE 35: CPT | Performed by: HOSPITALIST

## 2025-08-13 PROCEDURE — 63700000 HC SELF-ADMINISTRABLE DRUG: Performed by: NURSE PRACTITIONER

## 2025-08-13 PROCEDURE — 97166 OT EVAL MOD COMPLEX 45 MIN: CPT | Mod: GO

## 2025-08-13 PROCEDURE — 97530 THERAPEUTIC ACTIVITIES: CPT | Mod: GO

## 2025-08-13 PROCEDURE — 80048 BASIC METABOLIC PNL TOTAL CA: CPT | Performed by: NURSE PRACTITIONER

## 2025-08-13 RX ORDER — SODIUM CHLORIDE 0.9 % (FLUSH) 0.9 %
10 SYRINGE (ML) INJECTION
Status: DISCONTINUED | OUTPATIENT
Start: 2025-08-13 | End: 2025-08-13 | Stop reason: HOSPADM

## 2025-08-13 RX ORDER — POLYETHYLENE GLYCOL 3350 17 G/17G
17 POWDER, FOR SOLUTION ORAL DAILY PRN
Start: 2025-08-13

## 2025-08-13 RX ORDER — ACETAMINOPHEN 325 MG/1
650 TABLET ORAL EVERY 6 HOURS PRN
Start: 2025-08-13

## 2025-08-13 RX ORDER — SODIUM CHLORIDE 0.9 % (FLUSH) 0.9 %
10 SYRINGE (ML) INJECTION AS NEEDED
Status: DISCONTINUED | OUTPATIENT
Start: 2025-08-13 | End: 2025-08-13 | Stop reason: HOSPADM

## 2025-08-13 RX ORDER — OXYCODONE HYDROCHLORIDE 5 MG/1
5-10 TABLET ORAL EVERY 4 HOURS PRN
Start: 2025-08-13

## 2025-08-13 RX ORDER — AMOXICILLIN 250 MG
1 CAPSULE ORAL 2 TIMES DAILY
Start: 2025-08-13

## 2025-08-13 RX ADMIN — ACETAMINOPHEN 650 MG: 325 TABLET ORAL at 02:45

## 2025-08-13 RX ADMIN — ACETAMINOPHEN 650 MG: 325 TABLET ORAL at 09:26

## 2025-08-13 RX ADMIN — DOCUSATE SODIUM AND SENNOSIDES 1 TABLET: 8.6; 5 TABLET, FILM COATED ORAL at 09:26

## 2025-08-13 RX ADMIN — EZETIMIBE 10 MG: 10 TABLET ORAL at 09:26

## 2025-08-13 RX ADMIN — ACETAMINOPHEN 650 MG: 325 TABLET ORAL at 15:16

## 2025-08-13 RX ADMIN — CEFTAZIDIME 2 G: 2 INJECTION, POWDER, FOR SOLUTION INTRAVENOUS at 06:24

## 2025-08-13 RX ADMIN — METOPROLOL SUCCINATE 25 MG: 25 TABLET, EXTENDED RELEASE ORAL at 09:26

## 2025-08-13 RX ADMIN — APIXABAN 2.5 MG: 2.5 TABLET, FILM COATED ORAL at 09:26

## 2025-08-13 RX ADMIN — POLYETHYLENE GLYCOL 3350 17 G: 17 POWDER, FOR SOLUTION ORAL at 09:26

## 2025-08-13 RX ADMIN — DEXAMETHASONE SODIUM PHOSPHATE 8 MG: 4 INJECTION, SOLUTION INTRA-ARTICULAR; INTRALESIONAL; INTRAMUSCULAR; INTRAVENOUS; SOFT TISSUE at 06:25

## 2025-08-13 RX ADMIN — KETOROLAC TROMETHAMINE 15 MG: 15 INJECTION, SOLUTION INTRAMUSCULAR; INTRAVENOUS at 02:45

## 2025-08-13 RX ADMIN — OXYCODONE HYDROCHLORIDE 5 MG: 5 TABLET ORAL at 02:53

## 2025-08-13 RX ADMIN — CEFTAZIDIME 2 G: 2 INJECTION, POWDER, FOR SOLUTION INTRAVENOUS at 15:17

## 2025-08-13 RX ADMIN — OXYCODONE HYDROCHLORIDE 5 MG: 5 TABLET ORAL at 15:16

## (undated) DEVICE — SUCTION 18FR FRAZIER DISPOSABLE

## (undated) DEVICE — GAUZE SPONGES,16 PLY: Brand: CURITY

## (undated) DEVICE — SYRINGE 3ML LL

## (undated) DEVICE — SYRINGE 10ML LL CONTROL TOP

## (undated) DEVICE — DRESSING XEROFORM 5 X 9

## (undated) DEVICE — TUBING SUCTION 5MM X 12 FT

## (undated) DEVICE — BANDAGE ROLL,100% COTTON, 6 PLY, LARGE: Brand: KERLIX

## (undated) DEVICE — BAG DRAINAGE URINARY W TOWER

## (undated) DEVICE — STERILE POLYISOPRENE POWDER-FREE SURGICAL GLOVES: Brand: PROTEXIS

## (undated) DEVICE — SUT FIBERWIRE #2 1/2 CIRCLE T-5 38IN AR-7200

## (undated) DEVICE — PROXIMATE SKIN STAPLERS (35 WIDE) CONTAINS 35 STAINLESS STEEL STAPLES (FIXED HEAD): Brand: PROXIMATE

## (undated) DEVICE — ACE WRAP 4 IN UNSTERILE

## (undated) DEVICE — BETHLEHEM UNIVERSAL  MIONR EXT: Brand: CARDINAL HEALTH

## (undated) DEVICE — SPECIMEN CONTAINER STERILE PEEL PACK

## (undated) DEVICE — SUPER SPONGES,MEDIUM: Brand: DERMACEA

## (undated) DEVICE — LAMINECTOMY ARM CRADLE FOAM POSITIONER: Brand: CARDINAL HEALTH

## (undated) DEVICE — 4-PORT MANIFOLD: Brand: NEPTUNE 2

## (undated) DEVICE — NEEDLE 25G X 1 1/2

## (undated) DEVICE — KERLIX BANDAGE ROLL: Brand: KERLIX

## (undated) DEVICE — SOLN IRRIG STER WATER 1000ML

## (undated) DEVICE — NEEDLE BLUNT 18 G X 1 1/2IN

## (undated) DEVICE — WRAP COBAN LATEX FREE 6IN STERILE

## (undated) DEVICE — PAD GROUNDING ADULT

## (undated) DEVICE — CUFF TOURNIQUET DISP SZ34

## (undated) DEVICE — SYRINGE 30ML LL

## (undated) DEVICE — U-DRAPE: Brand: CONVERTORS

## (undated) DEVICE — SOLN IRRIG .9%SOD 1000ML

## (undated) DEVICE — SCD SEQUENTIAL COMPRESSION COMFORT SLEEVE LARGE KNEE LENGTH: Brand: KENDALL SCD

## (undated) DEVICE — MAYO STAND COVER: Brand: CONVERTORS

## (undated) DEVICE — NDL CNTR 20CT FM MAG: Brand: MEDLINE INDUSTRIES, INC.

## (undated) DEVICE — TUBING SMOKE EVAC PENCIL COATED

## (undated) DEVICE — SPONGE LAP STERILE 18X18

## (undated) DEVICE — SUT VICRYL 4-0 PS-2 27 IN J426H

## (undated) DEVICE — PREP PAD BNS: Brand: CONVERTORS

## (undated) DEVICE — DRESSING PREVENA PLUS 20CM PEEL

## (undated) DEVICE — SYRINGE 5ML LL

## (undated) DEVICE — ILLUMINATOR LIGHT MAT ULTRATHIN

## (undated) DEVICE — BULB SYRINGE,IRRIGATION WITH PROTECTIVE CAP: Brand: DOVER

## (undated) DEVICE — BANDAGE ACE VELCRO 4 IN LF

## (undated) DEVICE — Device

## (undated) DEVICE — PADDING CAST 6IN COTTON STRL

## (undated) DEVICE — STAPLER SKIN APPOSE

## (undated) DEVICE — SUT VICRYL 3-0 SH 27 IN J416H

## (undated) DEVICE — SUT ETHILON 3-0 PS-1 18 IN 1663H

## (undated) DEVICE — STOCKINETTE: Brand: DEROYAL

## (undated) DEVICE — INTENDED FOR TISSUE SEPARATION, AND OTHER PROCEDURES THAT REQUIRE A SHARP SURGICAL BLADE TO PUNCTURE OR CUT.: Brand: BARD-PARKER SAFETY BLADES SIZE 10, STERILE

## (undated) DEVICE — ACE WRAP 6 IN STERILE

## (undated) DEVICE — JP PERF DRN SIL FLT 10MM FULL: Brand: CARDINAL HEALTH

## (undated) DEVICE — ACE WRAP 6 IN UNSTERILE

## (undated) DEVICE — VESSEL LOOP MAXI RED

## (undated) DEVICE — SURGICAL CLIPPER BLADE GENERAL USE

## (undated) DEVICE — ABDOMINAL PAD: Brand: DERMACEA

## (undated) DEVICE — ASTOUND STANDARD SURGICAL GOWN, XL: Brand: CONVERTORS

## (undated) DEVICE — 3000CC GUARDIAN II: Brand: GUARDIAN

## (undated) DEVICE — CLIP HORIZON SM RED

## (undated) DEVICE — CURITY NON-ADHERENT STRIPS: Brand: CURITY

## (undated) DEVICE — DRAPE-U NON-STERILE

## (undated) DEVICE — SCD SEQUENTIAL COMPRESSION COMFORT SLEEVE MEDIUM KNEE LENGTH: Brand: KENDALL SCD

## (undated) DEVICE — NEEDLE DISP HYPO 21GX1 1/2IN

## (undated) DEVICE — CABLE BIPOLAR DISP MEGADYNE

## (undated) DEVICE — GLOVE SRG BIOGEL 7.5

## (undated) DEVICE — 3M™ IOBAN™ 2 ANTIMICROBIAL INCISE DRAPE 6650EZ: Brand: IOBAN™ 2

## (undated) DEVICE — CATH FOLEY 16FR 5ML 2 WAY UNCOATED SILICONE

## (undated) DEVICE — SUT 2 FIBERLOOP AR-7234

## (undated) DEVICE — WEBRIL 6 IN UNSTERILE

## (undated) DEVICE — POSITION CUSHION INSERT LARGE PRONEVIEW

## (undated) DEVICE — SYRINGE 10ML LL

## (undated) DEVICE — CLIPS HORIZON MEDIUM BLUE

## (undated) DEVICE — DRAPE IOBAN

## (undated) DEVICE — SUT SILK 3-0 FS-1 684G

## (undated) DEVICE — 3M™ TEGADERM™ TRANSPARENT FILM DRESSING FRAME STYLE, 1628, 6 IN X 8 IN (15 CM X 20 CM), 10/CT 8CT/CASE: Brand: 3M™ TEGADERM™

## (undated) DEVICE — IV CATH 18 G X 1.16 IN

## (undated) DEVICE — PROXIMATE PLUS MD MULTI-DIRECTIONAL RELEASE SKIN STAPLERS CONTAINS 35 STAINLESS STEEL STAPLES APPROXIMATE CLOSED DIMENSIONS: 6.9MM X 3.9MM WIDE: Brand: PROXIMATE

## (undated) DEVICE — GAUZE SPONGES,USP TYPE VII GAUZE, 12 PLY: Brand: CURITY

## (undated) DEVICE — BASIC PACK: Brand: CONVERTORS

## (undated) DEVICE — SKIN STAPLER LOADING UNIT

## (undated) DEVICE — DRAPE U/ SHT SPLIT PLASTIC ST 24/CS

## (undated) DEVICE — SLEEVE SCD COMFORT KNEE LENGTH MED

## (undated) DEVICE — SUT VICRYL 4-0 RB-1 27 IN J214H

## (undated) DEVICE — SUT VICRYL UNDYED 2-0 FSL 27IN J589H

## (undated) DEVICE — BOWL: 16OZ PEELPOUCH 75/CS 16/PLT: Brand: MEDEGEN MEDICAL PRODUCTS, LLC

## (undated) DEVICE — SUPER SPONGES,MEDIUM: Brand: KERLIX

## (undated) DEVICE — DRAPE C-ARM X-RAY

## (undated) DEVICE — DRAPE OPMIDRAPE PLASTIC SCOPE

## (undated) DEVICE — 1.5 TO 1 DERMACARRIER: Brand: MESHGRAFTTM  II TISSUE EXPANSION SYSTEM

## (undated) DEVICE — ULNAR NERVE PROTECTOR FOAM POSITIONER: Brand: CARDINAL HEALTH

## (undated) DEVICE — ACE WRAP 4 IN STERILE

## (undated) DEVICE — KIT BIO-TENODESIS BIOABS

## (undated) DEVICE — CHLORAPREP HI-LITE 26ML ORANGE

## (undated) DEVICE — SOLUTION PROV-IODINE .75 OZ

## (undated) DEVICE — DRAPE SHEET THREE QUARTER

## (undated) DEVICE — RENTAL PROBE ULTRASOUND DROP IN BK5000

## (undated) DEVICE — NEEDLE SPINAL 22G X 3.5IN  QUINCKE

## (undated) DEVICE — COTTON BALLS: Brand: DEROYAL

## (undated) DEVICE — STANDARD SURGICAL GOWN, L: Brand: CONVERTORS

## (undated) DEVICE — TONGUE DEPRESSOR STERILE

## (undated) DEVICE — BANDAGE, ESMARK LF STR 6"X9' (20/CS): Brand: CYPRESS

## (undated) DEVICE — LUBRICANT SURGILUBE TUBE 4 OZ  FLIP TOP

## (undated) DEVICE — SUTURE STRATAFIX 3-0 60CM MONOCRYL LUS UNDYED

## (undated) DEVICE — SUT PROLENE 4-0 PS-2 18 IN 8682H

## (undated) DEVICE — SUT ETHILON 3-0 PS-1 18 IN 1663G

## (undated) DEVICE — BRUSH EZ SCRUB PCMX W/NAIL CLEANER

## (undated) DEVICE — STOCKINETTE 2P PREROLLD 6X60

## (undated) DEVICE — INTENDED FOR TISSUE SEPARATION, AND OTHER PROCEDURES THAT REQUIRE A SHARP SURGICAL BLADE TO PUNCTURE OR CUT.: Brand: BARD-PARKER SAFETY BLADES SIZE 15, STERILE

## (undated) DEVICE — DRM5 9-0 BK MONO NYL 5"/13 CM: Brand: DRM5 9-0 BK MONO NYL 5"/13 CM

## (undated) DEVICE — DRAPE TOWEL 17X23 NON-STERILE

## (undated) DEVICE — BLADE ELECTRO MODEL B DISP

## (undated) DEVICE — STOCKINETTE REGULAR

## (undated) DEVICE — ULTRASOUND GEL STERILE FOIL PK

## (undated) DEVICE — DRM6 8-0 BK MONO NYL 5"/13 CM: Brand: DRM6 8-0 BK MONO NYL 5"/13 CM

## (undated) DEVICE — PREVENA PLUS 125 PUMP 14 DAY 150ML NPO ONLY

## (undated) DEVICE — NEEDLE 18 G X 1 1/2

## (undated) DEVICE — SUT ETHILON 4-0 PS-2 18 IN 1667H

## (undated) DEVICE — DERMACARRIERS II RATIO 1.5:1  SKIN GRAFT CARRIER

## (undated) DEVICE — UNIVERSAL  MINOR EXTREMITY PK: Brand: CARDINAL HEALTH

## (undated) DEVICE — 3M™ IOBAN™ 2 ANTIMICROBIAL INCISE DRAPE 6640EZ: Brand: IOBAN™ 2

## (undated) DEVICE — CRADLE EXTREMITY UNIVERSAL CONTOURED

## (undated) DEVICE — COTTON TIP APPLICTOR 2 PK

## (undated) DEVICE — SUT VICRYL 2-0 CT-2 27 IN J269H

## (undated) DEVICE — PREMIUM DRY TRAY LF: Brand: MEDLINE INDUSTRIES, INC.

## (undated) DEVICE — GARMENT,MEDLINE,DVT,INT,CALF,FOAM,LG: Brand: MEDLINE

## (undated) DEVICE — EMERALD TOP SHEET DRAPE: Brand: CONVERTORS

## (undated) DEVICE — HOOD T7 PLUS W/PEEL AWAY SHIELD

## (undated) DEVICE — SUT SILK 0 FSL 18 IN 678G

## (undated) DEVICE — TIBURON SPLIT SHEET: Brand: CONVERTORS

## (undated) DEVICE — STERILE POLYISOPRENE POWDER-FREE SURGICAL GLOVES WITH EMOLLIENT COATING: Brand: PROTEXIS

## (undated) DEVICE — DRAPE 3/4 REINFORCED 53X77 20/CS

## (undated) DEVICE — SINGLE PORT MANIFOLD: Brand: NEPTUNE 2

## (undated) DEVICE — GOWN SIRUS FABRNF RAGLAN XL ST 28/CS

## (undated) DEVICE — 10FR FRAZIER SUCTION HANDLE: Brand: CARDINAL HEALTH

## (undated) DEVICE — PAD GROUND ELECTROSURGICAL W/CORD

## (undated) DEVICE — APPLICATOR CHLORAPREP 26ML ORANGE TINT

## (undated) DEVICE — BLADE SAGITTAL #127 STABLECUT

## (undated) DEVICE — MAX-CORE® DISPOSABLE CORE BIOPSY INSTRUMENT, 18G X 20CM: Brand: MAX-CORE

## (undated) DEVICE — SET HANDPIECE INTERPULSE

## (undated) DEVICE — SUTURE VICRYL PLUS 2-0 VCP869H

## (undated) DEVICE — DRAPE C-ARMOUR

## (undated) DEVICE — SKIN MARKER DUAL TIP WITH RULER CAP, FLEXIBLE RULER AND LABELS: Brand: DEVON

## (undated) DEVICE — TELFA NON-ADHERENT ABSORBENT DRESSING: Brand: TELFA

## (undated) DEVICE — SUTURE VICRYL PLUS 1 UNDYED 1X27" CP

## (undated) DEVICE — BAG DECANTER

## (undated) DEVICE — SPLINT ORTHO-GLASS 4IN X 15FT

## (undated) DEVICE — WIPES INSTRUMENT EYE DRAIN

## (undated) DEVICE — CUFF TOURNIQUET 34 X 4 IN QUICK CONNECT DISP 1BLA

## (undated) DEVICE — DRAPE POUCH IRRIGATION

## (undated) DEVICE — SPONGE LAP 18 X 18 IN STRL RFD

## (undated) DEVICE — POV-IOD SOLUTION 4OZ BT

## (undated) DEVICE — PENROSE DRAIN, 18 X 3 8: Brand: CARDINAL HEALTH

## (undated) DEVICE — UNDERGLOVE PROTEXIS  BLUE SZ 7

## (undated) DEVICE — PENCIL ELECTROSURG E-Z CLEAN -0035H

## (undated) DEVICE — ELECTRODE EZ CLEAN BLADE -0012

## (undated) DEVICE — STERILE (2 X 30CM) LATEX COVER: Brand: PROCOVERS™

## (undated) DEVICE — GLOVE INDICATOR PI UNDERGLOVE SZ 7 BLUE

## (undated) DEVICE — GENERAL ORTHOPEDIC PACK: Brand: CONVERTORS

## (undated) DEVICE — RULER 4022800 10PK MICRO-GRID GREEN SIL

## (undated) DEVICE — CAST PADDING 4 IN SYNTHETIC NON-STRL

## (undated) DEVICE — HOOD T7 PLUS

## (undated) DEVICE — 3M™ STERI-DRAPE™ INCISE DRAPE 1040 (35CM X 35CM): Brand: STERI-DRAPE™

## (undated) DEVICE — PACK TOTAL JOINT

## (undated) DEVICE — GLOVE SZ 8 LINER PROTEXIS PI BL

## (undated) DEVICE — COVER LIGHTHANDLE STERILE BLUE

## (undated) DEVICE — TABLE COVER: Brand: CONVERTORS

## (undated) DEVICE — FORMALIN PRE-FILLED 10 PCT PROSTATE BIOPSY

## (undated) DEVICE — GLOVE INDICATOR PI UNDERGLOVE SZ 7.5 BLUE

## (undated) DEVICE — SYRINGE DISP LUER-LOK 30 CC

## (undated) DEVICE — PADDING CAST 4 IN  COTTON STRL

## (undated) DEVICE — SUTURE STRATAFIX PDO 1 CTX TAPER 36CM

## (undated) DEVICE — PISTOL GRIP SKIN STAPLER: Brand: MULTIFIRE PREMIUM

## (undated) DEVICE — SYSTEM TRANSPERINEAL ACCESS PRECISIONPOINT

## (undated) DEVICE — NEEDLE 18 G X 1 1/2 SAFETY

## (undated) DEVICE — CATH SECURE FOLEY

## (undated) DEVICE — SUT VICRYL 3-0 CT-2 27 IN J232H

## (undated) DEVICE — 3 TO 1 DERMACARRIER: Brand: MESHGRAFTTM II TISSUE EXPANSION SYSTEM

## (undated) DEVICE — JACKSON-PRATT 100CC BULB RESERVOIR: Brand: CARDINAL HEALTH

## (undated) DEVICE — BLANKET UPPER BODY BAIR HUGGER

## (undated) DEVICE — HOLDER PROBE URONAV BK8848

## (undated) DEVICE — DRESSING OPTIFOAM 4 X 10IN POST-OP

## (undated) DEVICE — UTILITY MARKER,BLACK WITH LABELS: Brand: DEVON

## (undated) DEVICE — GLOVE SURG DERMASSURE LF 6.5

## (undated) DEVICE — PAD CAST 4 IN COTTON NON STERILE

## (undated) DEVICE — INTENDED FOR TISSUE SEPARATION, AND OTHER PROCEDURES THAT REQUIRE A SHARP SURGICAL BLADE TO PUNCTURE OR CUT.: Brand: BARD-PARKER ® CARBON RIB-BACK BLADES

## (undated) DEVICE — ADHESIVE SKIN DERMABOND ADVANCED 0.7ML

## (undated) DEVICE — SUT SILK 2-0 FS 18 IN 685G

## (undated) DEVICE — VEST STERILE

## (undated) DEVICE — SYRINGE 50ML LL

## (undated) DEVICE — MANIFOLD FOUR PORT NEPTUNE

## (undated) DEVICE — GLOVE SZ 7.5 PROTEXIS PI